# Patient Record
Sex: MALE | Race: WHITE | Employment: OTHER | URBAN - METROPOLITAN AREA
[De-identification: names, ages, dates, MRNs, and addresses within clinical notes are randomized per-mention and may not be internally consistent; named-entity substitution may affect disease eponyms.]

---

## 2017-01-17 ENCOUNTER — APPOINTMENT (OUTPATIENT)
Dept: GENERAL RADIOLOGY | Age: 82
End: 2017-01-17
Attending: PHYSICIAN ASSISTANT
Payer: MEDICARE

## 2017-01-17 ENCOUNTER — HOSPITAL ENCOUNTER (EMERGENCY)
Age: 82
Discharge: HOME | End: 2017-01-17
Attending: EMERGENCY MEDICINE
Payer: MEDICARE

## 2017-01-17 ENCOUNTER — APPOINTMENT (OUTPATIENT)
Dept: CT IMAGING | Age: 82
End: 2017-01-17
Attending: PHYSICIAN ASSISTANT
Payer: MEDICARE

## 2017-01-17 VITALS
WEIGHT: 195 LBS | HEIGHT: 68 IN | RESPIRATION RATE: 16 BRPM | HEART RATE: 71 BPM | OXYGEN SATURATION: 98 % | TEMPERATURE: 97.6 F | BODY MASS INDEX: 29.55 KG/M2 | SYSTOLIC BLOOD PRESSURE: 156 MMHG | DIASTOLIC BLOOD PRESSURE: 80 MMHG

## 2017-01-17 DIAGNOSIS — T50.905A MEDICATION SIDE EFFECT, INITIAL ENCOUNTER: ICD-10-CM

## 2017-01-17 DIAGNOSIS — R11.0 NAUSEA ALONE: ICD-10-CM

## 2017-01-17 DIAGNOSIS — R42 DIZZINESS: Primary | ICD-10-CM

## 2017-01-17 LAB
ALBUMIN SERPL BCP-MCNC: 3 G/DL (ref 3.4–5)
ALBUMIN/GLOB SERPL: 0.8 {RATIO} (ref 0.8–1.7)
ALP SERPL-CCNC: 109 U/L (ref 45–117)
ALT SERPL-CCNC: 26 U/L (ref 16–61)
ANION GAP BLD CALC-SCNC: 9 MMOL/L (ref 3–18)
APPEARANCE UR: CLEAR
AST SERPL W P-5'-P-CCNC: 24 U/L (ref 15–37)
ATRIAL RATE: 214 BPM
BASOPHILS # BLD AUTO: 0 K/UL (ref 0–0.06)
BASOPHILS # BLD: 0 % (ref 0–2)
BILIRUB SERPL-MCNC: 0.3 MG/DL (ref 0.2–1)
BILIRUB UR QL: NEGATIVE
BUN SERPL-MCNC: 21 MG/DL (ref 7–18)
BUN/CREAT SERPL: 17 (ref 12–20)
CALCIUM SERPL-MCNC: 8.2 MG/DL (ref 8.5–10.1)
CALCULATED P AXIS, ECG09: 30 DEGREES
CALCULATED R AXIS, ECG10: -2 DEGREES
CALCULATED T AXIS, ECG11: 6 DEGREES
CHLORIDE SERPL-SCNC: 104 MMOL/L (ref 100–108)
CK MB CFR SERPL CALC: 2.1 % (ref 0–4)
CK MB SERPL-MCNC: 1.4 NG/ML (ref 0.5–3.6)
CK SERPL-CCNC: 67 U/L (ref 39–308)
CO2 SERPL-SCNC: 30 MMOL/L (ref 21–32)
COLOR UR: YELLOW
CREAT SERPL-MCNC: 1.25 MG/DL (ref 0.6–1.3)
DIAGNOSIS, 93000: NORMAL
DIFFERENTIAL METHOD BLD: ABNORMAL
EOSINOPHIL # BLD: 0.2 K/UL (ref 0–0.4)
EOSINOPHIL NFR BLD: 3 % (ref 0–5)
ERYTHROCYTE [DISTWIDTH] IN BLOOD BY AUTOMATED COUNT: 15.3 % (ref 11.6–14.5)
GLOBULIN SER CALC-MCNC: 3.6 G/DL (ref 2–4)
GLUCOSE SERPL-MCNC: 216 MG/DL (ref 74–99)
GLUCOSE UR STRIP.AUTO-MCNC: NEGATIVE MG/DL
HCT VFR BLD AUTO: 37.4 % (ref 36–48)
HGB BLD-MCNC: 12.3 G/DL (ref 13–16)
HGB UR QL STRIP: NEGATIVE
KETONES UR QL STRIP.AUTO: NEGATIVE MG/DL
LEUKOCYTE ESTERASE UR QL STRIP.AUTO: NEGATIVE
LYMPHOCYTES # BLD AUTO: 30 % (ref 21–52)
LYMPHOCYTES # BLD: 1.7 K/UL (ref 0.9–3.6)
MCH RBC QN AUTO: 27.9 PG (ref 24–34)
MCHC RBC AUTO-ENTMCNC: 32.9 G/DL (ref 31–37)
MCV RBC AUTO: 84.8 FL (ref 74–97)
MONOCYTES # BLD: 0.3 K/UL (ref 0.05–1.2)
MONOCYTES NFR BLD AUTO: 6 % (ref 3–10)
NEUTS SEG # BLD: 3.5 K/UL (ref 1.8–8)
NEUTS SEG NFR BLD AUTO: 61 % (ref 40–73)
NITRITE UR QL STRIP.AUTO: NEGATIVE
PH UR STRIP: 5 [PH] (ref 5–8)
PLATELET # BLD AUTO: 207 K/UL (ref 135–420)
PMV BLD AUTO: 9.4 FL (ref 9.2–11.8)
POTASSIUM SERPL-SCNC: 3.8 MMOL/L (ref 3.5–5.5)
PROT SERPL-MCNC: 6.6 G/DL (ref 6.4–8.2)
PROT UR STRIP-MCNC: NEGATIVE MG/DL
Q-T INTERVAL, ECG07: 426 MS
QRS DURATION, ECG06: 84 MS
QTC CALCULATION (BEZET), ECG08: 435 MS
RBC # BLD AUTO: 4.41 M/UL (ref 4.7–5.5)
SODIUM SERPL-SCNC: 143 MMOL/L (ref 136–145)
SP GR UR REFRACTOMETRY: 1.03 (ref 1–1.03)
TROPONIN I SERPL-MCNC: <0.02 NG/ML (ref 0–0.06)
UROBILINOGEN UR QL STRIP.AUTO: 1 EU/DL (ref 0.2–1)
VENTRICULAR RATE, ECG03: 63 BPM
WBC # BLD AUTO: 5.7 K/UL (ref 4.6–13.2)

## 2017-01-17 PROCEDURE — 71020 XR CHEST PA LAT: CPT

## 2017-01-17 PROCEDURE — 85025 COMPLETE CBC W/AUTO DIFF WBC: CPT | Performed by: PHYSICIAN ASSISTANT

## 2017-01-17 PROCEDURE — 93005 ELECTROCARDIOGRAM TRACING: CPT

## 2017-01-17 PROCEDURE — 82550 ASSAY OF CK (CPK): CPT | Performed by: PHYSICIAN ASSISTANT

## 2017-01-17 PROCEDURE — 96360 HYDRATION IV INFUSION INIT: CPT

## 2017-01-17 PROCEDURE — 80053 COMPREHEN METABOLIC PANEL: CPT | Performed by: PHYSICIAN ASSISTANT

## 2017-01-17 PROCEDURE — 74011250636 HC RX REV CODE- 250/636: Performed by: PHYSICIAN ASSISTANT

## 2017-01-17 PROCEDURE — 70450 CT HEAD/BRAIN W/O DYE: CPT

## 2017-01-17 PROCEDURE — 81003 URINALYSIS AUTO W/O SCOPE: CPT | Performed by: PHYSICIAN ASSISTANT

## 2017-01-17 PROCEDURE — 99284 EMERGENCY DEPT VISIT MOD MDM: CPT

## 2017-01-17 RX ORDER — OXYBUTYNIN CHLORIDE 5 MG/1
5 TABLET ORAL
COMMUNITY

## 2017-01-17 RX ORDER — ONDANSETRON 4 MG/1
4 TABLET, ORALLY DISINTEGRATING ORAL
Qty: 20 TAB | Refills: 0 | Status: ON HOLD | OUTPATIENT
Start: 2017-01-17 | End: 2017-09-26

## 2017-01-17 RX ORDER — ONDANSETRON 4 MG/1
4 TABLET, ORALLY DISINTEGRATING ORAL
Qty: 20 TAB | Refills: 0 | Status: SHIPPED | OUTPATIENT
Start: 2017-01-17 | End: 2018-03-13

## 2017-01-17 RX ORDER — DONEPEZIL HYDROCHLORIDE 5 MG/1
5 TABLET, FILM COATED ORAL
COMMUNITY
End: 2018-06-11

## 2017-01-17 RX ADMIN — SODIUM CHLORIDE 250 ML: 9 INJECTION, SOLUTION INTRAVENOUS at 15:16

## 2017-01-17 NOTE — ED NOTES
Bedside report complete. Patient was introduced to oncsanjay Lara RN. Patient updated on plan of care. Safety check performed: Bed locked in low posiiton. Side rails up. Call bell and personal items within reach. Daughter at bedside.

## 2017-01-17 NOTE — ED PROVIDER NOTES
HPI Comments:   3:01 PM     Gabriel Rios. is a 80 y.o. male who presents to ED c/o dizziness onset 10 days ago  Associated symptoms include nausea/diarrhea and generalized weakness. He states he feels like he is having \"motion sickness. \" Pt reports his dizziness is fine when he is lying down but is exacerbated by being up/moving around. Pt reports he recently started on Cardizem 10 days ago by his cardiologist. 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 Pt denies chest pain, headache, generalized body aches, vomiting, any pain, syncope, weakness, sick contacts, hx of vertigo (does say he has motion sickness), and any other symptoms or complaints. Patient is a 80 y.o. male presenting with dizziness. The history is provided by the patient. No  was used. Dizziness   This is a new problem. The current episode started more than 1 week ago. The problem has not changed since onset. Associated symptoms include nausea. Pertinent negatives include no chest pain, no vomiting and no headaches.         Past Medical History:   Diagnosis Date    Arthritis     Atrial fibrillation ([address]     Depression     Diabetes ([address] 1980s    GERD (gastroesophageal reflux disease)     Hypercholesterolemia     Hypertension      patient denies     Ulcer ([address]      at the anastomotic bite of gastric bypass    Varicose veins     Wide-complex tachycardia ([address] 5/2/2015       Past Surgical History:   Procedure Laterality Date    Endoscopy, colon, diagnostic      Egd  12/29/09     with biopsy    Biopsy liver  10/05/04    Gastrostomy tube  10/05/04    Pr abdomen surgery proc unlisted  1998     umb hernia Rupture    Hx cholecystectomy  10/05/04    Hx gi  10/05/04     vertical banded gastroplasty/gastric bypass    Hx mohs procedure  1991/1995     bilateral         Family History:   Problem Relation Age of Onset    Heart Attack Father     Diabetes Father     Hypertension Father     Cancer Mother      lung  Diabetes Mother     Other Brother      obese    Diabetes Brother     Diabetes Brother     Other Maternal Grandmother      obese    Diabetes Sister        Social History     Social History    Marital status:      Spouse name: N/A    Number of children: N/A    Years of education: N/A     Occupational History    Not on file. Social History Main Topics    Smoking status: Former Smoker    Smokeless tobacco: Not on file    Alcohol use 1.8 oz/week     1 Glasses of wine, 1 Cans of beer, 1 Shots of liquor per week      Comment: yearly    Drug use: No    Sexual activity: Not on file     Other Topics Concern    Not on file     Social History Narrative         ALLERGIES: Review of patient's allergies indicates no known allergies. Review of Systems   Constitutional:        Negative generalized body aches. Cardiovascular: Negative for chest pain. Gastrointestinal: Positive for diarrhea and nausea. Negative for vomiting. Neurological: Positive for dizziness. Negative for weakness and headaches. All other systems reviewed and are negative. Vitals:    01/17/17 1650 01/17/17 1700 01/17/17 1715 01/17/17 1733   BP:  146/82 135/87 156/80   Pulse:       Resp:       Temp:       SpO2: 98% 97% 96% 98%   Weight:       Height:                Physical Exam   Constitutional: He is oriented to person, place, and time. He appears well-developed and well-nourished. No distress. HENT:   Head: Normocephalic and atraumatic. Right Ear: External ear normal.   Left Ear: External ear normal.   Nose: Nose normal.   Mouth/Throat: Oropharynx is clear and moist.   Eyes: Conjunctivae and EOM are normal. Pupils are equal, round, and reactive to light. Neck: Normal range of motion. Neck supple. Cardiovascular: Normal rate and regular rhythm. Pulmonary/Chest: Effort normal and breath sounds normal. He has no wheezes. Abdominal: Soft. Bowel sounds are normal. He exhibits no distension.  There is no tenderness. There is no rebound and no guarding. Musculoskeletal: Normal range of motion. He exhibits no edema or tenderness. Neurological: He is alert and oriented to person, place, and time. He has normal strength. No cranial nerve deficit or sensory deficit. Coordination and gait normal.   Skin: Skin is warm and dry. Psychiatric: He has a normal mood and affect. His behavior is normal.   Nursing note and vitals reviewed. RESULTS:    EKG interpretation: (Preliminary)  14:48   Atrial flutter with variable AV block, 63 bpm, no change from previous 12/27/16  EKG read by Shawn Shaffer PA-C    CT HEAD WO CONT   Final Result  IMPRESSION:        1. No acute intracranial abnormality. Of note, noncontrast head CT can be normal  in the context of early acute stroke. 2. Mild periventricular white matter hypoattenuation, an unchanged and  nonspecific finding likely pertaining to chronic ischemic microvascular change. As read by the radiologist.       XR CHEST PA LAT   Final Result  IMPRESSION:     1. Right mid and lower lung field strandy opacity with associated right pleural  effusion, recommend clinical correlation for pneumonia. Small left pleural  effusion. Mild cardiomegaly. As read by the radiologist.            Labs Reviewed   CBC WITH AUTOMATED DIFF - Abnormal; Notable for the following:        Result Value    RBC 4.41 (*)     HGB 12.3 (*)     RDW 15.3 (*)     All other components within normal limits   METABOLIC PANEL, COMPREHENSIVE - Abnormal; Notable for the following:     Glucose 216 (*)     BUN 21 (*)     GFR est non-AA 56 (*)     Calcium 8.2 (*)     Albumin 3.0 (*)     All other components within normal limits   CARDIAC PANEL,(CK, CKMB & TROPONIN)   URINALYSIS W/ RFLX MICROSCOPIC       No results found for this or any previous visit (from the past 12 hour(s)).      MDM  Number of Diagnoses or Management Options     Amount and/or Complexity of Data Reviewed  Clinical lab tests: ordered and reviewed  Tests in the radiology section of CPT®: ordered and reviewed (CT head, CXR)  Tests in the medicine section of CPT®: ordered and reviewed (EKG)  Review and summarize past medical records: yes (Upon review of pt's chart, pt had admission on 12/27/16 for SOB and pneumonia. He was evalauted by cardiology. The only new medications that were started were Xarelto. The plan was to discharge pt and to have NAOMI guided cardioversion after 3-4 weeks of oral anticoagulation. )  Independent visualization of images, tracings, or specimens: yes (EKG, CXR )      ED Course     MEDICATIONS GIVEN:  Medications   sodium chloride 0.9 % bolus infusion 250 mL (0 mL IntraVENous IV Completed 1/17/17 0136)          Procedures      PROGRESS NOTE:  2:51 PM  Initial assessment performed. PROGRESS NOTE:   5:36 PM  Pt feeling better. He is up and ambulatory to the restroom without any assistance. Family at bedside. Long discussion with pt and family regarding possibility of dehydration versus medication side effect versus vertigo. He has an appointment with cardiology in 3 days to go over medications and undergo cardioversion for atrial flutter. His diagnostics all within normal limits, vital signs stable and suitable for discharge home. Pt and family agree with plan. Written by SAI Robles, as dictated by Kaushal Reddy PA-C .        DISCHARGE NOTE:  5:37 PM   Johanna Robertson Jr.'s  results have been reviewed with him. He has been counseled regarding his diagnosis, treatment, and plan. He verbally conveys understanding and agreement of the signs, symptoms, diagnosis, treatment and prognosis and additionally agrees to follow up as discussed. He also agrees with the care-plan and conveys that all of his questions have been answered.   I have also provided discharge instructions for him that include: educational information regarding their diagnosis and treatment, and list of reasons why they would want to return to the ED prior to their follow-up appointment, should his condition change. The patient and/or family has been provided with education for proper Emergency Department utilization. CLINICAL IMPRESSION:    1. Dizziness    2. Nausea alone    3. Medication side effect, initial encounter, possible        PLAN: DISCHARGE HOME    Follow-up Information     Follow up With Details Comments [address], [place] Schedule an appointment as soon as possible for a visit in 1 day Follow up with cardiology [address]  [address] [place] [address] [facility] EMERGENCY DEPT Go to As needed, If symptoms worsen [address] [address]  [phone]          Discharge Medication List as of 1/17/2017  5:38 PM      START taking these medications    Details   !! ondansetron (ZOFRAN ODT) 4 mg disintegrating tablet Take 1 Tab by mouth every eight (8) hours as needed for Nausea., Normal, Disp-20 Tab, R-0      !! ondansetron (ZOFRAN ODT) 4 mg disintegrating tablet Take 1 Tab by mouth every eight (8) hours as needed for Nausea., Normal, Disp-20 Tab, R-0       !! - Potential duplicate medications found. Please discuss with provider. CONTINUE these medications which have NOT CHANGED    Details   donepezil (ARICEPT) 5 mg tablet Take 5 mg by mouth nightly., Historical Med      oxybutynin (DITROPAN) 5 mg tablet Take 5 mg by mouth daily. , Historical Med      metoprolol tartrate 75 mg tab Take 75 mg by mouth every twelve (12) hours. , Print, Disp-60 Tab, R-0      rivaroxaban (XARELTO) 20 mg tab tablet Take 1 Tab by mouth daily. , Print, Disp-30 Tab, R-2      traMADol (ULTRAM) 50 mg tablet Take 50 mg by mouth every six (6) hours as needed for Pain., Historical Med      SITagliptin (JANUVIA) 100 mg tablet Take 100 mg by mouth daily. , Historical Med      VITAMIN D2 50,000 unit capsule TAKE 1 CAPSULE TWICE WEEKLY, Normal, Disp-52 Cap, R-0      dilTIAZem CD (CARDIZEM CD) 240 mg ER capsule Take 1 Cap by mouth daily. , Print, Disp-30 Cap, R-0      Omeprazole delayed release (PRILOSEC D/R) 20 mg tablet Take 20 mg by mouth daily. , Historical Med      multivitamin, tx-iron-ca-min (THERA-M W/ IRON) 9 mg iron-400 mcg tab tablet Take 1 Tab by mouth daily. , Historical Med      cholecalciferol (VITAMIN D3) 1,000 unit tablet Take  by mouth daily. , Historical Med      furosemide (LASIX) 20 mg tablet Take 40 mg by mouth daily. , Historical Med      melatonin tab tablet Take  by mouth nightly. Indications: sleep, Historical Med      ACETAMINOPHEN/DIPHENHYDRAMINE (TYLENOL PM PO) Take  by mouth nightly., Historical Med      insulin lispro (HUMALOG) 100 unit/mL injection by SubCUTAneous route. Indications: TYPE 2 DIABETES MELLITUS, sliding scale, Historical Med      insulin glargine (LANTUS) 100 unit/mL injection 12 Units by SubCUTAneous route nightly. Indications: TYPE 2 DIABETES MELLITUS, Historical Med      metFORMIN (GLUCOPHAGE) 500 mg tablet Take 500 mg by mouth nightly. Indications: type 2 diabetes mellitus, Historical Med      buPROPion XL (WELLBUTRIN XL) 150 mg tablet Take 150 mg by mouth every morning. Indications: MAJOR DEPRESSIVE DISORDER, Historical Med      vilazodone (VIIBRYD) 40 mg Tab Take  by mouth daily. Indications: MAJOR DEPRESSIVE DISORDER, Historical Med             ATTESTATIONS:  This note is prepared by Sophia Walters, acting as Scribe for Dmitriy Pop PA-C . Dmitriy Pop PA-C: The scribe's documentation has been prepared under my direction and personally reviewed by me in its entirety. I confirm that the note above accurately reflects all work, treatment, procedures, and medical decision making performed by me.

## 2017-01-17 NOTE — DISCHARGE INSTRUCTIONS
Dizziness: Care Instructions  Your Care Instructions  Dizziness is the feeling of unsteadiness or fuzziness in your head. It is different than having vertigo, which is a feeling that the room is spinning or that you are moving or falling. It is also different from lightheadedness, which is the feeling that you are about to faint. It can be hard to know what causes dizziness. Some people feel dizzy when they have migraine headaches. Sometimes bouts of flu can make you feel dizzy. Some medical conditions, such as heart problems or high blood pressure, can make you feel dizzy. Many medicines can cause dizziness, including medicines for high blood pressure, pain, or anxiety. If a medicine causes your symptoms, your doctor may recommend that you stop or change the medicine. If it is a problem with your heart, you may need medicine to help your heart work better. If there is no clear reason for your symptoms, your doctor may suggest watching and waiting for a while to see if the dizziness goes away on its own. Follow-up care is a key part of your treatment and safety. Be sure to make and go to all appointments, and call your doctor if you are having problems. It's also a good idea to know your test results and keep a list of the medicines you take. How can you care for yourself at home? · If your doctor recommends or prescribes medicine, take it exactly as directed. Call your doctor if you think you are having a problem with your medicine. · Do not drive while you feel dizzy. · Try to prevent falls. Steps you can take include:  ¨ Using nonskid mats, adding grab bars near the tub, and using night-lights. ¨ Clearing your home so that walkways are free of anything you might trip on. ¨ Letting family and friends know that you have been feeling dizzy. This will help them know how to help you. When should you call for help? Call 911 anytime you think you may need emergency care.  For example, call if:  · You passed out (lost consciousness). · You have dizziness along with symptoms of a heart attack. These may include:  ¨ Chest pain or pressure, or a strange feeling in the chest.  ¨ Sweating. ¨ Shortness of breath. ¨ Nausea or vomiting. ¨ Pain, pressure, or a strange feeling in the back, neck, jaw, or upper belly or in one or both shoulders or arms. ¨ Lightheadedness or sudden weakness. ¨ A fast or irregular heartbeat. · You have symptoms of a stroke. These may include:  ¨ Sudden numbness, tingling, weakness, or loss of movement in your face, arm, or leg, especially on only one side of your body. ¨ Sudden vision changes. ¨ Sudden trouble speaking. ¨ Sudden confusion or trouble understanding simple statements. ¨ Sudden problems with walking or balance. ¨ A sudden, severe headache that is different from past headaches. Call your doctor now or seek immediate medical care if:  · You feel dizzy and have a fever, headache, or ringing in your ears. · You have new or increased nausea and vomiting. · Your dizziness does not go away or comes back. Watch closely for changes in your health, and be sure to contact your doctor if:  · You do not get better as expected. Where can you learn more? Go to http://jared-bill.info/. Enter R733 in the search box to learn more about \"Dizziness: Care Instructions. \"  Current as of: May 27, 2016  Content Version: 11.1  © 3770-7447 [employer]. Care instructions adapted under license by [employer] (which disclaims liability or warranty for this information). If you have questions about a medical condition or this instruction, always ask your healthcare professional. [place] [address] any warranty or liability for your use of this information.

## 2017-01-17 NOTE — ED TRIAGE NOTES
Pt states nausea and dizziness, onset 10 days ago, seen by Dr. Bay Schmitt on Friday, discussed current meds, didn't seem to be concerned, pt states received phone call from his nurse to report to the ED for testing, pt states hx of gastric bypass and unable to vomit. C/o diarrhea. Pt states feels like motion sickness, pt also reports this has happened since he has been on Cardizem  Sepsis Screening completed    (  )Patient meets SIRS criteria. (x  )Patient does not meet SIRS criteria.       SIRS Criteria is achieved when two or more of the following are present   Temperature < 96.8°F (36°C) or > 100.9°F (38.3°C)   Heart Rate > 90 beats per minute   Respiratory Rate > 20 beats per minute   WBC count > 12,000 or <4,000 or > 10% bands

## 2017-01-17 NOTE — ED NOTES
RN in room for purpose of hourly rounding. Room checked for trash and safety hazards. Patient is. ..    [  ] seated at bedside. [ x ] lying in bed with side rails up and call bell in reach. [  ] lying in with call bell in reach and continuous monitoring in place. Pain reduction interventions. .. [x  ] not indicated at this time      include the following   [  ] administration of analgesic medications   [  ] lights turned down   [  ] patient offered a cool washcloth   [  ] heat applied   [  ] ice applied   [  ] patient repositioned    Restroom needs addressed. Patient is. ... [x  ] Not in need restroom assistance at this time  [  ] Ambulatory as needed to the restroom  [  ] Assisted to bedside commode  [  ] Assisted with use of bed pan  [  ] Provided with a urinal    Position. .. [x  ] Patient does not require assistance with repositioning  [  ] Patient repositions with assistance of RN  [  ] Patient repositioned with assistance of RN and other ED staff.

## 2017-01-17 NOTE — ED NOTES
Care assumed for discharge only. Pt given discharge instructions and verbalizes understanding. Patient armband removed and shredded. Pt discharged home ambulatory with family, denies complaints on discharge. No distress noted.

## 2017-02-02 ENCOUNTER — APPOINTMENT (OUTPATIENT)
Dept: GENERAL RADIOLOGY | Age: 82
End: 2017-02-02
Attending: EMERGENCY MEDICINE
Payer: MEDICARE

## 2017-02-02 ENCOUNTER — HOSPITAL ENCOUNTER (EMERGENCY)
Age: 82
Discharge: HOME | End: 2017-02-02
Attending: EMERGENCY MEDICINE | Admitting: EMERGENCY MEDICINE
Payer: MEDICARE

## 2017-02-02 VITALS
RESPIRATION RATE: 13 BRPM | TEMPERATURE: 98.2 F | BODY MASS INDEX: 29.1 KG/M2 | SYSTOLIC BLOOD PRESSURE: 152 MMHG | WEIGHT: 192 LBS | HEART RATE: 64 BPM | HEIGHT: 68 IN | OXYGEN SATURATION: 97 % | DIASTOLIC BLOOD PRESSURE: 73 MMHG

## 2017-02-02 DIAGNOSIS — I50.9 CHF (CONGESTIVE HEART FAILURE), NYHA CLASS II, UNSPECIFIED FAILURE CHRONICITY, UNSPECIFIED TYPE (HCC): Primary | ICD-10-CM

## 2017-02-02 LAB
ALBUMIN SERPL BCP-MCNC: 3.1 G/DL (ref 3.4–5)
ALBUMIN/GLOB SERPL: 0.8 {RATIO} (ref 0.8–1.7)
ALP SERPL-CCNC: 113 U/L (ref 45–117)
ALT SERPL-CCNC: 34 U/L (ref 16–61)
ANION GAP BLD CALC-SCNC: 4 MMOL/L (ref 3–18)
AST SERPL W P-5'-P-CCNC: 24 U/L (ref 15–37)
BASOPHILS # BLD AUTO: 0 K/UL (ref 0–0.06)
BASOPHILS # BLD: 0 % (ref 0–2)
BILIRUB SERPL-MCNC: 0.5 MG/DL (ref 0.2–1)
BNP SERPL-MCNC: 2298 PG/ML (ref 0–1800)
BUN SERPL-MCNC: 26 MG/DL (ref 7–18)
BUN/CREAT SERPL: 20 (ref 12–20)
CALCIUM SERPL-MCNC: 8.7 MG/DL (ref 8.5–10.1)
CHLORIDE SERPL-SCNC: 108 MMOL/L (ref 100–108)
CK MB CFR SERPL CALC: 1.8 % (ref 0–4)
CK MB SERPL-MCNC: 1.3 NG/ML (ref 0.5–3.6)
CK SERPL-CCNC: 71 U/L (ref 39–308)
CO2 SERPL-SCNC: 33 MMOL/L (ref 21–32)
CREAT SERPL-MCNC: 1.27 MG/DL (ref 0.6–1.3)
DIFFERENTIAL METHOD BLD: ABNORMAL
EOSINOPHIL # BLD: 0.2 K/UL (ref 0–0.4)
EOSINOPHIL NFR BLD: 3 % (ref 0–5)
ERYTHROCYTE [DISTWIDTH] IN BLOOD BY AUTOMATED COUNT: 16.1 % (ref 11.6–14.5)
GLOBULIN SER CALC-MCNC: 3.7 G/DL (ref 2–4)
GLUCOSE SERPL-MCNC: 139 MG/DL (ref 74–99)
HCT VFR BLD AUTO: 38.7 % (ref 36–48)
HGB BLD-MCNC: 12.2 G/DL (ref 13–16)
LYMPHOCYTES # BLD AUTO: 26 % (ref 21–52)
LYMPHOCYTES # BLD: 1.6 K/UL (ref 0.9–3.6)
MCH RBC QN AUTO: 27.2 PG (ref 24–34)
MCHC RBC AUTO-ENTMCNC: 31.5 G/DL (ref 31–37)
MCV RBC AUTO: 86.2 FL (ref 74–97)
MONOCYTES # BLD: 0.6 K/UL (ref 0.05–1.2)
MONOCYTES NFR BLD AUTO: 10 % (ref 3–10)
NEUTS SEG # BLD: 4 K/UL (ref 1.8–8)
NEUTS SEG NFR BLD AUTO: 61 % (ref 40–73)
PLATELET # BLD AUTO: 244 K/UL (ref 135–420)
PMV BLD AUTO: 9.9 FL (ref 9.2–11.8)
POTASSIUM SERPL-SCNC: 3.7 MMOL/L (ref 3.5–5.5)
PROT SERPL-MCNC: 6.8 G/DL (ref 6.4–8.2)
RBC # BLD AUTO: 4.49 M/UL (ref 4.7–5.5)
SODIUM SERPL-SCNC: 145 MMOL/L (ref 136–145)
TROPONIN I SERPL-MCNC: 0.02 NG/ML (ref 0–0.06)
WBC # BLD AUTO: 6.4 K/UL (ref 4.6–13.2)

## 2017-02-02 PROCEDURE — 96374 THER/PROPH/DIAG INJ IV PUSH: CPT

## 2017-02-02 PROCEDURE — 85025 COMPLETE CBC W/AUTO DIFF WBC: CPT | Performed by: EMERGENCY MEDICINE

## 2017-02-02 PROCEDURE — 93005 ELECTROCARDIOGRAM TRACING: CPT

## 2017-02-02 PROCEDURE — 80053 COMPREHEN METABOLIC PANEL: CPT | Performed by: EMERGENCY MEDICINE

## 2017-02-02 PROCEDURE — 71010 XR CHEST PORT: CPT

## 2017-02-02 PROCEDURE — 99284 EMERGENCY DEPT VISIT MOD MDM: CPT

## 2017-02-02 PROCEDURE — 74011250636 HC RX REV CODE- 250/636: Performed by: EMERGENCY MEDICINE

## 2017-02-02 PROCEDURE — 82550 ASSAY OF CK (CPK): CPT | Performed by: EMERGENCY MEDICINE

## 2017-02-02 PROCEDURE — 83880 ASSAY OF NATRIURETIC PEPTIDE: CPT | Performed by: EMERGENCY MEDICINE

## 2017-02-02 RX ORDER — SODIUM CHLORIDE 0.9 % (FLUSH) 0.9 %
5-10 SYRINGE (ML) INJECTION AS NEEDED
Status: DISCONTINUED | OUTPATIENT
Start: 2017-02-02 | End: 2017-02-02 | Stop reason: HOSPADM

## 2017-02-02 RX ORDER — SODIUM CHLORIDE 0.9 % (FLUSH) 0.9 %
5-10 SYRINGE (ML) INJECTION EVERY 8 HOURS
Status: DISCONTINUED | OUTPATIENT
Start: 2017-02-02 | End: 2017-02-02 | Stop reason: HOSPADM

## 2017-02-02 RX ORDER — FUROSEMIDE 10 MG/ML
20 INJECTION INTRAMUSCULAR; INTRAVENOUS
Status: COMPLETED | OUTPATIENT
Start: 2017-02-02 | End: 2017-02-02

## 2017-02-02 RX ADMIN — FUROSEMIDE 20 MG: 10 INJECTION, SOLUTION INTRAMUSCULAR; INTRAVENOUS at 12:56

## 2017-02-02 RX ADMIN — Medication 10 ML: at 12:57

## 2017-02-02 NOTE — ED TRIAGE NOTES
C/o SOB, difficulty taking deep breath onset about 45 minutes ago. Denies CP, dizziness, fever, cough. Sepsis Screening completed    (  )Patient meets SIRS criteria. ( x )Patient does not meet SIRS criteria.       SIRS Criteria is achieved when two or more of the following are present   Temperature < 96.8°F (36°C) or > 100.9°F (38.3°C)   Heart Rate > 90 beats per minute   Respiratory Rate > 20 beats per minute   WBC count > 12,000 or <4,000 or > 10% bands  x

## 2017-02-02 NOTE — DISCHARGE INSTRUCTIONS
Heart Failure: Care Instructions  Your Care Instructions    Heart failure occurs when your heart does not pump as much blood as the body needs. Failure does not mean that the heart has stopped pumping but rather that it is not pumping as well as it should. Over time, this causes fluid buildup in your lungs and other parts of your body. Fluid buildup can cause shortness of breath, fatigue, swollen ankles, and other problems. By taking medicines regularly, reducing sodium (salt) in your diet, checking your weight every day, and making lifestyle changes, you can feel better and live longer. Follow-up care is a key part of your treatment and safety. Be sure to make and go to all appointments, and call your doctor if you are having problems. It's also a good idea to know your test results and keep a list of the medicines you take. How can you care for yourself at home? Medicines  · Be safe with medicines. Take your medicines exactly as prescribed. Call your doctor if you think you are having a problem with your medicine. · Do not take any vitamins, over-the-counter medicine, or herbal products without talking to your doctor first. Elkin Henriquezi not take ibuprofen (Advil or Motrin) and naproxen (Aleve) without talking to your doctor first. They could make your heart failure worse. · You may be taking some of the following medicine. ¨ Beta-blockers can slow heart rate, decrease blood pressure, and improve your condition. Taking a beta-blocker may lower your chance of needing to be hospitalized. ¨ Angiotensin-converting enzyme inhibitors (ACEIs) reduce the heart's workload, lower blood pressure, and reduce swelling. Taking an ACEI may lower your chance of needing to be hospitalized again. ¨ Angiotensin II receptor blockers (ARBs) work like ACEIs. Your doctor may prescribe them instead of ACEIs. ¨ Diuretics, also called water pills, reduce swelling.   ¨ Potassium supplements replace this important mineral, which is sometimes lost with diuretics. ¨ Aspirin and other blood thinners prevent blood clots, which can cause a stroke or heart attack. You will get more details on the specific medicines your doctor prescribes. Diet  · Your doctor may suggest that you limit sodium to 2,000 milligrams (mg) a day or less. That is less than 1 teaspoon of salt a day, including all the salt you eat in cooking or in packaged foods. People get most of their sodium from processed foods. Fast food and restaurant meals also tend to be very high in sodium. · Ask your doctor how much liquid you can drink each day. You may have to limit liquids. Weight  · Weigh yourself without clothing at the same time each day. Record your weight. Call your doctor if you gain more than 3 pounds in 2 to 3 days. A sudden weight gain may mean that your heart failure is getting worse. Activity level  · Start light exercise (if your doctor says it is okay). Even if you can only do a small amount, exercise will help you get stronger, have more energy, and manage your weight and your stress. Walking is an easy way to get exercise. Start out by walking a little more than you did before. Bit by bit, increase the amount you walk. · When you exercise, watch for signs that your heart is working too hard. You are pushing yourself too hard if you cannot talk while you are exercising. If you become short of breath or dizzy or have chest pain, stop, sit down, and rest.  · If you feel \"wiped out\" the day after you exercise, walk slower or for a shorter distance until you can work up to a better pace. · Get enough rest at night. Sleeping with 1 or 2 pillows under your upper body and head may help you breathe easier. Lifestyle changes  · Do not smoke. Smoking can make a heart condition worse. If you need help quitting, talk to your doctor about stop-smoking programs and medicines. These can increase your chances of quitting for good.  Quitting smoking may be the most important step you can take to protect your heart. · Limit alcohol to 2 drinks a day for men and 1 drink a day for women. Too much alcohol can cause health problems. · Avoid getting sick from colds and the flu. Get a pneumococcal vaccine shot. If you have had one before, ask your doctor whether you need another dose. Get a flu shot each year. If you must be around people with colds or the flu, wash your hands often. When should you call for help? Call 911 if you have symptoms of sudden heart failure such as:  · You have severe trouble breathing. · You cough up pink, foamy mucus. · You have a new irregular or rapid heartbeat. Call your doctor now or seek immediate medical care if:  · You have new or increased shortness of breath. · You are dizzy or lightheaded, or you feel like you may faint. · You have sudden weight gain, such as 3 pounds or more in 2 to 3 days. · You have increased swelling in your legs, ankles, or feet. · You are suddenly so tired or weak that you cannot do your usual activities. Watch closely for changes in your health, and be sure to contact your doctor if:  · You develop new symptoms. Where can you learn more? Go to http://jared-bill.info/. Enter J725 in the search box to learn more about \"Heart Failure: Care Instructions. \"  Current as of: January 27, 2016  Content Version: 11.1  © 9916-7734 [employer]. Care instructions adapted under license by [employer] (which disclaims liability or warranty for this information). If you have questions about a medical condition or this instruction, always ask your healthcare professional. [address] any warranty or liability for your use of this information.

## 2017-02-02 NOTE — ED PROVIDER NOTES
HPI Comments:   10:06 AM     Sabiha Humphreys is a 80 y.o. Male with a hx of HTN and depression who presents to ED c/o SOB onset 1 hour ago while watching TV (lasted 30 minutes). He states it worsened while lying in bed. Associated sx include mild leg swelling. Prior episodes of this in the past while mowing the lawn 2 months ago and was admitted to this facility for pneumonia and A-fib. PMHx of arthritis, atrial fibrillation, tachycardia, GERD, and DM. Positive occasional EtOH use (last week) and blood thinner use (Xarelto). PCP is Dr. Joey Anderson. Pt denies numbness/tingling/weakness, tremors, tobacco use, any pain, and any other symptoms or complaints. Patient is a 80 y.o. male presenting with shortness of breath. The history is provided by the patient. No  was used. Shortness of Breath   This is a new problem. The current episode started less than 1 hour ago. The problem has not changed since onset. Associated symptoms include leg swelling (mild).         Past Medical History:   Diagnosis Date    Arthritis     Atrial fibrillation ([address]     Depression     Diabetes ([address] 1980s    GERD (gastroesophageal reflux disease)     Hypercholesterolemia     Hypertension      patient denies     Ulcer ([address]      at the anastomotic bite of gastric bypass    Varicose veins     Wide-complex tachycardia ([address] 5/2/2015       Past Surgical History:   Procedure Laterality Date    Endoscopy, colon, diagnostic      Egd  12/29/09     with biopsy    Biopsy liver  10/05/04    Gastrostomy tube  10/05/04    Pr abdomen surgery proc unlisted  1998     umb hernia Rupture    Hx cholecystectomy  10/05/04    Hx gi  10/05/04     vertical banded gastroplasty/gastric bypass    Hx mohs procedure  1991/1995     bilateral         Family History:   Problem Relation Age of Onset    Heart Attack Father     Diabetes Father     Hypertension Father     Cancer Mother      lung    Diabetes Mother    Ander Barboza Other Brother      obese    Diabetes Brother     Diabetes Brother     Other Maternal Grandmother      obese    Diabetes Sister        Social History     Social History    Marital status:      Spouse name: N/A    Number of children: N/A    Years of education: N/A     Occupational History    Not on file. Social History Main Topics    Smoking status: Former Smoker    Smokeless tobacco: Not on file    Alcohol use 1.8 oz/week     1 Glasses of wine, 1 Cans of beer, 1 Shots of liquor per week      Comment: yearly    Drug use: No    Sexual activity: Not on file     Other Topics Concern    Not on file     Social History Narrative         ALLERGIES: Review of patient's allergies indicates no known allergies. Review of Systems   Respiratory: Positive for shortness of breath. Cardiovascular: Positive for leg swelling (mild). Neurological: Negative for tremors, weakness and numbness. All other systems reviewed and are negative. Vitals:    02/02/17 0948 02/02/17 1000 02/02/17 1030   BP: 139/79 151/80 152/73   Pulse: (!) 55 63 64   Resp: 17 24 13   Temp: 98.2 °F (36.8 °C)     SpO2: 99% 100% 97%   Weight: 87.1 kg (192 lb)     Height: 5' 8\" (1.727 m)              Physical Exam   Constitutional: He is oriented to person, place, and time. He appears well-developed and well-nourished. No distress. Elderly male. HENT:   Head: Normocephalic and atraumatic. Eyes: Pupils are equal, round, and reactive to light. Neck: Neck supple. Cardiovascular: Normal rate, regular rhythm, S1 normal, S2 normal and normal heart sounds. Pulmonary/Chest: Breath sounds normal. No respiratory distress. He has no wheezes. He has no rales. He exhibits no tenderness. Abdominal: Soft. He exhibits no distension and no mass. There is no tenderness. There is no guarding. Musculoskeletal: Normal range of motion. He exhibits no edema or tenderness. Neurological: He is alert and oriented to person, place, and time. No cranial nerve deficit. Skin: No rash noted. Psychiatric: He has a normal mood and affect. His behavior is normal. Thought content normal.   Nursing note and vitals reviewed. RESULTS:    EKG interpretation: (Preliminary)  9:47  Atrial flutter with 4:1 AV conduction, 54 bpm, abnormal ECG  EKG read by May White MD     XR CHEST PORT   Final Result  1. Increasing consolidation right lower lobe.   2. Small right pleural effusion which may be larger than the portable exam from  2017 but is smaller than the CT of the chest.  As read by the radiologist.            Labs Reviewed   CBC WITH AUTOMATED DIFF - Abnormal; Notable for the following:        Result Value    RBC 4.49 (*)     HGB 12.2 (*)     RDW 16.1 (*)     All other components within normal limits   METABOLIC PANEL, COMPREHENSIVE - Abnormal; Notable for the following:     CO2 33 (*)     Glucose 139 (*)     BUN 26 (*)     GFR est non-AA 55 (*)     Albumin 3.1 (*)     All other components within normal limits   PRO-BNP - Abnormal; Notable for the following:     NT pro-BNP 2298 (*)     All other components within normal limits   CARDIAC PANEL,(CK, CKMB & TROPONIN)       Recent Results (from the past 12 hour(s))   EKG, 12 LEAD, INITIAL    Collection Time: 02/02/17  9:47 AM   Result Value Ref Range    Ventricular Rate 54 BPM    Atrial Rate 220 BPM    QRS Duration 86 ms    Q-T Interval 422 ms    QTC Calculation (Bezet) 400 ms    Calculated P Axis 30 degrees    Calculated R Axis 21 degrees    Calculated T Axis 33 degrees    Diagnosis       Atrial flutter with 4:1 AV conduction  Abnormal ECG  When compared with ECG of 17-JAN-2017 14:48,  No significant change was found     CARDIAC PANEL,(CK, CKMB & TROPONIN)    Collection Time: 02/02/17  9:57 AM   Result Value Ref Range    CK 71 39 - 308 U/L    CK - MB 1.3 0.5 - 3.6 ng/ml    CK-MB Index 1.8 0.0 - 4.0 %    Troponin-I, Qt. 0.02 0.00 - 0.06 NG/ML   CBC WITH AUTOMATED DIFF    Collection Time: 02/02/17  9:57 AM   Result Value Ref Range    WBC 6.4 4.6 - 13.2 K/uL    RBC 4.49 (L) 4.70 - 5.50 M/uL    HGB 12.2 (L) 13.0 - 16.0 g/dL    HCT 38.7 36.0 - 48.0 %    MCV 86.2 74.0 - 97.0 FL    MCH 27.2 24.0 - 34.0 PG    MCHC 31.5 31.0 - 37.0 g/dL    RDW 16.1 (H) 11.6 - 14.5 %    PLATELET 162 495 - 870 K/uL    MPV 9.9 9.2 - 11.8 FL    NEUTROPHILS 61 40 - 73 %    LYMPHOCYTES 26 21 - 52 %    MONOCYTES 10 3 - 10 %    EOSINOPHILS 3 0 - 5 %    BASOPHILS 0 0 - 2 %    ABS. NEUTROPHILS 4.0 1.8 - 8.0 K/UL    ABS. LYMPHOCYTES 1.6 0.9 - 3.6 K/UL    ABS. MONOCYTES 0.6 0.05 - 1.2 K/UL    ABS. EOSINOPHILS 0.2 0.0 - 0.4 K/UL    ABS. BASOPHILS 0.0 0.0 - 0.06 K/UL    DF AUTOMATED     METABOLIC PANEL, COMPREHENSIVE    Collection Time: 02/02/17  9:57 AM   Result Value Ref Range    Sodium 145 136 - 145 mmol/L    Potassium 3.7 3.5 - 5.5 mmol/L    Chloride 108 100 - 108 mmol/L    CO2 33 (H) 21 - 32 mmol/L    Anion gap 4 3.0 - 18 mmol/L    Glucose 139 (H) 74 - 99 mg/dL    BUN 26 (H) 7.0 - 18 MG/DL    Creatinine 1.27 0.6 - 1.3 MG/DL    BUN/Creatinine ratio 20 12 - 20      GFR est AA >60 >60 ml/min/1.73m2    GFR est non-AA 55 (L) >60 ml/min/1.73m2    Calcium 8.7 8.5 - 10.1 MG/DL    Bilirubin, total 0.5 0.2 - 1.0 MG/DL    ALT (SGPT) 34 16 - 61 U/L    AST (SGOT) 24 15 - 37 U/L    Alk. phosphatase 113 45 - 117 U/L    Protein, total 6.8 6.4 - 8.2 g/dL    Albumin 3.1 (L) 3.4 - 5.0 g/dL    Globulin 3.7 2.0 - 4.0 g/dL    A-G Ratio 0.8 0.8 - 1.7     PRO-BNP    Collection Time: 02/02/17  9:57 AM   Result Value Ref Range    NT pro-BNP 2298 (H) 0 - 1800 PG/ML        MDM  Number of Diagnoses or Management Options  CHF (congestive heart failure), NYHA class II, unspecified failure chronicity, unspecified type [facility]):   Diagnosis management comments: INITIAL CLINICAL IMPRESSION and PLANS:  The patient presents with the primary complaint(s) of: SOB. The presentation, to include historical aspects and clinical findings are consistent with the DX of SOB.  However, other possible DX's to consider as primary, associated with, or exacerbated by include:    1. CHF  2. Anxiety   3. Panic attack  4. Medication reaction  5. MI  6. ACS  7. Bronchitis  8. GERD  8. PE    Considering the above, my initial management plan to evaluate and therapeutic interventions include the following and as noted in the orders:    1. Labs: cardiac panel, CBC, CMP, pro -BNP  2. Imaging: EKG, CXR    Recorded by Donell Couch ED Scribe, as dictated by Marialuisa Osborne MD.        Amount and/or Complexity of Data Reviewed  Clinical lab tests: ordered and reviewed  Tests in the radiology section of CPT®: ordered and reviewed (CXR)  Tests in the medicine section of CPT®: ordered and reviewed (EKG)  Independent visualization of images, tracings, or specimens: yes (EKG, CXR)      ED Course     MEDICATIONS GIVEN:  Medications   sodium chloride (NS) flush 5-10 mL (not administered)   sodium chloride (NS) flush 5-10 mL (10 mL IntraVENous Given 2/2/17 1257)   furosemide (LASIX) injection 20 mg (20 mg IntraVENous Given 2/2/17 1256)        Procedures           PROGRESS NOTE:  10:06 AM  Initial assessment performed. CONSULT NOTE:   12:43 PM  Marialuisa Osborne MD  spoke with Vazquez Loza MD via telephone consult  Specialty: Cardiology  Discussed pt's hx, disposition, and available diagnostic and imaging results. Reviewed care plans. Consulting physician agrees with plans as outlined. He recommends 1 dose of Lasix and pt is to follow up with Micah Peterson MD tomorrow as scheduled. DISCHARGE NOTE:  12:44 PM   Ros Doyle Jr.'s  results have been reviewed with him. He has been counseled regarding his diagnosis, treatment, and plan. He verbally conveys understanding and agreement of the signs, symptoms, diagnosis, treatment and prognosis and additionally agrees to follow up as discussed. He also agrees with the care-plan and conveys that all of his questions have been answered.   I have also provided discharge instructions for him that include: educational information regarding their diagnosis and treatment, and list of reasons why they would want to return to the ED prior to their follow-up appointment, should his condition change. The patient and/or family has been provided with education for proper Emergency Department utilization. CLINICAL IMPRESSION:    1. CHF (congestive heart failure), NYHA class II, unspecified failure chronicity, unspecified type ([address]        PLAN: DISCHARGE HOME    Follow-up Information     Follow up With Details Comments Virgilio Goldsmith MD Go to Follow up with cardiology as scheduled tomorrow [address]  [address] [place] [address] [facility] EMERGENCY DEPT  As needed, If symptoms worsen [address] [address]  [phone]          Discharge Medication List as of 2/2/2017 12:55 PM      CONTINUE these medications which have NOT CHANGED    Details   donepezil (ARICEPT) 5 mg tablet Take 5 mg by mouth nightly., Historical Med      oxybutynin (DITROPAN) 5 mg tablet Take 5 mg by mouth daily. , Historical Med      !! ondansetron (ZOFRAN ODT) 4 mg disintegrating tablet Take 1 Tab by mouth every eight (8) hours as needed for Nausea., Normal, Disp-20 Tab, R-0      metoprolol tartrate 75 mg tab Take 75 mg by mouth every twelve (12) hours. , Print, Disp-60 Tab, R-0      rivaroxaban (XARELTO) 20 mg tab tablet Take 1 Tab by mouth daily. , Print, Disp-30 Tab, R-2      traMADol (ULTRAM) 50 mg tablet Take 50 mg by mouth every six (6) hours as needed for Pain., Historical Med      SITagliptin (JANUVIA) 100 mg tablet Take 100 mg by mouth daily. , Historical Med      VITAMIN D2 50,000 unit capsule TAKE 1 CAPSULE TWICE WEEKLY, Normal, Disp-52 Cap, R-0      dilTIAZem CD (CARDIZEM CD) 240 mg ER capsule Take 1 Cap by mouth daily. , Print, Disp-30 Cap, R-0      Omeprazole delayed release (PRILOSEC D/R) 20 mg tablet Take 20 mg by mouth daily. , Historical Med      multivitamin, tx-iron-ca-min (THERA-M W/ IRON) 9 mg iron-400 mcg tab tablet Take 1 Tab by mouth daily. , Historical Med      cholecalciferol (VITAMIN D3) 1,000 unit tablet Take  by mouth daily. , Historical Med      furosemide (LASIX) 20 mg tablet Take 40 mg by mouth daily. , Historical Med      melatonin tab tablet Take  by mouth nightly. Indications: sleep, Historical Med      insulin lispro (HUMALOG) 100 unit/mL injection by SubCUTAneous route. Indications: TYPE 2 DIABETES MELLITUS, sliding scale, Historical Med      insulin glargine (LANTUS) 100 unit/mL injection 12 Units by SubCUTAneous route nightly. Indications: TYPE 2 DIABETES MELLITUS, Historical Med      metFORMIN (GLUCOPHAGE) 500 mg tablet Take 500 mg by mouth nightly. Indications: type 2 diabetes mellitus, Historical Med      buPROPion XL (WELLBUTRIN XL) 150 mg tablet Take 150 mg by mouth every morning. Indications: MAJOR DEPRESSIVE DISORDER, Historical Med      vilazodone (VIIBRYD) 40 mg Tab Take  by mouth daily. Indications: MAJOR DEPRESSIVE DISORDER, Historical Med      !! ondansetron (ZOFRAN ODT) 4 mg disintegrating tablet Take 1 Tab by mouth every eight (8) hours as needed for Nausea., Normal, Disp-20 Tab, R-0      ACETAMINOPHEN/DIPHENHYDRAMINE (TYLENOL PM PO) Take  by mouth nightly., Historical Med       !! - Potential duplicate medications found. Please discuss with provider. ATTESTATIONS:  This note is prepared by Merline Shaver, acting as Scribe for Nini Quiroga MD .    Nini Quiroga MD: The scribe's documentation has been prepared under my direction and personally reviewed by me in its entirety. I confirm that the note above accurately reflects all work, treatment, procedures, and medical decision making performed by me.

## 2017-02-06 LAB
ATRIAL RATE: 220 BPM
CALCULATED P AXIS, ECG09: 30 DEGREES
CALCULATED R AXIS, ECG10: 21 DEGREES
CALCULATED T AXIS, ECG11: 33 DEGREES
DIAGNOSIS, 93000: NORMAL
Q-T INTERVAL, ECG07: 422 MS
QRS DURATION, ECG06: 86 MS
QTC CALCULATION (BEZET), ECG08: 400 MS
VENTRICULAR RATE, ECG03: 54 BPM

## 2017-03-20 ENCOUNTER — HOSPITAL ENCOUNTER (EMERGENCY)
Age: 82
Discharge: HOME | End: 2017-03-20
Attending: INTERNAL MEDICINE | Admitting: INTERNAL MEDICINE
Payer: MEDICARE

## 2017-03-20 ENCOUNTER — APPOINTMENT (OUTPATIENT)
Dept: GENERAL RADIOLOGY | Age: 82
End: 2017-03-20
Attending: FAMILY MEDICINE
Payer: MEDICARE

## 2017-03-20 ENCOUNTER — APPOINTMENT (OUTPATIENT)
Dept: CT IMAGING | Age: 82
End: 2017-03-20
Attending: FAMILY MEDICINE
Payer: MEDICARE

## 2017-03-20 VITALS
RESPIRATION RATE: 20 BRPM | TEMPERATURE: 98 F | HEART RATE: 58 BPM | SYSTOLIC BLOOD PRESSURE: 157 MMHG | HEIGHT: 68 IN | BODY MASS INDEX: 29.4 KG/M2 | DIASTOLIC BLOOD PRESSURE: 94 MMHG | WEIGHT: 194 LBS | OXYGEN SATURATION: 100 %

## 2017-03-20 DIAGNOSIS — T14.8XXA ABRASION: ICD-10-CM

## 2017-03-20 DIAGNOSIS — S00.03XA CONTUSION OF SCALP, INITIAL ENCOUNTER: Primary | ICD-10-CM

## 2017-03-20 PROCEDURE — 77030018846 HC SOL IRR STRL H20 ICUM -A

## 2017-03-20 PROCEDURE — 70450 CT HEAD/BRAIN W/O DYE: CPT

## 2017-03-20 PROCEDURE — 99283 EMERGENCY DEPT VISIT LOW MDM: CPT

## 2017-03-20 PROCEDURE — 90715 TDAP VACCINE 7 YRS/> IM: CPT | Performed by: INTERNAL MEDICINE

## 2017-03-20 PROCEDURE — 73080 X-RAY EXAM OF ELBOW: CPT

## 2017-03-20 PROCEDURE — 74011250636 HC RX REV CODE- 250/636: Performed by: INTERNAL MEDICINE

## 2017-03-20 PROCEDURE — 90471 IMMUNIZATION ADMIN: CPT

## 2017-03-20 PROCEDURE — 77030028990 HC ADH TISS DERMFLX CHMP -B

## 2017-03-20 RX ORDER — MUPIROCIN 20 MG/G
OINTMENT TOPICAL 3 TIMES DAILY
Qty: 22 G | Refills: 0 | Status: SHIPPED | OUTPATIENT
Start: 2017-03-20 | End: 2018-03-13

## 2017-03-20 RX ORDER — SULFAMETHOXAZOLE AND TRIMETHOPRIM 800; 160 MG/1; MG/1
1 TABLET ORAL 2 TIMES DAILY
Qty: 20 TAB | Refills: 0 | Status: SHIPPED | OUTPATIENT
Start: 2017-03-20 | End: 2017-03-30

## 2017-03-20 RX ADMIN — TETANUS TOXOID, REDUCED DIPHTHERIA TOXOID AND ACELLULAR PERTUSSIS VACCINE, ADSORBED 0.5 ML: 5; 2.5; 8; 8; 2.5 SUSPENSION INTRAMUSCULAR at 18:54

## 2017-03-20 NOTE — ED PROVIDER NOTES
[address]  EMERGENCY DEPARTMENT HISTORY AND PHYSICAL EXAM       Date: 3/20/2017   Patient Name: Juve Cutler   YOB: 1935  Medical Record Number: [id number]    History of Presenting Illness     Chief Complaint   Patient presents with    Fall    Head Injury        History Provided By:  patient    Additional History:   5:25 PM   Juve Cutler is a 80 y.o. male who presents to the emergency department C/O mechanical fall onto rocks and gravel when he was working on his yard 5.5 hours ago. Pt has associated laceration to left forehead, nose, and left cheek; the bleeding is controlled. Pt states that he \"doesn't feel right\". Pt has not eaten since breakfast but reports he had a diet coke. Patient is on Xarelto. Pt does not know his tetanus status. Denies LOC, dizziness, nausea, vomiting, diarrhea, chest pain, SOB, and any other sxs or complaints.      Primary Care Provider: Gerald Galeazzi, MD   Specialist:    Past History     Past Medical History:   Past Medical History:   Diagnosis Date    Arthritis     Atrial fibrillation ([address]     Depression     Diabetes ([address] 1980s    GERD (gastroesophageal reflux disease)     Hypercholesterolemia     Hypertension     patient denies     Ulcer ([address]     at the anastomotic bite of gastric bypass    Varicose veins     Wide-complex tachycardia ([address] 5/2/2015        Past Surgical History:   Past Surgical History:   Procedure Laterality Date    ABDOMEN SURGERY PROC UNLISTED  1998    umb hernia Rupture    BIOPSY LIVER  10/05/04    EGD  12/29/09    with biopsy    ENDOSCOPY, COLON, DIAGNOSTIC      GASTROSTOMY TUBE  10/05/04    HX CHOLECYSTECTOMY  10/05/04    HX GI  10/05/04    vertical banded gastroplasty/gastric bypass    HX MOHS PROCEDURES  1991/1995    bilateral        Family History:   Family History   Problem Relation Age of Onset    Heart Attack Father     Diabetes Father     Hypertension Father     Cancer Mother lung    Diabetes Mother     Other Brother      obese    Diabetes Brother     Diabetes Brother     Other Maternal Grandmother      obese    Diabetes Sister         Social History:   Social History   Substance Use Topics    Smoking status: Former Smoker    Smokeless tobacco: None    Alcohol use 1.8 oz/week     1 Glasses of wine, 1 Cans of beer, 1 Shots of liquor per week      Comment: yearly        Allergies:   No Known Allergies     Review of Systems   Review of Systems   Respiratory: Negative for shortness of breath. Cardiovascular: Negative for chest pain. Gastrointestinal: Negative for constipation, diarrhea, nausea and vomiting. Genitourinary: Negative. Skin: Positive for wound. Neurological: Negative for dizziness. (-) LOC   All other systems reviewed and are negative. Physical Exam  Vitals:    03/20/17 1502   BP: (!) 157/94   Pulse: (!) 58   Resp: 20   Temp: 98 °F (36.7 °C)   SpO2: 100%   Weight: 88 kg (194 lb)   Height: 5' 8\" (1.727 m)       Physical Exam   Constitutional: He is oriented to person, place, and time. He appears well-developed and well-nourished. HENT:   Head: Normocephalic. Right Ear: External ear normal.   Left Ear: External ear normal.   Nose: Nose normal.   Mouth/Throat: Oropharynx is clear and moist. Mucous membranes are dry. Abrasions forehead, nose, and left cheeck, see skin exam; no depressed skull, point tenderness, otorrhea or rhinorhea. No love sign or periorbital echymosis. No deformity. Eyes: Conjunctivae and EOM are normal. Pupils are equal, round, and reactive to light. Neck: Normal range of motion. Neck supple. No JVD present. No tracheal deviation present. No thyromegaly present. No step offs   Cardiovascular: Normal rate, regular rhythm, normal heart sounds and intact distal pulses. Pulmonary/Chest: Effort normal and breath sounds normal.   Pacer noted in the left upper chest.    Abdominal: Soft.  Bowel sounds are normal. He exhibits no distension and no mass. There is no tenderness. No HSM   Musculoskeletal: Normal range of motion. He exhibits no edema or tenderness. Lymphadenopathy:     He has no cervical adenopathy. Neurological: He is alert and oriented to person, place, and time. He has normal reflexes. No cranial nerve deficit. He exhibits normal muscle tone. Coordination normal.   No focal weakness. Cranial nerves intact. Skin: Skin is warm and dry. Multiple abrasions to the left forehead and supraorbital area. No active bleeding. No foreign body. Abrasion on left check and nasal bridge. Bruising on the lower lip; no lacerations. Abrasions to the left palm. Mild bruising of left elbow. Psychiatric: He has a normal mood and affect. His behavior is normal. Thought content normal.   Nursing note and vitals reviewed. Diagnostic Study Results     Labs -    No results found for this or any previous visit (from the past 12 hour(s)). Radiologic Studies -  CT HEAD WO CONT   Final Result   1. No acute intracranial abnormality. Stable exam.  2. Mild subcortical and periventricular white matter hypoattenuation, a  nonspecific finding likely pertaining to chronic ischemic microvascular change. As read by the radiologist.    XR ELBOW LT MIN 3 V   Final Result   No fracture or dislocation. As read by the radiologist.       Medical Decision Making   I am the first provider for this patient. I reviewed the vital signs, available nursing notes, past medical history, past surgical history, family history and social history. Vital Signs-Reviewed the patient's vital signs. Patient Vitals for the past 12 hrs:   Temp Pulse Resp BP SpO2   03/20/17 1502 98 °F (36.7 °C) (!) 58 20 (!) 157/94 100 %       Pulse Oximetry Analysis - Normal 100% on room air     Old Medical Records: Nursing notes. Provider Notes:   Ddx: mechanical fall- fracture, dislocation, bleed. No acute neural findings. Laceration/abrasion.      ED Course:    5:25 PM   Initial assessment performed. Medications Given in the ED:  Medications   diph,Pertuss(AC),Tet Vac-PF (BOOSTRIX) suspension 0.5 mL (not administered)       Discharge Note:  6:26 PM   Pt has been reexamined. Patient has no new complaints, changes, or physical findings. Care plan outlined and precautions discussed. Results were reviewed with the patient. All medications were reviewed with the patient; will d/c home with Bactrim and Bactroban. All of pt's questions and concerns were addressed. Patient was instructed and agrees to follow up with his PCP, as well as to return to the ED upon further deterioration. Patient is ready to go home. Diagnosis   Clinical Impression:   1. Contusion of scalp, initial encounter    2. Abrasion    3. Laceration         Follow-up Information     Follow up With Details Comments Contact Info    Clau Miller MD Schedule an appointment as soon as possible for a visit in 2 days For primary care follow up Edgard Cotton [address] [place] [address] EMERGENCY DEPT  As needed, If symptoms worsen [address] [id number] [phone]          Current Discharge Medication List      START taking these medications    Details   trimethoprim-sulfamethoxazole (BACTRIM DS) 160-800 mg per tablet Take 1 Tab by mouth two (2) times a day for 10 days. Qty: 20 Tab, Refills: 0      mupirocin (BACTROBAN) 2 % ointment Apply  to affected area three (3) times daily. Apply to area where theres is no dermabond (tissue glue)for 10 days  Qty: 22 g, Refills: 0             _______________________________   Attestations: This note is prepared by Candance Nim, acting as a Scribe for Doris Najera MD on 5:25 PM on 3/20/2017 . Doris Najera MD: The scribe's documentation has been prepared under my direction and personally reviewed by me in its entirety.   _______________________________

## 2017-03-20 NOTE — ED TRIAGE NOTES
Pt states tripped and fell outside today hit head on concrete. +abrasion noted to forehead, nose, L cheek. +bruising noted to lower lip. Denies LOC. Pt is on xarelto. Pt states \"I don't feel right. I'm not dizzy or nauseated. \"    Sepsis Screening completed    (  )Patient meets SIRS criteria. (x  )Patient does not meet SIRS criteria.       SIRS Criteria is achieved when two or more of the following are present   Temperature < 96.8°F (36°C) or > 100.9°F (38.3°C)   Heart Rate > 90 beats per minute   Respiratory Rate > 20 breaths per minute   WBC count > 12,000 or <4,000 or > 10% bands

## 2017-05-23 RX ORDER — ERGOCALCIFEROL 1.25 MG/1
CAPSULE ORAL
Qty: 52 CAP | Refills: 0 | Status: SHIPPED | OUTPATIENT
Start: 2017-05-23 | End: 2017-11-19 | Stop reason: SDUPTHER

## 2017-09-25 ENCOUNTER — ANESTHESIA EVENT (OUTPATIENT)
Dept: SURGERY | Age: 82
End: 2017-09-25
Payer: MEDICARE

## 2017-09-25 RX ORDER — SODIUM CHLORIDE 0.9 % (FLUSH) 0.9 %
5-10 SYRINGE (ML) INJECTION AS NEEDED
Status: CANCELLED | OUTPATIENT
Start: 2017-09-25

## 2017-09-25 RX ORDER — DEXTROSE 50 % IN WATER (D50W) INTRAVENOUS SYRINGE
25-50 AS NEEDED
Status: CANCELLED | OUTPATIENT
Start: 2017-09-25

## 2017-09-25 RX ORDER — NALOXONE HYDROCHLORIDE 0.4 MG/ML
0.1 INJECTION, SOLUTION INTRAMUSCULAR; INTRAVENOUS; SUBCUTANEOUS AS NEEDED
Status: CANCELLED | OUTPATIENT
Start: 2017-09-25

## 2017-09-25 RX ORDER — INSULIN LISPRO 100 [IU]/ML
INJECTION, SOLUTION INTRAVENOUS; SUBCUTANEOUS ONCE
Status: CANCELLED | OUTPATIENT
Start: 2017-09-25 | End: 2017-09-26

## 2017-09-25 RX ORDER — FLUMAZENIL 0.1 MG/ML
0.2 INJECTION INTRAVENOUS
Status: CANCELLED | OUTPATIENT
Start: 2017-09-25

## 2017-09-25 RX ORDER — MAGNESIUM SULFATE 100 %
4 CRYSTALS MISCELLANEOUS AS NEEDED
Status: CANCELLED | OUTPATIENT
Start: 2017-09-25

## 2017-09-25 RX ORDER — SODIUM CHLORIDE, SODIUM LACTATE, POTASSIUM CHLORIDE, CALCIUM CHLORIDE 600; 310; 30; 20 MG/100ML; MG/100ML; MG/100ML; MG/100ML
1000 INJECTION, SOLUTION INTRAVENOUS CONTINUOUS
Status: CANCELLED | OUTPATIENT
Start: 2017-09-25

## 2017-09-26 ENCOUNTER — HOSPITAL ENCOUNTER (OUTPATIENT)
Age: 82
Setting detail: OUTPATIENT SURGERY
Discharge: HOME | End: 2017-09-26
Attending: OPHTHALMOLOGY | Admitting: OPHTHALMOLOGY
Payer: MEDICARE

## 2017-09-26 ENCOUNTER — ANESTHESIA (OUTPATIENT)
Dept: SURGERY | Age: 82
End: 2017-09-26
Payer: MEDICARE

## 2017-09-26 VITALS
TEMPERATURE: 97.1 F | SYSTOLIC BLOOD PRESSURE: 119 MMHG | WEIGHT: 193 LBS | RESPIRATION RATE: 12 BRPM | DIASTOLIC BLOOD PRESSURE: 57 MMHG | HEIGHT: 67 IN | HEART RATE: 47 BPM | BODY MASS INDEX: 30.29 KG/M2 | OXYGEN SATURATION: 100 %

## 2017-09-26 PROBLEM — H53.8 BLURRED VISION: Status: ACTIVE | Noted: 2017-09-26

## 2017-09-26 PROBLEM — H53.8 BLURRED VISION: Status: RESOLVED | Noted: 2017-09-26 | Resolved: 2017-09-26

## 2017-09-26 LAB
GLUCOSE BLD STRIP.AUTO-MCNC: 121 MG/DL (ref 70–110)
GLUCOSE BLD STRIP.AUTO-MCNC: 155 MG/DL (ref 70–110)

## 2017-09-26 PROCEDURE — 77030013389: Performed by: OPHTHALMOLOGY

## 2017-09-26 PROCEDURE — 74011250636 HC RX REV CODE- 250/636

## 2017-09-26 PROCEDURE — 82962 GLUCOSE BLOOD TEST: CPT

## 2017-09-26 PROCEDURE — 74011000250 HC RX REV CODE- 250: Performed by: OPHTHALMOLOGY

## 2017-09-26 PROCEDURE — 76210000021 HC REC RM PH II 0.5 TO 1 HR: Performed by: OPHTHALMOLOGY

## 2017-09-26 PROCEDURE — V2632 POST CHMBR INTRAOCULAR LENS: HCPCS | Performed by: OPHTHALMOLOGY

## 2017-09-26 PROCEDURE — 77030013340: Performed by: OPHTHALMOLOGY

## 2017-09-26 PROCEDURE — 76010000138 HC OR TIME 0.5 TO 1 HR: Performed by: OPHTHALMOLOGY

## 2017-09-26 PROCEDURE — 74011250636 HC RX REV CODE- 250/636: Performed by: OPHTHALMOLOGY

## 2017-09-26 PROCEDURE — 74011636637 HC RX REV CODE- 636/637: Performed by: ANESTHESIOLOGY

## 2017-09-26 PROCEDURE — 76060000032 HC ANESTHESIA 0.5 TO 1 HR: Performed by: OPHTHALMOLOGY

## 2017-09-26 DEVICE — LENS IOL POST 1-PC 6X13 18.0 -- ACRYSOF: Type: IMPLANTABLE DEVICE | Site: EYE | Status: FUNCTIONAL

## 2017-09-26 RX ORDER — ONDANSETRON 2 MG/ML
INJECTION INTRAMUSCULAR; INTRAVENOUS AS NEEDED
Status: DISCONTINUED | OUTPATIENT
Start: 2017-09-26 | End: 2017-09-26 | Stop reason: HOSPADM

## 2017-09-26 RX ORDER — TROPICAMIDE 10 MG/ML
1 SOLUTION/ DROPS OPHTHALMIC
Status: COMPLETED | OUTPATIENT
Start: 2017-09-26 | End: 2017-09-26

## 2017-09-26 RX ORDER — FENTANYL CITRATE 50 UG/ML
INJECTION, SOLUTION INTRAMUSCULAR; INTRAVENOUS AS NEEDED
Status: DISCONTINUED | OUTPATIENT
Start: 2017-09-26 | End: 2017-09-26 | Stop reason: HOSPADM

## 2017-09-26 RX ORDER — INSULIN LISPRO 100 [IU]/ML
INJECTION, SOLUTION INTRAVENOUS; SUBCUTANEOUS ONCE
Status: COMPLETED | OUTPATIENT
Start: 2017-09-26 | End: 2017-09-26

## 2017-09-26 RX ORDER — MIDAZOLAM HYDROCHLORIDE 1 MG/ML
INJECTION, SOLUTION INTRAMUSCULAR; INTRAVENOUS AS NEEDED
Status: DISCONTINUED | OUTPATIENT
Start: 2017-09-26 | End: 2017-09-26 | Stop reason: HOSPADM

## 2017-09-26 RX ORDER — SODIUM CHLORIDE, SODIUM LACTATE, POTASSIUM CHLORIDE, CALCIUM CHLORIDE 600; 310; 30; 20 MG/100ML; MG/100ML; MG/100ML; MG/100ML
75 INJECTION, SOLUTION INTRAVENOUS CONTINUOUS
Status: DISCONTINUED | OUTPATIENT
Start: 2017-09-26 | End: 2017-09-26 | Stop reason: HOSPADM

## 2017-09-26 RX ORDER — EPINEPHRINE 1 MG/ML
INJECTION, SOLUTION, CONCENTRATE INTRAVENOUS AS NEEDED
Status: DISCONTINUED | OUTPATIENT
Start: 2017-09-26 | End: 2017-09-26 | Stop reason: HOSPADM

## 2017-09-26 RX ORDER — PHENYLEPHRINE HYDROCHLORIDE 25 MG/ML
1 SOLUTION/ DROPS OPHTHALMIC
Status: COMPLETED | OUTPATIENT
Start: 2017-09-26 | End: 2017-09-26

## 2017-09-26 RX ADMIN — FENTANYL CITRATE 25 MCG: 50 INJECTION, SOLUTION INTRAMUSCULAR; INTRAVENOUS at 08:02

## 2017-09-26 RX ADMIN — MIDAZOLAM HYDROCHLORIDE 0.5 MG: 1 INJECTION, SOLUTION INTRAMUSCULAR; INTRAVENOUS at 08:02

## 2017-09-26 RX ADMIN — PHENYLEPHRINE HYDROCHLORIDE 1 DROP: 2.5 SOLUTION/ DROPS OPHTHALMIC at 06:17

## 2017-09-26 RX ADMIN — FENTANYL CITRATE 50 MCG: 50 INJECTION, SOLUTION INTRAMUSCULAR; INTRAVENOUS at 07:39

## 2017-09-26 RX ADMIN — PHENYLEPHRINE HYDROCHLORIDE 1 DROP: 2.5 SOLUTION/ DROPS OPHTHALMIC at 06:28

## 2017-09-26 RX ADMIN — TROPICAMIDE 1 DROP: 10 SOLUTION/ DROPS OPHTHALMIC at 06:38

## 2017-09-26 RX ADMIN — PHENYLEPHRINE HYDROCHLORIDE 1 DROP: 2.5 SOLUTION/ DROPS OPHTHALMIC at 06:33

## 2017-09-26 RX ADMIN — TROPICAMIDE 1 DROP: 10 SOLUTION/ DROPS OPHTHALMIC at 06:23

## 2017-09-26 RX ADMIN — TROPICAMIDE 1 DROP: 10 SOLUTION/ DROPS OPHTHALMIC at 06:17

## 2017-09-26 RX ADMIN — INSULIN LISPRO 2 UNITS: 100 INJECTION, SOLUTION INTRAVENOUS; SUBCUTANEOUS at 07:03

## 2017-09-26 RX ADMIN — PHENYLEPHRINE HYDROCHLORIDE 1 DROP: 2.5 SOLUTION/ DROPS OPHTHALMIC at 06:38

## 2017-09-26 RX ADMIN — MIDAZOLAM HYDROCHLORIDE 1 MG: 1 INJECTION, SOLUTION INTRAMUSCULAR; INTRAVENOUS at 07:39

## 2017-09-26 RX ADMIN — LIDOCAINE HYDROCHLORIDE 2 DROP: 35 GEL OPHTHALMIC at 06:48

## 2017-09-26 RX ADMIN — SODIUM CHLORIDE, SODIUM LACTATE, POTASSIUM CHLORIDE, AND CALCIUM CHLORIDE 75 ML/HR: 600; 310; 30; 20 INJECTION, SOLUTION INTRAVENOUS at 06:47

## 2017-09-26 RX ADMIN — FENTANYL CITRATE 25 MCG: 50 INJECTION, SOLUTION INTRAMUSCULAR; INTRAVENOUS at 07:51

## 2017-09-26 RX ADMIN — TROPICAMIDE 1 DROP: 10 SOLUTION/ DROPS OPHTHALMIC at 06:33

## 2017-09-26 RX ADMIN — PHENYLEPHRINE HYDROCHLORIDE 1 DROP: 2.5 SOLUTION/ DROPS OPHTHALMIC at 06:23

## 2017-09-26 RX ADMIN — TROPICAMIDE 1 DROP: 10 SOLUTION/ DROPS OPHTHALMIC at 06:28

## 2017-09-26 RX ADMIN — MIDAZOLAM HYDROCHLORIDE 0.5 MG: 1 INJECTION, SOLUTION INTRAMUSCULAR; INTRAVENOUS at 07:51

## 2017-09-26 RX ADMIN — ONDANSETRON 4 MG: 2 INJECTION INTRAMUSCULAR; INTRAVENOUS at 07:39

## 2017-09-26 NOTE — IP AVS SNAPSHOT
[address] [address] 
[phone] Patient: Tricia Yoo MRN: [id number] CCY:1[birth year] You are allergic to the following No active allergies Recent Documentation Height Weight BMI Smoking Status 1.702 m 87.5 kg 30.23 kg/m2 Former Smoker Emergency Contacts Name Discharge Info Relation Home Work Mobile Dori Mcgee DISCHARGE CAREGIVER [3] Spouse [3] [phone] About your hospitalization You were admitted on:  September 26, 2017 You last received care in the:  [address] [place] [address] You were discharged on:  September 26, 2017 Unit phone number:  [phone] Why you were hospitalized Your primary diagnosis was:  Blurred Vision Providers Seen During Your Hospitalizations Provider Role Specialty Primary office phone Eneida Patterson MD Attending Provider Ophthalmology [phone] Your Primary Care Physician (PCP) Primary Care Physician Office Phone Office Fax Selvin Chelsey [phone] Follow-up Information Follow up With Details Comments Contact Info Sondra Eddy [address] [place] [address] 
[phone] Current Discharge Medication List  
  
CONTINUE these medications which have NOT CHANGED Dose & Instructions Dispensing Information Comments Morning Noon Evening Bedtime  
 cholecalciferol 1,000 unit tablet Commonly known as:  VITAMIN D3 Your last dose was: Your next dose is: Take  by mouth daily. Refills:  0  
     
   
   
   
  
 dilTIAZem  mg ER capsule Commonly known as:  CARDIZEM CD Your last dose was: Your next dose is:    
   
   
 Dose:  240 mg Take 1 Cap by mouth daily. Quantity:  30 Cap Refills:  0  
     
   
   
   
  
 donepezil 5 mg tablet Commonly known as:  ARICEPT  
   
 Your last dose was: Your next dose is:    
   
   
 Dose:  5 mg Take 5 mg by mouth nightly. Refills:  0  
     
   
   
   
  
 furosemide 20 mg tablet Commonly known as:  LASIX Your last dose was: Your next dose is:    
   
   
 Dose:  40 mg Take 40 mg by mouth daily. Refills:  0  
     
   
   
   
  
 insulin glargine 100 unit/mL injection Commonly known as:  LANTUS Your last dose was: Your next dose is:    
   
   
 Dose:  12 Units 12 Units by SubCUTAneous route nightly. Indications: TYPE 2 DIABETES MELLITUS Refills:  0  
     
   
   
   
  
 insulin lispro 100 unit/mL injection Commonly known as:  HUMALOG Your last dose was: Your next dose is:    
   
   
 by SubCUTAneous route. Indications: TYPE 2 DIABETES MELLITUS, sliding scale Refills:  0 JANUVIA 100 mg tablet Generic drug:  SITagliptin Your last dose was: Your next dose is:    
   
   
 Dose:  100 mg Take 100 mg by mouth daily. Refills:  0  
     
   
   
   
  
 melatonin Tab tablet Your last dose was: Your next dose is: Take  by mouth nightly. Indications: sleep Refills:  0  
     
   
   
   
  
 metFORMIN 500 mg tablet Commonly known as:  GLUCOPHAGE Your last dose was: Your next dose is:    
   
   
 Dose:  500 mg Take 500 mg by mouth nightly. Indications: type 2 diabetes mellitus Refills:  0  
     
   
   
   
  
 metoprolol tartrate 75 mg Tab Your last dose was: Your next dose is:    
   
   
 Dose:  75 mg Take 75 mg by mouth every twelve (12) hours. Quantity:  60 Tab Refills:  0  
     
   
   
   
  
 multivitamin, tx-iron-ca-min 9 mg iron-400 mcg Tab tablet Commonly known as:  THERA-M w/ IRON Your last dose was: Your next dose is:    
   
   
 Dose:  1 Tab Take 1 Tab by mouth daily. Refills:  0 mupirocin 2 % ointment [facility]  [facility] Your last dose was: Your next dose is:    
   
   
 Apply  to affected area three (3) times daily. Apply to area where theres is no dermabond (tissue glue)for 10 days Quantity:  22 g Refills:  0 Omeprazole delayed release 20 mg tablet Commonly known as:  PRILOSEC D/R Your last dose was: Your next dose is:    
   
   
 Dose:  20 mg Take 20 mg by mouth daily. Refills:  0  
     
   
   
   
  
 ondansetron 4 mg disintegrating tablet Commonly known as:  ZOFRAN ODT Your last dose was: Your next dose is:    
   
   
 Dose:  4 mg Take 1 Tab by mouth every eight (8) hours as needed for Nausea. Quantity:  20 Tab Refills:  0  
     
   
   
   
  
 oxybutynin 5 mg tablet Commonly known as:  NKYHGWNG Your last dose was: Your next dose is:    
   
   
 Dose:  5 mg Take 5 mg by mouth daily. Refills:  0  
     
   
   
   
  
 rivaroxaban 20 mg Tab tablet Commonly known as:  Cliff Saucedoe Your last dose was: Your next dose is:    
   
   
 Dose:  20 mg Take 1 Tab by mouth daily. Quantity:  30 Tab Refills:  2  
     
   
   
   
  
 traMADol 50 mg tablet Commonly known as:  ULTRAM  
   
Your last dose was: Your next dose is:    
   
   
 Dose:  50 mg Take 50 mg by mouth every six (6) hours as needed for Pain. Refills:  0  
     
   
   
   
  
 TYLENOL PM PO Your last dose was: Your next dose is: Take  by mouth nightly. Refills:  0 VIIBRYD 40 mg Tab tablet Generic drug:  vilazodone Your last dose was: Your next dose is: Take  by mouth daily. Indications: MAJOR DEPRESSIVE DISORDER Refills:  0  
     
   
   
   
  
 VITAMIN D2 50,000 unit capsule Generic drug:  ergocalciferol Your last dose was: Your next dose is: TAKE 1 CAPSULE TWICE WEEKLY Quantity:  52 Cap Refills:  0 WELLBUTRIN  mg tablet Generic drug:  buPROPion XL Your last dose was: Your next dose is:    
   
   
 Dose:  150 mg Take 150 mg by mouth every morning. Indications: MAJOR DEPRESSIVE DISORDER Refills:  0 Discharge Instructions DISCHARGE SUMMARY from Nurse The following personal items are in your possession at time of discharge: 
 
Dental Appliances: None Home Medications: None Jewelry: Necklace (x 2  with [place]) Clothing: Footwear, Shirt, Socks (locker #1) Other Valuables: Eyeglasses (with [place]) PATIENT INSTRUCTIONS: 
 
After general anesthesia or intravenous sedation, for 24 hours or while taking prescription Narcotics: · Limit your activities · Do not drive and operate hazardous machinery · Do not make important personal or business decisions · Do  not drink alcoholic beverages · If you have not urinated within 8 hours after discharge, please contact your surgeon on call. Report the following to your surgeon: 
· Excessive pain, swelling, redness or odor of or around the surgical area · Temperature over 100.5 · Nausea and vomiting lasting longer than 4 hours or if unable to take medications · Any signs of decreased circulation or nerve impairment to extremity: change in color, persistent  numbness, tingling, coldness or increase pain · Any questions What to do at Home: 
Regular diet Please follow post op eye drop instructions received from the office Return to office on Wednesday as scheduled If you experience any of the following symptoms bleeding, nausea, severe pain, please follow up with dr Lilo Brewer *  Please give a list of your current medications to your Primary Care Provider.  
 
*  Please update this list whenever your medications are discontinued, doses are 
 changed, or new medications (including over-the-counter products) are added. *  Please carry medication information at all times in case of emergency situations. These are general instructions for a healthy lifestyle: No smoking/ No tobacco products/ Avoid exposure to second hand smoke Surgeon General's Warning:  Quitting smoking now greatly reduces serious risk to your health. Obesity, smoking, and sedentary lifestyle greatly increases your risk for illness A healthy diet, regular physical exercise & weight monitoring are important for maintaining a healthy lifestyle You may be retaining fluid if you have a history of heart failure or if you experience any of the following symptoms:  Weight gain of 3 pounds or more overnight or 5 pounds in a week, increased swelling in our hands or feet or shortness of breath while lying flat in bed. Please call your doctor as soon as you notice any of these symptoms; do not wait until your next office visit. Recognize signs and symptoms of STROKE: 
 
F-face looks uneven A-arms unable to move or move unevenly S-speech slurred or non-existent T-time-call 911 as soon as signs and symptoms begin-DO NOT go Back to bed or wait to see if you get better-TIME IS BRAIN. Warning Signs of HEART ATTACK Call 911 if you have these symptoms: 
? Chest discomfort. Most heart attacks involve discomfort in the center of the chest that lasts more than a few minutes, or that goes away and comes back. It can feel like uncomfortable pressure, squeezing, fullness, or pain. ? Discomfort in other areas of the upper body. Symptoms can include pain or discomfort in one or both arms, the back, neck, jaw, or stomach. ? Shortness of breath with or without chest discomfort. ? Other signs may include breaking out in a cold sweat, nausea, or lightheadedness. Don't wait more than five minutes to call [email or web address]!  Fast action can save your life. Calling 911 is almost always the fastest way to get lifesaving treatment. Emergency Medical Services staff can begin treatment when they arrive  up to an hour sooner than if someone gets to the hospital by car. The discharge information has been reviewed with the patient and caregiver. The patient and caregiver verbalized understanding. Discharge medications reviewed with the patient and caregiver and appropriate educational materials and side effects teaching were provided. Cataract Surgery: What to Expect at [facility] Your Recovery After surgery, your eye will not hurt. But it may feel scratchy, sticky, or uncomfortable. It may also water more than usual. 
Most people see better 1 to 3 days after surgery. But it could take 3 to 10 weeks to get the full benefits of surgery and to see as clearly as possible. Your doctor may send you home with a bandage, patch, or clear shield on your eye. This will keep you from rubbing your eye. Your doctor will also give you eyedrops to help your eye heal. Use them exactly as directed. You can read or watch TV right away, but things may look blurry. Most people are able to return to work or their normal routine in 1 to 3 days. After your eye heals, you may still need to wear glasses, especially for reading. This care sheet gives you a general idea about how long it will take for you to recover. But each person recovers at a different pace. Follow the steps below to get better as quickly as possible. How can you care for yourself at home? Activity · Rest when you feel tired. Getting enough sleep will help you recover. · You may have trouble judging distances for a few days. Move slowly, and be careful going up and down stairs and pouring hot liquids. Ask for help if you need it. · Ask your doctor when it is okay to drive.  
· Wear your eye bandage, patch, or shield for as long as your doctor recommends. You may only need to wear it when you sleep. · You can shower or wash your hair the day after surgery. Keep water, soap, shampoo, hair spray, and shaving lotion out of your eye, especially for the first week. · Do not rub or put pressure on your eye for at least 1 week. · Do not wear eye makeup for 1 to 2 weeks. You may also want to avoid face cream or lotion. · Do not get your hair colored or permed for 10 days after surgery. · Do not bend over or do any strenuous activities, such as biking, jogging, weight lifting, or aerobic exercise, for 2 weeks or until your doctor says it is okay. · Avoid swimming, hot tubs, gardening, and dusting for 1 to 2 weeks. · Wear sunglasses on bright days for at least 1 year after surgery. Medicines · Your doctor will tell you if and when you can restart your medicines. He or she will also give you instructions about taking any new medicines. · If you take blood thinners, such as warfarin (Coumadin), clopidogrel (Plavix), or aspirin, be sure to talk to your doctor. He or she will tell you if and when to start taking those medicines again. Make sure that you understand exactly what your doctor wants you to do. · Follow your doctor's instructions for when to use your eyedrops. Always wash your hands before you put your drops in. To put in eyedrops: ¨ Tilt your head back, and pull your lower eyelid down with one finger. ¨ Drop or squirt the medicine inside the lower lid. ¨ Close your eye for 30 to 60 seconds to let the drops or ointment move around. ¨ Do not touch the ointment or dropper tip to your eyelashes or any other surface. · Follow your doctor's instructions for taking pain medicines. Follow-up care is a key part of your treatment and safety. Be sure to make and go to all appointments, and call your doctor if you are having problems. It's also a good idea to know your test results and keep a list of the medicines you take. When should you call for help? Call 911 anytime you think you may need emergency care. For example, call if: 
· You passed out (lost consciousness). · You have a sudden loss of vision. · You have sudden chest pain, are short of breath, or cough up blood. Call your doctor now or seek immediate medical care if: 
· You have signs of an eye infection, such as: 
¨ Pus or thick discharge coming from the eye. ¨ Redness or swelling around the eye. ¨ A fever. · You have new or worse eye pain. · You have vision changes. · You have symptoms of a blood clot in your leg (called a deep vein thrombosis), such as: 
¨ Pain in the calf, back of the knee, thigh, or groin. ¨ Redness and swelling in your leg or groin. Watch closely for changes in your health, and be sure to contact your doctor if: 
· You do not get better as expected. Where can you learn more? Go to http://jared-bill.info/. Enter R255 in the search box to learn more about \"Cataract Surgery: What to Expect at Home. \" Current as of: March 3, 2017 Content Version: 11.3 © 6583-8888 [employer]. Care instructions adapted under license by [facility] (which disclaims liability or warranty for this information). If you have questions about a medical condition or this instruction, always ask your healthcare professional. [address] any warranty or liability for your use of this information. Patient armband removed and shredded Discharge Orders None Introducing [facility]! Dear Felix Anguiano: Thank you for requesting a [employer] account. Our records indicate that you already have an active [employer] account. You can access your account anytime at [email or web address]. [employer] Did you know that you can access your hospital and ER discharge instructions at any time in [employer]? You can also review all of your test results from your hospital stay or ER visit. Additional Information If you have questions, please visit the Frequently Asked Questions section of the MyChart website at [email or web address] Remember, MyChart is NOT to be used for urgent needs. For medical emergencies, dial 911. Now available from your iPhone and Android! General Information Please provide this summary of care documentation to your next provider. Patient Signature:  ____________________________________________________________ Date:  ____________________________________________________________  
  
Juan Mancia Provider Signature:  ____________________________________________________________ Date:  ____________________________________________________________

## 2017-09-26 NOTE — PROGRESS NOTES
conducted a pre-surgery visit with Heather Hernandez, who is a [de-identified] y.o.,male. The  provided the following Interventions:  Initiated a relationship of care and support. Offered prayer and assurance of continued prayers on patient's behalf. Plan:  Chaplains will continue to follow and will provide pastoral care on an as needed/requested basis.  recommends bedside caregivers page  on duty if patient shows signs of acute spiritual or emotional distress.     [address] [facility], [address] [facility] office  [phone] pager

## 2017-09-26 NOTE — OP NOTES
Cataract Operative Note      Patient: Brett Meza               Sex: male          DOA: 9/26/2017         YOB: 1935      Age:  [de-identified] y.o.        LOS:  LOS: 0 days     Preoperative Diagnosis: Cataract left eye    Postoperative Diagnosis:  Cataract  left eye  Surgeon: Roberto Virk M.D. Anesthesia:  Topical anesthesia  Procedure:  Phacoemulsification of posterior chamber for intraocular lens implantation left    Fluids:  0    Procedure in Detail: The operative eye was prepped and draped in the usual fashion. A lid speculum was placed in the operative eye. A clear cornea approach was utilized. A paracentesis incision(s) was constructed with a 1 mm slit knife. One percent preservative-free lidocaine followed by viscoelastic was instilled into the anterior chamber. A clear corneal incision was made with a slit knife. A continuous curvilinear capsulorrhexis was constructed followed by hydrodissection. A phacoemulsification tip was placed into the eye, and the lens nucleus was emulsified. The irrigation/aspiration device was then used to remove any remaining cortical material.  Polishing of the capsule was performed as needed. The intraocular lens was then placed into the capsular bag after it was re-inflated with viscoelastic. The remaining viscoelastic was then removed using the irrigation/aspiration device. BSS on a cannula was then used to hydrate the wound edges. At the end of the procedure the wound was found to be watertight, the anterior chamber was deep and the pupil round. No blood loss during surgery. An antibiotic and anti-inflammatroy was placed into the operative eye. The lid speculum was removed. Protective sunglasses were then placed onto the patient. The patient was taken to the [address] [place] [address] Unit (PACU) in good condition having tolerated the procedure well. Estimated Blood Loss: 0                 Implants:   Implant Name Type Inv.  Item Serial No.  Lot No. LRB No. Used Action   LENS IOL POST 1-PC 6X13 18.0 -- [employer] - [id number]   LENS IOL POST 1-PC 6X13 18.0 -- [employer] [id number] [employer]   Left 1 Implanted       Specimens: * No specimens in log *            Complications:  None           Eduardo Wade.  Elizabeth Donovan M.D.  [unfilled]  8:12 AM

## 2017-09-26 NOTE — DISCHARGE INSTRUCTIONS
DISCHARGE SUMMARY from Nurse    The following personal items are in your possession at time of discharge:    Dental Appliances: None        Home Medications: None  Jewelry: Necklace (x 2  with [place])  Clothing: Footwear, Shirt, Socks (locker #1)  Other Valuables: Eyeglasses (with [place])             PATIENT INSTRUCTIONS:    After general anesthesia or intravenous sedation, for 24 hours or while taking prescription Narcotics:  · Limit your activities  · Do not drive and operate hazardous machinery  · Do not make important personal or business decisions  · Do  not drink alcoholic beverages  · If you have not urinated within 8 hours after discharge, please contact your surgeon on call. Report the following to your surgeon:  · Excessive pain, swelling, redness or odor of or around the surgical area  · Temperature over 100.5  · Nausea and vomiting lasting longer than 4 hours or if unable to take medications  · Any signs of decreased circulation or nerve impairment to extremity: change in color, persistent  numbness, tingling, coldness or increase pain  · Any questions        What to do at Home:  Regular diet  Please follow post op eye drop instructions received from the office  Return to office on Wednesday as scheduled    If you experience any of the following symptoms bleeding, nausea, severe pain, please follow up with dr Aman Burns      *  Please give a list of your current medications to your Primary Care Provider. *  Please update this list whenever your medications are discontinued, doses are      changed, or new medications (including over-the-counter products) are added. *  Please carry medication information at all times in case of emergency situations. These are general instructions for a healthy lifestyle:    No smoking/ No tobacco products/ Avoid exposure to second hand smoke    Surgeon General's Warning:  Quitting smoking now greatly reduces serious risk to your health.     Obesity, smoking, and sedentary lifestyle greatly increases your risk for illness    A healthy diet, regular physical exercise & weight monitoring are important for maintaining a healthy lifestyle    You may be retaining fluid if you have a history of heart failure or if you experience any of the following symptoms:  Weight gain of 3 pounds or more overnight or 5 pounds in a week, increased swelling in our hands or feet or shortness of breath while lying flat in bed. Please call your doctor as soon as you notice any of these symptoms; do not wait until your next office visit. Recognize signs and symptoms of STROKE:    F-face looks uneven    A-arms unable to move or move unevenly    S-speech slurred or non-existent    T-time-call 911 as soon as signs and symptoms begin-DO NOT go       Back to bed or wait to see if you get better-TIME IS BRAIN. Warning Signs of HEART ATTACK     Call 911 if you have these symptoms:   Chest discomfort. Most heart attacks involve discomfort in the center of the chest that lasts more than a few minutes, or that goes away and comes back. It can feel like uncomfortable pressure, squeezing, fullness, or pain.  Discomfort in other areas of the upper body. Symptoms can include pain or discomfort in one or both arms, the back, neck, jaw, or stomach.  Shortness of breath with or without chest discomfort.  Other signs may include breaking out in a cold sweat, nausea, or lightheadedness. Don't wait more than five minutes to call 911 - MINUTES MATTER! Fast action can save your life. Calling 911 is almost always the fastest way to get lifesaving treatment. Emergency Medical Services staff can begin treatment when they arrive -- up to an hour sooner than if someone gets to the hospital by car. The discharge information has been reviewed with the patient and caregiver. The patient and caregiver verbalized understanding.     Discharge medications reviewed with the patient and caregiver and appropriate educational materials and side effects teaching were provided. Cataract Surgery: What to Expect at Home  Your Recovery    After surgery, your eye will not hurt. But it may feel scratchy, sticky, or uncomfortable. It may also water more than usual.  Most people see better 1 to 3 days after surgery. But it could take 3 to 10 weeks to get the full benefits of surgery and to see as clearly as possible. Your doctor may send you home with a bandage, patch, or clear shield on your eye. This will keep you from rubbing your eye. Your doctor will also give you eyedrops to help your eye heal. Use them exactly as directed. You can read or watch TV right away, but things may look blurry. Most people are able to return to work or their normal routine in 1 to 3 days. After your eye heals, you may still need to wear glasses, especially for reading. This care sheet gives you a general idea about how long it will take for you to recover. But each person recovers at a different pace. Follow the steps below to get better as quickly as possible. How can you care for yourself at home? Activity  · Rest when you feel tired. Getting enough sleep will help you recover. · You may have trouble judging distances for a few days. Move slowly, and be careful going up and down stairs and pouring hot liquids. Ask for help if you need it. · Ask your doctor when it is okay to drive. · Wear your eye bandage, patch, or shield for as long as your doctor recommends. You may only need to wear it when you sleep. · You can shower or wash your hair the day after surgery. Keep water, soap, shampoo, hair spray, and shaving lotion out of your eye, especially for the first week. · Do not rub or put pressure on your eye for at least 1 week. · Do not wear eye makeup for 1 to 2 weeks. You may also want to avoid face cream or lotion. · Do not get your hair colored or permed for 10 days after surgery.   · Do not bend over or do any strenuous activities, such as biking, jogging, weight lifting, or aerobic exercise, for 2 weeks or until your doctor says it is okay. · Avoid swimming, hot tubs, gardening, and dusting for 1 to 2 weeks. · Wear sunglasses on bright days for at least 1 year after surgery. Medicines  · Your doctor will tell you if and when you can restart your medicines. He or she will also give you instructions about taking any new medicines. · If you take blood thinners, such as warfarin (Coumadin), clopidogrel (Plavix), or aspirin, be sure to talk to your doctor. He or she will tell you if and when to start taking those medicines again. Make sure that you understand exactly what your doctor wants you to do. · Follow your doctor's instructions for when to use your eyedrops. Always wash your hands before you put your drops in. To put in eyedrops:  ¨ Tilt your head back, and pull your lower eyelid down with one finger. ¨ Drop or squirt the medicine inside the lower lid. ¨ Close your eye for 30 to 60 seconds to let the drops or ointment move around. ¨ Do not touch the ointment or dropper tip to your eyelashes or any other surface. · Follow your doctor's instructions for taking pain medicines. Follow-up care is a key part of your treatment and safety. Be sure to make and go to all appointments, and call your doctor if you are having problems. It's also a good idea to know your test results and keep a list of the medicines you take. When should you call for help? Call 911 anytime you think you may need emergency care. For example, call if:  · You passed out (lost consciousness). · You have a sudden loss of vision. · You have sudden chest pain, are short of breath, or cough up blood. Call your doctor now or seek immediate medical care if:  · You have signs of an eye infection, such as:  ¨ Pus or thick discharge coming from the eye. ¨ Redness or swelling around the eye. ¨ A fever. · You have new or worse eye pain.   · You have vision changes. · You have symptoms of a blood clot in your leg (called a deep vein thrombosis), such as:  ¨ Pain in the calf, back of the knee, thigh, or groin. ¨ Redness and swelling in your leg or groin. Watch closely for changes in your health, and be sure to contact your doctor if:  · You do not get better as expected. Where can you learn more? Go to http://jared-bill.info/. Enter R255 in the search box to learn more about \"Cataract Surgery: What to Expect at Home. \"  Current as of: March 3, 2017  Content Version: 11.3  © 3590-9664 [employer]. Care instructions adapted under license by [employer] (which disclaims liability or warranty for this information). If you have questions about a medical condition or this instruction, always ask your healthcare professional. [place] [address] any warranty or liability for your use of this information.     Patient armband removed and shredded

## 2017-09-26 NOTE — ANESTHESIA POSTPROCEDURE EVALUATION
Post-Anesthesia Evaluation and Assessment    Patient: Terri Michel MRN: [id number]  SSN: [id number]    YOB: 1935  Age: [de-identified] y.o. Sex: male       Cardiovascular Function/Vital Signs  Visit Vitals    /55 (BP 1 Location: Left arm, BP Patient Position: At rest)    Pulse (!) 50    Temp 36.2 °C (97.1 °F)    Resp 12    Ht 5' 7\" (1.702 m)    Wt 87.5 kg (193 lb)    SpO2 97%    BMI 30.23 kg/m2       Patient is status post MAC anesthesia for Procedure(s):  CATARACT EXTRACTION WITH INTRA OCULAR LENS IMPLANT LEFT EYE. Nausea/Vomiting: None    Postoperative hydration reviewed and adequate. Pain:  Pain Scale 1: Numeric (0 - 10) (09/26/17 0823)  Pain Intensity 1: 0 (09/26/17 0823)   Managed    Neurological Status:   Neuro (WDL): Within Defined Limits (09/26/17 5412)   At baseline    Mental Status and Level of Consciousness: Alert and oriented     Pulmonary Status:   O2 Device: Room air (09/26/17 4071)   Adequate oxygenation and airway patent    Complications related to anesthesia: None    Post-anesthesia assessment completed.  No concerns    Signed By: Radha Rodrigez CRNA     September 26, 2017

## 2017-11-20 RX ORDER — ERGOCALCIFEROL 1.25 MG/1
CAPSULE ORAL
Qty: 52 CAP | Refills: 0 | Status: SHIPPED | OUTPATIENT
Start: 2017-11-20

## 2017-11-20 RX ORDER — SITAGLIPTIN 100 MG/1
TABLET, FILM COATED ORAL
Qty: 90 TAB | Refills: 3 | Status: SHIPPED | OUTPATIENT
Start: 2017-11-20 | End: 2018-04-03

## 2018-03-13 ENCOUNTER — HOSPITAL ENCOUNTER (EMERGENCY)
Age: 83
Discharge: HOME | End: 2018-03-13
Attending: EMERGENCY MEDICINE
Payer: MEDICARE

## 2018-03-13 VITALS
HEART RATE: 78 BPM | WEIGHT: 191 LBS | HEIGHT: 68 IN | SYSTOLIC BLOOD PRESSURE: 140 MMHG | DIASTOLIC BLOOD PRESSURE: 72 MMHG | OXYGEN SATURATION: 99 % | RESPIRATION RATE: 16 BRPM | BODY MASS INDEX: 28.95 KG/M2 | TEMPERATURE: 100.1 F

## 2018-03-13 DIAGNOSIS — G89.29 CHRONIC PAIN OF RIGHT KNEE: Primary | ICD-10-CM

## 2018-03-13 DIAGNOSIS — M25.561 CHRONIC PAIN OF RIGHT KNEE: Primary | ICD-10-CM

## 2018-03-13 PROCEDURE — 74011250637 HC RX REV CODE- 250/637: Performed by: PHYSICIAN ASSISTANT

## 2018-03-13 PROCEDURE — 99283 EMERGENCY DEPT VISIT LOW MDM: CPT

## 2018-03-13 RX ORDER — HYDROCODONE BITARTRATE AND ACETAMINOPHEN 5; 325 MG/1; MG/1
1 TABLET ORAL
Status: COMPLETED | OUTPATIENT
Start: 2018-03-13 | End: 2018-03-13

## 2018-03-13 RX ORDER — VALSARTAN AND HYDROCHLOROTHIAZIDE 320; 25 MG/1; MG/1
1 TABLET, FILM COATED ORAL DAILY
COMMUNITY
End: 2019-07-09

## 2018-03-13 RX ADMIN — HYDROCODONE BITARTRATE AND ACETAMINOPHEN 1 TABLET: 5; 325 TABLET ORAL at 13:37

## 2018-03-13 NOTE — ED PROVIDER NOTES
EMERGENCY DEPARTMENT HISTORY AND PHYSICAL EXAM    Date: 3/13/2018  Patient Name: Sharri Severance. History of Presenting Illness     Chief Complaint   Patient presents with    Knee Pain         History Provided By: Patient    Chief Complaint: right knee pain  Duration: 2 Years  Timing: acute on chronic  Location: right knee  Quality: sharp  Modifying Factors: Worse in the mornings  Associated Symptoms: antalgic gait    Additional History (Context):   1:22 PM   Sharri Severance. is a [de-identified] y.o. male who presents to the emergency department via EMS C/O acute on chronic right knee pain for 2 years, worsening in the past few days. Associated sxs include inability to walk and sleep disturbance secondary to pain. Sxs are worse in the mornings right after waking up. He has been taking Vicodin with temporary relief. Pt is followed by Lorena Aguilera MD, orthopedics and has a surgical consulatation appointment with him in approximately 1 hour. Pt has been receiving cortisone injections to the knee which has provided temporary relief. Denies new fall or injury. Pt denies any other sxs or complaints. PCP: Dwain Lieberman MD    Current Facility-Administered Medications   Medication Dose Route Frequency Provider Last Rate Last Dose    HYDROcodone-acetaminophen (NORCO) 5-325 mg per tablet 1 Tab  1 Tab Oral NOW SASHA Corcoran         Current Outpatient Prescriptions   Medication Sig Dispense Refill    FAMOTIDINE (PEPCID PO) Take  by mouth.  valsartan-hydroCHLOROthiazide (DIOVAN-HCT) 320-25 mg per tablet Take 1 Tab by mouth daily.  FOLIC ACID/MULTIVIT-MIN/LUTEIN (CENTRUM SILVER PO) Take  by mouth.  oxybutynin (DITROPAN) 5 mg tablet Take 5 mg by mouth daily.  JANUVIA 100 mg tablet TAKE 1 TABLET DAILY 90 Tab 3    VITAMIN D2 50,000 unit capsule TAKE 1 CAPSULE TWICE WEEKLY 52 Cap 0    donepezil (ARICEPT) 5 mg tablet Take 5 mg by mouth nightly.       metoprolol tartrate 75 mg tab Take 75 mg by mouth every twelve (12) hours. 60 Tab 0    rivaroxaban (XARELTO) 20 mg tab tablet Take 1 Tab by mouth daily. 30 Tab 2    dilTIAZem CD (CARDIZEM CD) 240 mg ER capsule Take 1 Cap by mouth daily. 30 Cap 0    Omeprazole delayed release (PRILOSEC D/R) 20 mg tablet Take 20 mg by mouth daily.  multivitamin, tx-iron-ca-min (THERA-M W/ IRON) 9 mg iron-400 mcg tab tablet Take 1 Tab by mouth daily.  cholecalciferol (VITAMIN D3) 1,000 unit tablet Take  by mouth daily.  furosemide (LASIX) 20 mg tablet Take 40 mg by mouth daily.  melatonin tab tablet Take  by mouth nightly. Indications: sleep      ACETAMINOPHEN/DIPHENHYDRAMINE (TYLENOL PM PO) Take  by mouth nightly.  insulin lispro (HUMALOG) 100 unit/mL injection by SubCUTAneous route. Indications: TYPE 2 DIABETES MELLITUS, sliding scale      insulin glargine (LANTUS) 100 unit/mL injection 12 Units by SubCUTAneous route nightly. Indications: TYPE 2 DIABETES MELLITUS      metFORMIN (GLUCOPHAGE) 500 mg tablet Take 500 mg by mouth nightly. Indications: type 2 diabetes mellitus      buPROPion XL (WELLBUTRIN XL) 150 mg tablet Take 150 mg by mouth every morning.  Indications: MAJOR DEPRESSIVE DISORDER         Past History     Past Medical History:  Past Medical History:   Diagnosis Date    Arthritis     Atrial fibrillation ([address]     Depression     Diabetes ([address] 1980s    GERD (gastroesophageal reflux disease)     Hypercholesterolemia     Hypertension     patient denies     Ulcer ([address]     at the anastomotic bite of gastric bypass    Varicose veins     Wide-complex tachycardia ([address] 5/2/2015       Past Surgical History:  Past Surgical History:   Procedure Laterality Date    ABDOMEN SURGERY PROC UNLISTED  1998    umb hernia Rupture    BIOPSY LIVER  10/05/04    EGD  12/29/09    with biopsy    ENDOSCOPY, COLON, DIAGNOSTIC      GASTROSTOMY TUBE  10/05/04    HX CHOLECYSTECTOMY  10/05/04    HX GI  10/05/04    vertical banded gastroplasty/gastric bypass    HX MOHS PROCEDURES  1991/1995    bilateral       Family History:  Family History   Problem Relation Age of Onset    Heart Attack Father     Diabetes Father     Hypertension Father     Cancer Mother      lung    Diabetes Mother     Other Brother      obese    Diabetes Brother     Diabetes Brother     Other Maternal Grandmother      obese    Diabetes Sister        Social History:  Social History   Substance Use Topics    Smoking status: Former Smoker    Smokeless tobacco: Not on file    Alcohol use 1.8 oz/week     1 Glasses of wine, 1 Cans of beer, 1 Shots of liquor per week      Comment: yearly       Allergies:  No Known Allergies      Review of Systems   Review of Systems   Musculoskeletal: Positive for arthralgias (right) and gait problem (unable). Psychiatric/Behavioral: Positive for sleep disturbance (secondary to pain). All other systems reviewed and are negative. Physical Exam     Vitals:    03/13/18 1315   BP: 140/72   Pulse: 78   Resp: 16   Temp: 100.1 °F (37.8 °C)   SpO2: 99%   Weight: 86.6 kg (191 lb)   Height: 5' 8\" (1.727 m)     Physical Exam   Constitutional: He is oriented to person, place, and time. He appears well-developed and well-nourished. No distress. HENT:   Head: Normocephalic and atraumatic. Eyes: Conjunctivae and EOM are normal. Pupils are equal, round, and reactive to light. Neck: Normal range of motion. Neck supple. Cardiovascular: Normal rate and regular rhythm. Musculoskeletal: Normal range of motion. He exhibits tenderness. He exhibits no deformity. Right knee with slighlt swelling & effusion and diffuse TTP, no erythema warmth or lesion, atraumatic appearing, FROM with increased pain with all motions, no calf TTP or pedal edema, distal pulses normal    Neurological: He is alert and oriented to person, place, and time. Skin: Skin is warm and dry. No rash noted. No erythema. Psychiatric: He has a normal mood and affect.  His behavior is normal.   Nursing note and vitals reviewed. Diagnostic Study Results     Labs -   No results found for this or any previous visit (from the past 12 hour(s)). Radiologic Studies -   No orders to display     CT Results  (Last 48 hours)    None        CXR Results  (Last 48 hours)    None          Medications given in the ED-  Medications   HYDROcodone-acetaminophen (NORCO) 5-325 mg per tablet 1 Tab (not administered)         Medical Decision Making   I am the first provider for this patient. I reviewed the vital signs, available nursing notes, past medical history, past surgical history, family history and social history. Vital Signs-Reviewed the patient's vital signs. Pulse Oximetry Analysis - 99% on room air     Records Reviewed: Nursing Notes    Procedures:  Procedures    ED Course:   1:22 PM Initial assessment performed. The patients presenting problems have been discussed, and they are in agreement with the care plan formulated and outlined with them. I have encouraged them to ask questions as they arise throughout their visit. 1:32 PM Called the patient's wife, Georgia, at [phone]. She will be on her way. Understands the plan is to go straight to Rashel Plasencia MD's office from here. They will be waiting there with a wheelchair to assist her.     1:41 PM Spoke with reception at Rashel Plasencia MD's office. Pt may be seen by Rashel Plasencia MD at 2:40 PM which is 1 hour from now. Will giva a pain pill now, discharge straight to [facility]. Diagnosis and Disposition       DISCHARGE NOTE:  1:45 PM   Tomasz Burnham Jr.'s  results have been reviewed with him. He has been counseled regarding his diagnosis, treatment, and plan. He verbally conveys understanding and agreement of the signs, symptoms, diagnosis, treatment and prognosis and additionally agrees to follow up as discussed.   He also agrees with the care-plan and conveys that all of his questions have been answered. I have also provided discharge instructions for him that include: educational information regarding their diagnosis and treatment, and list of reasons why they would want to return to the ED prior to their follow-up appointment, should his condition change. He has been provided with education for proper emergency department utilization. CLINICAL IMPRESSION:    1. Chronic pain of right knee        PLAN:  1. D/C Home  2. Current Discharge Medication List        3. Follow-up Information     Follow up With Details Comments Contact Info    Carolin Duff MD Go today For your appointment at 2:40 PM today [address] EMERGENCY DEPT  As needed, If symptoms worsen [address] [address]  [phone]        _______________________________    Attestations: This note is prepared by Earl Loera, acting as Scribe for Enedelia Denson PA-C. Enedelia Denson PA-C:  The scribe's documentation has been prepared under my direction and personally reviewed by me in its entirety.   I confirm that the note above accurately reflects all work, treatment, procedures, and medical decision making performed by me.  _______________________________

## 2018-03-13 NOTE — DISCHARGE INSTRUCTIONS
Chronic Pain: Care Instructions  Your Care Instructions    Chronic pain is pain that lasts a long time (months or even years) and may or may not have a clear cause. It is different from acute pain, which usually does have a clear cause-like an injury or illness-and gets better over time. Chronic pain:  · Lasts over time but may vary from day to day. · Does not go away despite efforts to end it. · May disrupt your sleep and lead to fatigue. · May cause depression or anxiety. · May make your muscles tense, causing more pain. · Can disrupt your work, hobbies, home life, and relationships with friends and family. Chronic pain is a very real condition. It is not just in your head. Treatment can help and usually includes several methods used together, such as medicines, physical therapy, exercise, and other treatments. Learning how to relax and changing negative thought patterns can also help you cope. Chronic pain is complex. Taking an active role in your treatment will help you better manage your pain. Tell your doctor if you have trouble dealing with your pain. You may have to try several things before you find what works best for you. Follow-up care is a key part of your treatment and safety. Be sure to make and go to all appointments, and call your doctor if you are having problems. It's also a good idea to know your test results and keep a list of the medicines you take. How can you care for yourself at home? · Pace yourself. Break up large jobs into smaller tasks. Save harder tasks for days when you have less pain, or go back and forth between hard tasks and easier ones. Take rest breaks. · Relax, and reduce stress. Relaxation techniques such as deep breathing or meditation can help. · Keep moving. Gentle, daily exercise can help reduce pain over the long run. Try low- or no-impact exercises such as walking, swimming, and stationary biking. Do stretches to stay flexible.   · Try heat, cold packs, and massage. · Get enough sleep. Chronic pain can make you tired and drain your energy. Talk with your doctor if you have trouble sleeping because of pain. · Think positive. Your thoughts can affect your pain level. Do things that you enjoy to distract yourself when you have pain instead of focusing on the pain. See a movie, read a book, listen to music, or spend time with a friend. · If you think you are depressed, talk to your doctor about treatment. · Keep a daily pain diary. Record how your moods, thoughts, sleep patterns, activities, and medicine affect your pain. You may find that your pain is worse during or after certain activities or when you are feeling a certain emotion. Having a record of your pain can help you and your doctor find the best ways to treat your pain. · Take pain medicines exactly as directed. ¨ If the doctor gave you a prescription medicine for pain, take it as prescribed. ¨ If you are not taking a prescription pain medicine, ask your doctor if you can take an over-the-counter medicine. Reducing constipation caused by pain medicine  · Include fruits, vegetables, beans, and whole grains in your diet each day. These foods are high in fiber. · Drink plenty of fluids, enough so that your urine is light yellow or clear like water. If you have kidney, heart, or liver disease and have to limit fluids, talk with your doctor before you increase the amount of fluids you drink. · If your doctor recommends it, get more exercise. Walking is a good choice. Bit by bit, increase the amount you walk every day. Try for at least 30 minutes on most days of the week. · Schedule time each day for a bowel movement. A daily routine may help. Take your time and do not strain when having a bowel movement. When should you call for help? Call your doctor now or seek immediate medical care if:  ? · Your pain gets worse or is out of control.    ? · You feel down or blue, or you do not enjoy things like you once did. You may be depressed, which is common in people with chronic pain. Depression can be treated. ? · You have vomiting or cramps for more than 2 hours. ? Watch closely for changes in your health, and be sure to contact your doctor if:  ? · You cannot sleep because of pain. ? · You are very worried or anxious about your pain. ? · You have trouble taking your pain medicine. ? · You have any concerns about your pain medicine. ? · You have trouble with bowel movements, such as:  ¨ No bowel movement in 3 days. ¨ Blood in the anal area, in your stool, or on the toilet paper. ¨ Diarrhea for more than 24 hours. Where can you learn more? Go to http://jared-bill.info/. Enter N004 in the search box to learn more about \"Chronic Pain: Care Instructions. \"  Current as of: October 14, 2016  Content Version: 11.4  © 1283-2296 [employer]. Care instructions adapted under license by [employer] (which disclaims liability or warranty for this information). If you have questions about a medical condition or this instruction, always ask your healthcare professional. [place] [address] any warranty or liability for your use of this information.

## 2018-03-13 NOTE — ED NOTES
See scanned documents for pt signing that he rec'd discharge instructions. Patient armband removed and shredded. Pt given discharge instructions and verbalizes understanding. Pt instructed to go directly to Dr. Caren Will office for his [phone] appointment. Pt discharged via w/c.

## 2018-03-13 NOTE — ED TRIAGE NOTES
C/o right knee pain since last week. Pt states he had a appointment with Dr. Elisabeth Silva today at [phone] for his knee pain. Pt declined pain medication by EMS en route. Sepsis Screening completed    (  )Patient meets SIRS criteria. (x  )Patient does not meet SIRS criteria.       SIRS Criteria is achieved when two or more of the following are present   Temperature < 96.8°F (36°C) or > 100.9°F (38.3°C)   Heart Rate > 90 beats per minute   Respiratory Rate > 20 breaths per minute   WBC count > 12,000 or <4,000 or > 10% bands

## 2018-04-03 ENCOUNTER — HOSPITAL ENCOUNTER (OUTPATIENT)
Dept: PREADMISSION TESTING | Age: 83
Discharge: HOME | End: 2018-04-03
Payer: MEDICARE

## 2018-04-03 ENCOUNTER — HOSPITAL ENCOUNTER (OUTPATIENT)
Dept: GENERAL RADIOLOGY | Age: 83
Discharge: HOME | End: 2018-04-03
Attending: ORTHOPAEDIC SURGERY
Payer: MEDICARE

## 2018-04-03 VITALS — HEIGHT: 67 IN | WEIGHT: 195 LBS | BODY MASS INDEX: 30.61 KG/M2

## 2018-04-03 LAB
ALBUMIN SERPL-MCNC: 2.7 G/DL (ref 3.4–5)
ALBUMIN/GLOB SERPL: 0.9 {RATIO} (ref 0.8–1.7)
ALP SERPL-CCNC: 184 U/L (ref 45–117)
ALT SERPL-CCNC: 21 U/L (ref 16–61)
ANION GAP SERPL CALC-SCNC: 10 MMOL/L (ref 3–18)
APPEARANCE UR: CLEAR
AST SERPL-CCNC: 19 U/L (ref 15–37)
ATRIAL RATE: 60 BPM
BASOPHILS # BLD: 0 K/UL (ref 0–0.06)
BASOPHILS NFR BLD: 1 % (ref 0–2)
BILIRUB SERPL-MCNC: 0.2 MG/DL (ref 0.2–1)
BILIRUB UR QL: NEGATIVE
BUN SERPL-MCNC: 32 MG/DL (ref 7–18)
BUN/CREAT SERPL: 18 (ref 12–20)
CALCIUM SERPL-MCNC: 8 MG/DL (ref 8.5–10.1)
CALCULATED P AXIS, ECG09: 67 DEGREES
CALCULATED R AXIS, ECG10: -33 DEGREES
CALCULATED T AXIS, ECG11: 9 DEGREES
CHLORIDE SERPL-SCNC: 108 MMOL/L (ref 100–108)
CO2 SERPL-SCNC: 28 MMOL/L (ref 21–32)
COLOR UR: YELLOW
CREAT SERPL-MCNC: 1.78 MG/DL (ref 0.6–1.3)
DIAGNOSIS, 93000: NORMAL
DIFFERENTIAL METHOD BLD: ABNORMAL
EOSINOPHIL # BLD: 0.2 K/UL (ref 0–0.4)
EOSINOPHIL NFR BLD: 3 % (ref 0–5)
ERYTHROCYTE [DISTWIDTH] IN BLOOD BY AUTOMATED COUNT: 13.3 % (ref 11.6–14.5)
EST. AVERAGE GLUCOSE BLD GHB EST-MCNC: 171 MG/DL
GLOBULIN SER CALC-MCNC: 2.9 G/DL (ref 2–4)
GLUCOSE SERPL-MCNC: 266 MG/DL (ref 74–99)
GLUCOSE UR STRIP.AUTO-MCNC: NEGATIVE MG/DL
HBA1C MFR BLD: 7.6 % (ref 4.5–5.6)
HCT VFR BLD AUTO: 34.6 % (ref 36–48)
HGB BLD-MCNC: 11.5 G/DL (ref 13–16)
HGB UR QL STRIP: NEGATIVE
KETONES UR QL STRIP.AUTO: NEGATIVE MG/DL
LEUKOCYTE ESTERASE UR QL STRIP.AUTO: NEGATIVE
LYMPHOCYTES # BLD: 2.2 K/UL (ref 0.9–3.6)
LYMPHOCYTES NFR BLD: 37 % (ref 21–52)
MCH RBC QN AUTO: 30.4 PG (ref 24–34)
MCHC RBC AUTO-ENTMCNC: 33.2 G/DL (ref 31–37)
MCV RBC AUTO: 91.5 FL (ref 74–97)
MONOCYTES # BLD: 0.4 K/UL (ref 0.05–1.2)
MONOCYTES NFR BLD: 7 % (ref 3–10)
NEUTS SEG # BLD: 3 K/UL (ref 1.8–8)
NEUTS SEG NFR BLD: 52 % (ref 40–73)
NITRITE UR QL STRIP.AUTO: NEGATIVE
P-R INTERVAL, ECG05: 174 MS
PH UR STRIP: 5 [PH] (ref 5–8)
PLATELET # BLD AUTO: 193 K/UL (ref 135–420)
PMV BLD AUTO: 9.8 FL (ref 9.2–11.8)
POTASSIUM SERPL-SCNC: 4.1 MMOL/L (ref 3.5–5.5)
PROT SERPL-MCNC: 5.6 G/DL (ref 6.4–8.2)
PROT UR STRIP-MCNC: NEGATIVE MG/DL
Q-T INTERVAL, ECG07: 434 MS
QRS DURATION, ECG06: 80 MS
QTC CALCULATION (BEZET), ECG08: 434 MS
RBC # BLD AUTO: 3.78 M/UL (ref 4.7–5.5)
SODIUM SERPL-SCNC: 146 MMOL/L (ref 136–145)
SP GR UR REFRACTOMETRY: 1.01 (ref 1–1.03)
UROBILINOGEN UR QL STRIP.AUTO: 0.2 EU/DL (ref 0.2–1)
VENTRICULAR RATE, ECG03: 60 BPM
WBC # BLD AUTO: 5.8 K/UL (ref 4.6–13.2)

## 2018-04-03 PROCEDURE — 36415 COLL VENOUS BLD VENIPUNCTURE: CPT | Performed by: ORTHOPAEDIC SURGERY

## 2018-04-03 PROCEDURE — 81003 URINALYSIS AUTO W/O SCOPE: CPT | Performed by: ORTHOPAEDIC SURGERY

## 2018-04-03 PROCEDURE — 71045 X-RAY EXAM CHEST 1 VIEW: CPT

## 2018-04-03 PROCEDURE — 93005 ELECTROCARDIOGRAM TRACING: CPT

## 2018-04-03 PROCEDURE — 85025 COMPLETE CBC W/AUTO DIFF WBC: CPT | Performed by: ORTHOPAEDIC SURGERY

## 2018-04-03 PROCEDURE — 80053 COMPREHEN METABOLIC PANEL: CPT | Performed by: ORTHOPAEDIC SURGERY

## 2018-04-03 PROCEDURE — 83036 HEMOGLOBIN GLYCOSYLATED A1C: CPT | Performed by: ORTHOPAEDIC SURGERY

## 2018-04-03 RX ORDER — CEFAZOLIN SODIUM 2 G/50ML
2 SOLUTION INTRAVENOUS ONCE
Status: CANCELLED | OUTPATIENT
Start: 2018-04-03 | End: 2018-04-03

## 2018-04-03 RX ORDER — INSULIN LISPRO 100 [IU]/ML
INJECTION, SOLUTION INTRAVENOUS; SUBCUTANEOUS
Status: CANCELLED | OUTPATIENT
Start: 2018-04-03

## 2018-04-03 RX ORDER — SODIUM CHLORIDE, SODIUM LACTATE, POTASSIUM CHLORIDE, CALCIUM CHLORIDE 600; 310; 30; 20 MG/100ML; MG/100ML; MG/100ML; MG/100ML
125 INJECTION, SOLUTION INTRAVENOUS CONTINUOUS
Status: CANCELLED | OUTPATIENT
Start: 2018-04-03

## 2018-04-03 RX ORDER — AMLODIPINE, VALSARTAN AND HYDROCHLOROTHIAZIDE 10; 320; 25 MG/1; MG/1; MG/1
1 TABLET ORAL DAILY
COMMUNITY
End: 2018-06-11

## 2018-04-03 RX ORDER — METFORMIN HYDROCHLORIDE 1000 MG/1
1000 TABLET ORAL
COMMUNITY
End: 2018-06-11

## 2018-04-03 NOTE — PERIOP NOTES
No sleep apnea or removable prosthetic devices. Reviewed Deandre wipes. No family history of MH. Pt has a [employer] Reveal Cardiac Monitor-OR notified. Pt to check with PCP re insulin dosage. Pt to check with cardiology on when to stop xarelto. Pt has dementia per wife. Pt meets criteria for special populations. Both PCP and cardiology contacted for BLANQUITA. Cardiology clearance in media.

## 2018-04-05 NOTE — PERIOP NOTES
Called patient regarding outstanding MRSA test, he states he will be here tomorrow. Patient was supposed to come today after attending the Joint Replacement Class but he forgot. Patient spoke with a nurse while attending the class about a laceration to the operative leg. He had it bandaged and mentioned antibiotics. I called Dr. Dinorah Guidry office and informed them. They were going to look into bringing the patient into the office earlier than 4/10 to assess.

## 2018-04-06 ENCOUNTER — HOSPITAL ENCOUNTER (OUTPATIENT)
Dept: PREADMISSION TESTING | Age: 83
Discharge: HOME | End: 2018-04-06
Payer: MEDICARE

## 2018-04-06 LAB
BACTERIA SPEC CULT: ABNORMAL
SERVICE CMNT-IMP: ABNORMAL

## 2018-04-06 PROCEDURE — 87641 MR-STAPH DNA AMP PROBE: CPT | Performed by: ORTHOPAEDIC SURGERY

## 2018-04-09 NOTE — PERIOP NOTES
Notified surgeons office that patient is MRSA positive. Spoke with patient concerning positive MRSA, aware of RX from [facility], explained directions for use, explained colonization of MRSA and made aware of contact isolation when hospitalized.

## 2018-05-19 RX ORDER — ERGOCALCIFEROL 1.25 MG/1
CAPSULE ORAL
Qty: 52 CAP | Refills: 0
Start: 2018-05-19

## 2018-06-11 ENCOUNTER — HOSPITAL ENCOUNTER (OUTPATIENT)
Dept: PREADMISSION TESTING | Age: 83
Discharge: HOME | End: 2018-06-11
Payer: MEDICARE

## 2018-06-11 VITALS — WEIGHT: 200 LBS | HEIGHT: 66 IN | BODY MASS INDEX: 32.14 KG/M2

## 2018-06-11 LAB
ALBUMIN SERPL-MCNC: 2.9 G/DL (ref 3.4–5)
ALBUMIN/GLOB SERPL: 1 {RATIO} (ref 0.8–1.7)
ALP SERPL-CCNC: 152 U/L (ref 45–117)
ALT SERPL-CCNC: 19 U/L (ref 16–61)
ANION GAP SERPL CALC-SCNC: 11 MMOL/L (ref 3–18)
APPEARANCE UR: CLEAR
AST SERPL-CCNC: 17 U/L (ref 15–37)
BACTERIA SPEC CULT: NORMAL
BASOPHILS # BLD: 0 K/UL (ref 0–0.06)
BASOPHILS NFR BLD: 0 % (ref 0–2)
BILIRUB SERPL-MCNC: 0.4 MG/DL (ref 0.2–1)
BILIRUB UR QL: NEGATIVE
BUN SERPL-MCNC: 24 MG/DL (ref 7–18)
BUN/CREAT SERPL: 17 (ref 12–20)
CALCIUM SERPL-MCNC: 8.1 MG/DL (ref 8.5–10.1)
CHLORIDE SERPL-SCNC: 106 MMOL/L (ref 100–108)
CO2 SERPL-SCNC: 28 MMOL/L (ref 21–32)
COLOR UR: YELLOW
CREAT SERPL-MCNC: 1.39 MG/DL (ref 0.6–1.3)
DIFFERENTIAL METHOD BLD: ABNORMAL
EOSINOPHIL # BLD: 0.2 K/UL (ref 0–0.4)
EOSINOPHIL NFR BLD: 3 % (ref 0–5)
ERYTHROCYTE [DISTWIDTH] IN BLOOD BY AUTOMATED COUNT: 13.3 % (ref 11.6–14.5)
EST. AVERAGE GLUCOSE BLD GHB EST-MCNC: 180 MG/DL
GLOBULIN SER CALC-MCNC: 2.9 G/DL (ref 2–4)
GLUCOSE SERPL-MCNC: 194 MG/DL (ref 74–99)
GLUCOSE UR STRIP.AUTO-MCNC: NEGATIVE MG/DL
HBA1C MFR BLD: 7.9 % (ref 4.5–5.6)
HCT VFR BLD AUTO: 33.9 % (ref 36–48)
HGB BLD-MCNC: 11.1 G/DL (ref 13–16)
HGB UR QL STRIP: NEGATIVE
KETONES UR QL STRIP.AUTO: ABNORMAL MG/DL
LEUKOCYTE ESTERASE UR QL STRIP.AUTO: NEGATIVE
LYMPHOCYTES # BLD: 1.7 K/UL (ref 0.9–3.6)
LYMPHOCYTES NFR BLD: 33 % (ref 21–52)
MCH RBC QN AUTO: 29.8 PG (ref 24–34)
MCHC RBC AUTO-ENTMCNC: 32.7 G/DL (ref 31–37)
MCV RBC AUTO: 90.9 FL (ref 74–97)
MONOCYTES # BLD: 0.4 K/UL (ref 0.05–1.2)
MONOCYTES NFR BLD: 7 % (ref 3–10)
NEUTS SEG # BLD: 2.9 K/UL (ref 1.8–8)
NEUTS SEG NFR BLD: 57 % (ref 40–73)
NITRITE UR QL STRIP.AUTO: NEGATIVE
PH UR STRIP: 5 [PH] (ref 5–8)
PLATELET # BLD AUTO: 173 K/UL (ref 135–420)
PMV BLD AUTO: 10.3 FL (ref 9.2–11.8)
POTASSIUM SERPL-SCNC: 3.8 MMOL/L (ref 3.5–5.5)
PROT SERPL-MCNC: 5.8 G/DL (ref 6.4–8.2)
PROT UR STRIP-MCNC: NEGATIVE MG/DL
RBC # BLD AUTO: 3.73 M/UL (ref 4.7–5.5)
SERVICE CMNT-IMP: NORMAL
SODIUM SERPL-SCNC: 145 MMOL/L (ref 136–145)
SP GR UR REFRACTOMETRY: 1.02 (ref 1–1.03)
UROBILINOGEN UR QL STRIP.AUTO: 1 EU/DL (ref 0.2–1)
WBC # BLD AUTO: 5.1 K/UL (ref 4.6–13.2)

## 2018-06-11 PROCEDURE — 85025 COMPLETE CBC W/AUTO DIFF WBC: CPT | Performed by: ORTHOPAEDIC SURGERY

## 2018-06-11 PROCEDURE — 83036 HEMOGLOBIN GLYCOSYLATED A1C: CPT | Performed by: ORTHOPAEDIC SURGERY

## 2018-06-11 PROCEDURE — 80053 COMPREHEN METABOLIC PANEL: CPT | Performed by: ORTHOPAEDIC SURGERY

## 2018-06-11 PROCEDURE — 81003 URINALYSIS AUTO W/O SCOPE: CPT | Performed by: ORTHOPAEDIC SURGERY

## 2018-06-11 PROCEDURE — 87641 MR-STAPH DNA AMP PROBE: CPT | Performed by: ORTHOPAEDIC SURGERY

## 2018-06-11 RX ORDER — MAGNESIUM SULFATE 100 %
16 CRYSTALS MISCELLANEOUS AS NEEDED
Status: CANCELLED | OUTPATIENT
Start: 2018-06-11

## 2018-06-11 RX ORDER — INSULIN LISPRO 100 [IU]/ML
INJECTION, SOLUTION INTRAVENOUS; SUBCUTANEOUS
Status: CANCELLED | OUTPATIENT
Start: 2018-06-11

## 2018-06-11 RX ORDER — METFORMIN HYDROCHLORIDE 500 MG/1
500 TABLET ORAL
COMMUNITY
End: 2018-11-27

## 2018-06-11 RX ORDER — DONEPEZIL HYDROCHLORIDE 5 MG/1
5 TABLET, FILM COATED ORAL
COMMUNITY

## 2018-06-11 RX ORDER — DICLOFENAC SODIUM 75 MG/1
TABLET, DELAYED RELEASE ORAL 2 TIMES DAILY
COMMUNITY
End: 2018-07-01

## 2018-06-11 RX ORDER — SODIUM CHLORIDE, SODIUM LACTATE, POTASSIUM CHLORIDE, CALCIUM CHLORIDE 600; 310; 30; 20 MG/100ML; MG/100ML; MG/100ML; MG/100ML
125 INJECTION, SOLUTION INTRAVENOUS CONTINUOUS
Status: CANCELLED | OUTPATIENT
Start: 2018-06-11

## 2018-06-11 RX ORDER — DEXTROSE 50 % IN WATER (D50W) INTRAVENOUS SYRINGE
50 AS NEEDED
Status: CANCELLED | OUTPATIENT
Start: 2018-06-11

## 2018-06-11 RX ORDER — CEFAZOLIN SODIUM/WATER 2 G/20 ML
2 SYRINGE (ML) INTRAVENOUS ONCE
Status: CANCELLED | OUTPATIENT
Start: 2018-06-11 | End: 2018-06-11

## 2018-06-11 NOTE — PERIOP NOTES
Chg wipes givenDenies any prostheticsPatient states family physician is aware of upcoming procedure/surgeryDenies sleep apneaDenies family history of anesthesia complicationsDenies shortness of breath nor chest pain while climbing stairs  Live [employer] 360 reveal to OR/darrel

## 2018-06-13 ENCOUNTER — HOSPITAL ENCOUNTER (EMERGENCY)
Age: 83
Discharge: HOME | End: 2018-06-13
Attending: EMERGENCY MEDICINE
Payer: MEDICARE

## 2018-06-13 VITALS
TEMPERATURE: 97.7 F | BODY MASS INDEX: 29.98 KG/M2 | WEIGHT: 191 LBS | HEIGHT: 67 IN | OXYGEN SATURATION: 99 % | SYSTOLIC BLOOD PRESSURE: 135 MMHG | RESPIRATION RATE: 13 BRPM | HEART RATE: 75 BPM | DIASTOLIC BLOOD PRESSURE: 65 MMHG
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
## 2018-06-13 PROCEDURE — 99282 EMERGENCY DEPT VISIT SF MDM: CPT

## 2018-06-13 RX ORDER — DOXYCYCLINE HYCLATE 100 MG
100 TABLET ORAL 2 TIMES DAILY
Qty: 20 TAB | Refills: 0 | Status: SHIPPED | OUTPATIENT
Start: 2018-06-13 | End: 2018-06-23

## 2018-06-14 NOTE — ED TRIAGE NOTES
Found tick on his LT flank area. Pt has removed. Pt thinking it could have been there for at least 2-3 days. Pt denying NV/rash. Site just became painful.

## 2018-06-14 NOTE — ED NOTES
Patient reports he started itching yesterday -left lower flank. Itching continued today. Pulled off a small brown tick. Patient reports he applied bacitracin magi and a band aid to the site after the tick was removed.

## 2018-06-14 NOTE — ED NOTES
Assuming care of pt from [facility]. Tick bite, may have been there for 2-3 days. Pt asymptomatic. NAD observed.

## 2018-06-14 NOTE — ED PROVIDER NOTES
EMERGENCY DEPARTMENT HISTORY AND PHYSICAL EXAM    Date: 6/13/2018  Patient Name: Maryjane Nunez. History of Presenting Illness     Chief Complaint   Patient presents with    Insect Bite         History Provided By: Patient    Chief Complaint: Tick bite  Duration: 1 Days  Timing:  Acute  Location: Left flank  Quality: Red rash  Severity: 2 out of 10  Modifying Factors: No modifying factors   Associated Symptoms: Itching redness    Additional History (Context):   11:13 PM  Maryjane Benavidez is a [de-identified] y.o. male with PMHX of HTN who presents to the emergency department C/O tick bite to left flank. Pt felt the tick yesterday, but did not realize it was actually a tick until today. Pt was able to fully remove the tick 3 hours ago, which was slightly engorged. Associated sxs include itchiness and redness surrounding bite. Pt states he was working in his yard prior to when he noticed the tick. Pt has appointment to f/u with PCP this week. Denies fever, chills, N/V, and any other sxs or complaints. PCP: Camille Wisdom MD    Current Outpatient Prescriptions   Medication Sig Dispense Refill    doxycycline (VIBRA-TABS) 100 mg tablet Take 1 Tab by mouth two (2) times a day for 10 days. 20 Tab 0    diclofenac EC (VOLTAREN) 75 mg EC tablet Take  by mouth two (2) times a day.  donepezil (ARICEPT) 5 mg tablet Take  by mouth nightly.  metFORMIN (GLUCOPHAGE) 500 mg tablet Take  by mouth two (2) times daily (with meals).  SITagliptin (JANUVIA) 100 mg tablet Take 100 mg by mouth nightly. Indications: type 2 diabetes mellitus      FAMOTIDINE (PEPCID PO) Take 20 mg by mouth two (2) times a day.  valsartan-hydroCHLOROthiazide (DIOVAN-HCT) 320-25 mg per tablet Take 1 Tab by mouth daily.  FOLIC ACID/MULTIVIT-MIN/LUTEIN (CENTRUM SILVER PO) Take 1 Tab by mouth daily.       VITAMIN D2 50,000 unit capsule TAKE 1 CAPSULE TWICE WEEKLY (Patient taking differently: TAKE 1 CAPSULE TWICE WEEKLY tue/thur) 52 Cap 0    oxybutynin (DITROPAN) 5 mg tablet Take 5 mg by mouth nightly.  metoprolol tartrate 75 mg tab Take 75 mg by mouth every twelve (12) hours. 60 Tab 0    rivaroxaban (XARELTO) 20 mg tab tablet Take 1 Tab by mouth daily. (Patient taking differently: Take 20 mg by mouth daily (with lunch). Indications: afib) 30 Tab 2    dilTIAZem CD (CARDIZEM CD) 240 mg ER capsule Take 1 Cap by mouth daily. (Patient taking differently: Take 240 mg by mouth daily (with lunch). ) 30 Cap 0    Omeprazole delayed release (PRILOSEC D/R) 20 mg tablet Take 20 mg by mouth daily.  cholecalciferol (VITAMIN D3) 1,000 unit tablet Take  by mouth daily.  furosemide (LASIX) 20 mg tablet Take 20 mg by mouth daily. Indications: Edema      melatonin tab tablet Take 5 mg by mouth nightly. Indications: sleep      buPROPion XL (WELLBUTRIN XL) 150 mg tablet Take 150 mg by mouth every morning. Indications: MAJOR DEPRESSIVE DISORDER      OTHER Take 2 Tabs by mouth two (2) times a day. Berbinex supplement Blood sugar and weight support      ACETAMINOPHEN/DIPHENHYDRAMINE (TYLENOL PM PO) Take  by mouth nightly.  insulin lispro (HUMALOG) 100 unit/mL injection by SubCUTAneous route four (4) times daily. BS-with sliding scale  Indications: type 2 diabetes mellitus, sliding scale      insulin glargine (LANTUS) 100 unit/mL injection 12 Units by SubCUTAneous route nightly.  Indications: TYPE 2 DIABETES MELLITUS         Past History     Past Medical History:  Past Medical History:   Diagnosis Date    Arthritis     Atrial fibrillation ([address]     Dementia     Depression     Diabetes ([address] 1980s    GERD (gastroesophageal reflux disease)     Hypercholesterolemia     Hypertension 2000    Ill-defined condition 2015    has passed out-has been tested cannot determine cause    Ulcer     at the anastomotic bite of gastric bypass    Varicose veins     Wide-complex tachycardia ([address] 5/2/2015       Past Surgical History:  Past Surgical History:   Procedure Laterality Date    ABDOMEN SURGERY PROC UNLISTED  1998    umb hernia Rupture    BIOPSY LIVER  10/05/04    EGD  12/29/09    with biopsy    ENDOSCOPY, COLON, DIAGNOSTIC      GASTROSTOMY TUBE  10/05/04    HX CATARACT REMOVAL      bilateral    HX CHOLECYSTECTOMY  10/05/04    HX GI  10/05/04    vertical banded gastroplasty/gastric bypass    HX MOHS PROCEDURES  1991/1995    bilateral    HX PACEMAKER  2015    [employer]-Reveal Ling Cardiac Monitor       Family History:  Family History   Problem Relation Age of Onset    Heart Attack Father     Diabetes Father     Hypertension Father     Cancer Mother      lung    Diabetes Mother     Other Brother      obese    Diabetes Brother     Diabetes Brother     Other Maternal Grandmother      obese    Diabetes Sister        Social History:  Social History   Substance Use Topics    Smoking status: Former Smoker     Quit date: 4/3/1967    Smokeless tobacco: Never Used    Alcohol use 0.6 oz/week     1 Glasses of wine per week      Comment: weekly       Allergies: Allergies   Allergen Reactions    Morphine Other (comments)     Hallucinate; \"ran all night long\"         Review of Systems   Review of Systems   Constitutional: Negative for chills and fever. Gastrointestinal: Negative for nausea and vomiting. Skin: Positive for rash and wound (tick bite). All other systems reviewed and are negative. Physical Exam     Vitals:    06/13/18 2243   BP: 135/65   Pulse: 75   Resp: 13   Temp: 97.5 °F (36.4 °C)   SpO2: 99%   Weight: 86.6 kg (191 lb)   Height: 5' 7\" (1.702 m)     Physical Exam   Constitutional: He is oriented to person, place, and time. He appears well-developed and well-nourished. No distress. HENT:   Head: Normocephalic and atraumatic. Eyes: EOM are normal. Pupils are equal, round, and reactive to light. Neck: Neck supple. Cardiovascular: Normal rate and regular rhythm. Exam reveals no gallop and no friction rub.     No murmur heard. Pulmonary/Chest: Effort normal and breath sounds normal. No respiratory distress. He has no wheezes. He has no rales. Abdominal: Soft. Bowel sounds are normal. He exhibits no distension and no mass. There is no tenderness. There is no rebound and no guarding. Musculoskeletal: Normal range of motion. He exhibits no tenderness or deformity. Lymphadenopathy:     He has no cervical adenopathy. Neurological: He is alert and oriented to person, place, and time. Skin: Skin is warm and dry. He is not diaphoretic. There is erythema.   + tick bite to the left lateral flank with noted erythema and mild induration, no abscess. No bebeto bulls eye rash. No vasculitis. Psychiatric: He has a normal mood and affect. His behavior is normal.   Nursing note and vitals reviewed. Diagnostic Study Results     Labs -   No results found for this or any previous visit (from the past 12 hour(s)). Radiologic Studies -   No orders to display       Medications given in the ED-  Medications - No data to display      Medical Decision Making   I am the first provider for this patient. I reviewed the vital signs, available nursing notes, past medical history, past surgical history, family history and social history. Vital Signs-Reviewed the patient's vital signs. Pulse Oximetry Analysis - 99% on RA     Records Reviewed: Nursing Notes    Provider Notes (Medical Decision Making): Impression:  Tick bite with concerning local reaction. Will start on Doxy. Have patient follow up with PCP for wound check. Leah Tate      Procedures:  Procedures    ED Course:   11:13 PM Initial assessment performed. The patients presenting problems have been discussed, and they are in agreement with the care plan formulated and outlined with them. I have encouraged them to ask questions as they arise throughout their visit.     Diagnosis and Disposition       DISCHARGE NOTE:  11:24 PM  Zia Medrano results have been reviewed with him. He has been counseled regarding his diagnosis, treatment, and plan. He verbally conveys understanding and agreement of the signs, symptoms, diagnosis, treatment and prognosis and additionally agrees to follow up as discussed. He also agrees with the care-plan and conveys that all of his questions have been answered. I have also provided discharge instructions for him that include: educational information regarding their diagnosis and treatment, and list of reasons why they would want to return to the ED prior to their follow-up appointment, should his condition change. He has been provided with education for proper emergency department utilization. CLINICAL IMPRESSION:    1. Tick bite, initial encounter        PLAN:  1. D/C Home  2. Current Discharge Medication List      START taking these medications    Details   doxycycline (VIBRA-TABS) 100 mg tablet Take 1 Tab by mouth two (2) times a day for 10 days. Qty: 20 Tab, Refills: 0         CONTINUE these medications which have NOT CHANGED    Details   diclofenac EC (VOLTAREN) 75 mg EC tablet Take  by mouth two (2) times a day. donepezil (ARICEPT) 5 mg tablet Take  by mouth nightly. metFORMIN (GLUCOPHAGE) 500 mg tablet Take  by mouth two (2) times daily (with meals). SITagliptin (JANUVIA) 100 mg tablet Take 100 mg by mouth nightly. Indications: type 2 diabetes mellitus      FAMOTIDINE (PEPCID PO) Take 20 mg by mouth two (2) times a day. valsartan-hydroCHLOROthiazide (DIOVAN-HCT) 320-25 mg per tablet Take 1 Tab by mouth daily. FOLIC ACID/MULTIVIT-MIN/LUTEIN (CENTRUM SILVER PO) Take 1 Tab by mouth daily. VITAMIN D2 50,000 unit capsule TAKE 1 CAPSULE TWICE WEEKLY  Qty: 52 Cap, Refills: 0      oxybutynin (DITROPAN) 5 mg tablet Take 5 mg by mouth nightly. metoprolol tartrate 75 mg tab Take 75 mg by mouth every twelve (12) hours.   Qty: 60 Tab, Refills: 0      rivaroxaban (XARELTO) 20 mg tab tablet Take 1 Tab by mouth daily. Qty: 30 Tab, Refills: 2      dilTIAZem CD (CARDIZEM CD) 240 mg ER capsule Take 1 Cap by mouth daily. Qty: 30 Cap, Refills: 0      Omeprazole delayed release (PRILOSEC D/R) 20 mg tablet Take 20 mg by mouth daily. cholecalciferol (VITAMIN D3) 1,000 unit tablet Take  by mouth daily. furosemide (LASIX) 20 mg tablet Take 20 mg by mouth daily. Indications: Edema      melatonin tab tablet Take 5 mg by mouth nightly. Indications: sleep      buPROPion XL (WELLBUTRIN XL) 150 mg tablet Take 150 mg by mouth every morning. Indications: MAJOR DEPRESSIVE DISORDER      OTHER Take 2 Tabs by mouth two (2) times a day. Berbinex supplement Blood sugar and weight support      ACETAMINOPHEN/DIPHENHYDRAMINE (TYLENOL PM PO) Take  by mouth nightly. insulin lispro (HUMALOG) 100 unit/mL injection by SubCUTAneous route four (4) times daily. BS-with sliding scale  Indications: type 2 diabetes mellitus, sliding scale      insulin glargine (LANTUS) 100 unit/mL injection 12 Units by SubCUTAneous route nightly. Indications: TYPE 2 DIABETES MELLITUS           3. Follow-up Information     Follow up With Details Comments [address]    THE [facility] EMERGENCY DEPT Go to As needed, if symptoms worsen [address] [facility]    Pablito Mena MD Schedule an appointment as soon as possible for a visit For primary care follow up the beginning of next week. [address] [facility]  [phone]          _______________________________    Attestations: This note is prepare by [place] BROOKE URBINA and Ora Petersen, acting as Scribe for YELITZA Barnes. YELITZA Barnes:  The scribe's documentation has been prepared under my direction and personally reviewed by me in its entirety.   I confirm that the note above accurately reflects all work, treatment, procedures, and medical decision making performed by me.  _______________________________

## 2018-06-14 NOTE — ED NOTES
Pt fine upon d/c. Afebrile- T 97.9 Tym. Pt ready for d/c home with his spouse. D/C instructions/RX reviewed with pt/understanding acknowledged by pt. NAD observed upon d/c home. Pt ambulatory.

## 2018-06-14 NOTE — DISCHARGE INSTRUCTIONS
Tick Bite: Care Instructions  Your Care Instructions    Ticks are small spiderlike animals. They bite to fasten themselves onto your skin and feed on your blood. Ticks can carry diseases. But most ticks do not carry diseases, and most tick bites do not cause serious health problems. Some people may have an allergic reaction to a tick bite. This reaction may be mild, with symptoms like itching and swelling. In rare cases, a severe allergic reaction may occur. Most of the time, all you need to do for a tick bite is relieve any symptoms you may have. Follow-up care is a key part of your treatment and safety. Be sure to make and go to all appointments, and call your doctor if you are having problems. It's also a good idea to know your test results and keep a list of the medicines you take. How can you care for yourself at home? · Put ice or a cold pack on the bite for 15 to 20 minutes once an hour. Put a thin cloth between the ice and your skin. · Try an over-the-counter medicine to relieve itching, redness, swelling, and pain. Be safe with medicines. Read and follow all instructions on the label. ¨ Take an antihistamine medicine, such as a nondrowsy one like loratadine (Claritin) or one that might make you sleepy like diphenhydramine (Benadryl). These medicines may help relieve itching, redness, and swelling. ¨ Use a spray of local anesthetic that contains benzocaine, such as Solarcaine. It may help relieve pain. If your skin reacts to the spray, stop using it. ¨ Put calamine lotion on the skin. It may help relieve itching. To avoid tick bites  · Avoid ticks:  ¨ Learn where ticks are found in your community, and stay away from those areas if possible. ¨ Cover as much of your body as possible when you work or play in grassy or wooded areas. ¨ Use insect repellents, such as products containing DEET. You can spray them on your skin.   ¨ Take steps to control ticks on your property if you live in an area where Lyme disease occurs. Clear leaves, brush, tall grasses, woodpiles, and stone fences from around your house and the edges of your yard or garden. This may help get rid of ticks. · When you come in from outdoors, check your body for ticks, including your groin, head, and underarms. The ticks may be about the size of a sesame seed. If no one else can help you check for ticks on your scalp, comb your hair with a fine-tooth comb. · If you find a tick, remove it quickly. Use tweezers to grasp the tick as close to its mouth (the part in your skin) as possible. Slowly pull the tick straight out-do not twist or yank-until its mouth releases from your skin. · Ticks can come into your house on clothing, outdoor gear, and pets. These ticks can fall off and attach to you. ¨ Check your clothing and outdoor gear. Remove any ticks you find. Then put your clothing in a clothes dryer on high heat for 1 hour to kill any ticks that might remain. ¨ Check your pets for ticks after they have been outdoors. · When hiking in the woods, carry a small dry jar or ziplock bag. If you find a tick on your body, remove the tick and put it in the jar or bag. Store the container in the freezer so you can give it to your doctor if symptoms develop. The tick can be tested to learn whether it is carrying the bacteria that cause Lyme disease. When should you call for help? Call 911 anytime you think you may need emergency care. For example, call if:  ? · You have symptoms of a severe allergic reaction. These may include:  ¨ Sudden raised, red areas (hives) all over your body. ¨ Swelling of the throat, mouth, lips, or tongue. ¨ Trouble breathing. ¨ Passing out (losing consciousness). Or you may feel very lightheaded or suddenly feel weak, confused, or restless. ?Call your doctor now or seek immediate medical care if:  ? · You have signs of infection, such as:  ¨ Increased pain, swelling, warmth, or redness around the bite.   ¨ Red streaks leading from the bite. ¨ Pus draining from the bite. ¨ A fever. ? Watch closely for changes in your health, and be sure to contact your doctor if:  ? · You develop a new rash. ? · You have joint pain. ? · You are very tired. ? · You have flu-like symptoms. ? · You have symptoms for more than 1 week. Where can you learn more? Go to http://jared-bill.info/. Enter D435 in the search box to learn more about \"Tick Bite: Care Instructions. \"  Current as of: March 20, 2017  Content Version: 11.4  © 4128-5972 [employer]. Care instructions adapted under license by [employer] (which disclaims liability or warranty for this information). If you have questions about a medical condition or this instruction, always ask your healthcare professional. [address] any warranty or liability for your use of this information.

## 2018-06-25 ENCOUNTER — ANESTHESIA EVENT (OUTPATIENT)
Dept: SURGERY | Age: 83
DRG: 470 | End: 2018-06-25
Payer: MEDICARE

## 2018-06-28 ENCOUNTER — HOSPITAL ENCOUNTER (INPATIENT)
Age: 83
LOS: 3 days | Discharge: NURSING, REHAB OR LONG-TERM CARE | DRG: 470 | End: 2018-07-01
Attending: ORTHOPAEDIC SURGERY | Admitting: ORTHOPAEDIC SURGERY
Payer: MEDICARE

## 2018-06-28 ENCOUNTER — APPOINTMENT (OUTPATIENT)
Dept: GENERAL RADIOLOGY | Age: 83
DRG: 470 | End: 2018-06-28
Attending: PHYSICIAN ASSISTANT
Payer: MEDICARE

## 2018-06-28 ENCOUNTER — ANESTHESIA (OUTPATIENT)
Dept: SURGERY | Age: 83
DRG: 470 | End: 2018-06-28
Payer: MEDICARE

## 2018-06-28 DIAGNOSIS — M17.11 PRIMARY OSTEOARTHRITIS OF RIGHT KNEE: Primary | Chronic | ICD-10-CM

## 2018-06-28 LAB
ABO + RH BLD: NORMAL
BLOOD GROUP ANTIBODIES SERPL: NORMAL
GLUCOSE BLD STRIP.AUTO-MCNC: 186 MG/DL (ref 70–110)
GLUCOSE BLD STRIP.AUTO-MCNC: 193 MG/DL (ref 70–110)
GLUCOSE BLD STRIP.AUTO-MCNC: 210 MG/DL (ref 70–110)
SPECIMEN EXP DATE BLD: NORMAL

## 2018-06-28 PROCEDURE — 82962 GLUCOSE BLOOD TEST: CPT

## 2018-06-28 PROCEDURE — 77030013708 HC HNDPC SUC IRR PULS STRY –B: Performed by: ORTHOPAEDIC SURGERY

## 2018-06-28 PROCEDURE — 76210000000 HC OR PH I REC 2 TO 2.5 HR: Performed by: ORTHOPAEDIC SURGERY

## 2018-06-28 PROCEDURE — 77030020813 HC INST SCULP CEM KT DISP S&N -B: Performed by: ORTHOPAEDIC SURGERY

## 2018-06-28 PROCEDURE — 74011250636 HC RX REV CODE- 250/636: Performed by: ORTHOPAEDIC SURGERY

## 2018-06-28 PROCEDURE — 77030008462 HC STPLR SKN PROX J&J -A: Performed by: ORTHOPAEDIC SURGERY

## 2018-06-28 PROCEDURE — 77030018836 HC SOL IRR NACL ICUM -A: Performed by: ORTHOPAEDIC SURGERY

## 2018-06-28 PROCEDURE — 77030032490 HC SLV COMPR SCD KNE COVD -B: Performed by: ORTHOPAEDIC SURGERY

## 2018-06-28 PROCEDURE — 76010000131 HC OR TIME 2 TO 2.5 HR: Performed by: ORTHOPAEDIC SURGERY

## 2018-06-28 PROCEDURE — 74011636637 HC RX REV CODE- 636/637: Performed by: PHYSICIAN ASSISTANT

## 2018-06-28 PROCEDURE — 86901 BLOOD TYPING SEROLOGIC RH(D): CPT | Performed by: ORTHOPAEDIC SURGERY

## 2018-06-28 PROCEDURE — 77030018673: Performed by: ORTHOPAEDIC SURGERY

## 2018-06-28 PROCEDURE — 74011250636 HC RX REV CODE- 250/636

## 2018-06-28 PROCEDURE — 77030012893

## 2018-06-28 PROCEDURE — 77030012508 HC MSK AIRWY LMA AMBU -A: Performed by: NURSE ANESTHETIST, CERTIFIED REGISTERED

## 2018-06-28 PROCEDURE — 74011000258 HC RX REV CODE- 258

## 2018-06-28 PROCEDURE — 77030003666 HC NDL SPINAL BD -A: Performed by: ORTHOPAEDIC SURGERY

## 2018-06-28 PROCEDURE — 74011000250 HC RX REV CODE- 250

## 2018-06-28 PROCEDURE — 74011250637 HC RX REV CODE- 250/637: Performed by: PHYSICIAN ASSISTANT

## 2018-06-28 PROCEDURE — 77030003029 HC SUT VCRL J&J -B: Performed by: ORTHOPAEDIC SURGERY

## 2018-06-28 PROCEDURE — C1713 ANCHOR/SCREW BN/BN,TIS/BN: HCPCS | Performed by: ORTHOPAEDIC SURGERY

## 2018-06-28 PROCEDURE — 64450 NJX AA&/STRD OTHER PN/BRANCH: CPT | Performed by: ORTHOPAEDIC SURGERY

## 2018-06-28 PROCEDURE — C1776 JOINT DEVICE (IMPLANTABLE): HCPCS | Performed by: ORTHOPAEDIC SURGERY

## 2018-06-28 PROCEDURE — 77030037875 HC DRSG MEPILEX <16IN BORD MOLN -A: Performed by: ORTHOPAEDIC SURGERY

## 2018-06-28 PROCEDURE — 0SRC0J9 REPLACEMENT OF RIGHT KNEE JOINT WITH SYNTHETIC SUBSTITUTE, CEMENTED, OPEN APPROACH: ICD-10-PCS | Performed by: ORTHOPAEDIC SURGERY

## 2018-06-28 PROCEDURE — 77030011640 HC PAD GRND REM COVD -A: Performed by: ORTHOPAEDIC SURGERY

## 2018-06-28 PROCEDURE — 74011250637 HC RX REV CODE- 250/637: Performed by: ANESTHESIOLOGY

## 2018-06-28 PROCEDURE — 77030011628: Performed by: ORTHOPAEDIC SURGERY

## 2018-06-28 PROCEDURE — 77030039267 HC ADH SKN EXOFIN S2SG -B: Performed by: ORTHOPAEDIC SURGERY

## 2018-06-28 PROCEDURE — 74011636637 HC RX REV CODE- 636/637: Performed by: ORTHOPAEDIC SURGERY

## 2018-06-28 PROCEDURE — 77030027138 HC INCENT SPIROMETER -A

## 2018-06-28 PROCEDURE — 77030020754 HC CUF TRNQT 2BLA STRY -B: Performed by: ORTHOPAEDIC SURGERY

## 2018-06-28 PROCEDURE — 77030038010: Performed by: ORTHOPAEDIC SURGERY

## 2018-06-28 PROCEDURE — 74011250636 HC RX REV CODE- 250/636: Performed by: ANESTHESIOLOGY

## 2018-06-28 PROCEDURE — 73560 X-RAY EXAM OF KNEE 1 OR 2: CPT

## 2018-06-28 PROCEDURE — 76942 ECHO GUIDE FOR BIOPSY: CPT | Performed by: ORTHOPAEDIC SURGERY

## 2018-06-28 PROCEDURE — 74011000250 HC RX REV CODE- 250: Performed by: ORTHOPAEDIC SURGERY

## 2018-06-28 PROCEDURE — 77030002933 HC SUT MCRYL J&J -A: Performed by: ORTHOPAEDIC SURGERY

## 2018-06-28 PROCEDURE — 65270000029 HC RM PRIVATE

## 2018-06-28 PROCEDURE — 77030020782 HC GWN BAIR PAWS FLX 3M -B: Performed by: ORTHOPAEDIC SURGERY

## 2018-06-28 PROCEDURE — 77030018846 HC SOL IRR STRL H20 ICUM -A: Performed by: ORTHOPAEDIC SURGERY

## 2018-06-28 PROCEDURE — 76060000035 HC ANESTHESIA 2 TO 2.5 HR: Performed by: ORTHOPAEDIC SURGERY

## 2018-06-28 PROCEDURE — 3E0T3BZ INTRODUCTION OF ANESTHETIC AGENT INTO PERIPHERAL NERVES AND PLEXI, PERCUTANEOUS APPROACH: ICD-10-PCS | Performed by: ORTHOPAEDIC SURGERY

## 2018-06-28 DEVICE — CEMENT BNE 20GM HALF DOSE PMMA VISC RADPQ FAST: Type: IMPLANTABLE DEVICE | Site: KNEE | Status: FUNCTIONAL

## 2018-06-28 DEVICE — CEMENT BNE 40GM FULL DOSE PMMA W/O ANTIBIO HI VISC N RADPQ: Type: IMPLANTABLE DEVICE | Site: KNEE | Status: FUNCTIONAL

## 2018-06-28 RX ORDER — LIDOCAINE HYDROCHLORIDE 20 MG/ML
INJECTION, SOLUTION EPIDURAL; INFILTRATION; INTRACAUDAL; PERINEURAL AS NEEDED
Status: DISCONTINUED | OUTPATIENT
Start: 2018-06-28 | End: 2018-06-28 | Stop reason: HOSPADM

## 2018-06-28 RX ORDER — MAGNESIUM SULFATE 100 %
4 CRYSTALS MISCELLANEOUS AS NEEDED
Status: DISCONTINUED | OUTPATIENT
Start: 2018-06-28 | End: 2018-07-01 | Stop reason: HOSPADM

## 2018-06-28 RX ORDER — DEXTROSE 50 % IN WATER (D50W) INTRAVENOUS SYRINGE
25-50 AS NEEDED
Status: DISCONTINUED | OUTPATIENT
Start: 2018-06-28 | End: 2018-06-28 | Stop reason: HOSPADM

## 2018-06-28 RX ORDER — SODIUM CHLORIDE, SODIUM LACTATE, POTASSIUM CHLORIDE, CALCIUM CHLORIDE 600; 310; 30; 20 MG/100ML; MG/100ML; MG/100ML; MG/100ML
75 INJECTION, SOLUTION INTRAVENOUS CONTINUOUS
Status: DISCONTINUED | OUTPATIENT
Start: 2018-06-28 | End: 2018-06-29

## 2018-06-28 RX ORDER — BUPROPION HYDROCHLORIDE 150 MG/1
150 TABLET ORAL
Status: DISCONTINUED | OUTPATIENT
Start: 2018-06-29 | End: 2018-07-01 | Stop reason: HOSPADM

## 2018-06-28 RX ORDER — ACETAMINOPHEN 500 MG
1000 TABLET ORAL ONCE
Status: COMPLETED | OUTPATIENT
Start: 2018-06-28 | End: 2018-06-28

## 2018-06-28 RX ORDER — ONDANSETRON 2 MG/ML
4 INJECTION INTRAMUSCULAR; INTRAVENOUS ONCE
Status: DISCONTINUED | OUTPATIENT
Start: 2018-06-28 | End: 2018-06-28 | Stop reason: HOSPADM

## 2018-06-28 RX ORDER — BISACODYL 5 MG
10 TABLET, DELAYED RELEASE (ENTERIC COATED) ORAL DAILY PRN
Status: DISCONTINUED | OUTPATIENT
Start: 2018-06-28 | End: 2018-07-01 | Stop reason: HOSPADM

## 2018-06-28 RX ORDER — SODIUM CHLORIDE, SODIUM LACTATE, POTASSIUM CHLORIDE, CALCIUM CHLORIDE 600; 310; 30; 20 MG/100ML; MG/100ML; MG/100ML; MG/100ML
150 INJECTION, SOLUTION INTRAVENOUS CONTINUOUS
Status: DISCONTINUED | OUTPATIENT
Start: 2018-06-28 | End: 2018-06-28 | Stop reason: HOSPADM

## 2018-06-28 RX ORDER — OXYCODONE AND ACETAMINOPHEN 5; 325 MG/1; MG/1
1-2 TABLET ORAL
Status: DISCONTINUED | OUTPATIENT
Start: 2018-06-28 | End: 2018-07-01 | Stop reason: HOSPADM

## 2018-06-28 RX ORDER — MAGNESIUM SULFATE 100 %
16 CRYSTALS MISCELLANEOUS AS NEEDED
Status: DISCONTINUED | OUTPATIENT
Start: 2018-06-28 | End: 2018-07-01 | Stop reason: HOSPADM

## 2018-06-28 RX ORDER — ENOXAPARIN SODIUM 100 MG/ML
40 INJECTION SUBCUTANEOUS DAILY
Status: CANCELLED | OUTPATIENT
Start: 2018-06-29 | End: 2018-07-02

## 2018-06-28 RX ORDER — ROPIVACAINE HYDROCHLORIDE 5 MG/ML
INJECTION, SOLUTION EPIDURAL; INFILTRATION; PERINEURAL AS NEEDED
Status: DISCONTINUED | OUTPATIENT
Start: 2018-06-28 | End: 2018-06-28 | Stop reason: HOSPADM

## 2018-06-28 RX ORDER — OXYCODONE HYDROCHLORIDE 5 MG/1
5 TABLET ORAL ONCE
Status: DISCONTINUED | OUTPATIENT
Start: 2018-06-28 | End: 2018-06-28 | Stop reason: HOSPADM

## 2018-06-28 RX ORDER — PROPOFOL 10 MG/ML
INJECTION, EMULSION INTRAVENOUS AS NEEDED
Status: DISCONTINUED | OUTPATIENT
Start: 2018-06-28 | End: 2018-06-28 | Stop reason: HOSPADM

## 2018-06-28 RX ORDER — CEFAZOLIN SODIUM/WATER 2 G/20 ML
2 SYRINGE (ML) INTRAVENOUS EVERY 8 HOURS
Status: COMPLETED | OUTPATIENT
Start: 2018-06-29 | End: 2018-06-29

## 2018-06-28 RX ORDER — DIPHENHYDRAMINE HYDROCHLORIDE 50 MG/ML
12.5 INJECTION, SOLUTION INTRAMUSCULAR; INTRAVENOUS
Status: DISCONTINUED | OUTPATIENT
Start: 2018-06-28 | End: 2018-07-01 | Stop reason: HOSPADM

## 2018-06-28 RX ORDER — INSULIN LISPRO 100 [IU]/ML
INJECTION, SOLUTION INTRAVENOUS; SUBCUTANEOUS
Status: DISCONTINUED | OUTPATIENT
Start: 2018-06-28 | End: 2018-06-29

## 2018-06-28 RX ORDER — ONDANSETRON 2 MG/ML
INJECTION INTRAMUSCULAR; INTRAVENOUS AS NEEDED
Status: DISCONTINUED | OUTPATIENT
Start: 2018-06-28 | End: 2018-06-28 | Stop reason: HOSPADM

## 2018-06-28 RX ORDER — CELECOXIB 100 MG/1
200 CAPSULE ORAL
Status: COMPLETED | OUTPATIENT
Start: 2018-06-28 | End: 2018-06-28

## 2018-06-28 RX ORDER — FENTANYL CITRATE 50 UG/ML
INJECTION, SOLUTION INTRAMUSCULAR; INTRAVENOUS AS NEEDED
Status: DISCONTINUED | OUTPATIENT
Start: 2018-06-28 | End: 2018-06-28 | Stop reason: HOSPADM

## 2018-06-28 RX ORDER — FENTANYL CITRATE 50 UG/ML
25 INJECTION, SOLUTION INTRAMUSCULAR; INTRAVENOUS AS NEEDED
Status: DISCONTINUED | OUTPATIENT
Start: 2018-06-28 | End: 2018-06-28 | Stop reason: HOSPADM

## 2018-06-28 RX ORDER — SODIUM CHLORIDE, SODIUM LACTATE, POTASSIUM CHLORIDE, CALCIUM CHLORIDE 600; 310; 30; 20 MG/100ML; MG/100ML; MG/100ML; MG/100ML
125 INJECTION, SOLUTION INTRAVENOUS CONTINUOUS
Status: DISCONTINUED | OUTPATIENT
Start: 2018-06-28 | End: 2018-06-29

## 2018-06-28 RX ORDER — FUROSEMIDE 20 MG/1
20 TABLET ORAL DAILY
Status: DISCONTINUED | OUTPATIENT
Start: 2018-06-29 | End: 2018-07-01 | Stop reason: HOSPADM

## 2018-06-28 RX ORDER — DILTIAZEM HYDROCHLORIDE 240 MG/1
240 CAPSULE, COATED, EXTENDED RELEASE ORAL DAILY
Status: DISCONTINUED | OUTPATIENT
Start: 2018-06-29 | End: 2018-07-01 | Stop reason: HOSPADM

## 2018-06-28 RX ORDER — INSULIN LISPRO 100 [IU]/ML
INJECTION, SOLUTION INTRAVENOUS; SUBCUTANEOUS
Status: DISCONTINUED | OUTPATIENT
Start: 2018-06-28 | End: 2018-06-28 | Stop reason: HOSPADM

## 2018-06-28 RX ORDER — INSULIN LISPRO 100 [IU]/ML
INJECTION, SOLUTION INTRAVENOUS; SUBCUTANEOUS ONCE
Status: DISCONTINUED | OUTPATIENT
Start: 2018-06-28 | End: 2018-06-28 | Stop reason: HOSPADM

## 2018-06-28 RX ORDER — HYDROMORPHONE HYDROCHLORIDE 2 MG/ML
0.5 INJECTION, SOLUTION INTRAMUSCULAR; INTRAVENOUS; SUBCUTANEOUS
Status: DISPENSED | OUTPATIENT
Start: 2018-06-28 | End: 2018-06-28

## 2018-06-28 RX ORDER — CEFAZOLIN SODIUM/WATER 2 G/20 ML
2 SYRINGE (ML) INTRAVENOUS ONCE
Status: COMPLETED | OUTPATIENT
Start: 2018-06-28 | End: 2018-06-28

## 2018-06-28 RX ORDER — DONEPEZIL HYDROCHLORIDE 5 MG/1
5 TABLET, FILM COATED ORAL
Status: DISCONTINUED | OUTPATIENT
Start: 2018-06-28 | End: 2018-07-01 | Stop reason: HOSPADM

## 2018-06-28 RX ORDER — SODIUM CHLORIDE 0.9 % (FLUSH) 0.9 %
5-10 SYRINGE (ML) INJECTION EVERY 8 HOURS
Status: DISCONTINUED | OUTPATIENT
Start: 2018-06-28 | End: 2018-07-01 | Stop reason: HOSPADM

## 2018-06-28 RX ORDER — DIPHENHYDRAMINE HYDROCHLORIDE 50 MG/ML
12.5 INJECTION, SOLUTION INTRAMUSCULAR; INTRAVENOUS
Status: DISCONTINUED | OUTPATIENT
Start: 2018-06-28 | End: 2018-06-28 | Stop reason: HOSPADM

## 2018-06-28 RX ORDER — SODIUM CHLORIDE 0.9 % (FLUSH) 0.9 %
5-10 SYRINGE (ML) INJECTION AS NEEDED
Status: DISCONTINUED | OUTPATIENT
Start: 2018-06-28 | End: 2018-06-28 | Stop reason: HOSPADM

## 2018-06-28 RX ORDER — INSULIN LISPRO 100 [IU]/ML
INJECTION, SOLUTION INTRAVENOUS; SUBCUTANEOUS 4 TIMES DAILY
Status: DISCONTINUED | OUTPATIENT
Start: 2018-06-28 | End: 2018-06-29 | Stop reason: SDUPTHER

## 2018-06-28 RX ORDER — PHENOL/SODIUM PHENOLATE
20 AEROSOL, SPRAY (ML) MUCOUS MEMBRANE DAILY
Status: DISCONTINUED | OUTPATIENT
Start: 2018-06-29 | End: 2018-06-29 | Stop reason: CLARIF

## 2018-06-28 RX ORDER — ACETAMINOPHEN 325 MG/1
650 TABLET ORAL
Status: DISCONTINUED | OUTPATIENT
Start: 2018-06-28 | End: 2018-07-01 | Stop reason: HOSPADM

## 2018-06-28 RX ORDER — EPHEDRINE SULFATE/0.9% NACL/PF 25 MG/5 ML
SYRINGE (ML) INTRAVENOUS AS NEEDED
Status: DISCONTINUED | OUTPATIENT
Start: 2018-06-28 | End: 2018-06-28 | Stop reason: HOSPADM

## 2018-06-28 RX ORDER — HYDROCHLOROTHIAZIDE 25 MG/1
25 TABLET ORAL DAILY
Status: DISCONTINUED | OUTPATIENT
Start: 2018-06-29 | End: 2018-07-01 | Stop reason: HOSPADM

## 2018-06-28 RX ORDER — INSULIN GLARGINE 100 [IU]/ML
12 INJECTION, SOLUTION SUBCUTANEOUS
Status: DISCONTINUED | OUTPATIENT
Start: 2018-06-28 | End: 2018-06-29

## 2018-06-28 RX ORDER — DEXTROSE 50 % IN WATER (D50W) INTRAVENOUS SYRINGE
25-50 AS NEEDED
Status: DISCONTINUED | OUTPATIENT
Start: 2018-06-28 | End: 2018-07-01 | Stop reason: HOSPADM

## 2018-06-28 RX ORDER — ONDANSETRON 4 MG/1
4 TABLET, ORALLY DISINTEGRATING ORAL
Status: DISCONTINUED | OUTPATIENT
Start: 2018-06-28 | End: 2018-07-01 | Stop reason: HOSPADM

## 2018-06-28 RX ORDER — SODIUM CHLORIDE 0.9 % (FLUSH) 0.9 %
5-10 SYRINGE (ML) INJECTION AS NEEDED
Status: DISCONTINUED | OUTPATIENT
Start: 2018-06-28 | End: 2018-07-01 | Stop reason: HOSPADM

## 2018-06-28 RX ORDER — NALOXONE HYDROCHLORIDE 0.4 MG/ML
0.4 INJECTION, SOLUTION INTRAMUSCULAR; INTRAVENOUS; SUBCUTANEOUS AS NEEDED
Status: DISCONTINUED | OUTPATIENT
Start: 2018-06-28 | End: 2018-07-01 | Stop reason: HOSPADM

## 2018-06-28 RX ORDER — MIDAZOLAM HYDROCHLORIDE 1 MG/ML
INJECTION, SOLUTION INTRAMUSCULAR; INTRAVENOUS AS NEEDED
Status: DISCONTINUED | OUTPATIENT
Start: 2018-06-28 | End: 2018-06-28 | Stop reason: HOSPADM

## 2018-06-28 RX ORDER — MAGNESIUM SULFATE 100 %
4 CRYSTALS MISCELLANEOUS AS NEEDED
Status: DISCONTINUED | OUTPATIENT
Start: 2018-06-28 | End: 2018-06-28 | Stop reason: HOSPADM

## 2018-06-28 RX ORDER — NALOXONE HYDROCHLORIDE 0.4 MG/ML
0.1 INJECTION, SOLUTION INTRAMUSCULAR; INTRAVENOUS; SUBCUTANEOUS AS NEEDED
Status: DISCONTINUED | OUTPATIENT
Start: 2018-06-28 | End: 2018-06-28 | Stop reason: HOSPADM

## 2018-06-28 RX ORDER — OXYBUTYNIN CHLORIDE 5 MG/1
5 TABLET ORAL
Status: DISCONTINUED | OUTPATIENT
Start: 2018-06-28 | End: 2018-06-29

## 2018-06-28 RX ORDER — VALSARTAN 160 MG/1
320 TABLET ORAL DAILY
Status: DISCONTINUED | OUTPATIENT
Start: 2018-06-29 | End: 2018-07-01 | Stop reason: HOSPADM

## 2018-06-28 RX ORDER — DEXTROSE 50 % IN WATER (D50W) INTRAVENOUS SYRINGE
50 AS NEEDED
Status: DISCONTINUED | OUTPATIENT
Start: 2018-06-28 | End: 2018-07-01 | Stop reason: HOSPADM

## 2018-06-28 RX ORDER — ALBUTEROL SULFATE 0.83 MG/ML
2.5 SOLUTION RESPIRATORY (INHALATION) AS NEEDED
Status: DISCONTINUED | OUTPATIENT
Start: 2018-06-28 | End: 2018-06-28 | Stop reason: HOSPADM

## 2018-06-28 RX ORDER — HYDROMORPHONE HYDROCHLORIDE 2 MG/ML
1 INJECTION, SOLUTION INTRAMUSCULAR; INTRAVENOUS; SUBCUTANEOUS
Status: DISCONTINUED | OUTPATIENT
Start: 2018-06-28 | End: 2018-07-01 | Stop reason: HOSPADM

## 2018-06-28 RX ADMIN — OXYBUTYNIN CHLORIDE 5 MG: 5 TABLET ORAL at 22:05

## 2018-06-28 RX ADMIN — FENTANYL CITRATE 50 MCG: 50 INJECTION, SOLUTION INTRAMUSCULAR; INTRAVENOUS at 17:19

## 2018-06-28 RX ADMIN — LIDOCAINE HYDROCHLORIDE 40 MG: 20 INJECTION, SOLUTION EPIDURAL; INFILTRATION; INTRACAUDAL; PERINEURAL at 16:57

## 2018-06-28 RX ADMIN — INSULIN GLARGINE 12 UNITS: 100 INJECTION, SOLUTION SUBCUTANEOUS at 22:05

## 2018-06-28 RX ADMIN — FENTANYL CITRATE 25 MCG: 50 INJECTION, SOLUTION INTRAMUSCULAR; INTRAVENOUS at 19:09

## 2018-06-28 RX ADMIN — SODIUM CHLORIDE, SODIUM LACTATE, POTASSIUM CHLORIDE, AND CALCIUM CHLORIDE 125 ML/HR: 600; 310; 30; 20 INJECTION, SOLUTION INTRAVENOUS at 16:17

## 2018-06-28 RX ADMIN — FENTANYL CITRATE 25 MCG: 50 INJECTION, SOLUTION INTRAMUSCULAR; INTRAVENOUS at 19:39

## 2018-06-28 RX ADMIN — INSULIN LISPRO 4 UNITS: 100 INJECTION, SOLUTION INTRAVENOUS; SUBCUTANEOUS at 22:04

## 2018-06-28 RX ADMIN — ROPIVACAINE HYDROCHLORIDE 23 ML: 5 INJECTION, SOLUTION EPIDURAL; INFILTRATION; PERINEURAL at 15:42

## 2018-06-28 RX ADMIN — Medication 2 G: at 16:49

## 2018-06-28 RX ADMIN — FENTANYL CITRATE 25 MCG: 50 INJECTION, SOLUTION INTRAMUSCULAR; INTRAVENOUS at 19:29

## 2018-06-28 RX ADMIN — FENTANYL CITRATE 25 MCG: 50 INJECTION, SOLUTION INTRAMUSCULAR; INTRAVENOUS at 18:48

## 2018-06-28 RX ADMIN — SODIUM CHLORIDE, SODIUM LACTATE, POTASSIUM CHLORIDE, AND CALCIUM CHLORIDE: 600; 310; 30; 20 INJECTION, SOLUTION INTRAVENOUS at 17:45

## 2018-06-28 RX ADMIN — DONEPEZIL HYDROCHLORIDE 5 MG: 5 TABLET, FILM COATED ORAL at 22:05

## 2018-06-28 RX ADMIN — MIDAZOLAM HYDROCHLORIDE 2 MG: 1 INJECTION, SOLUTION INTRAMUSCULAR; INTRAVENOUS at 15:35

## 2018-06-28 RX ADMIN — INSULIN LISPRO 4 UNITS: 100 INJECTION, SOLUTION INTRAVENOUS; SUBCUTANEOUS at 16:04

## 2018-06-28 RX ADMIN — ACETAMINOPHEN 1000 MG: 500 TABLET, FILM COATED ORAL at 15:10

## 2018-06-28 RX ADMIN — SODIUM CHLORIDE, SODIUM LACTATE, POTASSIUM CHLORIDE, AND CALCIUM CHLORIDE 150 ML/HR: 600; 310; 30; 20 INJECTION, SOLUTION INTRAVENOUS at 20:07

## 2018-06-28 RX ADMIN — Medication 5 MG: at 18:21

## 2018-06-28 RX ADMIN — PROPOFOL 50 MG: 10 INJECTION, EMULSION INTRAVENOUS at 16:58

## 2018-06-28 RX ADMIN — ONDANSETRON 4 MG: 2 INJECTION INTRAMUSCULAR; INTRAVENOUS at 17:06

## 2018-06-28 RX ADMIN — FENTANYL CITRATE 25 MCG: 50 INJECTION, SOLUTION INTRAMUSCULAR; INTRAVENOUS at 18:28

## 2018-06-28 RX ADMIN — SODIUM CHLORIDE, SODIUM LACTATE, POTASSIUM CHLORIDE, AND CALCIUM CHLORIDE 125 ML/HR: 600; 310; 30; 20 INJECTION, SOLUTION INTRAVENOUS at 15:07

## 2018-06-28 RX ADMIN — PROPOFOL 50 MG: 10 INJECTION, EMULSION INTRAVENOUS at 16:57

## 2018-06-28 RX ADMIN — CELECOXIB 200 MG: 100 CAPSULE ORAL at 15:10

## 2018-06-28 RX ADMIN — FENTANYL CITRATE 25 MCG: 50 INJECTION, SOLUTION INTRAMUSCULAR; INTRAVENOUS at 19:20

## 2018-06-28 NOTE — BRIEF OP NOTE
BRIEF OPERATIVE NOTE    Date of Procedure: 6/28/2018   Preoperative Diagnosis: UNILATERAL PRIMARY OSTEOARTHRITIS, RIGHT KNEE  Postoperative Diagnosis: UNILATERAL PRIMARY OSTEOARTHRITIS, RIGHT KNEE    Procedure(s):  RIGHT TOTAL KNEE REPLACEMENT   Surgeon(s) and Role:     * Van Brady MD - Primary         Surgical Assistant: rose marie    Surgical Staff:  Circ-1: Noreen Posada RN  Physician Assistant: Oskar Carrillo PA-C  Scrub Tech-1: Ivelisse Briceno  Surg Asst-1: Vincenzo Nevarez  Event Time In   Incision Start [id number]   Incision Close      Anesthesia: General   Estimated Blood Loss: 100ml  Specimens: * No specimens in log *   Findings: arthritis   Complications: none  Implants:   Implant Name Type Inv.  Item Serial No.  Lot No. LRB No. Used Action   CEMENT BNE FAST SET 20GM -- ORDER IN SETS OF 20 - [id number]  CEMENT BNE FAST SET 20GM -- ORDER IN SETS OF 20  [facility] [employer] [id number] Right 2 Implanted   HXL PE WITH VITAMIN E PATELLA     REN[place] [id number] Right 1 Implanted   EMAX - VITAMIN E HXL TIBIAL INSERT    R[employer]IS [id number] Right 1 Implanted   FEMORAL COMPONENT - A 200 KNEE SYSTEM    [employer] [id number] Right 1 Implanted   TIBIAL BASEPLATE     [employer] [id number] Right 1 Implanted   CEMENT BNE SMARTSET HV 40GM -- ORDER IN SETS OF 20 - [id number]   CEMENT BNE SMARTSET HV 40GM -- ORDER IN SETS OF 20   [facility] [employer] [id number] Right 1 Implanted

## 2018-06-28 NOTE — PERIOP NOTES
I spoke, today, with Alex Carrera regarding pt's prior hx of positive MRSA PCR back on 4/6/18, and  Negative MRSA PCR on 6/11/18. She states the pt does not need to be in isolation or needed to complete the bundle.

## 2018-06-28 NOTE — ANESTHESIA PREPROCEDURE EVALUATION
Anesthetic History   No history of anesthetic complications            Review of Systems / Medical History  Patient summary reviewed, nursing notes reviewed and pertinent labs reviewed    Pulmonary  Within defined limits                 Neuro/Psych   Within defined limits           Cardiovascular    Hypertension        Dysrhythmias       Exercise tolerance: >4 METS     GI/Hepatic/Renal     GERD           Endo/Other    Diabetes: type 2    Arthritis     Other Findings              Physical Exam    Airway  Mallampati: II  TM Distance: 4 - 6 cm  Neck ROM: normal range of motion   Mouth opening: Normal     Cardiovascular  Regular rate and rhythm,  S1 and S2 normal,  no murmur, click, rub, or gallop             Dental  No notable dental hx       Pulmonary  Breath sounds clear to auscultation               Abdominal  GI exam deferred       Other Findings            Anesthetic Plan    ASA: 3  Anesthesia type: general and regional - femoral single shot      Post-op pain plan if not by surgeon: peripheral nerve block single      Anesthetic plan and risks discussed with: Patient

## 2018-06-28 NOTE — H&P
[facility] [address] [address] [facility]  History and Physical Exam    Patient: Jerrod Bowers. MRN: [id number]  SSN: [id number]    YOB: 1935  Age: [de-identified] y.o. Sex: male      Subjective:      Chief Complaint: Right knee pain    History of Present Illness:  Patient complains of ongoing and worsening right pain and difficulty ambulating. X-rays showed severe degenerative osteoarthritis of the joint. The patient's pain has persisted and progressed despite conservative treatments and therapies, including 6 months of NSAID's, cortisone injection therapy, viscosupplement injection therapy, bracing and activity modification. Patient also reports problems performing his ADL's. The patient has at this time failed all conservative treatment measures and has opted for surgical intervention. Past Medical History:   Diagnosis Date    Arthritis     Atrial fibrillation ([address]     Dementia     Depression     Diabetes ([address] 1980s    GERD (gastroesophageal reflux disease)     Hypercholesterolemia     Hypertension 2000    Ill-defined condition 2015    has passed out-has been tested cannot determine cause    Primary osteoarthritis of right knee 6/28/2018    Ulcer     at the anastomotic bite of gastric bypass    Varicose veins     Wide-complex tachycardia ([address] 5/2/2015     Past Surgical History:   Procedure Laterality Date    ABDOMEN SURGERY PROC UNLISTED  1998    umb hernia Rupture    BIOPSY LIVER  10/05/04    EGD  12/29/09    with biopsy    ENDOSCOPY, COLON, DIAGNOSTIC      GASTROSTOMY TUBE  10/05/04    HX CATARACT REMOVAL      bilateral    HX CHOLECYSTECTOMY  10/05/04    HX GI  10/05/04    vertical banded gastroplasty/gastric bypass    HX MOHS PROCEDURES  1991/1995    bilateral    HX PACEMAKER  2015    [employer]-Re[employer] Cardiac 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      Social History Main Topics    Smoking status: Former Smoker     Quit date: 4/3/1967    Smokeless tobacco: Never Used    Alcohol use 0.6 oz/week     1 Glasses of wine per week      Comment: weekly    Drug use: No    Sexual activity: No     Prior to Admission medications    Medication Sig Start Date End Date Taking? Authorizing Provider   diclofenac EC (VOLTAREN) 75 mg EC tablet Take  by mouth two (2) times a day. Historical Provider   donepezil (ARICEPT) 5 mg tablet Take  by mouth nightly. Historical Provider   metFORMIN (GLUCOPHAGE) 500 mg tablet Take  by mouth two (2) times daily (with meals). Historical Provider   SITagliptin (JANUVIA) 100 mg tablet Take 100 mg by mouth nightly. Indications: type 2 diabetes mellitus    Historical Provider   OTHER Take 2 Tabs by mouth two (2) times a day. Berbinex supplement Blood sugar and weight support    Historical Provider   FAMOTIDINE (PEPCID PO) Take 20 mg by mouth two (2) times a day. Lobo Garcia MD   valsartan-hydroCHLOROthiazide (DIOVAN-HCT) 320-25 mg per tablet Take 1 Tab by mouth daily. Lobo Garcia MD   FOLIC ACID/MULTIVIT-MIN/LUTEIN (CENTRUM SILVER PO) Take 1 Tab by mouth daily. Lobo Garcia MD   VITAMIN D2 50,000 unit capsule TAKE 1 CAPSULE TWICE WEEKLY  Patient taking differently: TAKE 1 CAPSULE TWICE WEEKLY tue/thur 11/20/17   Elfida Buerger, MD   oxybutynin (DITROPAN) 5 mg tablet Take 5 mg by mouth nightly. Lobo Garcia MD   metoprolol tartrate 75 mg tab Take 75 mg by mouth every twelve (12) hours. 12/29/16   Jose Miguel Lara MD   rivaroxaban (XARELTO) 20 mg tab tablet Take 1 Tab by mouth daily. Patient taking differently: Take 20 mg by mouth daily (with lunch). Indications: afib 12/29/16   Jose Miguel Lara MD   dilTIAZem CD (CARDIZEM CD) 240 mg ER capsule Take 1 Cap by mouth daily. Patient taking differently: Take 240 mg by mouth daily (with lunch). 11/22/16   Dewayne Gallegos MD   Omeprazole delayed release (PRILOSEC D/R) 20 mg tablet Take 20 mg by mouth daily.     Historical Provider   cholecalciferol (VITAMIN D3) 1,000 unit tablet Take  by mouth daily. Historical Provider   furosemide (LASIX) 20 mg tablet Take 20 mg by mouth daily. Indications: Edema    Historical Provider   melatonin tab tablet Take 5 mg by mouth nightly. Indications: sleep    Historical Provider   ACETAMINOPHEN/DIPHENHYDRAMINE (TYLENOL PM PO) Take  by mouth nightly. Historical Provider   insulin lispro (HUMALOG) 100 unit/mL injection by SubCUTAneous route four (4) times daily. BS-with sliding scale  Indications: type 2 diabetes mellitus, sliding scale    Historical Provider   insulin glargine (LANTUS) 100 unit/mL injection 12 Units by SubCUTAneous route nightly. Indications: TYPE 2 DIABETES MELLITUS    Historical Provider   buPROPion XL (WELLBUTRIN XL) 150 mg tablet Take 150 mg by mouth every morning. Indications: MAJOR DEPRESSIVE DISORDER    Phys Other, MD       Allergies: Allergies   Allergen Reactions    Morphine Other (comments)     Hallucinate; \"ran all night long\"        Review of Systems:  A comprehensive review of systems was negative except for that written in the History of Present Illness. Objective:       Physical Exam:  HEENT: Normocephalic, atraumatic  Lungs:  Clear to auscultation  Heart:   Regular rate and rhythm  Abdomen: Soft  Extremities:  Pain with range of motion of the right knee, including flexion and extension. Moderate pain in the medial joint line w/ mild swelling. Pain with varus stress but no varus/ valgus deformity noted. No instability noted. Assessment:      End-stage Arthritis of the right knee. Plan:       Proceed with scheduled RIGHT TOTAL KNEE REPLACEMENT. The various methods of treatment have been discussed with the patient and family. After consideration of risks, benefits, and other options for treatment, the patient has consented to surgical interventions. Questions were answered and preoperative teaching was done by Dr Silvino Philippe.  Based on this patient's extensive medical and cardiac history to include, CHF, atrial fibrillation, hypertension and diabetes mellitus, we feel he would benefit from an inpatient admission and potential medicine management.     Signed By: Bo Obrien PA-C     June 28, 2018

## 2018-06-28 NOTE — ANESTHESIA PROCEDURE NOTES
Peripheral Block    Start time: 6/28/2018 3:35 PM  End time: 6/28/2018 3:42 PM  Performed by: David Rizzo  Authorized by: David Rizzo       Pre-procedure: Indications: at surgeon's request, post-op pain management, procedure for pain and primary anesthetic    Preanesthetic Checklist: patient identified, risks and benefits discussed, site marked, timeout performed, anesthesia consent given and patient being monitored      Block Type:   Block Type:   Adductor canal  Laterality:  Right  Monitoring:  Standard ASA monitoring, continuous pulse ox, frequent vital sign checks, heart rate, oxygen and responsive to questions  Injection Technique:  Single shot  Procedures: ultrasound guided    Location:  Mid thigh  Needle Type:  Stimuplex  Needle Gauge:  20 G  Needle Localization:  Ultrasound guidance and anatomical landmarks  Medication Injected:  0.5%  ropivacaine  Volume (mL):  23    Assessment:  Number of attempts:  1  Injection Assessment:  Incremental injection every 5 mL, negative aspiration for CSF, no paresthesia, ultrasound image on chart, no intravascular symptoms, negative aspiration for blood and local visualized surrounding nerve on ultrasound  Patient tolerance:  Patient tolerated the procedure well with no immediate complications

## 2018-06-28 NOTE — PERIOP NOTES
Pt denies ever having a MRSA infection. He states he only had a positive test back in April. His surgery that was scheduled to be in April 2018, was canceled because he hurt is shin, as stated by pt. Per Wilda Garcia, pt does  Not need to have Vanco as choice of antibiotic.

## 2018-06-29 LAB
ANION GAP SERPL CALC-SCNC: 9 MMOL/L (ref 3–18)
BASOPHILS # BLD: 0 K/UL (ref 0–0.06)
BASOPHILS NFR BLD: 0 % (ref 0–2)
BUN SERPL-MCNC: 28 MG/DL (ref 7–18)
BUN/CREAT SERPL: 18 (ref 12–20)
CALCIUM SERPL-MCNC: 7.7 MG/DL (ref 8.5–10.1)
CHLORIDE SERPL-SCNC: 105 MMOL/L (ref 100–108)
CO2 SERPL-SCNC: 30 MMOL/L (ref 21–32)
CREAT SERPL-MCNC: 1.55 MG/DL (ref 0.6–1.3)
DIFFERENTIAL METHOD BLD: ABNORMAL
EOSINOPHIL # BLD: 0.1 K/UL (ref 0–0.4)
EOSINOPHIL NFR BLD: 1 % (ref 0–5)
ERYTHROCYTE [DISTWIDTH] IN BLOOD BY AUTOMATED COUNT: 13.2 % (ref 11.6–14.5)
GLUCOSE BLD STRIP.AUTO-MCNC: 165 MG/DL (ref 70–110)
GLUCOSE BLD STRIP.AUTO-MCNC: 267 MG/DL (ref 70–110)
GLUCOSE BLD STRIP.AUTO-MCNC: 270 MG/DL (ref 70–110)
GLUCOSE BLD STRIP.AUTO-MCNC: 396 MG/DL (ref 70–110)
GLUCOSE SERPL-MCNC: 322 MG/DL (ref 74–99)
HCT VFR BLD AUTO: 29.8 % (ref 36–48)
HGB BLD-MCNC: 9.9 G/DL (ref 13–16)
INR PPP: 1.1 (ref 0.8–1.2)
LYMPHOCYTES # BLD: 1.3 K/UL (ref 0.9–3.6)
LYMPHOCYTES NFR BLD: 20 % (ref 21–52)
MCH RBC QN AUTO: 29.8 PG (ref 24–34)
MCHC RBC AUTO-ENTMCNC: 33.2 G/DL (ref 31–37)
MCV RBC AUTO: 89.8 FL (ref 74–97)
MONOCYTES # BLD: 0.7 K/UL (ref 0.05–1.2)
MONOCYTES NFR BLD: 10 % (ref 3–10)
NEUTS SEG # BLD: 4.5 K/UL (ref 1.8–8)
NEUTS SEG NFR BLD: 69 % (ref 40–73)
PLATELET # BLD AUTO: 133 K/UL (ref 135–420)
PMV BLD AUTO: 9.5 FL (ref 9.2–11.8)
POTASSIUM SERPL-SCNC: 3.5 MMOL/L (ref 3.5–5.5)
PROTHROMBIN TIME: 13.6 SEC (ref 11.5–15.2)
RBC # BLD AUTO: 3.32 M/UL (ref 4.7–5.5)
SODIUM SERPL-SCNC: 144 MMOL/L (ref 136–145)
WBC # BLD AUTO: 6.6 K/UL (ref 4.6–13.2)

## 2018-06-29 PROCEDURE — 97161 PT EVAL LOW COMPLEX 20 MIN: CPT

## 2018-06-29 PROCEDURE — 80048 BASIC METABOLIC PNL TOTAL CA: CPT | Performed by: ORTHOPAEDIC SURGERY

## 2018-06-29 PROCEDURE — 97165 OT EVAL LOW COMPLEX 30 MIN: CPT

## 2018-06-29 PROCEDURE — 74011250636 HC RX REV CODE- 250/636: Performed by: PHYSICIAN ASSISTANT

## 2018-06-29 PROCEDURE — 97116 GAIT TRAINING THERAPY: CPT

## 2018-06-29 PROCEDURE — 74011250637 HC RX REV CODE- 250/637: Performed by: ORTHOPAEDIC SURGERY

## 2018-06-29 PROCEDURE — 36415 COLL VENOUS BLD VENIPUNCTURE: CPT | Performed by: ORTHOPAEDIC SURGERY

## 2018-06-29 PROCEDURE — 85025 COMPLETE CBC W/AUTO DIFF WBC: CPT | Performed by: ORTHOPAEDIC SURGERY

## 2018-06-29 PROCEDURE — 85610 PROTHROMBIN TIME: CPT | Performed by: ORTHOPAEDIC SURGERY

## 2018-06-29 PROCEDURE — 74011636637 HC RX REV CODE- 636/637: Performed by: ORTHOPAEDIC SURGERY

## 2018-06-29 PROCEDURE — 74011250637 HC RX REV CODE- 250/637: Performed by: PHYSICIAN ASSISTANT

## 2018-06-29 PROCEDURE — 77010033678 HC OXYGEN DAILY

## 2018-06-29 PROCEDURE — 65270000029 HC RM PRIVATE

## 2018-06-29 PROCEDURE — 97535 SELF CARE MNGMENT TRAINING: CPT

## 2018-06-29 PROCEDURE — 74011636637 HC RX REV CODE- 636/637: Performed by: INTERNAL MEDICINE

## 2018-06-29 PROCEDURE — 74011636637 HC RX REV CODE- 636/637: Performed by: PHYSICIAN ASSISTANT

## 2018-06-29 PROCEDURE — 82962 GLUCOSE BLOOD TEST: CPT

## 2018-06-29 PROCEDURE — 97110 THERAPEUTIC EXERCISES: CPT

## 2018-06-29 RX ORDER — INSULIN LISPRO 100 [IU]/ML
5 INJECTION, SOLUTION INTRAVENOUS; SUBCUTANEOUS ONCE
Status: COMPLETED | OUTPATIENT
Start: 2018-06-29 | End: 2018-06-29

## 2018-06-29 RX ORDER — INSULIN LISPRO 100 [IU]/ML
INJECTION, SOLUTION INTRAVENOUS; SUBCUTANEOUS
Status: DISCONTINUED | OUTPATIENT
Start: 2018-06-29 | End: 2018-07-01 | Stop reason: HOSPADM

## 2018-06-29 RX ORDER — INSULIN LISPRO 100 [IU]/ML
6 INJECTION, SOLUTION INTRAVENOUS; SUBCUTANEOUS
Status: DISCONTINUED | OUTPATIENT
Start: 2018-06-30 | End: 2018-07-01 | Stop reason: HOSPADM

## 2018-06-29 RX ORDER — METFORMIN HYDROCHLORIDE 500 MG/1
500 TABLET ORAL 2 TIMES DAILY WITH MEALS
Status: DISCONTINUED | OUTPATIENT
Start: 2018-06-29 | End: 2018-06-29

## 2018-06-29 RX ORDER — INSULIN GLARGINE 100 [IU]/ML
18 INJECTION, SOLUTION SUBCUTANEOUS
Status: DISCONTINUED | OUTPATIENT
Start: 2018-06-29 | End: 2018-07-01 | Stop reason: HOSPADM

## 2018-06-29 RX ORDER — OMEPRAZOLE 20 MG/1
20 CAPSULE, DELAYED RELEASE ORAL DAILY
Status: DISCONTINUED | OUTPATIENT
Start: 2018-06-29 | End: 2018-07-01 | Stop reason: HOSPADM

## 2018-06-29 RX ADMIN — SODIUM CHLORIDE, SODIUM LACTATE, POTASSIUM CHLORIDE, AND CALCIUM CHLORIDE 75 ML/HR: 600; 310; 30; 20 INJECTION, SOLUTION INTRAVENOUS at 05:49

## 2018-06-29 RX ADMIN — Medication 10 ML: at 22:26

## 2018-06-29 RX ADMIN — OMEPRAZOLE 20 MG: 20 CAPSULE, DELAYED RELEASE ORAL at 11:58

## 2018-06-29 RX ADMIN — Medication 2 G: at 08:21

## 2018-06-29 RX ADMIN — METOPROLOL TARTRATE 75 MG: 50 TABLET ORAL at 08:26

## 2018-06-29 RX ADMIN — METOPROLOL TARTRATE 75 MG: 50 TABLET ORAL at 22:21

## 2018-06-29 RX ADMIN — BUPROPION HYDROCHLORIDE 150 MG: 150 TABLET, FILM COATED, EXTENDED RELEASE ORAL at 06:33

## 2018-06-29 RX ADMIN — OXYCODONE HYDROCHLORIDE AND ACETAMINOPHEN 1 TABLET: 5; 325 TABLET ORAL at 11:58

## 2018-06-29 RX ADMIN — HYDROCHLOROTHIAZIDE 25 MG: 25 TABLET ORAL at 08:27

## 2018-06-29 RX ADMIN — METFORMIN HYDROCHLORIDE 500 MG: 500 TABLET, FILM COATED ORAL at 17:59

## 2018-06-29 RX ADMIN — OXYCODONE HYDROCHLORIDE AND ACETAMINOPHEN 1 TABLET: 5; 325 TABLET ORAL at 22:20

## 2018-06-29 RX ADMIN — RIVAROXABAN 20 MG: 10 TABLET, FILM COATED ORAL at 11:58

## 2018-06-29 RX ADMIN — FUROSEMIDE 20 MG: 20 TABLET ORAL at 08:27

## 2018-06-29 RX ADMIN — DILTIAZEM HYDROCHLORIDE 240 MG: 240 CAPSULE, COATED, EXTENDED RELEASE ORAL at 08:27

## 2018-06-29 RX ADMIN — INSULIN GLARGINE 18 UNITS: 100 INJECTION, SOLUTION SUBCUTANEOUS at 22:20

## 2018-06-29 RX ADMIN — OXYCODONE HYDROCHLORIDE AND ACETAMINOPHEN 1 TABLET: 5; 325 TABLET ORAL at 06:37

## 2018-06-29 RX ADMIN — VALSARTAN 320 MG: 160 TABLET ORAL at 08:26

## 2018-06-29 RX ADMIN — INSULIN LISPRO 5 UNITS: 100 INJECTION, SOLUTION INTRAVENOUS; SUBCUTANEOUS at 18:18

## 2018-06-29 RX ADMIN — DONEPEZIL HYDROCHLORIDE 5 MG: 5 TABLET, FILM COATED ORAL at 22:21

## 2018-06-29 RX ADMIN — INSULIN LISPRO 3 UNITS: 100 INJECTION, SOLUTION INTRAVENOUS; SUBCUTANEOUS at 22:20

## 2018-06-29 RX ADMIN — INSULIN LISPRO 6 UNITS: 100 INJECTION, SOLUTION INTRAVENOUS; SUBCUTANEOUS at 08:27

## 2018-06-29 RX ADMIN — INSULIN LISPRO 9 UNITS: 100 INJECTION, SOLUTION INTRAVENOUS; SUBCUTANEOUS at 11:58

## 2018-06-29 RX ADMIN — Medication 2 G: at 00:41

## 2018-06-29 RX ADMIN — INSULIN LISPRO 15 UNITS: 100 INJECTION, SOLUTION INTRAVENOUS; SUBCUTANEOUS at 17:59

## 2018-06-29 NOTE — PERIOP NOTES
TRANSFER - OUT REPORT:    Verbal report given to Kyle Allen RN (name) on John Whalen.  being transferred to Room 201(unit) for routine post - op       Report consisted of patients Situation, Background, Assessment and   Recommendations(SBAR). Information from the following report(s) SBAR, Kardex, ED Summary, Intake/Output, Recent Results and Cardiac Rhythm NSR was reviewed with the receiving nurse. Lines:   Peripheral IV 06/28/18 Right Hand (Active)   Site Assessment Clean, dry, & intact 6/28/2018  8:48 PM   Phlebitis Assessment 0 6/28/2018  8:48 PM   Infiltration Assessment 0 6/28/2018  8:48 PM   Dressing Status Clean, dry, & intact 6/28/2018  8:48 PM   Dressing Type Transparent 6/28/2018  8:48 PM   Hub Color/Line Status Infusing 6/28/2018  8:48 PM        Opportunity for questions and clarification was provided.       Patient transported with:   O2 @ 2 liters

## 2018-06-29 NOTE — PROGRESS NOTES
[address] [facility] care of patient from Renetta Machuca RN. Shift assessment initiated. Pain voiced at 0/10 at this time, voiced pain only occurs when he walks on surgical extremity. Denies the need for interventions at this time. IS encouraged. All questions and concerns answered. All needs bed. Bed in the lowest position. Call bell/phone within reach. No further at this time. 1949 Assessment complete. Call bell and personal items within reach. No needs at this time. 2221 Patient assisted back to bed from bathroom. Repositioned in bed for comfort. Scheduled medications given. Pain voiced at 5/10, medicated per MAR. Ice applied to site. Will continue to monitor for changes. 0245 Reassessment complete. No significant changes. VS stable. Pain voiced at 6/10, medicated per MAR. Ice at site. No needs at this time. 0600 Dr. Anushka Griggs paged to report critical potassium of 2.9.   0620 No return call, Dr. Anushka Griggs repaged. 8073 Orders given. 40mEq x3 doses ordered [id number]. Orders initiated. 0630 Complaints of dizziness, not placed in chair. 3824 Nausea and vomiting at the moment. Potassium/Wellbutrin held. Zofran given. 0800 Bedside and Verbal shift change report given to LUI Friedman RN (oncoming nurse) by RONNIE Gonzalez RN (offgoing nurse). Report included the following information SBAR, Kardex, MAR, Recent Results and Med Rec Status.

## 2018-06-29 NOTE — CONSULTS
Medicine Consult    Patient:  Freddy Oro [de-identified] y.o. male 1935  Asked to evaluate patient by Dr Sharif Stephens  Primary Care Provider:  Eduar Britt MD  Date of Admission:  6/28/2018  Reason for Consult: diabetes      Assessment/Plan   1. OA sp TKA  2. Dm2 w hyperglycemia  3 chronic diastolic CHF- compensated  4. CKD  5. Dementia      PLAN:  - icnrease lantus to 18 untis qhs, lispro 6 units tid w meals  - stop metformin  - start januvia 100mg daily  - continue home medication regimen- cardizem, xarelto, hctz, lasix, metoprolol  - pt/ot  - will follow along with you      Patient Active Problem List   Diagnosis Code    Hypertension I10    Diabetes mellitus ([facility] E11.9    Hypercholesterolemia E78.00    Depression F32.9    Varicose veins I83.90    Arthritis M19.90    Encephalopathy G93.40    Encephalopathy acute G93.40    Syncope R55    Vertigo R42    Nausea R11.0    Tinnitus H93.19    Atrial fibrillation (HCC) I48.91    Wide-complex tachycardia (HCC) I47.2    Syncope and collapse R55    Cardiac device in situ Z95.9    Atrial flutter (HCC) I48.92    Acute on chronic diastolic congestive heart failure (HCC) I50.33    CHF (congestive heart failure), NYHA class II (HCC) I50.9    Combined systolic and diastolic congestive heart failure, NYHA class 3 (HCC) I50.40    Elevated BP [id number]    Primary osteoarthritis of right knee M17.11       Thank you for allowing us to participate in    HPI:   CC: \" I feel fine\"  Freddy Oro is a [de-identified] y.o. male with past medical history significant for Afib, CHF, dementia, depression, DM2 and OA is sp TKA by Dr Jg Murdock. Medicine is asked to consult to manage the aforementioned medical problems, most noteably his DM2. He currently denies any pain or discomfort, no dyspnea, headahce, shortness of breath,nausea or vomiting. He is tolerating his diet at this time.  Preoperative a1c was noted to be 7.8    Past Medical History:   Diagnosis Date    Arthritis  Atrial fibrillation ([facility]     Dementia     Depression     Diabetes ([facility] [address] [facility] 1980s    GERD (gastroesophageal reflux disease)     Hypercholesterolemia     Hypertension 2000    Ill-defined condition 2015    has passed out-has been tested cannot determine cause    Primary osteoarthritis of right knee 6/28/2018    Ulcer     at the anastomotic bite of gastric bypass    Varicose veins     Wide-complex tachycardia ([facility] 5/2/2015     Past Surgical History:   Procedure Laterality Date    ABDOMEN SURGERY PROC UNLISTED  1998    umb hernia Rupture    BIOPSY LIVER  10/05/04    EGD  12/29/09    with biopsy    ENDOSCOPY, COLON, DIAGNOSTIC      GASTROSTOMY TUBE  10/05/04    HX CATARACT REMOVAL      bilateral    HX CHOLECYSTECTOMY  10/05/04    HX GI  10/05/04    vertical banded gastroplasty/gastric bypass    HX MOHS PROCEDURES  1991/1995    bilateral    HX PACEMAKER  2015    [employer] 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 Current Outpatient Prescriptions on File Prior to Encounter   Medication Sig Dispense Refill    FAMOTIDINE (PEPCID PO) Take 20 mg by mouth two (2) times a day.  valsartan-hydroCHLOROthiazide (DIOVAN-HCT) 320-25 mg per tablet Take 1 Tab by mouth daily.  FOLIC ACID/MULTIVIT-MIN/LUTEIN (CENTRUM SILVER PO) Take 1 Tab by mouth daily.  oxybutynin (DITROPAN) 5 mg tablet Take 5 mg by mouth nightly.       metoprolol tartrate 75 mg tab Take 75 mg by mouth every twelve (12) hours. 60 Tab 0    dilTIAZem CD (CARDIZEM CD) 240 mg ER capsule Take 1 Cap by mouth daily. (Patient taking differently: Take 240 mg by mouth daily (with lunch). ) 30 Cap 0    Omeprazole delayed release (PRILOSEC D/R) 20 mg tablet Take 20 mg by mouth daily.  furosemide (LASIX) 20 mg tablet Take 20 mg by mouth daily. Indications: Edema      melatonin tab tablet Take 5 mg by mouth nightly. Indications: sleep      ACETAMINOPHEN/DIPHENHYDRAMINE (TYLENOL PM PO) Take  by mouth nightly.  insulin lispro (HUMALOG) 100 unit/mL injection by SubCUTAneous route four (4) times daily. BS-with sliding scale  Indications: type 2 diabetes mellitus, sliding scale      insulin glargine (LANTUS) 100 unit/mL injection 12 Units by SubCUTAneous route nightly. Indications: TYPE 2 DIABETES MELLITUS      buPROPion XL (WELLBUTRIN XL) 150 mg tablet Take 150 mg by mouth every morning. Indications: MAJOR DEPRESSIVE DISORDER      SITagliptin (JANUVIA) 100 mg tablet Take 100 mg by mouth nightly. Indications: type 2 diabetes mellitus      OTHER Take 2 Tabs by mouth two (2) times a day. Berbinex supplement Blood sugar and weight support      VITAMIN D2 50,000 unit capsule TAKE 1 CAPSULE TWICE WEEKLY (Patient taking differently: TAKE 1 CAPSULE TWICE WEEKLY tue/thur) 52 Cap 0    rivaroxaban (XARELTO) 20 mg tab tablet Take 1 Tab by mouth daily. (Patient taking differently: Take 20 mg by mouth daily (with lunch). Indications: afib) 30 Tab 2    cholecalciferol (VITAMIN D3) 1,000 unit tablet Take  by mouth daily.         Allergies   Allergen Reactions    Morphine Other (comments)     Hallucinate; \"ran all night long\"           Review of Systems  Constitutional: No fever, chills, diaphoresis, malaise, fatigue or weight gain/loss or falls  Skin: no itching or rashes  HEENT: no ear discomfort, hearing loss, tinnitus, epistaxis or sore throat  EYES: no blurry vision, double vision or photophobia  CARDIOVASCULAR: no claudication, cp, palpitations, orthopnea, pnd or LE edema  PULMONARY: no cough, wheeze, shortness of breath or sputum production  GI: no nausea, vomiting, diarrhea, abdominal pain, melena, hematemesis or brbpr  : no dysuria, hematuria  MUSCULOSKELETAL: no back pain, joint pain or myalgias  ENDOCRINE: no heat/cold intolerance, polyuria or polydipsia  HEME: no easy bruising or bleeding  NEURO: no unilateral weakness, numbness, tingling or seizures      Physical Exam:      Visit Vitals    /42    Pulse 71    Temp 98.2 °F (36.8 °C)    Resp 16    Ht 5' 7\" (1.702 m)    Wt 88.2 kg (194 lb 7.1 oz)    SpO2 100%    BMI 30.45 kg/m2     Body mass index is 30.45 kg/(m^2).     Physical Exam:  GEN: well nourished, laying in bed in no acute distress, pleasantly confused  HEENT: atraumatic, nose normal,oropharynx clear, MMM  NECK: supple, trachea midline, no supraclavicular or submandibular adenopathy noted  EYES: conjuctiva normal, lids with out lesions, PERRL  HEART: RRR with out m/r/g, pmi nondisplaced, pulses 2+ distally, cap refil normal  LUNGS: equal chest wall expansion, cta bl with out wheezes/rales or rhonchi  AB: soft, +BS, nt/nd no organomegaly  NEURO: alert, awake and oriented x3, gait not assessed, cranial nerves intact, strength 5/5 bl UE and LE, sensation intact, reflexes nonpathological  SKIN: dry, intact, warm no breakdown noted        Laboratory Studies:    CMP:   Lab Results   Component Value Date/Time     06/29/2018 03:35 AM    K 3.5 06/29/2018 03:35 AM     06/29/2018 03:35 AM    CO2 30 06/29/2018 03:35 AM    AGAP 9 06/29/2018 03:35 AM     (H) 06/29/2018 03:35 AM    BUN 28 (H) 06/29/2018 03:35 AM    CREA 1.55 (H) 06/29/2018 03:35 AM    GFRAA 53 (L) 06/29/2018 03:35 AM    GFRNA 43 (L) 06/29/2018 03:35 AM    CA 7.7 (L) 06/29/2018 03:35 AM     CBC:   Lab Results   Component Value Date/Time    WBC 6.6 06/29/2018 03:35 AM    HGB 9.9 (L) 06/29/2018 03:35 AM    HCT 29.8 (L) 06/29/2018 03:35 AM     (L) 06/29/2018 03:35 AM       Imaging studies personally reviewed:  Reviewed old records including old H&P, consultation notes and DC summaries        Shirley Polanco, DO  Internal Medicine/Geriatrics

## 2018-06-29 NOTE — PROGRESS NOTES
Problem: Mobility Impaired (Adult and Pediatric)  Goal: *Acute Goals and Plan of Care (Insert Text)  Physical Therapy Goals  Initiated 6/29/2018 and to be accomplished within 3 day(s)  1. Patient will move from supine to sit and sit to supine  in bed with supervision/set-up. 2.  Patient will transfer from bed to chair and chair to bed with supervision/set-up using the least restrictive device. 3.  Patient will perform sit to stand with supervision/set-up. 4.  Patient will ambulate with supervision/set-up for >150 feet with the least restrictive device. 5.  Patient will ascend/descend 3 stairs with handrail(s) with minimal assistance/contact guard assist.   Outcome: Progressing Towards Goal  physical Therapy TREATMENT    Patient: Artem Sommer. [de-identified] y.o. male)  Date: 6/29/2018  Diagnosis: UNILATERAL PRIMARY OSTEOARTHRITIS, RIGHT KNEE  Primary osteoarthritis of right knee Primary osteoarthritis of right knee  Procedure(s) (LRB):  RIGHT TOTAL KNEE REPLACEMENT  (Right) 1 Day Post-Op  Precautions: Fall, WBAT   Chart, physical therapy assessment, plan of care and goals were reviewed. ASSESSMENT:  Pt making progress toward PT goals at this time. During gait training pt ambulated 102 feet with RW/GB use with a slow/antalgic gait pattern with improved heel strike as compared to previous PT session. Left pt in bed with all needs met, call bell within reach, ice pack to R knee and SCDs applied. Hermes Maldonado RN in the room administering meds. Recommend SNF vs. HHPT at time of discharge; pt wants SNF placement as pt's wife unable to take care of him. Pt was able to demonstrate >90 degrees of R knee flexion with AAROM.   Progression toward goals:  []      Improving appropriately and progressing toward goals  [x]      Improving slowly and progressing toward goals  []      Not making progress toward goals and plan of care will be adjusted     PLAN:  Patient continues to benefit from skilled intervention to address the above impairments. Continue treatment per established plan of care. Discharge Recommendations:  Home Health and [place]  Further Equipment Recommendations for Discharge:  rolling walker     SUBJECTIVE:   Patient stated I am doing ok, it hurts a little more.     OBJECTIVE DATA SUMMARY:   Critical Behavior:  Neurologic State: Alert, Appropriate for age  Orientation Level: Appropriate for age, Oriented X4  Cognition: Appropriate decision making, Appropriate for age attention/concentration, Appropriate safety awareness, Follows commands  Safety/Judgement: Awareness of environment, Fall prevention, Good awareness of safety precautions, Insight into deficits  Functional Mobility Training:  Bed Mobility:  Rolling: Contact guard assistance  Supine to Sit: Contact guard assistance   Scooting: Contact guard assistance   Transfers:  Sit to Stand: Contact guard assistance; Additional time  Stand to Sit: Contact guard assistance; Additional time  Bed to Chair: Supervision  Balance:  Sitting: Intact  Standing: Intact; With support  Ambulation/Gait Training:  Distance (ft): 104 Feet (ft)  Assistive Device: Walker, rolling;Gait belt  Ambulation - Level of Assistance: Supervision   Gait Description (WDL): Exceptions to WDL  Gait Abnormalities: Antalgic;Decreased step clearance  Right Side Weight Bearing: As tolerated   Base of Support: Shift to left  Stance: Right decreased; Left increased  Speed/Shreya: Slow  Step Length: Right shortened;Left shortened  Swing Pattern: Right asymmetrical;Left asymmetrical   Interventions: Visual/Demos; Verbal cues; Tactile cues; Safety awareness training   Therapeutic Exercises:   HEP included ankle pumps, quad sets, heel slides, marching, LAQ x 10 reps  Pain:  Pain Scale 1: Numeric (0 - 10)  Pain Intensity 1: 5  Pain Location 1: Knee  Pain Orientation 1: Right  Pain Description 1: Aching  Pain Intervention(s) 1: Medication (see MAR)  Activity Tolerance:   Fair+  Please refer to the flowsheet for vital signs taken during this treatment.   After treatment:   [] Patient left in no apparent distress sitting up in chair  [x] Patient left in no apparent distress in bed  [x] Call bell left within reach  [x] Nursing notified  [] Caregiver present  [] Bed alarm activated        Brittnee Vieyra PT, DPT     Time Calculation: 24 mins

## 2018-06-29 NOTE — PROGRESS NOTES
Reason for Admission:   RIGHT TOTAL KNEE REPLACEMENT                  RRAT Score:     17             Do you (patient/family) have any concerns for transition/discharge? Plan for utilizing home health:     n/a    Likelihood of readmission?   green            Transition of Care Plan:      Chart reviewed, met with pt in room. Pt planning discharge to SNF, noted PT recommendation for SNF vs HH. Pt states that he and wife had discussed SNF as he is worried wife won't be able to take care of him once home. FOC offered, pt chose the [facility]; referral placed with CMS, pt ready for [facility] Sunday 7/1. Pt may need medical transport, is aware he may incur cost for transport. CM following to finalize plan. [address] unable to assist, states he is too high functioning for SNF, met with pt and he was made aware. FOC offered, referrals placed with [facility] and [facility]. Care Management Interventions  PCP Verified by CM:  Yes  Transition of Care Consult (CM Consult): SNF  Partner SNF: Yes  Discharge Durable Medical Equipment: No  Physical Therapy Consult: Yes  Occupational Therapy Consult: Yes  Current Support Network: Lives with Spouse, Own Home  Confirm Follow Up Transport: Family  Plan discussed with Pt/Family/Caregiver: Yes  Freedom of Choice Offered: Yes  Discharge Location  Discharge Placement: Skilled nursing facility

## 2018-06-29 NOTE — PROGRESS NOTES
21:15 Arrived from PACU via bed. Assessment completed. Lungs are clear bilat. Ace wrap on RLE remains C/D/I with + CMS & + PP. Denies pain or discomfort in calves. Cool packs in place. Skin assessment completed with SANDRITA Jean RN. Voiding per urinal w/o difficulty. Oriented to room ect. 22:30 Shift assessment completed. See nsg flow sheet for details. 02:45 Reassessed with 0 changes noted. Ace wrap remains C/D/I with + CMS & + PP. Denies pain or discomfort in calves. Resting quietly in bed with eyes closed between cares x for voiding per urinal & in the BR w/o difficulty. 07:25 Bedside and Verbal shift change report given to Nani Hubbard RN (oncoming nurse) by Kathy Collins RN (offgoing nurse). Report included the following information SBAR.

## 2018-06-29 NOTE — PROGRESS NOTES
Problem: Falls - Risk of  Goal: *Absence of Falls  Document Noemi Fall Risk and appropriate interventions in the flowsheet.    Outcome: Progressing Towards Goal  Fall Risk Interventions:  Mobility Interventions: Utilize walker, cane, or other assitive device, PT Consult for assist device competence, PT Consult for mobility concerns, Patient to call before getting OOB, OT consult for ADLs    Mentation Interventions: Adequate sleep, hydration, pain control    Medication Interventions: Assess postural VS orthostatic hypotension, Patient to call before getting OOB, Teach patient to arise slowly    Elimination Interventions: Call light in reach, Elevated toilet seat, Toileting schedule/hourly rounds    History of Falls Interventions: Bed/chair exit alarm

## 2018-06-29 NOTE — PROGRESS NOTES
Problem: Falls - Risk of  Goal: *Absence of Falls  Document Noemi Fall Risk and appropriate interventions in the flowsheet.    Outcome: Progressing Towards Goal  Fall Risk Interventions:  Mobility Interventions: Patient to call before getting OOB, Utilize walker, cane, or other assitive device    Mentation Interventions: Adequate sleep, hydration, pain control    Medication Interventions: Assess postural VS orthostatic hypotension, Patient to call before getting OOB, Teach patient to arise slowly    Elimination Interventions: Patient to call for help with toileting needs, Urinal in reach    History of Falls Interventions: Bed/chair exit alarm

## 2018-06-29 NOTE — PROGRESS NOTES
0725 Assumed care of pt at this time. Pt in bed with no signs of distress. Pt left with call light within reach and encouraged to call for assistance. 0747 Head to toe assessment completed at this time  Patient is A&OX4. Pt denies N/V chest pain and SOB or distress. Pt is calm and cooperative. Pedal pulses are present. Capillary refill less than 3 seconds. Skin in warm and dry with ACe wrap dressing on right knee and is CDI. Lungs are clear bilaterally. Patient instructed on use and reason for incentive spirometer. Bowel sounds are active. Abdomen is soft and non-tender. FRANSISCA on LLE & Plexi in place bilaterally . Positive dorsalis pedis pulse, sensation, warm. No tingling or numbness to lower extremities. 18 g needle in the Rt hand. Pain scale explained to patient. Reasons for taking PRN meds explained to patient. Patient instructed to call for prn when needed. Pain level is 4. Patient was oriented to call bell and bed function. Will continue to monitor    1158 Pt state pain as 5/10. Pt received medication as per MAR. Potential medication side effects explained to patient, patient verbalizes understanding, opportunities for questions provided. Patient stable, no apparent distress at this time, bed in locked position, call bell within reach. 1500 reassessment done , no changes to previous, pt is resting quietly on bed, call light within reach and bed at lowest position. 65 Spoke with SASHA acosta regarding  and she gave the order for start metformin 500 mg BID and hospitalist consult for diabetes    1703 paged dr Jennifer Dorsey for Diabetes    1706 Dr Jennifer Dorsey called and said he will come and see the pt    1805 Dr Vivian Dukes gave verbal order for 5 unit of insulin for now    Shift summary: Patient had uneventful shift. Patient ambulated with assistance using walker. Pain remained well-controlled with medication.  No issues/concerns at this time

## 2018-06-29 NOTE — ANESTHESIA POSTPROCEDURE EVALUATION
Post-Anesthesia Evaluation & Assessment    Visit Vitals    /74    Pulse 74    Temp 36.7 °C (98.1 °F)    Resp 20    Ht 5' 7\" (1.702 m)    Wt 88.2 kg (194 lb 6 oz)    SpO2 100%    BMI 30.44 kg/m2       Nausea/Vomiting: Controlled. Post-operative hydration adequate. Pain Scale 1: Numeric (0 - 10) (06/28/18 2010)  Pain Intensity 1: 0 (06/28/18 2010)   Managed    Pain score at or below stated goal level. Mental status & Level of consciousness: alert and oriented x 3    Neurological status: moves all extremities, sensation grossly intact    Pulmonary status: airway patent, adequate oxygenation. Complications related to anesthesia: none    Patient has met all PACU discharge requirements.       Lew Kumar DO

## 2018-06-29 NOTE — PROGRESS NOTES
Transferred Pt to  Room 201, Cary Le RN at bedside. Dressing CDI. Left pt stable. Dual skin assessment complete  Belongings is with the pt at bedside.      06/28/18 2114   Vital Signs   Temp 98 °F (36.7 °C)   Temp Source Oral   Pulse (Heart Rate) 65   Resp Rate 16   /63   Oxygen Therapy   O2 Sat (%) 100 %

## 2018-06-29 NOTE — PROGRESS NOTES
Plan: pt has been accepted for Sunday 7/1 admission to [facility] at [address], [place]. [phone] for report. Please include all hard scripts for controlled substances, med rec and dc summary in packet. Please medicate for pain prior to dc if possible and needed to help offset delay when patient first arrives to facility. Pt may need medical transport to facility, he is aware he may incur cost. Pt agrees with discharge plan. Please have pt arrive at facility by 2pm day of discharge. Care Management Interventions  PCP Verified by CM:  Yes  Transition of Care Consult (CM Consult): SNF  Partner SNF: Yes  Discharge Durable Medical Equipment: No  Physical Therapy Consult: Yes  Occupational Therapy Consult: Yes  Current Support Network: Lives with Spouse, Own Home  Confirm Follow Up Transport: Family  Plan discussed with Pt/Family/Caregiver: Yes  Freedom of Choice Offered: Yes  Discharge Location  Discharge Placement: Skilled nursing facility

## 2018-06-29 NOTE — PROGRESS NOTES
Problem: Self Care Deficits Care Plan (Adult)  Goal: *Acute Goals and Plan of Care (Insert Text)  Short Term Goals 6/29/18:  Rnony Oquendo OTR/L  In one session:  1. Pt will perform all basic self care and functional ADL transfers with modified independent to supervision using AD/DME if needed in order to return home safely with wife. Goal met after OT session 6/29/18. Pt reports he has sock aid and shoe horn at home. Pt would benefit from 3 in 1 commode. Pt reports having supportive wife who can assist as needed. Outcome: Resolved/Met Date Met: 06/29/18  Occupational Therapy EVALUATION/discharge    Patient: Reba Hodgson. [de-identified] y.o. male)  Date: 6/29/2018  Primary Diagnosis: UNILATERAL PRIMARY OSTEOARTHRITIS, RIGHT KNEE  Primary osteoarthritis of right knee  Procedure(s) (LRB):  RIGHT TOTAL KNEE REPLACEMENT  (Right) 1 Day Post-Op   Precautions:   Fall, WBAT    ASSESSMENT AND RECOMMENDATIONS:  Based on the objective data described below, the patient presents with ability to perform ADL tasks at near baseline level. After OT session today patient was able to perform all basic self care and functional ADL transfers with modified independence to supervision using AD/DME/AE as needed. Pt needed verbal cues for safety but exhibited good activity tolerance and good standing tolerance. Pt reports having supportive wife at home who can assist as needed. Pt with no further OT/ADL concerns at this time. Skilled occupational therapy is not indicated at this time. Education: fall prevention; compensatory techniques for dressing    Discharge Recommendations: Home Health  Further Equipment Recommendations for Discharge: rolling walker and 3 in 1 commode     SUBJECTIVE:   Patient stated My wife knows all our plans.     OBJECTIVE DATA SUMMARY:     Past Medical History:   Diagnosis Date    Arthritis     Atrial fibrillation ([facility]     Dementia     Depression     Diabetes ([facility] [facility] 1980s    GERD (gastroesophageal reflux disease)     Hypercholesterolemia     Hypertension 2000    Ill-defined condition 2015    has passed out-has been tested cannot determine cause    Primary osteoarthritis of right knee 6/28/2018    Ulcer     at the anastomotic bite of gastric bypass    Varicose veins     Wide-complex tachycardia ([address] 5/2/2015     Past Surgical History:   Procedure Laterality Date    ABDOMEN SURGERY PROC UNLISTED  1998    umb hernia Rupture    BIOPSY LIVER  10/05/04    EGD  12/29/09    with biopsy    ENDOSCOPY, COLON, DIAGNOSTIC      GASTROSTOMY TUBE  10/05/04    HX CATARACT REMOVAL      bilateral    HX CHOLECYSTECTOMY  10/05/04    HX GI  10/05/04    vertical banded gastroplasty/gastric bypass    HX MOHS PROCEDURES  1991/1995    bilateral    HX PACEMAKER  2015    [employer] Cardiac Monitor     Barriers to Learning/Limitations: yes;  emotional and cognitive  Compensate with: visual, verbal, tactile, kinesthetic cues/model    G-Codes (GP)  Self Care   Current  CJ= 20-39%   Goal  CJ= 20-39%   D/C  CJ= 20-39%  The severity rating is based on the professional judgement & direct observation of Level of Assistance required for Functional Mobility and ADLs. Eval Complexity: History: MEDIUM Complexity : Expanded review of history including physical, cognitive and psychosocial  history ; Examination: LOW Complexity : 1-3 performance deficits relating to physical, cognitive , or psychosocial skils that result in activity limitations and / or participation restrictions ; Decision Making:LOW Complexity : No comorbidities that affect functional and no verbal or physical assistance needed to complete eval tasks     Prior Level of Function/Home Situation: Pt reports living with wife at independent level. Pt used cane for ambulation.     Home Situation  Home Environment: Private residence  # Steps to Enter: 5  One/Two Story Residence: One story  Living Alone: No  Support Systems: Spouse/Significant Other/Partner  Patient Expects to be Discharged to[de-identified] Private residence  Current DME Used/Available at Home: Grab bars, Shower chair, Cinderella Reaper, straight, Cinderella Reaper, quad, Walker, rolling, Walker, rollator, [employer] dressing aides  Tub or Shower Type: Shower  Cognitive/Behavioral Status:  Neurologic State: Alert; Appropriate for age  Orientation Level: Appropriate for age;Oriented X4  Cognition: Appropriate decision making; Appropriate for age attention/concentration; Appropriate safety awareness  Safety/Judgement: Awareness of environment; Fall prevention;Good awareness of safety precautions; Insight into deficits  Skin: site of incision R knee  Edema: RLE  Vision/Perceptual:  glasses  Coordination:  Coordination: Generally decreased, functional  Fine Motor Skills-Upper: Left Intact; Right Intact    Gross Motor Skills-Upper: Left Intact; Right Intact  Balance:  Sitting: Intact  Standing: Intact; With support  Strength:  Strength: Generally decreased, functional  Tone & Sensation:  Tone: Normal  Sensation: Impaired  Range of Motion:  AROM: Generally decreased, functional  PROM: Generally decreased, functional  Functional Mobility and Transfers for ADLs:  Bed Mobility:  Rolling: Supervision  Supine to Sit: Supervision  Scooting: Supervision  Transfers:  Sit to Stand: Contact guard assistance; Additional time  Bed to Chair: Supervision   Toilet Transfer : Supervision     Bathroom Mobility: Supervision/set up  ADL Assessment:  Feeding: Independent  Oral Facial Hygiene/Grooming: Contact guard assistance  Upper Body Dressing: Independent  Lower Body Dressing:  Moderate assistance  Toileting: Contact guard assistance  ADL Intervention:    Grooming  Grooming Assistance: Modified independent  Washing Face: Modified independent  Washing Hands: Modified independent    Lower Body Dressing Assistance  Pants With Elastic Waist: Supervision/set-up    Toileting  Clothing Management: Supervision/set-up  Cues: Verbal cues provided    Cognitive Retraining  Safety/Judgement: Awareness of environment; Fall prevention;Good awareness of safety precautions; Insight into deficits    Pain:  Pre-treatment: 5/10; nurse aware  Post-treatment: 5/10; nurse aware  Activity Tolerance:   Fair; rest breaks needed during morning self care; moderate pain  Please refer to the flowsheet for vital signs taken during this treatment. After treatment:   [x]  Patient left in no apparent distress sitting up in chair  []  Patient left in no apparent distress in bed  []  Call bell left within reach  [x]  Nursing notified  []  Caregiver present  []  Bed alarm activated    COMMUNICATION/EDUCATION:   Communication/Collaboration:  [x]      Home safety education was provided and the patient/caregiver indicated understanding. [x]      Patient/family have participated as able and agree with findings and recommendations. []      Patient is unable to participate in plan of care at this time.     Thank you for this referral.  Jessika Stevens OT  Time Calculation: 28 mins

## 2018-06-29 NOTE — OP NOTES
[facility] [place]  OPERATIVE REPORT    Name:BISHOP Kasie Chandler  MR#: [id number]  : 1935  ACCOUNT #: [de-identified]   DATE OF SERVICE: 2018    PREOPERATIVE DIAGNOSIS:  Right knee osteoarthritis. POSTOPERATIVE DIAGNOSIS:  Right knee osteoarthritis. PROCEDURE PERFORMED:  Right total knee replacement. INDICATIONS:  The patient is an 53-year-old gentleman with end-stage osteoarthritis of the right knee who failed conservative treatment, opted for right total knee replacement after risks and benefits explained to him. ANESTHESIA:  General plus regional.    ESTIMATED BLOOD LOSS:  295HV    COMPLICATIONS:  None. SPECIMENS REMOVED:  None. FINDINGS:  Right knee osteoarthritis. IMPLANT:  Renovis size 5 femur, 6 tibia, 10 poly ultra-congruent a size 4 patella, all cemented. SURGEON:  Caleb Mcintosh MD    ASSISTANT:  Kellen Nicole PA-C essential for patient positioning, retraction, optimization implantation assistance with closure and hemostasis. TOURNIQUET TIME:  Approximately 36 minutes at 300 mmHg. PROCEDURE:  The patient already given general anesthetic after regional block and preoperative antibiotics, positioned, prepped and draped in usual sterile fashion with right lower extremity draped free. Surgeon time-out was performed confirming the right side as the proper side and after all team members agreed, tourniquet placed at 300 mmHg for approximately 36 minutes. Midline skin incision was made. Medial parapatellar arthrotomy was performed revealing osteoarthritic knee joint. ACL and meniscal remnants removed. PCL was preserved. Intramedullary guide was placed in the femur. Distal cut was made. Femur was measured a size 5, 4-in-1 block was used to finish off the femur in proper rotation along with Whitesides line and transepicondylar axis.   Once we were satisfied with the femoral preparation at this point, we cut the tibia, taking 2 mm off the more worn medial side using the extramedullary guide. We then checked flexion and extension gaps, gave symmetric flexion and extension to full extension and full flexion. Tibia was measured size 6, drilled and punched in proper rotation with the anatomic tibia orientation in line with the medial tibia tubercle. Once we were satisfied with this, we then trialled and the 10 poly gave full extension, full flexion, good stability and good patellar tracking with the 5 femur, 6 tibia. Patella was cut freehand measured size 4 and trialled after being drilled and had a good patellar tracking. Lug holes were drilled for the femur. The trials were removed. Wounds were irrigated and injected with ropivacaine per anesthesia recommendations. We then cemented in sequence tibia, femur and patella, removing any and all debris inserting the final poly in good position. Knee was then held in full extension. The tourniquet was then let down at 36 minutes, allowed the cement to harden. Once cement hardened, we then examined the knee. Bleeders were electrocauterized. Wound was irrigated and any and all debris was removed from the knee. Once satisfied with the final components implant, hemostasis and removal of any and all debris and tracking of the patella and stability. The extensor mechanism was then closed with #1 Vicryl. Skin was irrigated and closed with 2-0 Monocryl in the deep dermal, 3-0 in the subcuticular, Dermabond tape and Mepilex dressing. The patient was then extubated and taken to recovery  room. POSTOPERATIVE PLAN:  Ice, elevation. Weightbear as tolerated, pain control.       MD Arnulfo Barrera / Da Muñiz  D: 06/28/2018 18:11     T: 06/29/2018 11:42  JOB #: [id number]

## 2018-06-29 NOTE — PROGRESS NOTES
Problem: Mobility Impaired (Adult and Pediatric)  Goal: *Acute Goals and Plan of Care (Insert Text)  Physical Therapy Goals  Initiated 6/29/2018 and to be accomplished within 3 day(s)  1. Patient will move from supine to sit and sit to supine  in bed with supervision/set-up. 2.  Patient will transfer from bed to chair and chair to bed with supervision/set-up using the least restrictive device. 3.  Patient will perform sit to stand with supervision/set-up. 4.  Patient will ambulate with supervision/set-up for >150 feet with the least restrictive device. 5.  Patient will ascend/descend 3 stairs with handrail(s) with minimal assistance/contact guard assist.   Outcome: Progressing Towards Goal  physical Therapy EVALUATION    Patient: Gillian Ross. [de-identified] y.o. male)  Date: 6/29/2018  Primary Diagnosis: UNILATERAL PRIMARY OSTEOARTHRITIS, RIGHT KNEE  Primary osteoarthritis of right knee  Procedure(s) (LRB):  RIGHT TOTAL KNEE REPLACEMENT  (Right) 1 Day Post-Op   Precautions:   Fall, WBAT    ASSESSMENT :  Based on the objective data described below, Patient present to PT with decreased functional mobility with regard to bed mobility, transfers, gait, balance, weakness, and overall tolerance for activity secondary to R LE weakness and decreased AROM s/p R TKA. Pt alert and oriented x 3 with slight confusion during conversation. During gait training pt ambulated 100 feet with RW/GB use with a slow/antalgic gait pattern with decreased heel strike. Left pt in a chair with all needs met and call bell within reach. Recommend HHPT at time of discharge. Patient will benefit from skilled intervention to address the above impairments.   Patients rehabilitation potential is considered to be Good  Factors which may influence rehabilitation potential include:   []         None noted  []         Mental ability/status  [x]         Medical condition  []         Home/family situation and support systems  []         Safety awareness  []         Pain tolerance/management  []         Other:      PLAN :  Recommendations and Planned Interventions:  [x]           Bed Mobility Training             [x]    Neuromuscular Re-Education  [x]           Transfer Training                   []    Orthotic/Prosthetic Training  [x]           Gait Training                          []    Modalities  [x]           Therapeutic Exercises          []    Edema Management/Control  [x]           Therapeutic Activities            [x]    Patient and Family Training/Education  []           Other (comment):    Frequency/Duration: Patient will be followed by physical therapy twice daily to address goals. Discharge Recommendations: SNF vs. Home Health  Further Equipment Recommendations for Discharge: rolling walker     SUBJECTIVE:   Patient stated I am feeling ok.     OBJECTIVE DATA SUMMARY:     Past Medical History:   Diagnosis Date    Arthritis     Atrial fibrillation ([facility] [address]     Dementia     Depression     Diabetes ([facility] [address] 1980s    GERD (gastroesophageal reflux disease)     Hypercholesterolemia     Hypertension 2000    Ill-defined condition 2015    has passed out-has been tested cannot determine cause    Primary osteoarthritis of right knee 6/28/2018    Ulcer     at the anastomotic bite of gastric bypass    Varicose veins     Wide-complex tachycardia ([facility] [address] 5/2/2015     Past Surgical History:   Procedure Laterality Date    ABDOMEN SURGERY PROC UNLISTED  1998    umb hernia Rupture    BIOPSY LIVER  10/05/04    EGD  12/29/09    with biopsy    ENDOSCOPY, COLON, DIAGNOSTIC      GASTROSTOMY TUBE  10/05/04    HX CATARACT REMOVAL      bilateral    HX CHOLECYSTECTOMY  10/05/04    HX GI  10/05/04    vertical banded gastroplasty/gastric bypass    HX MOHS PROCEDURES  1991/1995    bilateral    HX PACEMAKER  2015    [employer] Cardiac Monitor     Barriers to Learning/Limitations: yes;  altered mental status (i.e.Sedation, Confusion)  Compensate with: visual, verbal, tactile, kinesthetic cues/model  Prior Level of Function/Home Situation: Pt stated he was independent with ADLs and mobility. Home Situation  Home Environment: Private residence  # Steps to Enter: 4  One/Two Story Residence: One story  Living Alone: No  Support Systems: Spouse/Significant Other/Partner  Patient Expects to be Discharged to[de-identified] Private residence  Current DME Used/Available at Home: Carollee Cashing, straight, Walker, rolling, Wheelchair  Critical Behavior:  Neurologic State: Alert; Appropriate for age  Orientation Level: Appropriate for age;Oriented X4  Cognition: Appropriate decision making; Appropriate for age attention/concentration; Appropriate safety awareness  Safety/Judgement: Awareness of environment; Fall prevention;Good awareness of safety precautions; Insight into deficits  Psychosocial  Patient Behaviors: Calm; Cooperative; Talkative  Purposeful Interaction: Yes  Pt Identified Daily Priority: Clinical issues (comment)  Caritas Process: Establish trust;Teaching/learning; Attend basic human needs  Caring Interventions: Reassure  Reassure: Therapeutic listening; Informing; Acceptance  Skin Condition/Temp: Dry;Warm   Skin Integrity: Incision (comment)  Skin Integumentary  Skin Color: Appropriate for ethnicity  Skin Condition/Temp: Dry;Warm  Skin Integrity: Incision (comment)  Turgor: Non-tenting  Hair Growth: Present  Varicosities: Absent   Strength:    Strength: Generally decreased, functional   Tone & Sensation:   Tone: Normal   Sensation: Impaired   Range Of Motion:  AROM: Generally decreased, functional   PROM: Generally decreased, functional   Functional Mobility:  Transfers:  Sit to Stand: Contact guard assistance; Additional time  Stand to Sit: Contact guard assistance; Additional time   Balance:   Sitting: Intact  Standing: Intact; With support  Ambulation/Gait Training:  Distance (ft): 100 Feet (ft)  Assistive Device: Walker, rolling;Gait belt  Ambulation - Level of Assistance: Contact guard assistance   Gait Description (WDL): Exceptions to WDL  Gait Abnormalities: Antalgic;Decreased step clearance  Right Side Weight Bearing: As tolerated   Base of Support: Shift to left  Stance: Right decreased; Left increased  Speed/Shreya: Slow  Step Length: Right shortened;Left shortened  Swing Pattern: Left asymmetrical;Right asymmetrical   Interventions: Visual/Demos; Verbal cues; Tactile cues; Safety awareness training   Therapeutic Exercises:   HEP included ankle pumps, LAQ, knee flexion x 10 reps  Pain:  Pain Scale 1: Numeric (0 - 10)  Pain Intensity 1: 4  Pain Location 1: Knee  Pain Orientation 1: Right  Pain Description 1: Aching  Pain Intervention(s) 1: Cold pack  Activity Tolerance:   Fair  Please refer to the flowsheet for vital signs taken during this treatment. After treatment:   [x]         Patient left in no apparent distress sitting up in chair  []         Patient left in no apparent distress in bed  [x]         Call bell left within reach  [x]         Nursing notified  []         Caregiver present  []         Bed alarm activated    COMMUNICATION/EDUCATION:   [x]         Fall prevention education was provided and the patient/caregiver indicated understanding. [x]         Patient/family have participated as able in goal setting and plan of care. [x]         Patient/family agree to work toward stated goals and plan of care. []         Patient understands intent and goals of therapy, but is neutral about his/her participation. []         Patient is unable to participate in goal setting and plan of care. Thank you for this referral.  Italia Triplett PT, DPT       Time Calculation: 25 mins    Mobility:  Current  CJ= 20-39%   Goal  CI= 1-19%. The severity rating is based on the Other Level of assistance required for mobility.

## 2018-06-29 NOTE — ROUTINE PROCESS
Bedside shift change report given to malcolm London (oncoming nurse) by Casandra JACKSON RN (offgoing nurse). Report included the following information SBAR, Kardex and MAR.

## 2018-06-29 NOTE — PROGRESS NOTES
Progress Note     Patient: Gillian Ross. MRN: [id number]  SSN: [id number]    YOB: 1935  Age: [de-identified] y.o. Sex: male      POD:    1 Day Post-Op  S/P:    Procedure(s):  RIGHT TOTAL KNEE REPLACEMENT      Subjective:   Pt. is resting in bed with minimal complaints of pain after receiving pain medication. Denies paresthesias, radicular pain, CP, or SOB. Objective  Objective:   Patient Vitals for the past 12 hrs:   BP Temp Pulse Resp SpO2   06/29/18 0725 147/76 98.4 °F (36.9 °C) 84 17 100 %   06/29/18 0301 112/60 97.8 °F (36.6 °C) 68 16 99 %   06/29/18 0100 120/64 98 °F (36.7 °C) 66 16 100 %   06/29/18 0010 115/58 98.1 °F (36.7 °C) 67 16 100 %   06/28/18 2308 114/58 98.1 °F (36.7 °C) 73 16 100 %   06/28/18 2256 111/59 98.1 °F (36.7 °C) 70 16 100 %   06/28/18 2209 117/64 98.5 °F (36.9 °C) 74 16 100 %   06/28/18 2114 132/63 98 °F (36.7 °C) 65 16 100 %     Recent Labs      06/29/18   0335   HGB  9.9*   HCT  29.8*   INR  1.1   NA  144   K  3.5   CL  105   CO2  30   BUN  28*   CREA  1.55*   GLU  322*     PHYSICAL EXAM  Pt resting in chair eating breakfast.  Lower extremity operative dressing clean/dry/intact. Neurovascularly intact. Dorsalis pedis pulse 2+ bilaterally. Calves soft, nontender. Assessment:     Assessment:   Status post Right total knee replacement, doing well post-operatively         Plan:   1. Continue pain management/ ice to operative area. 2. Continue to progress with PT/OT. WBAT.   3. Discharge planning for SNF

## 2018-06-30 PROBLEM — E11.65 TYPE II OR UNSPECIFIED TYPE DIABETES MELLITUS WITH RENAL MANIFESTATIONS, UNCONTROLLED(250.42) (HCC): Status: ACTIVE | Noted: 2018-06-30

## 2018-06-30 PROBLEM — I50.32 DIASTOLIC CHF, CHRONIC (HCC): Status: ACTIVE | Noted: 2018-06-30

## 2018-06-30 PROBLEM — E11.29 TYPE II OR UNSPECIFIED TYPE DIABETES MELLITUS WITH RENAL MANIFESTATIONS, UNCONTROLLED(250.42) (HCC): Status: ACTIVE | Noted: 2018-06-30

## 2018-06-30 LAB
ANION GAP SERPL CALC-SCNC: 5 MMOL/L (ref 3–18)
BASOPHILS # BLD: 0 K/UL (ref 0–0.06)
BASOPHILS NFR BLD: 0 % (ref 0–2)
BUN SERPL-MCNC: 19 MG/DL (ref 7–18)
BUN/CREAT SERPL: 15 (ref 12–20)
CALCIUM SERPL-MCNC: 7.8 MG/DL (ref 8.5–10.1)
CHLORIDE SERPL-SCNC: 101 MMOL/L (ref 100–108)
CO2 SERPL-SCNC: 32 MMOL/L (ref 21–32)
CREAT SERPL-MCNC: 1.29 MG/DL (ref 0.6–1.3)
DIFFERENTIAL METHOD BLD: ABNORMAL
EOSINOPHIL # BLD: 0.2 K/UL (ref 0–0.4)
EOSINOPHIL NFR BLD: 2 % (ref 0–5)
ERYTHROCYTE [DISTWIDTH] IN BLOOD BY AUTOMATED COUNT: 13.2 % (ref 11.6–14.5)
GLUCOSE BLD STRIP.AUTO-MCNC: 140 MG/DL (ref 70–110)
GLUCOSE BLD STRIP.AUTO-MCNC: 160 MG/DL (ref 70–110)
GLUCOSE BLD STRIP.AUTO-MCNC: 178 MG/DL (ref 70–110)
GLUCOSE BLD STRIP.AUTO-MCNC: 188 MG/DL (ref 70–110)
GLUCOSE SERPL-MCNC: 88 MG/DL (ref 74–99)
HCT VFR BLD AUTO: 29.8 % (ref 36–48)
HGB BLD-MCNC: 10 G/DL (ref 13–16)
INR PPP: 2 (ref 0.8–1.2)
LYMPHOCYTES # BLD: 1.7 K/UL (ref 0.9–3.6)
LYMPHOCYTES NFR BLD: 22 % (ref 21–52)
MCH RBC QN AUTO: 30.1 PG (ref 24–34)
MCHC RBC AUTO-ENTMCNC: 33.6 G/DL (ref 31–37)
MCV RBC AUTO: 89.8 FL (ref 74–97)
MONOCYTES # BLD: 0.9 K/UL (ref 0.05–1.2)
MONOCYTES NFR BLD: 12 % (ref 3–10)
NEUTS SEG # BLD: 4.7 K/UL (ref 1.8–8)
NEUTS SEG NFR BLD: 64 % (ref 40–73)
PLATELET # BLD AUTO: 138 K/UL (ref 135–420)
PMV BLD AUTO: 9.8 FL (ref 9.2–11.8)
POTASSIUM SERPL-SCNC: 2.9 MMOL/L (ref 3.5–5.5)
PROTHROMBIN TIME: 22.3 SEC (ref 11.5–15.2)
RBC # BLD AUTO: 3.32 M/UL (ref 4.7–5.5)
SODIUM SERPL-SCNC: 138 MMOL/L (ref 136–145)
WBC # BLD AUTO: 7.4 K/UL (ref 4.6–13.2)

## 2018-06-30 PROCEDURE — 74011250637 HC RX REV CODE- 250/637: Performed by: ORTHOPAEDIC SURGERY

## 2018-06-30 PROCEDURE — 82962 GLUCOSE BLOOD TEST: CPT

## 2018-06-30 PROCEDURE — 85610 PROTHROMBIN TIME: CPT | Performed by: ORTHOPAEDIC SURGERY

## 2018-06-30 PROCEDURE — 74011636637 HC RX REV CODE- 636/637: Performed by: INTERNAL MEDICINE

## 2018-06-30 PROCEDURE — 97116 GAIT TRAINING THERAPY: CPT

## 2018-06-30 PROCEDURE — 74011250637 HC RX REV CODE- 250/637: Performed by: INTERNAL MEDICINE

## 2018-06-30 PROCEDURE — 74011636637 HC RX REV CODE- 636/637: Performed by: ORTHOPAEDIC SURGERY

## 2018-06-30 PROCEDURE — 80048 BASIC METABOLIC PNL TOTAL CA: CPT | Performed by: ORTHOPAEDIC SURGERY

## 2018-06-30 PROCEDURE — 36415 COLL VENOUS BLD VENIPUNCTURE: CPT | Performed by: ORTHOPAEDIC SURGERY

## 2018-06-30 PROCEDURE — 85025 COMPLETE CBC W/AUTO DIFF WBC: CPT | Performed by: ORTHOPAEDIC SURGERY

## 2018-06-30 PROCEDURE — 74011250637 HC RX REV CODE- 250/637: Performed by: PHYSICIAN ASSISTANT

## 2018-06-30 PROCEDURE — 97530 THERAPEUTIC ACTIVITIES: CPT

## 2018-06-30 PROCEDURE — 65270000029 HC RM PRIVATE

## 2018-06-30 RX ORDER — OXYCODONE AND ACETAMINOPHEN 5; 325 MG/1; MG/1
1-2 TABLET ORAL
Qty: 50 TAB | Refills: 0 | Status: SHIPPED | OUTPATIENT
Start: 2018-06-30 | End: 2018-07-01

## 2018-06-30 RX ORDER — POTASSIUM CHLORIDE 20 MEQ/1
40 TABLET, EXTENDED RELEASE ORAL EVERY 4 HOURS
Status: COMPLETED | OUTPATIENT
Start: 2018-06-30 | End: 2018-06-30

## 2018-06-30 RX ADMIN — INSULIN LISPRO 6 UNITS: 100 INJECTION, SOLUTION INTRAVENOUS; SUBCUTANEOUS at 17:48

## 2018-06-30 RX ADMIN — OXYCODONE HYDROCHLORIDE AND ACETAMINOPHEN 2 TABLET: 5; 325 TABLET ORAL at 02:20

## 2018-06-30 RX ADMIN — OXYCODONE HYDROCHLORIDE AND ACETAMINOPHEN 1 TABLET: 5; 325 TABLET ORAL at 21:36

## 2018-06-30 RX ADMIN — RIVAROXABAN 20 MG: 10 TABLET, FILM COATED ORAL at 12:24

## 2018-06-30 RX ADMIN — SITAGLIPTIN 100 MG: 100 TABLET, FILM COATED ORAL at 08:56

## 2018-06-30 RX ADMIN — INSULIN LISPRO 6 UNITS: 100 INJECTION, SOLUTION INTRAVENOUS; SUBCUTANEOUS at 12:25

## 2018-06-30 RX ADMIN — DONEPEZIL HYDROCHLORIDE 5 MG: 5 TABLET, FILM COATED ORAL at 22:35

## 2018-06-30 RX ADMIN — POTASSIUM CHLORIDE 40 MEQ: 20 TABLET, EXTENDED RELEASE ORAL at 17:47

## 2018-06-30 RX ADMIN — HYDROCHLOROTHIAZIDE 25 MG: 25 TABLET ORAL at 08:56

## 2018-06-30 RX ADMIN — Medication 10 ML: at 06:54

## 2018-06-30 RX ADMIN — POTASSIUM CHLORIDE 40 MEQ: 20 TABLET, EXTENDED RELEASE ORAL at 12:24

## 2018-06-30 RX ADMIN — INSULIN LISPRO 3 UNITS: 100 INJECTION, SOLUTION INTRAVENOUS; SUBCUTANEOUS at 22:36

## 2018-06-30 RX ADMIN — ONDANSETRON 4 MG: 4 TABLET, ORALLY DISINTEGRATING ORAL at 06:50

## 2018-06-30 RX ADMIN — INSULIN GLARGINE 18 UNITS: 100 INJECTION, SOLUTION SUBCUTANEOUS at 22:37

## 2018-06-30 RX ADMIN — DILTIAZEM HYDROCHLORIDE 240 MG: 240 CAPSULE, COATED, EXTENDED RELEASE ORAL at 08:56

## 2018-06-30 RX ADMIN — VALSARTAN 320 MG: 160 TABLET ORAL at 08:56

## 2018-06-30 RX ADMIN — OMEPRAZOLE 20 MG: 20 CAPSULE, DELAYED RELEASE ORAL at 08:56

## 2018-06-30 RX ADMIN — ACETAMINOPHEN 650 MG: 325 TABLET ORAL at 00:55

## 2018-06-30 RX ADMIN — FUROSEMIDE 20 MG: 20 TABLET ORAL at 08:56

## 2018-06-30 RX ADMIN — METOPROLOL TARTRATE 75 MG: 50 TABLET ORAL at 08:56

## 2018-06-30 RX ADMIN — POTASSIUM CHLORIDE 40 MEQ: 20 TABLET, EXTENDED RELEASE ORAL at 07:55

## 2018-06-30 RX ADMIN — BUPROPION HYDROCHLORIDE 150 MG: 150 TABLET, FILM COATED, EXTENDED RELEASE ORAL at 07:55

## 2018-06-30 RX ADMIN — Medication 10 ML: at 22:00

## 2018-06-30 RX ADMIN — Medication 10 ML: at 17:50

## 2018-06-30 NOTE — DISCHARGE SUMMARY
Right Total Knee Arthroplasty Discharge Summary       Patient ID:  Edger Osler  [id number]  [de-identified] y.o.  1935    Admit date: 6/28/2018    Discharge date:  7/1/2018        Discharge Diagnoses: Status post Right total knee arthroplasty    Past Surgical History:   Procedure Laterality Date    ABDOMEN SURGERY PROC UNLISTED  1998    umb hernia Rupture    BIOPSY LIVER  10/05/04    EGD  12/29/09    with biopsy    ENDOSCOPY, COLON, DIAGNOSTIC      GASTROSTOMY TUBE  10/05/04    HX CATARACT REMOVAL      bilateral    HX CHOLECYSTECTOMY  10/05/04    HX GI  10/05/04    vertical banded gastroplasty/gastric bypass    HX MOHS PROCEDURES  8360/6782    bilateral    HX PACEMAKER  2015    [employer] Cardiac Monitor     Allergies   Allergen Reactions    Morphine Other (comments)     Hallucinate; \"ran all night long\"       Hospital Course: Patient was admitted for elective right total knee arthroplasty by Dr Micaela Baxter. Hospital course was uneventful. Patient is progressing well with physical therapy and doing well post-operatively. PCP: Magaly Omalley MD    Disposition: SNF    Patient Instructions:   Current Discharge Medication List      CONTINUE these medications which have NOT CHANGED    Details   donepezil (ARICEPT) 5 mg tablet Take  by mouth nightly. metFORMIN (GLUCOPHAGE) 500 mg tablet Take  by mouth two (2) times daily (with meals). FAMOTIDINE (PEPCID PO) Take 20 mg by mouth two (2) times a day. valsartan-hydroCHLOROthiazide (DIOVAN-HCT) 320-25 mg per tablet Take 1 Tab by mouth daily. FOLIC ACID/MULTIVIT-MIN/LUTEIN (CENTRUM SILVER PO) Take 1 Tab by mouth daily. oxybutynin (DITROPAN) 5 mg tablet Take 5 mg by mouth nightly. metoprolol tartrate 75 mg tab Take 75 mg by mouth every twelve (12) hours. Qty: 60 Tab, Refills: 0      dilTIAZem CD (CARDIZEM CD) 240 mg ER capsule Take 1 Cap by mouth daily.   Qty: 30 Cap, Refills: 0      Omeprazole delayed release (PRILOSEC D/R) 20 mg tablet Take 20 mg by mouth daily. furosemide (LASIX) 20 mg tablet Take 20 mg by mouth daily. Indications: Edema      melatonin tab tablet Take 5 mg by mouth nightly. Indications: sleep      ACETAMINOPHEN/DIPHENHYDRAMINE (TYLENOL PM PO) Take  by mouth nightly. insulin lispro (HUMALOG) 100 unit/mL injection by SubCUTAneous route four (4) times daily. BS-with sliding scale  Indications: type 2 diabetes mellitus, sliding scale      insulin glargine (LANTUS) 100 unit/mL injection 12 Units by SubCUTAneous route nightly. Indications: TYPE 2 DIABETES MELLITUS      buPROPion XL (WELLBUTRIN XL) 150 mg tablet Take 150 mg by mouth every morning. Indications: MAJOR DEPRESSIVE DISORDER      diclofenac EC (VOLTAREN) 75 mg EC tablet Take  by mouth two (2) times a day. SITagliptin (JANUVIA) 100 mg tablet Take 100 mg by mouth nightly. Indications: type 2 diabetes mellitus      OTHER Take 2 Tabs by mouth two (2) times a day. Berbinex supplement Blood sugar and weight support      VITAMIN D2 50,000 unit capsule TAKE 1 CAPSULE TWICE WEEKLY  Qty: 52 Cap, Refills: 0      rivaroxaban (XARELTO) 20 mg tab tablet Take 1 Tab by mouth daily. Qty: 30 Tab, Refills: 2      cholecalciferol (VITAMIN D3) 1,000 unit tablet Take  by mouth daily. Follow-up with Dr Dhiraj Sheldon in 2 weeks. Discharge Condition: Stable. Status post Right total knee replacement.

## 2018-06-30 NOTE — PROGRESS NOTES
Progress Note     Patient: Shanti Chi. MRN: [id number]  SSN: [id number]    YOB: 1935  Age: [de-identified] y.o. Sex: male      POD:    1 Day Post-Op  S/P:    Procedure(s):  RIGHT TOTAL KNEE REPLACEMENT      Subjective:   Pt. is resting in bed. He states he feels the two tablets of Percocet is causing him to feel dizzy and nauseous. He states he did better with one tablet. Denies paresthesias, radicular pain, CP, or SOB. Objective  Objective:     Patient Vitals for the past 12 hrs:   BP Temp Pulse Resp SpO2   06/30/18 0656 119/51 97.4 °F (36.3 °C) (!) 54 15 96 %   06/30/18 0601 126/50 97.5 °F (36.4 °C) 70 16 100 %   06/30/18 0224 111/61 98.2 °F (36.8 °C) 60 16 98 %   06/29/18 2337 110/56 98 °F (36.7 °C) 68 16 98 %     Recent Labs      06/30/18   0437   HGB  10.0*   HCT  29.8*   INR  2.0*   NA  138   K  2.9*   CL  101   CO2  32   BUN  19*   CREA  1.29   GLU  88     PHYSICAL EXAM  Pt resting in chair eating breakfast.  Lower extremity operative dressing clean/dry/intact. Neurovascularly intact. Dorsalis pedis pulse 2+ bilaterally. Calves soft, nontender. Assessment:     Assessment:   Status post Right total knee replacement, doing well post-operatively         Plan:   1. Continue pain management/ ice to operative area. Recommend minimizing narcotics as much as possible. Discussed tylenol. 2. Continue to progress with PT/OT. WBAT. 3. Discharge planning for SNF - likely tomorrow.

## 2018-06-30 NOTE — PROGRESS NOTES
Hospitalist Progress Note    Patient: Reba Hodgson. MRN: [id number]  CSN: [id number]    YOB: 1935  Age: [de-identified] y.o. Sex: male    DOA: 6/28/2018 LOS:  LOS: 2 days            Assessment/Plan     Principal Problem:    Primary osteoarthritis of right knee (6/28/2018)    Active Problems:    Hypertension ()      Hyperlipemia ()      Cardiac device in situ (7/10/2015)      Overview: EP study 7/10/15 without inducible, sustained arrhythmias. S/p Linq subcutaneous loop recorder. Type II or unspecified type diabetes mellitus with renal manifestations, uncontrolled(250.42) ([facility] [address] (6/30/2018)    OA: sp R. TKA. Post op care and pain control, be caution with narcotics. On Xarelto. Dizziness: 2nd to narcotics use. Will cut down the dose of percocet. Hypokalemia: K+ repletion    Dm2 w hyperglycemia: lantus to 18 untis qhs, lispro 6 units tid w meals    chronic diastolic CHF: compensated. Continue Cardizem. Lasix, BB    CKD: Improving. Monitor Bcer    Dementia: In his baseline     PT/OT    Incentive spirometry    Possible SNF in am    CC:  \" I feel drunk\"          Subjective:     Pt was seen and examined with the nurse in the morning round. C/O dizziness and confusion after took two percocet early am.  No CP/SOB    Review of systems  General: No fevers or chills. Cardiovascular: No chest pain or pressure. No palpitations. Pulmonary: No cough, SOB  Gastrointestinal: No nausea, vomiting. Objective:      Visit Vitals    /84 (BP 1 Location: Right arm, BP Patient Position: At rest)    Pulse 62    Temp 97.3 °F (36.3 °C)    Resp 15    Ht 5' 7\" (1.702 m)    Wt 88.2 kg (194 lb 7.1 oz)    SpO2 100%    BMI 30.45 kg/m2       Physical Exam:    Gen: NAD, non-toxic. Heent:  MMM, NC, AT. Cor: s1s2 RRR. No MRG. PMI mid 5th intercostal space. Resp:  CTA b/l. No w/r/r. Nml effort and diaphragmatic excursion. Abd:  NT ND.  BS positive. No rebound or guarding. No masses. Ext: S/P R. TKR      Intake and Output:  Current Shift:  06/30 0701 - 06/30 1900  In: 240 [P.O.:240]  Out: -   Last three shifts:  06/28 1901 - 06/30 0700  In: 1900 [P.O.:1300; I.V.:600]  Out: 2300 [Urine:2300]    Labs: Results:       Chemistry Recent Labs      06/30/18 0437 06/29/18   0335   GLU  88  322*   NA  138  144   K  2.9*  3.5   CL  101  105   CO2  32  30   BUN  19*  28*   CREA  1.29  1.55*   CA  7.8*  7.7*   AGAP  5  9   BUCR  15  18      CBC w/Diff Recent Labs      06/30/18 0437 06/29/18   0335   WBC  7.4  6.6   RBC  3.32*  3.32*   HGB  10.0*  9.9*   HCT  29.8*  29.8*   PLT  138  133*   GRANS  64  69   LYMPH  22  20*   EOS  2  1      Cardiac Enzymes No results for input(s): CPK, CKND1, ABBY in the last 72 hours. No lab exists for component: CKRMB, TROIP   Coagulation Recent Labs      06/30/18 0437 06/29/18   0335   PTP  22.3*  13.6   INR  2.0*  1.1       Lipid Panel Lab Results   Component Value Date/Time    Cholesterol, total 107 01/24/2015 09:15 AM    HDL Cholesterol 25 (L) 01/24/2015 09:15 AM    LDL, calculated 44 01/24/2015 09:15 AM    VLDL, calculated 38 01/24/2015 09:15 AM    Triglyceride 190 (H) 01/24/2015 09:15 AM    CHOL/HDL Ratio 4.3 01/24/2015 09:15 AM      BNP No results for input(s): BNPP in the last 72 hours. Liver Enzymes No results for input(s): TP, ALB, TBIL, AP, SGOT, GPT in the last 72 hours.     No lab exists for component: DBIL   Thyroid Studies Lab Results   Component Value Date/Time    TSH 2.84 12/04/2015 11:35 AM        Procedures/imaging: see electronic medical records for all procedures/Xrays and details which were not copied into this note but were reviewed prior to creation of Plan      Medications Reviewed  Sol Gillespie MD

## 2018-06-30 NOTE — PROGRESS NOTES
0800  Bedside and Verbal shift change report given to Jovi Schwab RN, by RONNIE Santos RN. Report included the following information SBAR, Kardex, OR Summary, Intake/Output and MAR.

## 2018-06-30 NOTE — PROGRESS NOTES
Problem: Mobility Impaired (Adult and Pediatric)  Goal: *Acute Goals and Plan of Care (Insert Text)  Physical Therapy Goals  Initiated 6/29/2018 and to be accomplished within 3 day(s)  1. Patient will move from supine to sit and sit to supine  in bed with supervision/set-up. 2.  Patient will transfer from bed to chair and chair to bed with supervision/set-up using the least restrictive device. 3.  Patient will perform sit to stand with supervision/set-up. 4.  Patient will ambulate with supervision/set-up for >150 feet with the least restrictive device. 5.  Patient will ascend/descend 3 stairs with handrail(s) with minimal assistance/contact guard assist.   Outcome: Progressing Towards Goal  physical Therapy TREATMENT    Patient: Shukri Friedman [de-identified] y.o. male)  Date: 6/30/2018  Diagnosis: UNILATERAL PRIMARY OSTEOARTHRITIS, RIGHT KNEE  Primary osteoarthritis of right knee Primary osteoarthritis of right knee  Procedure(s) (LRB):  RIGHT TOTAL KNEE REPLACEMENT  (Right) 2 Days Post-Op  Precautions: Fall, WBAT   Chart, physical therapy assessment, plan of care and goals were reviewed. ASSESSMENT:  Pt supine in bed upon arrival, willing to participate after min encouragement. Pt reports dry heaving all night and mild dizziness. Pt completed bed mobility with Kelly/CGA but req approx 2 min sitting EOB before amb d/t dry heaving. Pt states \"this is nothing new, I start dry heaving from time to time. \" Lack of full extension noted with amb on R knee. Pt amb with CGA with GB and RW. He req 3 standing rest breaks while amb d/t \"tiredness\" Unable to get reading on pulse ox, but pt denied any increased dizziness. Pt returned to room with stand to sit into chair with VC req for correct hand placement and eccentric control. Pt left sitting in chair, call bell within reach, nrs notified.    Progression toward goals:  [x]      Improving appropriately and progressing toward goals  []      Improving slowly and progressing toward goals  []      Not making progress toward goals and plan of care will be adjusted     PLAN:  Patient continues to benefit from skilled intervention to address the above impairments. Continue treatment per established plan of care. Discharge Recommendations:  Rehab  Further Equipment Recommendations for Discharge:  none     SUBJECTIVE:   Patient stated Mickeal Good had a rough night.     OBJECTIVE DATA SUMMARY:   Critical Behavior:  Neurologic State: Alert, Appropriate for age  Orientation Level: Oriented X4  Cognition: Appropriate decision making, Appropriate for age attention/concentration, Appropriate safety awareness, Follows commands  Safety/Judgement: Awareness of environment, Fall prevention, Good awareness of safety precautions, Insight into deficits  Functional Mobility Training:  Bed Mobility:  Supine to Sit: Contact guard assistance;Minimum assistance  Sit to Supine: Contact guard assistance  Scooting: Stand-by assistance  Transfers:  Sit to Stand: Contact guard assistance;Minimum assistance  Stand to Sit: Contact guard assistance  Balance:  Sitting: Impaired  Sitting - Dynamic: Prop sitting  Standing: With support  Ambulation/Gait Training:  Distance (ft): 110 Feet (ft)  Assistive Device: Gait belt;Walker, rolling  Ambulation - Level of Assistance: Contact guard assistance  Gait Abnormalities: Step to gait  Right Side Weight Bearing: As tolerated     Base of Support: Widened     Speed/Shreya: Slow  Pain:  Pain Scale 1: Numeric (0 - 10)  Pain Intensity 1: 0  Pain Location 1: Knee  Pain Orientation 1: Right     Pain Intervention(s) 1: Ice  Activity Tolerance:   Fair+  Please refer to the flowsheet for vital signs taken during this treatment.   After treatment:   [x] Patient left in no apparent distress sitting up in chair  [] Patient left in no apparent distress in bed  [x] Call bell left within reach  [x] Nursing notified  [] Caregiver present  [] Bed alarm activated      Jordyn Brown   Time Calculation: 28 mins

## 2018-07-01 VITALS
HEART RATE: 62 BPM | RESPIRATION RATE: 16 BRPM | SYSTOLIC BLOOD PRESSURE: 138 MMHG | WEIGHT: 194.45 LBS | TEMPERATURE: 97.4 F | HEIGHT: 67 IN | OXYGEN SATURATION: 99 % | BODY MASS INDEX: 30.52 KG/M2 | DIASTOLIC BLOOD PRESSURE: 88 MMHG

## 2018-07-01 LAB
ANION GAP SERPL CALC-SCNC: 2 MMOL/L (ref 3–18)
BASOPHILS # BLD: 0 K/UL (ref 0–0.06)
BASOPHILS NFR BLD: 0 % (ref 0–2)
BUN SERPL-MCNC: 19 MG/DL (ref 7–18)
BUN/CREAT SERPL: 14 (ref 12–20)
CALCIUM SERPL-MCNC: 7.5 MG/DL (ref 8.5–10.1)
CHLORIDE SERPL-SCNC: 102 MMOL/L (ref 100–108)
CO2 SERPL-SCNC: 35 MMOL/L (ref 21–32)
CREAT SERPL-MCNC: 1.32 MG/DL (ref 0.6–1.3)
DIFFERENTIAL METHOD BLD: ABNORMAL
EOSINOPHIL # BLD: 0.1 K/UL (ref 0–0.4)
EOSINOPHIL NFR BLD: 1 % (ref 0–5)
ERYTHROCYTE [DISTWIDTH] IN BLOOD BY AUTOMATED COUNT: 13.2 % (ref 11.6–14.5)
GLUCOSE BLD STRIP.AUTO-MCNC: 157 MG/DL (ref 70–110)
GLUCOSE BLD STRIP.AUTO-MCNC: 196 MG/DL (ref 70–110)
GLUCOSE SERPL-MCNC: 159 MG/DL (ref 74–99)
HCT VFR BLD AUTO: 29.5 % (ref 36–48)
HGB BLD-MCNC: 9.8 G/DL (ref 13–16)
INR PPP: 2.4 (ref 0.8–1.2)
LYMPHOCYTES # BLD: 1.7 K/UL (ref 0.9–3.6)
LYMPHOCYTES NFR BLD: 24 % (ref 21–52)
MCH RBC QN AUTO: 30.1 PG (ref 24–34)
MCHC RBC AUTO-ENTMCNC: 33.2 G/DL (ref 31–37)
MCV RBC AUTO: 90.5 FL (ref 74–97)
MONOCYTES # BLD: 0.9 K/UL (ref 0.05–1.2)
MONOCYTES NFR BLD: 12 % (ref 3–10)
NEUTS SEG # BLD: 4.7 K/UL (ref 1.8–8)
NEUTS SEG NFR BLD: 63 % (ref 40–73)
PLATELET # BLD AUTO: 159 K/UL (ref 135–420)
PMV BLD AUTO: 9.7 FL (ref 9.2–11.8)
POTASSIUM SERPL-SCNC: 4 MMOL/L (ref 3.5–5.5)
PROTHROMBIN TIME: 25.1 SEC (ref 11.5–15.2)
RBC # BLD AUTO: 3.26 M/UL (ref 4.7–5.5)
SODIUM SERPL-SCNC: 139 MMOL/L (ref 136–145)
WBC # BLD AUTO: 7.4 K/UL (ref 4.6–13.2)

## 2018-07-01 PROCEDURE — 74011636637 HC RX REV CODE- 636/637: Performed by: INTERNAL MEDICINE

## 2018-07-01 PROCEDURE — 74011250637 HC RX REV CODE- 250/637: Performed by: INTERNAL MEDICINE

## 2018-07-01 PROCEDURE — 85610 PROTHROMBIN TIME: CPT | Performed by: ORTHOPAEDIC SURGERY

## 2018-07-01 PROCEDURE — 80048 BASIC METABOLIC PNL TOTAL CA: CPT | Performed by: ORTHOPAEDIC SURGERY

## 2018-07-01 PROCEDURE — 74011250637 HC RX REV CODE- 250/637: Performed by: PHYSICIAN ASSISTANT

## 2018-07-01 PROCEDURE — 97116 GAIT TRAINING THERAPY: CPT

## 2018-07-01 PROCEDURE — 82962 GLUCOSE BLOOD TEST: CPT

## 2018-07-01 PROCEDURE — 74011636637 HC RX REV CODE- 636/637: Performed by: ORTHOPAEDIC SURGERY

## 2018-07-01 PROCEDURE — 97110 THERAPEUTIC EXERCISES: CPT

## 2018-07-01 PROCEDURE — 85025 COMPLETE CBC W/AUTO DIFF WBC: CPT | Performed by: ORTHOPAEDIC SURGERY

## 2018-07-01 PROCEDURE — 36415 COLL VENOUS BLD VENIPUNCTURE: CPT | Performed by: ORTHOPAEDIC SURGERY

## 2018-07-01 PROCEDURE — 74011250637 HC RX REV CODE- 250/637: Performed by: ORTHOPAEDIC SURGERY

## 2018-07-01 RX ORDER — OXYCODONE AND ACETAMINOPHEN 5; 325 MG/1; MG/1
TABLET ORAL
Qty: 50 TAB | Refills: 0 | Status: SHIPPED | OUTPATIENT
Start: 2018-07-01 | End: 2018-07-01

## 2018-07-01 RX ORDER — OXYCODONE AND ACETAMINOPHEN 5; 325 MG/1; MG/1
TABLET ORAL
Qty: 60 TAB | Refills: 0 | Status: SHIPPED | OUTPATIENT
Start: 2018-07-01 | End: 2018-11-27

## 2018-07-01 RX ADMIN — BUPROPION HYDROCHLORIDE 150 MG: 150 TABLET, FILM COATED, EXTENDED RELEASE ORAL at 06:11

## 2018-07-01 RX ADMIN — INSULIN LISPRO 3 UNITS: 100 INJECTION, SOLUTION INTRAVENOUS; SUBCUTANEOUS at 12:22

## 2018-07-01 RX ADMIN — RIVAROXABAN 20 MG: 10 TABLET, FILM COATED ORAL at 12:22

## 2018-07-01 RX ADMIN — SITAGLIPTIN 100 MG: 100 TABLET, FILM COATED ORAL at 08:46

## 2018-07-01 RX ADMIN — INSULIN LISPRO 6 UNITS: 100 INJECTION, SOLUTION INTRAVENOUS; SUBCUTANEOUS at 12:20

## 2018-07-01 RX ADMIN — OMEPRAZOLE 20 MG: 20 CAPSULE, DELAYED RELEASE ORAL at 08:47

## 2018-07-01 RX ADMIN — METOPROLOL TARTRATE 75 MG: 50 TABLET ORAL at 08:46

## 2018-07-01 RX ADMIN — FUROSEMIDE 20 MG: 20 TABLET ORAL at 08:46

## 2018-07-01 RX ADMIN — VALSARTAN 320 MG: 160 TABLET ORAL at 08:46

## 2018-07-01 RX ADMIN — DILTIAZEM HYDROCHLORIDE 240 MG: 240 CAPSULE, COATED, EXTENDED RELEASE ORAL at 08:47

## 2018-07-01 RX ADMIN — Medication 10 ML: at 06:11

## 2018-07-01 RX ADMIN — OXYCODONE HYDROCHLORIDE AND ACETAMINOPHEN 1 TABLET: 5; 325 TABLET ORAL at 13:22

## 2018-07-01 RX ADMIN — INSULIN LISPRO 6 UNITS: 100 INJECTION, SOLUTION INTRAVENOUS; SUBCUTANEOUS at 08:47

## 2018-07-01 RX ADMIN — OXYCODONE HYDROCHLORIDE AND ACETAMINOPHEN 1 TABLET: 5; 325 TABLET ORAL at 09:43

## 2018-07-01 RX ADMIN — HYDROCHLOROTHIAZIDE 25 MG: 25 TABLET ORAL at 08:48

## 2018-07-01 RX ADMIN — INSULIN LISPRO 3 UNITS: 100 INJECTION, SOLUTION INTRAVENOUS; SUBCUTANEOUS at 08:47

## 2018-07-01 NOTE — PROGRESS NOTES
Problem: Mobility Impaired (Adult and Pediatric)  Goal: *Acute Goals and Plan of Care (Insert Text)  Physical Therapy Goals  Initiated 6/29/2018 and to be accomplished within 3 day(s)  1. Patient will move from supine to sit and sit to supine  in bed with supervision/set-up. 2.  Patient will transfer from bed to chair and chair to bed with supervision/set-up using the least restrictive device. 3.  Patient will perform sit to stand with supervision/set-up. 4.  Patient will ambulate with supervision/set-up for >150 feet with the least restrictive device. 5.  Patient will ascend/descend 3 stairs with handrail(s) with minimal assistance/contact guard assist.   Outcome: Progressing Towards Goal  physical Therapy TREATMENT    Patient: Jerrod Bowers. [de-identified] y.o. male)  Date: 7/1/2018  Diagnosis: UNILATERAL PRIMARY OSTEOARTHRITIS, RIGHT KNEE  Primary osteoarthritis of right knee Primary osteoarthritis of right knee  Procedure(s) (LRB):  RIGHT TOTAL KNEE REPLACEMENT  (Right) 3 Days Post-Op  Precautions: Fall, WBAT   Chart, physical therapy assessment, plan of care and goals were reviewed. ASSESSMENT:  Pt supine in bed, right LE wrapped with ace bandage, willing to participate. Pt able to perform bed mobility with CGA for LE management. He negotiated RW in tight spaces with turns and amb approx 120ft with CGA and multiple rest breaks req. Pt amb with lack of full extension, step to gait, and decreased toe clearance. Pt unable to tolerate prolonged amb due to reported \"tiredness\" but returned to room and able to perform Therex as stated below with fair tolerance. Pt returned to supine in bed with CGA, NAD, all needs met, call bell within reach.   Progression toward goals:  []      Improving appropriately and progressing toward goals  [x]      Improving slowly and progressing toward goals  []      Not making progress toward goals and plan of care will be adjusted     PLAN:  Patient continues to benefit from skilled intervention to address the above impairments. Continue treatment per established plan of care. Discharge Recommendations:  Rehab  Further Equipment Recommendations for Discharge:  TBD     SUBJECTIVE:   Patient stated Sumit Stinson had a better night last night.     OBJECTIVE DATA SUMMARY:   Critical Behavior:  Neurologic State: Alert, Appropriate for age  Orientation Level: Oriented X4  Cognition: Appropriate decision making, Follows commands  Safety/Judgement: Awareness of environment, Fall prevention, Good awareness of safety precautions, Insight into deficits  Functional Mobility Training:  Bed Mobility:  Rolling: Contact guard assistance  Supine to Sit: Contact guard assistance  Sit to Supine: Contact guard assistance  Scooting: Contact guard assistance   Transfers:  Sit to Stand: Contact guard assistance  Stand to Sit: Contact guard assistance  Balance:  Sitting: With support  Sitting - Static: Good (unsupported); Fair (occasional)  Standing: With support  Ambulation/Gait Training:  Distance (ft): 120 Feet (ft)  Assistive Device: Gait belt;Walker, rolling  Ambulation - Level of Assistance: Contact guard assistance;Minimal assistance   Gait Abnormalities: Step to gait  Right Side Weight Bearing: As tolerated  Base of Support: Widened  Speed/Shreya: Slow;Shuffled  Step Length: Left shortened  Stairs: Therapeutic Exercises:   Seated R LAQ: 3x5  Pain:  Pain Scale 1: Numeric (0 - 10)  Pain scale 3/10 in posterior right knee  Activity Tolerance:   good  Please refer to the flowsheet for vital signs taken during this treatment.   After treatment:   [] Patient left in no apparent distress sitting up in chair  [x] Patient left in no apparent distress in bed  [x] Call bell left within reach  [x] Nursing notified  [] Caregiver present  [] Bed alarm activated      Yi Nolasco   Time Calculation: 24 mins

## 2018-07-01 NOTE — PROGRESS NOTES
Progress Note     Patient: Marilu Bateman. MRN: [id number]  SSN: [id number]    YOB: 1935  Age: [de-identified] y.o. Sex: male      POD:    1 Day Post-Op  S/P:    Procedure(s):  RIGHT TOTAL KNEE REPLACEMENT      Subjective:   Pt. is resting in bed. No complaints today. Denies paresthesias, radicular pain, CP, or SOB. Objective  Objective:     Patient Vitals for the past 12 hrs:   BP Temp Pulse Resp SpO2   07/01/18 1057 138/88 97.4 °F (36.3 °C) 62 16 99 %   07/01/18 0736 134/70 98.7 °F (37.1 °C) 75 17 98 %   07/01/18 0310 118/66 98.9 °F (37.2 °C) 73 16 98 %     Recent Labs      07/01/18   0059   HGB  9.8*   HCT  29.5*   INR  2.4*   NA  139   K  4.0   CL  102   CO2  35*   BUN  19*   CREA  1.32*   GLU  159*     PHYSICAL EXAM  Pt resting in chair eating breakfast.  Lower extremity operative dressing clean/dry/intact. Neurovascularly intact. Dorsalis pedis pulse 2+ bilaterally. Calves soft, nontender. Assessment:     Assessment:   Status post Right total knee replacement, doing well post-operatively         Plan:   1. Continue current pain management, ice, elevation. 2. Continue to progress with PT/OT. WBAT. 3. Discharge planning for SNF - today.

## 2018-07-01 NOTE — PROGRESS NOTES
TRANSFER - OUT REPORT:    Verbal report given to Tonie Richards LPN(name) on Charlotte William.  being transferred to [address] [place] [address](unit) for routine post - op       Report consisted of patients Situation, Background, Assessment and   Recommendations(SBAR). Information from the following report(s) SBAR, Kardex, Intake/Output, MAR and Accordion was reviewed with the receiving nurse. Opportunity for questions and clarification was provided.

## 2018-07-01 NOTE — PROGRESS NOTES
1920  Bedside and Verbal shift change report given to EMILE PabonRN by Liliana Vitale RN. Report included the following information SBAR, Kardex, OR Summary, Intake/Output and MAR.

## 2018-07-01 NOTE — PROGRESS NOTES
Problem: Falls - Risk of  Goal: *Absence of Falls  Document Noemi Fall Risk and appropriate interventions in the flowsheet.    Outcome: Progressing Towards Goal  Fall Risk Interventions:  Mobility Interventions: Patient to call before getting OOB    Mentation Interventions: Adequate sleep, hydration, pain control    Medication Interventions: Patient to call before getting OOB    Elimination Interventions: Call light in reach, Patient to call for help with toileting needs    History of Falls Interventions: Door open when patient unattended, Room close to nurse's station

## 2018-07-01 NOTE — PROGRESS NOTES
Hospitalist Progress Note    Patient: Flash Bermeo MRN: [id number]  CSN: [id number]    YOB: 1935  Age: [de-identified] y.o. Sex: male    DOA: 6/28/2018 LOS:  LOS: 3 days            Assessment/Plan     Principal Problem:    Primary osteoarthritis of right knee (6/28/2018)    Active Problems:    Hypertension ()      Hyperlipemia ()      Dizziness (2/27/2015)      Cardiac device in situ (7/10/2015)      Overview: EP study 7/10/15 without inducible, sustained arrhythmias. S/p Linq subcutaneous loop recorder. Type II or unspecified type diabetes mellitus with renal manifestations, uncontrolled(250.42) ([facility]) (8/92/6014)      Diastolic CHF, chronic ([facility]) (6/30/2018)      OA: sp R. TKA. Post op care and pain control, be caution with narcotics. On Xarelto.     Dizziness: 2nd to narcotics use. Improved after cut down the dose of percocet.      Hypokalemia: Resolved after K+ repletion     Dm2 w hyperglycemia: lantus to 18 untis qhs, lispro 6 units tid w meals     chronic diastolic CHF: compensated. Continue Cardizem. Lasix, BB     CKD: Ongoing. Monitor Bcer     Dementia: In his baseline      PT/OT     Incentive spirometry     SNF today.      CC:  \" I am doing better\"          Subjective:      Pt was seen and examined with the nurse in the morning round.      Feeling better. C/O pain to R. Knee, current pain regimen helped his pain. No CP/SOB      Review of systems  General: No fevers or chills. Cardiovascular: No chest pain or pressure. No palpitations. Pulmonary: No cough, SOB  Gastrointestinal: No nausea, vomiting. Objective:      Visit Vitals    /70 (BP 1 Location: Left arm, BP Patient Position: Sitting)    Pulse 75    Temp 98.7 °F (37.1 °C)    Resp 17    Ht 5' 7\" (1.702 m)    Wt 88.2 kg (194 lb 7.1 oz)    SpO2 98%    BMI 30.45 kg/m2       Physical Exam:    Gen: NAD, non-toxic. Heent:  MMM, NC, AT. Cor: s1s2 RRR. No MRG. PMI mid 5th intercostal space. Resp:  CTA b/l.   No w/r/r.  Nml effort and diaphragmatic excursion. Abd:  NT ND.  BS positive. No rebound or guarding. No masses. Ext: R. TKR      Intake and Output:  Current Shift:     Last three shifts:  06/29 1901 - 07/01 0700  In: 600 [P.O.:600]  Out: 2025 [Urine:2025]    Labs: Results:       Chemistry Recent Labs      07/01/18 0059 06/30/18   0437  06/29/18   0335   GLU  159*  88  322*   NA  139  138  144   K  4.0  2.9*  3.5   CL  102  101  105   CO2  35*  32  30   BUN  19*  19*  28*   CREA  1.32*  1.29  1.55*   CA  7.5*  7.8*  7.7*   AGAP  2*  5  9   BUCR  14  15  18      CBC w/Diff Recent Labs      07/01/18 0059 06/30/18 0437 06/29/18   0335   WBC  7.4  7.4  6.6   RBC  3.26*  3.32*  3.32*   HGB  9.8*  10.0*  9.9*   HCT  29.5*  29.8*  29.8*   PLT  159  138  133*   GRANS  63  64  69   LYMPH  24  22  20*   EOS  1  2  1      Cardiac Enzymes No results for input(s): CPK, CKND1, ABBY in the last 72 hours. No lab exists for component: CKRMB, TROIP   Coagulation Recent Labs      07/01/18 0059 06/30/18   0437   PTP  25.1*  22.3*   INR  2.4*  2.0*       Lipid Panel Lab Results   Component Value Date/Time    Cholesterol, total 107 01/24/2015 09:15 AM    HDL Cholesterol 25 (L) 01/24/2015 09:15 AM    LDL, calculated 44 01/24/2015 09:15 AM    VLDL, calculated 38 01/24/2015 09:15 AM    Triglyceride 190 (H) 01/24/2015 09:15 AM    CHOL/HDL Ratio 4.3 01/24/2015 09:15 AM      BNP No results for input(s): BNPP in the last 72 hours. Liver Enzymes No results for input(s): TP, ALB, TBIL, AP, SGOT, GPT in the last 72 hours.     No lab exists for component: DBIL   Thyroid Studies Lab Results   Component Value Date/Time    TSH 2.84 12/04/2015 11:35 AM        Procedures/imaging: see electronic medical records for all procedures/Xrays and details which were not copied into this note but were reviewed prior to creation of Plan      Medications Reviewed  Antwan Hanson MD

## 2018-07-01 NOTE — PROGRESS NOTES
1920 - Received report from Ryder Callaway RN. Assumed care of patient at this time. 2010 - Patient assisted to bathroom and back to bed at this time. A/O x 4. IV to right hand intact. [place] to LLE and plexi's bilaterally. Elastic dressing to RLE CDI. Denies numbness/tingling. Pedal pulses palpable. Lungs CTA. Bowel sounds active to all quadrants. Pain 0/10.      0720 - Bedside and Verbal shift change report given to Sony De Santiago RN by Jo Oglesby RN. Report included the following information SBAR, Kardex, OR Summary, Intake/Output and MAR.

## 2018-07-01 NOTE — PROGRESS NOTES
0715 Assumed care of pt at this time. Pt in chair with no signs of distress. Pt left with call light within reach and encouraged to call for assistance. 7836 Head to toe assessment completed at this time  Patient is A&OX4. Pt denies N/V chest pain and SOB or distress. Pt is calm and cooperative. Pedal pulses are present. Capillary refill less than 3 seconds. Skin in warm and dry with ACE wrap dressing on right knee and is CDI. Lungs are clear bilaterally. Patient instructed on use and reason for incentive spirometer. Bowel sounds are active. Abdomen is soft and non-tender. FRANSISCA on LLE & Plexi in place bilaterally . Positive dorsalis pedis pulse, sensation, warm. No tingling or numbness to lower extremities. 18 g needle in the Rt hand. Pain scale explained to patient. Reasons for taking PRN meds explained to patient. Patient instructed to call for prn when needed. Pain level is 2, medicated as per MAR. Patient was oriented to call bell and bed function. Will continue to monitor    1322 pain 1/10 medicated as per MAR. All medications reviewed individually with patient. Opportunities for questions and concerns provided. Patient discharged via car, with his wife . Patient's arm band appropriately discarded. \

## 2018-07-14 ENCOUNTER — HOME HEALTH ADMISSION (OUTPATIENT)
Dept: HOME HEALTH SERVICES | Facility: HOME HEALTH | Age: 83
End: 2018-07-14
Payer: MEDICARE

## 2018-07-15 ENCOUNTER — HOME CARE VISIT (OUTPATIENT)
Dept: HOME HEALTH SERVICES | Facility: HOME HEALTH | Age: 83
End: 2018-07-15

## 2018-07-16 ENCOUNTER — HOME CARE VISIT (OUTPATIENT)
Dept: SCHEDULING | Facility: HOME HEALTH | Age: 83
End: 2018-07-16
Payer: MEDICARE

## 2018-07-16 ENCOUNTER — HOME CARE VISIT (OUTPATIENT)
Dept: HOME HEALTH SERVICES | Facility: HOME HEALTH | Age: 83
End: 2018-07-16

## 2018-07-16 VITALS
RESPIRATION RATE: 17 BRPM | DIASTOLIC BLOOD PRESSURE: 70 MMHG | TEMPERATURE: 97.8 F | HEART RATE: 73 BPM | SYSTOLIC BLOOD PRESSURE: 110 MMHG

## 2018-07-16 VITALS
DIASTOLIC BLOOD PRESSURE: 67 MMHG | TEMPERATURE: 97.6 F | HEART RATE: 74 BPM | SYSTOLIC BLOOD PRESSURE: 119 MMHG | RESPIRATION RATE: 16 BRPM | OXYGEN SATURATION: 98 % | BODY MASS INDEX: 30.45 KG/M2 | HEIGHT: 67 IN | WEIGHT: 194 LBS

## 2018-07-16 PROCEDURE — 400013 HH SOC

## 2018-07-16 PROCEDURE — 3331090002 HH PPS REVENUE DEBIT

## 2018-07-16 PROCEDURE — G0299 HHS/HOSPICE OF RN EA 15 MIN: HCPCS

## 2018-07-16 PROCEDURE — 3331090001 HH PPS REVENUE CREDIT

## 2018-07-16 PROCEDURE — G0151 HHCP-SERV OF PT,EA 15 MIN: HCPCS

## 2018-07-17 ENCOUNTER — HOME CARE VISIT (OUTPATIENT)
Dept: HOME HEALTH SERVICES | Facility: HOME HEALTH | Age: 83
End: 2018-07-17
Payer: MEDICARE

## 2018-07-17 PROCEDURE — 3331090002 HH PPS REVENUE DEBIT

## 2018-07-17 PROCEDURE — 3331090001 HH PPS REVENUE CREDIT

## 2018-07-18 PROCEDURE — 3331090001 HH PPS REVENUE CREDIT

## 2018-07-18 PROCEDURE — 3331090002 HH PPS REVENUE DEBIT

## 2018-07-19 ENCOUNTER — HOME CARE VISIT (OUTPATIENT)
Dept: SCHEDULING | Facility: HOME HEALTH | Age: 83
End: 2018-07-19
Payer: MEDICARE

## 2018-07-19 PROCEDURE — G0155 HHCP-SVS OF CSW,EA 15 MIN: HCPCS

## 2018-07-19 PROCEDURE — G0157 HHC PT ASSISTANT EA 15: HCPCS

## 2018-07-19 PROCEDURE — 3331090001 HH PPS REVENUE CREDIT

## 2018-07-19 PROCEDURE — 3331090002 HH PPS REVENUE DEBIT

## 2018-07-20 ENCOUNTER — HOME CARE VISIT (OUTPATIENT)
Dept: SCHEDULING | Facility: HOME HEALTH | Age: 83
End: 2018-07-20
Payer: MEDICARE

## 2018-07-20 PROCEDURE — 3331090002 HH PPS REVENUE DEBIT

## 2018-07-20 PROCEDURE — G0157 HHC PT ASSISTANT EA 15: HCPCS

## 2018-07-20 PROCEDURE — 3331090001 HH PPS REVENUE CREDIT

## 2018-07-21 PROCEDURE — 3331090001 HH PPS REVENUE CREDIT

## 2018-07-21 PROCEDURE — 3331090002 HH PPS REVENUE DEBIT

## 2018-07-22 PROCEDURE — 3331090002 HH PPS REVENUE DEBIT

## 2018-07-22 PROCEDURE — 3331090001 HH PPS REVENUE CREDIT

## 2018-07-23 PROCEDURE — 3331090001 HH PPS REVENUE CREDIT

## 2018-07-23 PROCEDURE — 3331090002 HH PPS REVENUE DEBIT

## 2018-07-24 ENCOUNTER — HOME CARE VISIT (OUTPATIENT)
Dept: HOME HEALTH SERVICES | Facility: HOME HEALTH | Age: 83
End: 2018-07-24
Payer: MEDICARE

## 2018-07-24 ENCOUNTER — HOME CARE VISIT (OUTPATIENT)
Dept: SCHEDULING | Facility: HOME HEALTH | Age: 83
End: 2018-07-24
Payer: MEDICARE

## 2018-07-24 VITALS
OXYGEN SATURATION: 99 % | DIASTOLIC BLOOD PRESSURE: 58 MMHG | TEMPERATURE: 98.5 F | HEART RATE: 99 BPM | RESPIRATION RATE: 14 BRPM | SYSTOLIC BLOOD PRESSURE: 101 MMHG

## 2018-07-24 PROCEDURE — 3331090001 HH PPS REVENUE CREDIT

## 2018-07-24 PROCEDURE — G0157 HHC PT ASSISTANT EA 15: HCPCS

## 2018-07-24 PROCEDURE — G0299 HHS/HOSPICE OF RN EA 15 MIN: HCPCS

## 2018-07-24 PROCEDURE — 3331090002 HH PPS REVENUE DEBIT

## 2018-07-25 PROCEDURE — 3331090001 HH PPS REVENUE CREDIT

## 2018-07-25 PROCEDURE — 3331090002 HH PPS REVENUE DEBIT

## 2018-07-26 PROCEDURE — 3331090001 HH PPS REVENUE CREDIT

## 2018-07-26 PROCEDURE — 3331090002 HH PPS REVENUE DEBIT

## 2018-07-27 PROCEDURE — 3331090002 HH PPS REVENUE DEBIT

## 2018-07-27 PROCEDURE — 3331090001 HH PPS REVENUE CREDIT

## 2018-07-28 PROCEDURE — 3331090002 HH PPS REVENUE DEBIT

## 2018-07-28 PROCEDURE — 3331090001 HH PPS REVENUE CREDIT

## 2018-07-29 PROCEDURE — 3331090002 HH PPS REVENUE DEBIT

## 2018-07-29 PROCEDURE — 3331090001 HH PPS REVENUE CREDIT

## 2018-07-30 PROCEDURE — 3331090002 HH PPS REVENUE DEBIT

## 2018-07-30 PROCEDURE — 3331090001 HH PPS REVENUE CREDIT

## 2018-11-20 ENCOUNTER — HOSPITAL ENCOUNTER (EMERGENCY)
Age: 83
Discharge: HOME | End: 2018-11-20
Attending: EMERGENCY MEDICINE
Payer: MEDICARE

## 2018-11-20 ENCOUNTER — APPOINTMENT (OUTPATIENT)
Dept: GENERAL RADIOLOGY | Age: 83
End: 2018-11-20
Attending: EMERGENCY MEDICINE
Payer: MEDICARE

## 2018-11-20 VITALS
DIASTOLIC BLOOD PRESSURE: 63 MMHG | WEIGHT: 184 LBS | HEIGHT: 67 IN | BODY MASS INDEX: 28.88 KG/M2 | OXYGEN SATURATION: 100 % | TEMPERATURE: 98.5 F | RESPIRATION RATE: 16 BRPM | SYSTOLIC BLOOD PRESSURE: 125 MMHG | HEART RATE: 61 BPM

## 2018-11-20 DIAGNOSIS — M25.431 PAIN AND SWELLING OF RIGHT WRIST: ICD-10-CM

## 2018-11-20 DIAGNOSIS — M25.531 RIGHT WRIST PAIN: Primary | ICD-10-CM

## 2018-11-20 DIAGNOSIS — M25.531 PAIN AND SWELLING OF RIGHT WRIST: ICD-10-CM

## 2018-11-20 PROCEDURE — 73110 X-RAY EXAM OF WRIST: CPT

## 2018-11-20 PROCEDURE — 99283 EMERGENCY DEPT VISIT LOW MDM: CPT

## 2018-11-20 PROCEDURE — L3809 WHFO W/O JOINTS PRE OTS: HCPCS

## 2018-11-20 NOTE — ED NOTES
Pt discharged per ambulatory with spouse, no acute distress on discharge, written inst given to pt, verbalizes understanding Patient armband removed and shredded

## 2018-11-20 NOTE — DISCHARGE INSTRUCTIONS
Musculoskeletal Pain: Care Instructions  Your Care Instructions    Different problems with the bones, muscles, nerves, ligaments, and tendons in the body can cause pain. One or more areas of your body may ache or burn. Or they may feel tired, stiff, or sore. The medical term for this type of pain is musculoskeletal pain. It can have many different causes. Sometimes the pain is caused by an injury such as a strain or sprain. Or you might have pain from using one part of your body in the same way over and over again. This is called overuse. In some cases, the cause of the pain is another health problem such as arthritis or fibromyalgia. The doctor will examine you and ask you questions about your health to help find the cause of your pain. Blood tests or imaging tests like an X-ray may also be helpful. But sometimes doctors can't find a cause of the pain. Treatment depends on your symptoms and the cause of the pain, if known. The doctor has checked you carefully, but problems can develop later. If you notice any problems or new symptoms, get medical treatment right away. Follow-up care is a key part of your treatment and safety. Be sure to make and go to all appointments, and call your doctor if you are having problems. It's also a good idea to know your test results and keep a list of the medicines you take. How can you care for yourself at home? · Rest until you feel better. · Do not do anything that makes the pain worse. Return to exercise gradually if you feel better and your doctor says it's okay. · Be safe with medicines. Read and follow all instructions on the label. ? If the doctor gave you a prescription medicine for pain, take it as prescribed. ? If you are not taking a prescription pain medicine, ask your doctor if you can take an over-the-counter medicine. · Put ice or a cold pack on the area for 10 to 20 minutes at a time to ease pain.  Put a thin cloth between the ice and your skin.  When should you call for help? Call your doctor now or seek immediate medical care if:    · You have new pain, or your pain gets worse.     · You have new symptoms such as a fever, a rash, or chills.    Watch closely for changes in your health, and be sure to contact your doctor if:    · You do not get better as expected. Where can you learn more? Go to http://jared-bill.info/. Enter F819 in the search box to learn more about \"Musculoskeletal Pain: Care Instructions. \"  Current as of: June 4, 2018  Content Version: 11.8  © 7095-4719 [employer]. Care instructions adapted under license by [employer] (which disclaims liability or warranty for this information). If you have questions about a medical condition or this instruction, always ask your healthcare professional. [address] any warranty or liability for your use of this information.

## 2018-11-20 NOTE — ED PROVIDER NOTES
EMERGENCY DEPARTMENT HISTORY AND PHYSICAL EXAM 
 
Date: 11/20/2018 Patient Name: Baron Chavez. History of Presenting Illness Chief Complaint Patient presents with  
 Hand Swelling History Provided By: Patient Chief Complaint: right hand Duration: 3 Days Timing:  Worsening Location: right hand Quality: pain and swelling Severity: 10 out of 10 Associated Symptoms: denies any other associated signs or symptoms Additional History (Context):  
7:40 AM  
Baron Barroso is a 82 y.o. male who presents to the emergency department via EMS C/O right hand pain and swelling starting 3 days ago which woke him from his sleep. Reports the pain woke him from sleep twice last night, prompting him to come to the ED for evaluation. His pain is rated 10/10Denies injury or trauma to the hand. Denies etoh use the night prior to symptom onset. Pt denies wounds, and any other sxs or complaints. PCP: Jayla Lowery MD 
 
Current Outpatient Medications Medication Sig Dispense Refill  oxyCODONE-acetaminophen (PERCOCET) 5-325 mg per tablet 1-2 tablets by mouth every 4-6 hours as needed for pain. Indications: Pain (Patient taking differently: Take 2 Tabs by mouth every six (6) hours as needed for Pain. 1-2 tablets by mouth every 4-6 hours as needed for pain. Indications: Pain) 60 Tab 0  
 donepezil (ARICEPT) 5 mg tablet Take 5 mg by mouth nightly.  metFORMIN (GLUCOPHAGE) 500 mg tablet Take 500 mg by mouth daily (with breakfast).  OTHER Take 2 Tabs by mouth two (2) times a day. Berbinex supplement Blood sugar and weight support  FAMOTIDINE (PEPCID PO) Take 20 mg by mouth two (2) times a day.  valsartan-hydroCHLOROthiazide (DIOVAN-HCT) 320-25 mg per tablet Take 1 Tab by mouth daily.  FOLIC ACID/MULTIVIT-MIN/LUTEIN (CENTRUM SILVER PO) Take 1 Tab by mouth daily.     
 VITAMIN D2 50,000 unit capsule TAKE 1 CAPSULE TWICE WEEKLY 52 Cap 0  
  oxybutynin (DITROPAN) 5 mg tablet Take 5 mg by mouth nightly.  metoprolol tartrate 75 mg tab Take 75 mg by mouth every twelve (12) hours. 60 Tab 0  
 rivaroxaban (XARELTO) 20 mg tab tablet Take 1 Tab by mouth daily. (Patient taking differently: Take 20 mg by mouth daily (with lunch). Indications: afib) 30 Tab 2  
 dilTIAZem CD (CARDIZEM CD) 240 mg ER capsule Take 1 Cap by mouth daily. (Patient taking differently: Take 240 mg by mouth daily (with lunch). ) 30 Cap 0  
 Omeprazole delayed release (PRILOSEC D/R) 20 mg tablet Take 20 mg by mouth daily.  cholecalciferol (VITAMIN D3) 1,000 unit tablet Take 1,000 Units by mouth daily.  furosemide (LASIX) 20 mg tablet Take 20 mg by mouth daily. Indications: Edema  melatonin tab tablet Take 5 mg by mouth nightly. Indications: sleep  ACETAMINOPHEN/DIPHENHYDRAMINE (TYLENOL PM PO) Take 1 Tab by mouth nightly. Indications: as needed for sleep  insulin lispro (HUMALOG) 100 unit/mL injection by SubCUTAneous route four (4) times daily. BS-with sliding scale 80-12-=2 units 121-160=4 
161-200=6 
201-250=8 
251- and over 10 units 
with each meal  Indications: type 2 diabetes mellitus, sliding scale  insulin glargine (LANTUS) 100 unit/mL injection 12 Units by SubCUTAneous route nightly. Indications: TYPE 2 DIABETES MELLITUS    
 buPROPion XL (WELLBUTRIN XL) 150 mg tablet Take 150 mg by mouth every morning. Indications: MAJOR DEPRESSIVE DISORDER Past History Past Medical History: 
Past Medical History:  
Diagnosis Date  Arthritis  Atrial fibrillation ([address]  Dementia  Depression  Diabetes ([facility] [address] 1980s  GERD (gastroesophageal reflux disease)  Hypercholesterolemia  Hypertension 2000  Ill-defined condition 2015  
 has passed out-has been tested cannot determine cause  Primary osteoarthritis of right knee 6/28/2018  Ulcer   
 at the anastomotic bite of gastric bypass  Varicose veins  Wide-complex tachycardia ([facility] [address] 2015 Past Surgical History: 
Past Surgical History:  
Procedure Laterality Date  ABDOMEN SURGERY PROC UNLISTED    
 umb hernia Rupture  BIOPSY LIVER  10/05/04  
 EGD  09  
 with biopsy  ENDOSCOPY, COLON, DIAGNOSTIC    
 GASTROSTOMY TUBE  10/05/04  HX CATARACT REMOVAL    
 bilateral  
 HX CHOLECYSTECTOMY  10/05/04  HX GI  10/05/04  
 vertical banded gastroplasty/gastric bypass  HX MOHS PROCEDURES    
 bilateral  
 HX PACEMAKER   [facility] Family History: 
Family History Problem Relation Age of Onset  Heart Attack Father  Diabetes Father  Hypertension Father  Cancer Mother   
     lung  Diabetes Mother  Other Brother   
     obese  Diabetes Brother  Diabetes Brother  Other Maternal Grandmother   
     obese  Diabetes Sister Social History: 
Social History Tobacco Use  Smoking status: Former Smoker Last attempt to quit: 4/3/1967 Years since quittin.6  Smokeless tobacco: Never Used Substance Use Topics  Alcohol use: Yes Alcohol/week: 2.4 oz Types: 3 Glasses of wine, 1 Shots of liquor per week Comment: monthly  Drug use: No  
 
 
Allergies: Allergies Allergen Reactions  Morphine Other (comments) Hallucinate; \"ran all night long\" Review of Systems Review of Systems Musculoskeletal: Positive for arthralgias (right hand pain). Right hand swelling Skin: Negative for wound. All other systems reviewed and are negative. Physical Exam  
 
Vitals:  
 18 0736 BP: 145/73 Pulse: (!) 56 Resp: 16 Temp: 98.5 °F (36.9 °C) SpO2: 100% Weight: 83.5 kg (184 lb) Height: 5' 7\" (1.702 m) Physical Exam 
Vital signs and nursing notes reviewed CONSTITUTIONAL: Alert, in no apparent distress; well-developed; well-nourished. HEAD:  Normocephalic, atraumatic EYES: PERRL; EOM's intact. ENTM: Nose: no rhinorrhea; Throat: no erythema or exudate, mucous membranes moist 
Neck:  No JVD, supple without lymphadenopathy RESP: Chest clear, equal breath sounds. CV: S1 and S2 WNL; No murmurs, gallops or rubs. GI: Normal bowel sounds, abdomen soft and non-tender. No masses or organomegaly. : No costo-vertebral angle tenderness. BACK:  Non-tender UPPER EXT:  Right wrist is swollen and tender. There is pain with movement of the right hand. LOWER EXT: No edema, no calf tenderness. Distal pulses intact. NEURO: CN intact, reflexes 2/4 and sym, strength 5/5 and sym, sensation intact. SKIN: No rashes; Normal for age and stage. PSYCH:  Alert and oriented, normal affect. Diagnostic Study Results Labs - No results found for this or any previous visit (from the past 12 hour(s)). Radiologic Studies -  
XR WRIST RT AP/LAT/OBL MIN 3V Final Result IMPRESSION: 
 
Diffusely swollen right wrist and degenerative changes as described without 
acute osseous abnormality or malalignment. As read by the radiologist.   
 
Medications given in the ED- Medications - No data to display Medical Decision Making I am the first provider for this patient. I reviewed the vital signs, available nursing notes, past medical history, past surgical history, family history and social history. Vital Signs-Reviewed the patient's vital signs. Pulse Oximetry Analysis - 100% on room air Records Reviewed: Nursing Notes Provider Notes (Medical Decision Making): Ddx: sprain, strain, fracture, dislocation Procedures: 
Procedures ED Course:  
7:40 AM Initial assessment performed. The patients presenting problems have been discussed, and they are in agreement with the care plan formulated and outlined with them. I have encouraged them to ask questions as they arise throughout their visit. Diagnosis and Disposition Discussion: Patient presents with swelling confined to the right wrist. There is tenderness with movement, and tenderness on exam. X-ray demonstrate no acute fracture. This is likely a contusion or other soft tissue injury. Will splint and have the patient follow up with his primary care provider in 1 week. The patient will take home Tylenol for pain as needed and will discharge home DISCHARGE NOTE: 
8:40 AM  
Bell Christy Guzman's  results have been reviewed with him. He has been counseled regarding his diagnosis, treatment, and plan. He verbally conveys understanding and agreement of the signs, symptoms, diagnosis, treatment and prognosis and additionally agrees to follow up as discussed. He also agrees with the care-plan and conveys that all of his questions have been answered. I have also provided discharge instructions for him that include: educational information regarding their diagnosis and treatment, and list of reasons why they would want to return to the ED prior to their follow-up appointment, should his condition change. He has been provided with education for proper emergency department utilization. CLINICAL IMPRESSION: 
 
1. Right wrist pain 2. Pain and swelling of right wrist   
 
 
PLAN: 
1. D/C Home 2. Current Discharge Medication List  
  
CONTINUE these medications which have NOT CHANGED Details  
oxyCODONE-acetaminophen (PERCOCET) 5-325 mg per tablet 1-2 tablets by mouth every 4-6 hours as needed for pain. Indications: Pain 
Qty: 60 Tab, Refills: 0 Associated Diagnoses: Primary osteoarthritis of right knee  
  
donepezil (ARICEPT) 5 mg tablet Take 5 mg by mouth nightly. metFORMIN (GLUCOPHAGE) 500 mg tablet Take 500 mg by mouth daily (with breakfast). OTHER Take 2 Tabs by mouth two (2) times a day. Berbinex supplement Blood sugar and weight support FAMOTIDINE (PEPCID PO) Take 20 mg by mouth two (2) times a day. valsartan-hydroCHLOROthiazide (DIOVAN-HCT) 320-25 mg per tablet Take 1 Tab by mouth daily. FOLIC ACID/MULTIVIT-MIN/LUTEIN (CENTRUM SILVER PO) Take 1 Tab by mouth daily. VITAMIN D2 50,000 unit capsule TAKE 1 CAPSULE TWICE WEEKLY Qty: 52 Cap, Refills: 0  
  
oxybutynin (DITROPAN) 5 mg tablet Take 5 mg by mouth nightly. metoprolol tartrate 75 mg tab Take 75 mg by mouth every twelve (12) hours. Qty: 60 Tab, Refills: 0  
  
rivaroxaban (XARELTO) 20 mg tab tablet Take 1 Tab by mouth daily. Qty: 30 Tab, Refills: 2  
  
dilTIAZem CD (CARDIZEM CD) 240 mg ER capsule Take 1 Cap by mouth daily. Qty: 30 Cap, Refills: 0 Omeprazole delayed release (PRILOSEC D/R) 20 mg tablet Take 20 mg by mouth daily. cholecalciferol (VITAMIN D3) 1,000 unit tablet Take 1,000 Units by mouth daily. furosemide (LASIX) 20 mg tablet Take 20 mg by mouth daily. Indications: Edema  
  
melatonin tab tablet Take 5 mg by mouth nightly. Indications: sleep ACETAMINOPHEN/DIPHENHYDRAMINE (TYLENOL PM PO) Take 1 Tab by mouth nightly. Indications: as needed for sleep  
  
insulin lispro (HUMALOG) 100 unit/mL injection by SubCUTAneous route four (4) times daily. BS-with sliding scale 80-12-=2 units 121-160=4 
161-200=6 
201-250=8 
251- and over 10 units 
with each meal  Indications: type 2 diabetes mellitus, sliding scale  
  
insulin glargine (LANTUS) 100 unit/mL injection 12 Units by SubCUTAneous route nightly. Indications: TYPE 2 DIABETES MELLITUS  
  
buPROPion XL (WELLBUTRIN XL) 150 mg tablet Take 150 mg by mouth every morning. Indications: MAJOR DEPRESSIVE DISORDER 3. Follow-up Information None 
  
 
_______________________________ Attestations: This note is prepared by Zakia Gaitan, acting as Scribe for Jann Ferrari MD. 
 
Jann Ferrari MD:  The scribe's documentation has been prepared under my direction and personally reviewed by me in its entirety.   I confirm that the note above accurately reflects all work, treatment, procedures, and medical decision making performed by me. 
_______________________________

## 2018-11-27 ENCOUNTER — HOSPITAL ENCOUNTER (OUTPATIENT)
Age: 83
Setting detail: OBSERVATION
Discharge: HOME HEALTH | End: 2018-11-28
Attending: EMERGENCY MEDICINE | Admitting: INTERNAL MEDICINE
Payer: MEDICARE

## 2018-11-27 ENCOUNTER — APPOINTMENT (OUTPATIENT)
Dept: CT IMAGING | Age: 83
End: 2018-11-27
Attending: EMERGENCY MEDICINE
Payer: MEDICARE

## 2018-11-27 ENCOUNTER — APPOINTMENT (OUTPATIENT)
Dept: GENERAL RADIOLOGY | Age: 83
End: 2018-11-27
Attending: EMERGENCY MEDICINE
Payer: MEDICARE

## 2018-11-27 DIAGNOSIS — R42 DIZZINESS: ICD-10-CM

## 2018-11-27 DIAGNOSIS — R91.1 LUNG NODULE: ICD-10-CM

## 2018-11-27 DIAGNOSIS — R55 NEAR SYNCOPE: Primary | ICD-10-CM

## 2018-11-27 PROBLEM — G43.109 MIGRAINE WITH VERTIGO: Status: ACTIVE | Noted: 2018-11-27

## 2018-11-27 LAB
ALBUMIN SERPL-MCNC: 2.7 G/DL (ref 3.4–5)
ALBUMIN/GLOB SERPL: 0.8 {RATIO} (ref 0.8–1.7)
ALP SERPL-CCNC: 136 U/L (ref 45–117)
ALT SERPL-CCNC: 16 U/L (ref 16–61)
ANION GAP SERPL CALC-SCNC: 13 MMOL/L (ref 3–18)
APPEARANCE UR: CLEAR
APTT PPP: 30.9 SEC (ref 23–36.4)
ARTERIAL PATENCY WRIST A: YES
AST SERPL-CCNC: 17 U/L (ref 15–37)
BASE EXCESS BLD CALC-SCNC: 8 MMOL/L
BASOPHILS # BLD: 0 K/UL (ref 0–0.1)
BASOPHILS NFR BLD: 0 % (ref 0–2)
BDY SITE: ABNORMAL
BILIRUB DIRECT SERPL-MCNC: <0.1 MG/DL (ref 0–0.2)
BILIRUB SERPL-MCNC: 0.4 MG/DL (ref 0.2–1)
BILIRUB UR QL: NEGATIVE
BUN SERPL-MCNC: 25 MG/DL (ref 7–18)
BUN/CREAT SERPL: 15
CALCIUM SERPL-MCNC: 8.2 MG/DL (ref 8.5–10.1)
CHLORIDE SERPL-SCNC: 102 MMOL/L (ref 100–108)
CK MB CFR SERPL CALC: 1.6 % (ref 0–4)
CK MB SERPL-MCNC: 1.2 NG/ML (ref 5–25)
CK SERPL-CCNC: 73 U/L (ref 39–308)
CO2 SERPL-SCNC: 27 MMOL/L (ref 21–32)
COLOR UR: YELLOW
CREAT SERPL-MCNC: 1.69 MG/DL (ref 0.6–1.3)
DIFFERENTIAL METHOD BLD: ABNORMAL
EOSINOPHIL # BLD: 0.1 K/UL (ref 0–0.4)
EOSINOPHIL NFR BLD: 2 % (ref 0–5)
ERYTHROCYTE [DISTWIDTH] IN BLOOD BY AUTOMATED COUNT: 12.9 % (ref 11.6–14.5)
GAS FLOW.O2 O2 DELIVERY SYS: ABNORMAL L/MIN
GLOBULIN SER CALC-MCNC: 3.2 G/DL (ref 2–4)
GLUCOSE BLD STRIP.AUTO-MCNC: 214 MG/DL (ref 70–110)
GLUCOSE BLD STRIP.AUTO-MCNC: 321 MG/DL (ref 70–110)
GLUCOSE SERPL-MCNC: 192 MG/DL (ref 74–99)
GLUCOSE UR STRIP.AUTO-MCNC: 100 MG/DL
HCO3 BLD-SCNC: 31.8 MMOL/L (ref 22–26)
HCT VFR BLD AUTO: 35.5 % (ref 36–48)
HGB BLD-MCNC: 11.9 G/DL (ref 13–16)
HGB UR QL STRIP: NEGATIVE
INR PPP: 1.8 (ref 0.8–1.2)
KETONES UR QL STRIP.AUTO: ABNORMAL MG/DL
LEUKOCYTE ESTERASE UR QL STRIP.AUTO: NEGATIVE
LYMPHOCYTES # BLD: 2.8 K/UL (ref 0.9–3.6)
LYMPHOCYTES NFR BLD: 41 % (ref 21–52)
MAGNESIUM SERPL-MCNC: 1.8 MG/DL (ref 1.6–2.6)
MCH RBC QN AUTO: 29.6 PG (ref 24–34)
MCHC RBC AUTO-ENTMCNC: 33.5 G/DL (ref 31–37)
MCV RBC AUTO: 88.3 FL (ref 74–97)
MONOCYTES # BLD: 0.5 K/UL (ref 0.05–1.2)
MONOCYTES NFR BLD: 7 % (ref 3–10)
NEUTS SEG # BLD: 3.4 K/UL (ref 1.8–8)
NEUTS SEG NFR BLD: 50 % (ref 40–73)
NITRITE UR QL STRIP.AUTO: NEGATIVE
O2/TOTAL GAS SETTING VFR VENT: 21 %
PCO2 BLD: 45.5 MMHG (ref 35–45)
PH BLD: 7.45 [PH] (ref 7.35–7.45)
PH UR STRIP: 8.5 [PH] (ref 5–8)
PLATELET # BLD AUTO: 226 K/UL (ref 135–420)
PMV BLD AUTO: 8.9 FL (ref 9.2–11.8)
PO2 BLD: 72 MMHG (ref 80–100)
POTASSIUM SERPL-SCNC: 3.4 MMOL/L (ref 3.5–5.5)
PROT SERPL-MCNC: 5.9 G/DL (ref 6.4–8.2)
PROT UR STRIP-MCNC: NEGATIVE MG/DL
PROTHROMBIN TIME: 21.1 SEC (ref 11.5–15.2)
RBC # BLD AUTO: 4.02 M/UL (ref 4.7–5.5)
SAO2 % BLD: 95 % (ref 92–97)
SERVICE CMNT-IMP: ABNORMAL
SODIUM SERPL-SCNC: 142 MMOL/L (ref 136–145)
SP GR UR REFRACTOMETRY: >1.03 (ref 1–1.03)
SPECIMEN TYPE: ABNORMAL
TOTAL RESP. RATE, ITRR: 8
TROPONIN I SERPL-MCNC: <0.02 NG/ML (ref 0–0.04)
UROBILINOGEN UR QL STRIP.AUTO: 1 EU/DL (ref 0.2–1)
WBC # BLD AUTO: 6.8 K/UL (ref 4.6–13.2)

## 2018-11-27 PROCEDURE — 82962 GLUCOSE BLOOD TEST: CPT

## 2018-11-27 PROCEDURE — 85730 THROMBOPLASTIN TIME PARTIAL: CPT

## 2018-11-27 PROCEDURE — 93005 ELECTROCARDIOGRAM TRACING: CPT

## 2018-11-27 PROCEDURE — 82803 BLOOD GASES ANY COMBINATION: CPT

## 2018-11-27 PROCEDURE — 80048 BASIC METABOLIC PNL TOTAL CA: CPT

## 2018-11-27 PROCEDURE — 81003 URINALYSIS AUTO W/O SCOPE: CPT

## 2018-11-27 PROCEDURE — 94762 N-INVAS EAR/PLS OXIMTRY CONT: CPT

## 2018-11-27 PROCEDURE — 96361 HYDRATE IV INFUSION ADD-ON: CPT

## 2018-11-27 PROCEDURE — 71275 CT ANGIOGRAPHY CHEST: CPT

## 2018-11-27 PROCEDURE — 74011250636 HC RX REV CODE- 250/636: Performed by: EMERGENCY MEDICINE

## 2018-11-27 PROCEDURE — 80076 HEPATIC FUNCTION PANEL: CPT

## 2018-11-27 PROCEDURE — 74011250637 HC RX REV CODE- 250/637: Performed by: INTERNAL MEDICINE

## 2018-11-27 PROCEDURE — 99218 HC RM OBSERVATION: CPT

## 2018-11-27 PROCEDURE — 74011636320 HC RX REV CODE- 636/320: Performed by: EMERGENCY MEDICINE

## 2018-11-27 PROCEDURE — 70450 CT HEAD/BRAIN W/O DYE: CPT

## 2018-11-27 PROCEDURE — 71045 X-RAY EXAM CHEST 1 VIEW: CPT

## 2018-11-27 PROCEDURE — 99285 EMERGENCY DEPT VISIT HI MDM: CPT

## 2018-11-27 PROCEDURE — 96374 THER/PROPH/DIAG INJ IV PUSH: CPT

## 2018-11-27 PROCEDURE — 74011636637 HC RX REV CODE- 636/637: Performed by: INTERNAL MEDICINE

## 2018-11-27 PROCEDURE — 36600 WITHDRAWAL OF ARTERIAL BLOOD: CPT

## 2018-11-27 PROCEDURE — 85025 COMPLETE CBC W/AUTO DIFF WBC: CPT

## 2018-11-27 PROCEDURE — 82553 CREATINE MB FRACTION: CPT

## 2018-11-27 PROCEDURE — 83735 ASSAY OF MAGNESIUM: CPT

## 2018-11-27 PROCEDURE — 74011250636 HC RX REV CODE- 250/636: Performed by: INTERNAL MEDICINE

## 2018-11-27 PROCEDURE — 85610 PROTHROMBIN TIME: CPT

## 2018-11-27 RX ORDER — DONEPEZIL HYDROCHLORIDE 5 MG/1
5 TABLET, FILM COATED ORAL
Status: DISCONTINUED | OUTPATIENT
Start: 2018-11-27 | End: 2018-11-28 | Stop reason: HOSPADM

## 2018-11-27 RX ORDER — FLUTICASONE PROPIONATE 50 MCG
2 SPRAY, SUSPENSION (ML) NASAL DAILY
Status: DISCONTINUED | OUTPATIENT
Start: 2018-11-28 | End: 2018-11-28 | Stop reason: HOSPADM

## 2018-11-27 RX ORDER — INSULIN GLARGINE 100 [IU]/ML
15 INJECTION, SOLUTION SUBCUTANEOUS
Status: DISCONTINUED | OUTPATIENT
Start: 2018-11-27 | End: 2018-11-28 | Stop reason: HOSPADM

## 2018-11-27 RX ORDER — FAMOTIDINE 20 MG/1
20 TABLET, FILM COATED ORAL DAILY
Status: DISCONTINUED | OUTPATIENT
Start: 2018-11-28 | End: 2018-11-28 | Stop reason: HOSPADM

## 2018-11-27 RX ORDER — DILTIAZEM HYDROCHLORIDE 240 MG/1
240 CAPSULE, COATED, EXTENDED RELEASE ORAL
COMMUNITY

## 2018-11-27 RX ORDER — FAMOTIDINE 20 MG/1
20 TABLET, FILM COATED ORAL 2 TIMES DAILY
Status: DISCONTINUED | OUTPATIENT
Start: 2018-11-27 | End: 2018-11-27 | Stop reason: DRUGHIGH

## 2018-11-27 RX ORDER — DEXTROSE 50 % IN WATER (D50W) INTRAVENOUS SYRINGE
25-50 AS NEEDED
Status: DISCONTINUED | OUTPATIENT
Start: 2018-11-27 | End: 2018-11-28 | Stop reason: HOSPADM

## 2018-11-27 RX ORDER — MAGNESIUM SULFATE 100 %
4 CRYSTALS MISCELLANEOUS AS NEEDED
Status: DISCONTINUED | OUTPATIENT
Start: 2018-11-27 | End: 2018-11-28 | Stop reason: HOSPADM

## 2018-11-27 RX ORDER — MECLIZINE HCL 12.5 MG 12.5 MG/1
25 TABLET ORAL
Status: COMPLETED | OUTPATIENT
Start: 2018-11-27 | End: 2018-11-27

## 2018-11-27 RX ORDER — OXYBUTYNIN CHLORIDE 5 MG/1
5 TABLET ORAL
Status: DISCONTINUED | OUTPATIENT
Start: 2018-11-27 | End: 2018-11-28 | Stop reason: HOSPADM

## 2018-11-27 RX ORDER — MECLIZINE HCL 12.5 MG 12.5 MG/1
25 TABLET ORAL EVERY 6 HOURS
Status: DISCONTINUED | OUTPATIENT
Start: 2018-11-27 | End: 2018-11-28 | Stop reason: HOSPADM

## 2018-11-27 RX ORDER — FAMOTIDINE 20 MG/1
20 TABLET, FILM COATED ORAL 2 TIMES DAILY
COMMUNITY
End: 2018-11-28

## 2018-11-27 RX ORDER — BUPROPION HYDROCHLORIDE 150 MG/1
150 TABLET, EXTENDED RELEASE ORAL DAILY
COMMUNITY

## 2018-11-27 RX ORDER — CYCLOSPORINE 0.5 MG/ML
1 EMULSION OPHTHALMIC AS NEEDED
COMMUNITY

## 2018-11-27 RX ORDER — DILTIAZEM HYDROCHLORIDE 240 MG/1
240 CAPSULE, COATED, EXTENDED RELEASE ORAL
Status: DISCONTINUED | OUTPATIENT
Start: 2018-11-28 | End: 2018-11-28 | Stop reason: HOSPADM

## 2018-11-27 RX ORDER — FLUTICASONE PROPIONATE 50 MCG
2 SPRAY, SUSPENSION (ML) NASAL
COMMUNITY

## 2018-11-27 RX ORDER — BUPROPION HYDROCHLORIDE 150 MG/1
150 TABLET, EXTENDED RELEASE ORAL DAILY
Status: DISCONTINUED | OUTPATIENT
Start: 2018-11-28 | End: 2018-11-28 | Stop reason: HOSPADM

## 2018-11-27 RX ORDER — ONDANSETRON 2 MG/ML
4 INJECTION INTRAMUSCULAR; INTRAVENOUS
Status: COMPLETED | OUTPATIENT
Start: 2018-11-27 | End: 2018-11-27

## 2018-11-27 RX ORDER — INSULIN LISPRO 100 [IU]/ML
INJECTION, SOLUTION INTRAVENOUS; SUBCUTANEOUS
Status: DISCONTINUED | OUTPATIENT
Start: 2018-11-27 | End: 2018-11-28 | Stop reason: HOSPADM

## 2018-11-27 RX ORDER — SODIUM CHLORIDE 9 MG/ML
125 INJECTION, SOLUTION INTRAVENOUS CONTINUOUS
Status: DISCONTINUED | OUTPATIENT
Start: 2018-11-27 | End: 2018-11-28 | Stop reason: HOSPADM

## 2018-11-27 RX ORDER — METFORMIN HYDROCHLORIDE 500 MG/1
1000 TABLET ORAL 2 TIMES DAILY WITH MEALS
COMMUNITY
End: 2019-07-09

## 2018-11-27 RX ADMIN — ONDANSETRON 4 MG: 2 INJECTION INTRAMUSCULAR; INTRAVENOUS at 11:57

## 2018-11-27 RX ADMIN — MECLIZINE 25 MG: 12.5 TABLET ORAL at 20:11

## 2018-11-27 RX ADMIN — INSULIN GLARGINE 15 UNITS: 100 INJECTION, SOLUTION SUBCUTANEOUS at 22:22

## 2018-11-27 RX ADMIN — SODIUM CHLORIDE 1000 ML: 900 INJECTION, SOLUTION INTRAVENOUS at 14:06

## 2018-11-27 RX ADMIN — OXYBUTYNIN CHLORIDE 5 MG: 5 TABLET ORAL at 22:21

## 2018-11-27 RX ADMIN — SODIUM CHLORIDE 125 ML/HR: 900 INJECTION, SOLUTION INTRAVENOUS at 21:00

## 2018-11-27 RX ADMIN — DONEPEZIL HYDROCHLORIDE 5 MG: 5 TABLET, FILM COATED ORAL at 22:21

## 2018-11-27 RX ADMIN — INSULIN LISPRO 8 UNITS: 100 INJECTION, SOLUTION INTRAVENOUS; SUBCUTANEOUS at 22:22

## 2018-11-27 RX ADMIN — MECLIZINE 25 MG: 12.5 TABLET ORAL at 12:03

## 2018-11-27 RX ADMIN — IOPAMIDOL 100 ML: 755 INJECTION, SOLUTION INTRAVENOUS at 13:48

## 2018-11-27 NOTE — PROGRESS NOTES
Admission Medication Reconciliation has been performed on this ED patient consisting of interview of the patient regarding their PTA Home Medication List, Allergies and PMH as well as obtaining outpatient pharmacy information. Chief complaint:  
Chief Complaint Patient presents with  Dizziness Pt has PMH of  
Past Medical History:  
Diagnosis Date  Arthritis  Atrial fibrillation ([facility] [address]  Dementia  Depression  Diabetes ([facility] [address] 1980s  GERD (gastroesophageal reflux disease)  Hypercholesterolemia  Hypertension 2000  Ill-defined condition 2015  
 has passed out-has been tested cannot determine cause  Primary osteoarthritis of right knee 6/28/2018  Ulcer   
 at the anastomotic bite of gastric bypass  Varicose veins  Wide-complex tachycardia ([facility] [address] 5/2/2015 Interviewed patient who was very interactive but a poor historian Patient did  provide a written list of home medications which was reviewed with the wife. This list was incomplete and/or contained inaccurate information. Discussed with pt the importance of and strategies they could employ to maintain a current and readily available home medication list . Medications are managed by: self and wife Patient's outpatient [facility] PAML was  marked complete and did  require modification. Admission orders have not already been written. Medication Compliance Issues and/or Medication Concerns: none Rupesh Swanson [facility] Clinical Pharmacist 
[phone]

## 2018-11-27 NOTE — ROUTINE PROCESS
TRANSFER - OUT REPORT: 
 
Verbal report given to [facility] RN on Dustin Diehl.  being transferred to telemetry unit for routine progression of care Report consisted of patients Situation, Background, Assessment and  
Recommendations(SBAR). Information from the following report(s) SBAR, ED Summary, Procedure Summary, MAR, Recent Results and Cardiac Rhythm SR was reviewed with the receiving nurse. Lines:  
Peripheral IV 11/27/18 Left;Posterior Forearm (Active) Site Assessment Clean, dry, & intact 11/27/2018 11:37 AM  
Phlebitis Assessment 0 11/27/2018 11:37 AM  
Infiltration Assessment 0 11/27/2018 11:37 AM  
Dressing Status Clean, dry, & intact 11/27/2018 11:37 AM  
Dressing Type Transparent 11/27/2018 11:37 AM  
Hub Color/Line Status Green 11/27/2018 11:37 AM  
Action Taken Blood drawn 11/27/2018 11:37 AM  
Alcohol Cap Used Yes 11/27/2018 11:37 AM  
  
 
Opportunity for questions and clarification was provided. Patient transported with: 
 Monitor Registered Nurse

## 2018-11-27 NOTE — ED PROVIDER NOTES
EMERGENCY DEPARTMENT HISTORY AND PHYSICAL EXAM 
 
Date: 11/27/2018 Patient Name: Mars Love. History of Presenting Illness Chief Complaint Patient presents with  Dizziness History Provided By: Patient and EMS Chief Complaint: dizziness Duration: this morning Timing:  Acute Location: head Quality: room-spinning Associated Symptoms: blurred vision Additional History (Context):  
11:13 AM  
Mars Damon is a 82 y.o. male with PMHX of A-fib, CHF, DM, dementia who presents to the emergency department C/O room-spinning dizziness starting this morning when he was watching TV. Associated sxs include intermittent blurred vision. Patient reports \"blacking out\"  Reports hx of similar sxs for which he was seen and admitted, stating he was diagnosed with A-fib. Reports blood thinner use of Xarelto. Pt denies chest pain, shortness of breath, and any other sxs or complaints. PCP: Berry Bae MD 
 
Current Outpatient Medications Medication Sig Dispense Refill  oxyCODONE-acetaminophen (PERCOCET) 5-325 mg per tablet 1-2 tablets by mouth every 4-6 hours as needed for pain. Indications: Pain (Patient taking differently: Take 2 Tabs by mouth every six (6) hours as needed for Pain. 1-2 tablets by mouth every 4-6 hours as needed for pain. Indications: Pain) 60 Tab 0  
 donepezil (ARICEPT) 5 mg tablet Take 5 mg by mouth nightly.  metFORMIN (GLUCOPHAGE) 500 mg tablet Take 500 mg by mouth daily (with breakfast).  OTHER Take 2 Tabs by mouth two (2) times a day. Berbinex supplement Blood sugar and weight support  FAMOTIDINE (PEPCID PO) Take 20 mg by mouth two (2) times a day.  valsartan-hydroCHLOROthiazide (DIOVAN-HCT) 320-25 mg per tablet Take 1 Tab by mouth daily.  FOLIC ACID/MULTIVIT-MIN/LUTEIN (CENTRUM SILVER PO) Take 1 Tab by mouth daily.     
 VITAMIN D2 50,000 unit capsule TAKE 1 CAPSULE TWICE WEEKLY 52 Cap 0  
  oxybutynin (DITROPAN) 5 mg tablet Take 5 mg by mouth nightly.  metoprolol tartrate 75 mg tab Take 75 mg by mouth every twelve (12) hours. 60 Tab 0  
 rivaroxaban (XARELTO) 20 mg tab tablet Take 1 Tab by mouth daily. (Patient taking differently: Take 20 mg by mouth daily (with lunch). Indications: afib) 30 Tab 2  
 dilTIAZem CD (CARDIZEM CD) 240 mg ER capsule Take 1 Cap by mouth daily. (Patient taking differently: Take 240 mg by mouth daily (with lunch). ) 30 Cap 0  
 Omeprazole delayed release (PRILOSEC D/R) 20 mg tablet Take 20 mg by mouth daily.  cholecalciferol (VITAMIN D3) 1,000 unit tablet Take 1,000 Units by mouth daily.  furosemide (LASIX) 20 mg tablet Take 20 mg by mouth daily. Indications: Edema  melatonin tab tablet Take 5 mg by mouth nightly. Indications: sleep  ACETAMINOPHEN/DIPHENHYDRAMINE (TYLENOL PM PO) Take 1 Tab by mouth nightly. Indications: as needed for sleep  insulin lispro (HUMALOG) 100 unit/mL injection by SubCUTAneous route four (4) times daily. BS-with sliding scale 80-12-=2 units 121-160=4 
161-200=6 
201-250=8 
251- and over 10 units 
with each meal  Indications: type 2 diabetes mellitus, sliding scale  insulin glargine (LANTUS) 100 unit/mL injection 12 Units by SubCUTAneous route nightly. Indications: TYPE 2 DIABETES MELLITUS    
 buPROPion XL (WELLBUTRIN XL) 150 mg tablet Take 150 mg by mouth every morning. Indications: MAJOR DEPRESSIVE DISORDER Past History Past Medical History: 
Past Medical History:  
Diagnosis Date  Arthritis  Atrial fibrillation ([address]  Dementia  Depression  Diabetes ([facility] [address] 1980s  GERD (gastroesophageal reflux disease)  Hypercholesterolemia  Hypertension 2000  Ill-defined condition 2015  
 has passed out-has been tested cannot determine cause  Primary osteoarthritis of right knee 6/28/2018  Ulcer   
 at the anastomotic bite of gastric bypass  Varicose veins  Wide-complex tachycardia ([facility] [address] 2015 Past Surgical History: 
Past Surgical History:  
Procedure Laterality Date  ABDOMEN SURGERY PROC UNLISTED    
 umb hernia Rupture  BIOPSY LIVER  10/05/04  
 EGD  09  
 with biopsy  ENDOSCOPY, COLON, DIAGNOSTIC    
 GASTROSTOMY TUBE  10/05/04  HX CATARACT REMOVAL    
 bilateral  
 HX CHOLECYSTECTOMY  10/05/04  HX GI  10/05/04  
 vertical banded gastroplasty/gastric bypass  HX MOHS PROCEDURES    
 bilateral  
 HX PACEMAKER   [facility] Cardiac Monitor Family History: 
Family History Problem Relation Age of Onset  Heart Attack Father  Diabetes Father  Hypertension Father  Cancer Mother   
     lung  Diabetes Mother  Other Brother   
     obese  Diabetes Brother  Diabetes Brother  Other Maternal Grandmother   
     obese  Diabetes Sister Social History: 
Social History Tobacco Use  Smoking status: Former Smoker Last attempt to quit: 4/3/1967 Years since quittin.6  Smokeless tobacco: Never Used Substance Use Topics  Alcohol use: Yes Alcohol/week: 2.4 oz Types: 3 Glasses of wine, 1 Shots of liquor per week Comment: monthly  Drug use: No  
 
 
Allergies: Allergies Allergen Reactions  Morphine Other (comments) Hallucinate; \"ran all night long\" Review of Systems Review of Systems Eyes: Positive for visual disturbance (intermittent, blurred). Respiratory: Negative for shortness of breath. Cardiovascular: Negative for chest pain. Neurological: Positive for dizziness and syncope. All other systems reviewed and are negative. Physical Exam  
 
Vitals:  
 18 1130 18 1135 18 1200 18 1406 BP: 141/63 141/63 117/57 129/62 Pulse: 72 63 (!) 56 (!) 56 Resp: 21 20 15 16 SpO2: 100% 100% 100% 99% Weight:  83.5 kg (184 lb) Height:  5' 8\" (1.727 m) Physical Exam  
 Nursing note and vitals reviewed. Constitutional: Elderly, in moderate distress Head: Normocephalic, Atraumatic Eyes: Clenching eyes shut. Pupils are equal, round, and reactive to light, EOMI Neck: Supple, non-tender Cardiovascular: Regular rate and rhythm, no murmurs, rubs, or gallops Chest: Normal work of breathing and chest excursion bilaterally Lungs: Clear to ausculation bilaterally Abdomen: Soft, non tender, non distended, normoactive bowel sounds Back: No evidence of trauma or deformity Extremities: No evidence of trauma or deformity, no LE edema Skin: Warm and dry, normal cap refill Neuro: Alert and appropriate, CN intact, normal speech, strength and sensation full and symmetric bilaterally, past pointing bilaterally on finger to nose Psychiatric: Anxious mood and affect Diagnostic Study Results Labs - Recent Results (from the past 12 hour(s)) GLUCOSE, POC Collection Time: 11/27/18 11:16 AM  
Result Value Ref Range Glucose (POC) 214 (H) 70 - 110 mg/dL EKG, 12 LEAD, INITIAL Collection Time: 11/27/18 11:31 AM  
Result Value Ref Range Ventricular Rate 74 BPM  
 Atrial Rate 74 BPM  
 P-R Interval 160 ms QRS Duration 82 ms Q-T Interval 440 ms QTC Calculation (Bezet) 488 ms Calculated P Axis 17 degrees Calculated R Axis -35 degrees Calculated T Axis -4 degrees Diagnosis Sinus rhythm with occasional premature ventricular complexes Left axis deviation Low voltage QRS Prolonged QT Abnormal ECG When compared with ECG of 03-APR-2018 15:10, 
premature ventricular complexes are now present QT has lengthened CBC WITH AUTOMATED DIFF Collection Time: 11/27/18 11:40 AM  
Result Value Ref Range WBC 6.8 4.6 - 13.2 K/uL  
 RBC 4.02 (L) 4.70 - 5.50 M/uL  
 HGB 11.9 (L) 13.0 - 16.0 g/dL HCT 35.5 (L) 36.0 - 48.0 % MCV 88.3 74.0 - 97.0 FL  
 MCH 29.6 24.0 - 34.0 PG  
 MCHC 33.5 31.0 - 37.0 g/dL  
 RDW 12.9 11.6 - 14.5 % PLATELET 401 235 - 721 K/uL MPV 8.9 (L) 9.2 - 11.8 FL  
 NEUTROPHILS 50 40 - 73 % LYMPHOCYTES 41 21 - 52 % MONOCYTES 7 3 - 10 % EOSINOPHILS 2 0 - 5 % BASOPHILS 0 0 - 2 %  
 ABS. NEUTROPHILS 3.4 1.8 - 8.0 K/UL  
 ABS. LYMPHOCYTES 2.8 0.9 - 3.6 K/UL  
 ABS. MONOCYTES 0.5 0.05 - 1.2 K/UL  
 ABS. EOSINOPHILS 0.1 0.0 - 0.4 K/UL  
 ABS. BASOPHILS 0.0 0.0 - 0.1 K/UL  
 DF AUTOMATED PROTHROMBIN TIME + INR Collection Time: 11/27/18 11:40 AM  
Result Value Ref Range Prothrombin time 21.1 (H) 11.5 - 15.2 sec INR 1.8 (H) 0.8 - 1.2 PTT Collection Time: 11/27/18 11:40 AM  
Result Value Ref Range aPTT 30.9 23.0 - 36.4 SEC METABOLIC PANEL, BASIC Collection Time: 11/27/18 11:40 AM  
Result Value Ref Range Sodium 142 136 - 145 mmol/L Potassium 3.4 (L) 3.5 - 5.5 mmol/L Chloride 102 100 - 108 mmol/L  
 CO2 27 21 - 32 mmol/L Anion gap 13 3.0 - 18 mmol/L Glucose 192 (H) 74 - 99 mg/dL BUN 25 (H) 7.0 - 18 MG/DL Creatinine 1.69 (H) 0.6 - 1.3 MG/DL  
 BUN/Creatinine ratio 15 GFR est AA 47 (L) >60 ml/min/1.73m2 GFR est non-AA 39 (L) >60 ml/min/1.73m2 Calcium 8.2 (L) 8.5 - 10.1 MG/DL  
HEPATIC FUNCTION PANEL Collection Time: 11/27/18 11:40 AM  
Result Value Ref Range Protein, total 5.9 (L) 6.4 - 8.2 g/dL Albumin 2.7 (L) 3.4 - 5.0 g/dL Globulin 3.2 2.0 - 4.0 g/dL A-G Ratio 0.8 0.8 - 1.7 Bilirubin, total 0.4 0.2 - 1.0 MG/DL Bilirubin, direct <0.1 0.0 - 0.2 MG/DL Alk. phosphatase 136 (H) 45 - 117 U/L  
 AST (SGOT) 17 15 - 37 U/L  
 ALT (SGPT) 16 16 - 61 U/L  
CARDIAC PANEL,(CK, CKMB & TROPONIN) Collection Time: 11/27/18 11:40 AM  
Result Value Ref Range CK 73 39 - 308 U/L  
 CK - MB 1.2 <3.6 ng/ml CK-MB Index 1.6 0.0 - 4.0 % Troponin-I, Qt. <0.02 0.0 - 0.045 NG/ML  
MAGNESIUM Collection Time: 11/27/18 11:40 AM  
Result Value Ref Range Magnesium 1.8 1.6 - 2.6 mg/dL POC G3  Collection Time: 11/27/18  1:06 PM  
 Result Value Ref Range Device: ROOM AIR    
 FIO2 (POC) 21 % pH (POC) 7.453 (H) 7.35 - 7.45    
 pCO2 (POC) 45.5 (H) 35.0 - 45.0 MMHG  
 pO2 (POC) 72 (L) 80 - 100 MMHG  
 HCO3 (POC) 31.8 (H) 22 - 26 MMOL/L  
 sO2 (POC) 95 92 - 97 % Base excess (POC) 8 mmol/L Allens test (POC) YES Total resp. rate 8 Site RIGHT RADIAL Specimen type (POC) ARTERIAL Performed by Dereje Cabral Radiologic Studies -  
CT Results  (Last 48 hours)  
          
 11/27/18 1359  CTA CHEST W OR W WO CONT Final result Impression:  IMPRESSION:  
   
1. No evidence of pulmonary embolism. 2.  No acute cardiopulmonary process. Cardiomegaly. 3. Nonspecific bilateral pulmonary nodules, the largest measuring 6 mm. Please  
see recommended follow-up as directed below.  
   
======== Fleischner Society pulmonary nodule guidelines (revised 2017): Multiple solid nodules <6 mm:  
-Low risk for lung cancer: No follow-up. -High risk for lung cancer: Optional chest CT in 12 months. Narrative:  EXAM: CTA Chest  
   
INDICATION: Chest pain and shortness of breath. COMPARISON: 12/27/2016. TECHNIQUE: Axial CT imaging from the thoracic inlet through the diaphragm with  
intravenous contrast. Coronal and sagittal MIP reformats were generated. One or more dose reduction techniques were used on this CT: automated exposure  
control, adjustment of the mAs and/or kVp according to patient's size, and  
iterative reconstruction techniques. The specific techniques utilized on this CT  
exam have been documented in the patient's electronic medical record. One or more dose reduction techniques were used on this CT: automated exposure  
control, adjustment of the mAs and/or kVp according to patient's size, and  
iterative reconstruction techniques. The specific techniques utilized on this CT  
exam have been documented in the patient's electronic medical record.  
_______________ FINDINGS:  
   
EXAM QUALITY: Adequate. PULMONARY ARTERIES: No evidence of pulmonary embolism. MEDIASTINUM: Cardiomegaly with coronary artery atherosclerotic calcifications. No findings of thoracic aortic aneurysm or dissection. LUNGS: No focal consolidation. Bilateral pulmonary nodules:   
  > Lateral left lower lobe noncalcified 6 cm pulmonary nodule (image 80). > Subpleural peripheral right middle lobe 3 mm pulmonary nodule (image 77). > Lateral right lower lobe subpleural 6 mm pulmonary nodule (image 90). Ramez Stutsman PLEURA: No pneumothorax or effusion. Ramez Stutsman AIRWAY: Patent. LYMPH NODES: No pathologically enlarged lymph nodes. Left hilar calcified lymph  
nodes. Ramez Stutsman UPPER ABDOMEN: No acute process. Previous gastric surgery with small air-fluid  
level in the excluded stomach. Prior cholecystectomy. Multiple splenic granuloma  
are present. OTHER: No acute or aggressive osseous abnormalities identified. _______________  
   
  
 11/27/18 1128  CT HEAD WO CONT Final result Impression:  IMPRESSION:  
   
1. No acute intracranial abnormality. Stable examination. 2. Mild, unchanged periventricular white matter hypoattenuation; unchanged and  
nonspecific, likely reflecting chronic ischemic microvascular change. CRITICAL RESULT: CODE S findings called to  in the ER prior to dictation  
at 1133 am on 11/27/2018. Narrative:  EXAM: CT head INDICATION: Ataxia, stroke. COMPARISON: March 20, 2017 TECHNIQUE: Axial CT imaging of the head was performed without intravenous  
contrast.  
   
One or more dose reduction techniques were used on this CT: automated exposure  
control, adjustment of the mAs and/or kVp according to patient's size, and  
iterative reconstruction techniques. The specific techniques utilized on this CT  
exam have been documented in the patient's electronic medical record.   
   
_______________ FINDINGS:  
   
BRAIN AND POSTERIOR FOSSA: Mild cortical and cerebellar volume loss  
redemonstrated. Ventricular size and configuration stable. Minor periventricular  
white matter hypoattenuation is present. Tiny physiologic bilateral basal  
ganglia calcifications redemonstrated. There is no intracranial hemorrhage, mass  
effect, or midline shift. The gray-white matter differentiation is within normal  
limits. EXTRA-AXIAL SPACES AND MENINGES: There are no abnormal extra-axial fluid  
collections. CALVARIUM: No acute osseous abnormality. SINUSES: The imaged paranasal sinuses and mastoid air cells are clear. OTHER: Faint atherosclerotic calcifications of the carotid siphons noted. Trace  
amount of gas within the cavernous sinus and posterior confluence of sinuses  
most in keeping with sequela of intravenous access. _______________ CXR Results  (Last 48 hours)  
          
 11/27/18 1158  XR CHEST PORT Final result Impression:  Impression: Moderately underexpanded lungs without superimposed acute radiographic  
cardiopulmonary abnormality. Narrative:  Chest, single view Indication: Ataxia, suspected stroke Comparison: 4/3/2018 Findings:  Portable upright AP view of the chest was obtained. Progressive  
pulmonary hypoinflation noted without focal pneumonic consolidation,  
pneumothorax, or pleural effusion. The cardiac size and mediastinal contours are  
stable. No acute osseous abnormality demonstrated. Medications given in the ED- Medications  
meclizine (ANTIVERT) tablet 25 mg (25 mg Oral Given 11/27/18 1203) ondansetron [facility]) injection 4 mg (4 mg IntraVENous Given 11/27/18 1157)  
sodium chloride 0.9 % bolus infusion 1,000 mL (1,000 mL IntraVENous New Bag 11/27/18 1406) iopamidol (ISOVUE-370) 76 % injection 100 mL (100 mL IntraVENous Given 11/27/18 1348) Medical Decision Making I am the first provider for this patient. I reviewed the vital signs, available nursing notes, past medical history, past surgical history, family history and social history. Vital Signs-Reviewed the patient's vital signs. Pulse Oximetry Analysis - 100% on room air Cardiac Monitor: 
Rate: 64 bpm 
Rhythm: NSR 
 
EKG interpretation: (Preliminary) 11:31 AM  
Sinus rhythm. Rate 74 bpm. Occasional PVCs. QRS duration 82 ms. EKG read by Nuzhat Longo MD at 11:32 AM  
 
Records Reviewed: Nursing Notes and Old Medical Records Provider Notes (Medical Decision Making): 82year old male presenting after near syncopal episode with dizziness. Activated Code S. The patient was evaluated by the teleneurologist. No stroke. However, he had repeated episodes of hypoxia. CTA showed no PE. Will require further inpatient evaluation here for further near syncopal evaluation and hypoxia. Procedures: 
Procedures ED Course:  
11:13 AM Initial assessment performed. Code S protocol called and initiated. 11:18 AM Discussed patient's history, exam, and available diagnostics results with Levi Jack MD, teleneurology, who will evaluate the patient after he returns from CT. 
 
11:33 AM Received a call from Shreyas Mcdonough MD, radiology, who reports the patient's CT head shows no acute process. 11:45 AM Levi Jack MD has evaluate the patient. He states there is no evidence of stroke and recommends a near-syncope work up. 2:35 PM The patient feels better, but is still dizzy. 3:00 PM Discussed patient's history, exam, and available diagnostics results with Laura Garcia MD, internal medicine, who would like to evaluate the patient to decide on placement for admission. Diagnosis and Disposition Core Measures: 
For Hospitalized Patients: 
 
1. Hospitalization Decision Time: The decision to hospitalize the patient was made by Nuzhat Longo MD at 2:30 PM on 11/27/2018 2. Aspirin: Aspirin was not given because the patient did not present with a stroke at the time of their Emergency Department evaluation 3:45 PM Patient is being admitted to the hospital by Ander Ahn MD to Telemetry. The results of their tests and reasons for their admission have been discussed with them and/or available family. They convey agreement and understanding for the need to be admitted and for their admission diagnosis. CONDITIONS ON ADMISSION: 
Sepsis is not present at the time of admission. Deep Vein Thrombosis is not present at the time of admission. Thrombosis is not present at the time of admission. Urinary Tract Infection is not present at the time of admission. Pneumonia is not present at the time of admission. MRSA is not present at the time of admission. Wound infection is not present at the time of admission. Pressure Ulcer is not present at the time of admission. CLINICAL IMPRESSION: 
 
1. Near syncope 2. Dizziness 3. Lung nodule   
  
_______________________________ Attestations: This note is prepared by Logan Chapa, acting as Scribe for Beverly Enriquez MD. Beverly Enriquez MD:  The scribe's documentation has been prepared under my direction and personally reviewed by me in its entirety. I confirm that the note above accurately reflects all work, treatment, procedures, and medical decision making performed by me. 
_______________________________

## 2018-11-27 NOTE — ED TRIAGE NOTES
Patient arrived via EMS for dizziness , nausea and vomiting since 0730. Hx CHF, DM, and gastric bypass, patient actively vomiting, blood glucose 238 via EMS patient has generalized weakness

## 2018-11-27 NOTE — H&P
History & Physical 
Patient: Yaya Geronimo MRN: [id number]  CSN: [id number] YOB: 1935  Age: 82 y.o. Sex: male DOA: 11/27/2018 Primary Care Provider:  Lobito Dominguez MD 
 
 
Assessment/Plan Patient Active Problem List  
Diagnosis Code  Hypertension I10  
 Diabetes mellitus ([facility] [address] E11.9  Hyperlipemia E78.5  Depression F32.9  Varicose veins I83.90  Arthritis M19.90  
 Encephalopathy G93.40  Encephalopathy acute G93.40  Syncope R55  Dizziness R42  Nausea R11.0  Tinnitus H93.19  
 Atrial fibrillation (HCC) I48.91  
 Wide-complex tachycardia (HCC) I47.2  Syncope and collapse R55  Cardiac device in situ Z95.9  Atrial flutter (HCC) I48.92  
 Acute on chronic diastolic congestive heart failure (HCC) I50.33  
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  
 Migraine with vertigo G43.109  
 
83 y/o gentleman here with sensation of  rotary movement incited by moving / turing the head. Symptoms and exam suggest vertigo. Hx + for BPPV in the past.   
 
Vertigo + hx of BPPV. Afib/flutter. HTN. DM2. Dyslipidemia. Dementia. GERD. Osteoarthritis. CHF diastolic chronic. 
 
-Admit obs. 
-Hydrate. 
-Rx Antivert q 6hrs. 
-Neuro checks as per Tele Neuro recs. -Fall precautions. -PT/OT. -DVT px via home med Xarelto. 
-Dispo TBD. -Full code. CC: room spinning. HPI:  
 
Yaya Geronimo is a 82 y.o. male who has multiple chronic medical issues and a hx of syncope and vertigo. Presented to the ER today with rotary sensation of room spinning.   Symptom calms at rest.  Provoked with position changes or turning the head rapidly. Seen by Tele-Neurology in the ER. Not thought to be a CVA or other neurological malady.,  Asked to  admit for obs and treatment of vertigo. Past Medical History:  
Diagnosis Date  Arthritis  Atrial fibrillation ([address]  Dementia  Depression  Diabetes ([address] 1980s  GERD (gastroesophageal reflux disease)  Hypercholesterolemia  Hypertension   Ill-defined condition   
 has passed out-has been tested cannot determine cause  Primary osteoarthritis of right knee 2018  Ulcer   
 at the anastomotic bite of gastric bypass  Varicose veins  Wide-complex tachycardia ([address] 2015 Past Surgical History:  
Procedure Laterality Date  ABDOMEN SURGERY PROC UNLISTED    
 umb hernia Rupture  BIOPSY LIVER  10/05/04  
 EGD  09  
 with biopsy  ENDOSCOPY, COLON, DIAGNOSTIC    
 GASTROSTOMY TUBE  10/05/04  HX CATARACT REMOVAL    
 bilateral  
 HX CHOLECYSTECTOMY  10/05/04  HX GI  10/05/04  
 vertical banded gastroplasty/gastric bypass  HX MOHS PROCEDURES    
 bilateral  
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   
     lung  Diabetes Mother  Other Brother   
     obese  Diabetes Brother  Diabetes Brother  Other Maternal Grandmother   
     obese  Diabetes Sister Social History Socioeconomic History  Marital status:  Spouse name: Not on file  Number of children: Not on file  Years of education: Not on file  Highest education level: Not on file Tobacco Use  Smoking status: Former Smoker Last attempt to quit: 4/3/1967 Years since quittin.6  Smokeless tobacco: Never Used Substance and Sexual Activity  Alcohol use: Yes   Alcohol/week: 2.4 oz  
 Types: 3 Glasses of wine, 1 Shots of liquor per week Comment: monthly  Drug use: No  
 Sexual activity: No  
 
 
Prior to Admission medications Medication Sig Start Date End Date Taking? Authorizing Provider buPROPion SR (WELLBUTRIN SR) 150 mg SR tablet Take 150 mg by mouth daily. Yes Provider, Historical  
dilTIAZem CD (CARDIZEM CD) 240 mg ER capsule Take 240 mg by mouth daily (after lunch). Yes Provider, Historical  
famotidine (PEPCID) 20 mg tablet Take 20 mg by mouth two (2) times a day. Yes Provider, Historical  
multivitamin, tx-iron-ca-min (THERA-M W/ IRON) 9 mg iron-400 mcg tab tablet Take 1 Tab by mouth daily. Formulary substitution for DAILY MULTIVITAMIN centrum silver   Yes Provider, Historical  
insulin detemir U-100 (LEVEMIR U-100 INSULIN) 100 unit/mL injection 15 Units by SubCUTAneous route nightly. Yes Provider, Historical  
metFORMIN (GLUCOPHAGE) 500 mg tablet Take 1,000 mg by mouth two (2) times daily (with meals). Yes Provider, Historical  
rivaroxaban (XARELTO) 20 mg tab tablet Take 20 mg by mouth daily (with lunch). Yes Provider, Historical  
fluticasone (FLONASE) 50 mcg/actuation nasal spray 2 Sprays by Both Nostrils route daily as needed for Rhinitis. Yes Provider, Historical  
cycloSPORINE (RESTASIS) 0.05 % dpet Administer 1 Drop to left eye as needed. Yes Provider, Historical  
donepezil (ARICEPT) 5 mg tablet Take 5 mg by mouth nightly. Yes Provider, Historical  
oxybutynin (DITROPAN) 5 mg tablet Take 5 mg by mouth nightly. Yes Other, MD Lobo  
cholecalciferol (VITAMIN D3) 1,000 unit tablet Take 1,000 Units by mouth daily. Yes Provider, Historical  
ACETAMINOPHEN/DIPHENHYDRAMINE (TYLENOL PM PO) Take 1 Tab by mouth nightly. Indications: as needed for sleep   Yes Provider, Historical  
valsartan-hydroCHLOROthiazide (DIOVAN-HCT) 320-25 mg per tablet Take 1 Tab by mouth daily.     Other, MD Lobo  
 VITAMIN D2 50,000 unit capsule TAKE 1 CAPSULE TWICE WEEKLY 11/20/17   Te Babin MD  
metoprolol tartrate 75 mg tab Take 75 mg by mouth every twelve (12) hours. 12/29/16   Lyla Miranda MD  
Omeprazole delayed release (PRILOSEC D/R) 20 mg tablet Take 20 mg by mouth daily. Provider, Historical  
furosemide (LASIX) 20 mg tablet Take 20 mg by mouth daily. Indications: Edema    Provider, Historical  
melatonin tab tablet Take 5 mg by mouth nightly. Indications: sleep    Provider, Historical  
insulin lispro (HUMALOG) 100 unit/mL injection by SubCUTAneous route four (4) times daily. BS-with sliding scale 80-12-=2 units 121-160=4 
161-200=6 
201-250=8 
251- and over 10 units 
with each meal  Indications: type 2 diabetes mellitus, sliding scale    Provider, Historical  
 
 
Allergies Allergen Reactions  Morphine Other (comments) Hallucinate; \"ran all night long\" Review of Systems Gen: No fever, chills, malaise, weight loss/gain. Heent: + horizontal nystagmus right side. Heart: No chest pain, palpitations, WHITFIELD, pnd, or orthopnea. Resp: No cough, hemoptysis, wheezing and shortness of breath. GI: No nausea, vomiting, diarrhea, constipation, melena or hematochezia. : No urinary obstruction, dysuria or hematuria. Derm: No rash, new skin lesion or pruritis. Musc/skeletal: no bone or joint complains. Vasc: No edema, cyanosis or claudication. Endo: No heat/cold intolerance, no polyuria,polydipsia or polyphagia. Neuro: No unilateral weakness, numbness, tingling. No seizures. Mere Dasen not  Attempted. Heme: No easy bruising or bleeding. Physical Exam:  
 
Physical Exam: 
Visit Vitals /72 (BP 1 Location: Right arm, BP Patient Position: At rest) Pulse 65 Temp 98.8 °F (37.1 °C) Resp 16 Ht 5' 8\" (1.727 m) Wt 87.5 kg (193 lb) SpO2 100% BMI 29.35 kg/m² O2 Device: Room air Temp (24hrs), Av.9 °F (36.6 °C), Min:97.4 °F (36.3 °C), Max:98.8 °F (37.1 °C) 
  701 -  1900 In: 1000 [I.V.:1000] Out: -    No intake/output data recorded. General:  Awake, cooperative, no distress. Head:  Normocephalic, without obvious abnormality, atraumatic. Eyes:  Conjunctivae/corneas clear, sclera anicteric, PERRL, EOMs intact. Nose: Nares normal. No drainage or sinus tenderness. Throat: Lips, mucosa, and tongue normal.   
Neck: Supple, symmetrical, trachea midline, no adenopathy. Lungs:   Clear to auscultation bilaterally. Heart:  Regular rate and rhythm, S1, S2 normal, no murmur, click, rub or gallop. Abdomen: Soft, non-tender. Bowel sounds normal. No masses,  No organomegaly. Extremities: Extremities normal, atraumatic, no cyanosis or edema. Capillary refill normal.  
Pulses: 2+ and symmetric all extremities. Skin: Skin color pink/pale/mottled/flushed, turgor normal. No rashes or lesions Neurologic: CNII-XII intact. No focal motor or sensory deficit. Labs Reviewed: All lab results for the last 24 hours reviewed. Recent Results (from the past 24 hour(s)) GLUCOSE, POC Collection Time: 18 11:16 AM  
Result Value Ref Range Glucose (POC) 214 (H) 70 - 110 mg/dL EKG, 12 LEAD, INITIAL Collection Time: 18 11:31 AM  
Result Value Ref Range Ventricular Rate 74 BPM  
 Atrial Rate 74 BPM  
 P-R Interval 160 ms QRS Duration 82 ms Q-T Interval 440 ms QTC Calculation (Bezet) 488 ms Calculated P Axis 17 degrees Calculated R Axis -35 degrees Calculated T Axis -4 degrees Diagnosis Sinus rhythm with occasional premature ventricular complexes Left axis deviation Low voltage QRS Prolonged QT Abnormal ECG When compared with ECG of 2018 15:10, 
premature ventricular complexes are now present QT has lengthened CBC WITH AUTOMATED DIFF Collection Time: 18 11:40 AM  
Result Value Ref Range WBC 6.8 4.6 - 13.2 K/uL  
 RBC 4.02 (L) 4.70 - 5.50 M/uL  
 HGB 11.9 (L) 13.0 - 16.0 g/dL HCT 35.5 (L) 36.0 - 48.0 % MCV 88.3 74.0 - 97.0 FL  
 MCH 29.6 24.0 - 34.0 PG  
 MCHC 33.5 31.0 - 37.0 g/dL  
 RDW 12.9 11.6 - 14.5 % PLATELET 625 174 - 625 K/uL MPV 8.9 (L) 9.2 - 11.8 FL  
 NEUTROPHILS 50 40 - 73 % LYMPHOCYTES 41 21 - 52 % MONOCYTES 7 3 - 10 % EOSINOPHILS 2 0 - 5 % BASOPHILS 0 0 - 2 %  
 ABS. NEUTROPHILS 3.4 1.8 - 8.0 K/UL  
 ABS. LYMPHOCYTES 2.8 0.9 - 3.6 K/UL  
 ABS. MONOCYTES 0.5 0.05 - 1.2 K/UL  
 ABS. EOSINOPHILS 0.1 0.0 - 0.4 K/UL  
 ABS. BASOPHILS 0.0 0.0 - 0.1 K/UL  
 DF AUTOMATED PROTHROMBIN TIME + INR Collection Time: 11/27/18 11:40 AM  
Result Value Ref Range Prothrombin time 21.1 (H) 11.5 - 15.2 sec INR 1.8 (H) 0.8 - 1.2 PTT Collection Time: 11/27/18 11:40 AM  
Result Value Ref Range aPTT 30.9 23.0 - 36.4 SEC METABOLIC PANEL, BASIC Collection Time: 11/27/18 11:40 AM  
Result Value Ref Range Sodium 142 136 - 145 mmol/L Potassium 3.4 (L) 3.5 - 5.5 mmol/L Chloride 102 100 - 108 mmol/L  
 CO2 27 21 - 32 mmol/L Anion gap 13 3.0 - 18 mmol/L Glucose 192 (H) 74 - 99 mg/dL BUN 25 (H) 7.0 - 18 MG/DL Creatinine 1.69 (H) 0.6 - 1.3 MG/DL  
 BUN/Creatinine ratio 15 GFR est AA 47 (L) >60 ml/min/1.73m2 GFR est non-AA 39 (L) >60 ml/min/1.73m2 Calcium 8.2 (L) 8.5 - 10.1 MG/DL  
HEPATIC FUNCTION PANEL Collection Time: 11/27/18 11:40 AM  
Result Value Ref Range Protein, total 5.9 (L) 6.4 - 8.2 g/dL Albumin 2.7 (L) 3.4 - 5.0 g/dL Globulin 3.2 2.0 - 4.0 g/dL A-G Ratio 0.8 0.8 - 1.7 Bilirubin, total 0.4 0.2 - 1.0 MG/DL Bilirubin, direct <0.1 0.0 - 0.2 MG/DL Alk. phosphatase 136 (H) 45 - 117 U/L  
 AST (SGOT) 17 15 - 37 U/L  
 ALT (SGPT) 16 16 - 61 U/L  
CARDIAC PANEL,(CK, CKMB & TROPONIN) Collection Time: 11/27/18 11:40 AM  
Result Value Ref Range CK 73 39 - 308 U/L  
 CK - MB 1.2 <3.6 ng/ml CK-MB Index 1.6 0.0 - 4.0 % Troponin-I, Qt. <0.02 0.0 - 0.045 NG/ML  
MAGNESIUM Collection Time: 11/27/18 11:40 AM  
Result Value Ref Range Magnesium 1.8 1.6 - 2.6 mg/dL POC G3 Collection Time: 11/27/18  1:06 PM  
Result Value Ref Range Device: ROOM AIR    
 FIO2 (POC) 21 % pH (POC) 7.453 (H) 7.35 - 7.45    
 pCO2 (POC) 45.5 (H) 35.0 - 45.0 MMHG  
 pO2 (POC) 72 (L) 80 - 100 MMHG  
 HCO3 (POC) 31.8 (H) 22 - 26 MMOL/L  
 sO2 (POC) 95 92 - 97 % Base excess (POC) 8 mmol/L Allens test (POC) YES Total resp. rate 8 Site RIGHT RADIAL Specimen type (POC) ARTERIAL Performed by Madison Howard URINALYSIS W/ RFLX MICROSCOPIC Collection Time: 11/27/18  3:32 PM  
Result Value Ref Range Color YELLOW Appearance CLEAR Specific gravity >1.030 (H) 1.005 - 1.030  
 pH (UA) 8.5 (H) 5.0 - 8.0 Protein NEGATIVE  NEG mg/dL Glucose 100 (A) NEG mg/dL Ketone TRACE (A) NEG mg/dL Bilirubin NEGATIVE  NEG Blood NEGATIVE  NEG Urobilinogen 1.0 0.2 - 1.0 EU/dL Nitrites NEGATIVE  NEG  Leukocyte Esterase NEGATIVE  NEG    
 
 
 
 
 
CC: Colonel Vern MD

## 2018-11-28 VITALS
HEIGHT: 68 IN | RESPIRATION RATE: 17 BRPM | DIASTOLIC BLOOD PRESSURE: 69 MMHG | WEIGHT: 192.9 LBS | OXYGEN SATURATION: 98 % | TEMPERATURE: 97.3 F | SYSTOLIC BLOOD PRESSURE: 121 MMHG | HEART RATE: 66 BPM | BODY MASS INDEX: 29.24 KG/M2

## 2018-11-28 LAB
ANION GAP SERPL CALC-SCNC: 2 MMOL/L (ref 3–18)
BUN SERPL-MCNC: 23 MG/DL (ref 7–18)
BUN/CREAT SERPL: 16
CALCIUM SERPL-MCNC: 7.8 MG/DL (ref 8.5–10.1)
CHLORIDE SERPL-SCNC: 107 MMOL/L (ref 100–108)
CO2 SERPL-SCNC: 34 MMOL/L (ref 21–32)
CREAT SERPL-MCNC: 1.47 MG/DL (ref 0.6–1.3)
ERYTHROCYTE [DISTWIDTH] IN BLOOD BY AUTOMATED COUNT: 13.5 % (ref 11.6–14.5)
EST. AVERAGE GLUCOSE BLD GHB EST-MCNC: 223 MG/DL
GLUCOSE BLD STRIP.AUTO-MCNC: 128 MG/DL (ref 70–110)
GLUCOSE BLD STRIP.AUTO-MCNC: 179 MG/DL (ref 70–110)
GLUCOSE SERPL-MCNC: 107 MG/DL (ref 74–99)
HBA1C MFR BLD: 9.4 % (ref 4.2–5.6)
HCT VFR BLD AUTO: 33.2 % (ref 36–48)
HGB BLD-MCNC: 10.6 G/DL (ref 13–16)
MCH RBC QN AUTO: 29.3 PG (ref 24–34)
MCHC RBC AUTO-ENTMCNC: 31.9 G/DL (ref 31–37)
MCV RBC AUTO: 91.7 FL (ref 74–97)
PLATELET # BLD AUTO: 229 K/UL (ref 135–420)
PMV BLD AUTO: 9.6 FL (ref 9.2–11.8)
POTASSIUM SERPL-SCNC: 3.9 MMOL/L (ref 3.5–5.5)
RBC # BLD AUTO: 3.62 M/UL (ref 4.7–5.5)
SODIUM SERPL-SCNC: 143 MMOL/L (ref 136–145)
WBC # BLD AUTO: 5.1 K/UL (ref 4.6–13.2)

## 2018-11-28 PROCEDURE — G8980 MOBILITY D/C STATUS: HCPCS

## 2018-11-28 PROCEDURE — 80048 BASIC METABOLIC PNL TOTAL CA: CPT

## 2018-11-28 PROCEDURE — 82962 GLUCOSE BLOOD TEST: CPT

## 2018-11-28 PROCEDURE — 96361 HYDRATE IV INFUSION ADD-ON: CPT

## 2018-11-28 PROCEDURE — 85027 COMPLETE CBC AUTOMATED: CPT

## 2018-11-28 PROCEDURE — 74011250636 HC RX REV CODE- 250/636: Performed by: INTERNAL MEDICINE

## 2018-11-28 PROCEDURE — 99218 HC RM OBSERVATION: CPT

## 2018-11-28 PROCEDURE — 97161 PT EVAL LOW COMPLEX 20 MIN: CPT

## 2018-11-28 PROCEDURE — 74011636637 HC RX REV CODE- 636/637: Performed by: INTERNAL MEDICINE

## 2018-11-28 PROCEDURE — 36415 COLL VENOUS BLD VENIPUNCTURE: CPT

## 2018-11-28 PROCEDURE — G8978 MOBILITY CURRENT STATUS: HCPCS

## 2018-11-28 PROCEDURE — 97116 GAIT TRAINING THERAPY: CPT

## 2018-11-28 PROCEDURE — 83036 HEMOGLOBIN GLYCOSYLATED A1C: CPT

## 2018-11-28 PROCEDURE — 74011250637 HC RX REV CODE- 250/637: Performed by: INTERNAL MEDICINE

## 2018-11-28 PROCEDURE — G8979 MOBILITY GOAL STATUS: HCPCS

## 2018-11-28 RX ORDER — MECLIZINE HYDROCHLORIDE 25 MG/1
25 TABLET ORAL EVERY 6 HOURS
Qty: 40 TAB | Refills: 0 | Status: SHIPPED | OUTPATIENT
Start: 2018-11-28 | End: 2018-12-08

## 2018-11-28 RX ADMIN — BUPROPION HYDROCHLORIDE 150 MG: 150 TABLET, EXTENDED RELEASE ORAL at 09:47

## 2018-11-28 RX ADMIN — HYDROCHLOROTHIAZIDE: 25 TABLET ORAL at 10:00

## 2018-11-28 RX ADMIN — MECLIZINE 25 MG: 12.5 TABLET ORAL at 02:14

## 2018-11-28 RX ADMIN — MECLIZINE 25 MG: 12.5 TABLET ORAL at 07:46

## 2018-11-28 RX ADMIN — DILTIAZEM HYDROCHLORIDE 240 MG: 240 CAPSULE, COATED, EXTENDED RELEASE ORAL at 12:26

## 2018-11-28 RX ADMIN — INSULIN LISPRO 2 UNITS: 100 INJECTION, SOLUTION INTRAVENOUS; SUBCUTANEOUS at 12:25

## 2018-11-28 RX ADMIN — FAMOTIDINE 20 MG: 20 TABLET ORAL at 09:47

## 2018-11-28 RX ADMIN — SODIUM CHLORIDE 125 ML/HR: 900 INJECTION, SOLUTION INTRAVENOUS at 08:01

## 2018-11-28 RX ADMIN — MECLIZINE 25 MG: 12.5 TABLET ORAL at 12:26

## 2018-11-28 RX ADMIN — RIVAROXABAN 20 MG: 10 TABLET, FILM COATED ORAL at 12:26

## 2018-11-28 NOTE — PROGRESS NOTES
Problem: Falls - Risk of 
Goal: *Absence of Falls Document Edward Shadow Fall Risk and appropriate interventions in the flowsheet. Outcome: Progressing Towards Goal 
Fall Risk Interventions: 
  
 
  
 
Medication Interventions: Teach patient to arise slowly, Patient to call before getting OOB History of Falls Interventions: Evaluate medications/consider consulting pharmacy

## 2018-11-28 NOTE — DIABETES MGMT
NUTRITION / GLYCEMIC CONTROL PLAN OF CARE Diabetes Management:  
-known h/o T2DM, HbA1c not within recommended range for age + comorbids r/t non -adherence to basal/SSI + oral home regimen 
-s/p right knee replacement June 2018, pt reports previous A1c in 7% range, higher BG r/t non-adherence to SSI (pt states he often forgets his lunch time dose (h/o dementia) 
- recommend: monitor BG trends and adjust as needed, pt may benefit from mealtime insulin 
- education: (see GC RN note) 
- goals: *BG will be in target range of  mg/dl (non-ICU) by 12/3 *PO intake will be 75% of meals offered by 12/3 *Pt will follow up with OP PCP for Diabetes Management by 12/30 
- TDD: 23 (15 Lantus + 8 units - Humalog Normal Insulin Sensitivity Corrective Coverage) - BG range: 107-321 mg/dl - Hypo: no 
- BG in target range (non-ICU: <140; ICU<180): [] Yes  [x] No 
- Steroids: no 
Current Insulin regimen:  
Lantus 15 units daily - Humalog Normal Insulin Sensitivity Corrective Coverage Home medication/insulin regimen: 
Levemir 24 units daily (PTA says 14 units) Humalog SSI Metformin 1000 mg BID Recent Glucose Results:  
Lab Results Component Value Date/Time  (H) 11/28/2018 04:44 AM  
 GLUCPOC 179 (H) 11/28/2018 11:16 AM  
 GLUCPOC 128 (H) 11/28/2018 06:23 AM  
 GLUCPOC 321 (H) 11/27/2018 09:15 PM  
 
 
Adequate glycemic control PTA:  [] Yes  [x] No 
 
HbA1c: equivalent  to ave BGlucose of 223 mg/dl for 2-3 months prior to admission Lab Results Component Value Date/Time Hemoglobin A1c 9.4 (H) 11/28/2018 04:44 AM  
 
Diet:  
Active Orders Diet DIET DIABETIC WITH OPTIONS Consistent Carb 2000kcal; Regular Gloria Gagnon MS, RN, CDE Glycemic Control Team 
[phone] Pager [phone] (M-TH 8:00-4:30P) *After Hours pager [phone]

## 2018-11-28 NOTE — PROGRESS NOTES
Discharge instructions given to pt., verbalized understanding. No questions at this time. Pt to set up his own appt. With pcp later today.

## 2018-11-28 NOTE — PROGRESS NOTES
Reason for Admission:   Baron Barroso is a 82 y.o. male with PMHX of A-fib, CHF, DM, dementia who presents to the emergency department C/O room-spinning dizziness starting this morning when he was watching TV. Associated sxs include intermittent blurred vision. Patient reports \"blacking out\"  Reports hx of similar sxs for which he was seen and admitted, stating he was diagnosed with A-fib. Reports blood thinner use of Xarelto. Pt denies chest pain, shortness of breath, and any other sxs or complaints. Patient admitted for observation for medical management of Near Syncope RRAT Score:   28 Resources/supports as identified by patient/family:   Supportive family has medicare for insurance and has Dr. Sulma Moore Top Challenges facing patient (as identified by patient/family and CM):  tbd Finances/Medication cost?   Has medicare Transportation? Patient drives 
          t Support system or lack thereof? Has supportive wife Living arrangements? Lives with wife Self-care/ADLs/Cognition?  independent Current Advanced Directive/Advance Care Plan:  please consult pastoral care or palliative care with need to address advance care plan 
                       
Plan for utilizing home health:    declines Likelihood of readmission: low Transition of Care Plan:      Met with pateint  At bedside he lives with her  does not use any DME. Has pcp Dr. Sulma Moore. Has medicare was admitted under observation. patient is to follow up with Dr. Sulma Moore and have him refer him to ENT MD. He does not want cm to make appointment for him Care Management Interventions PCP Verified by CM: Yes Transition of Care Consult (CM Consult): Discharge Planning Current Support Network: Lives with Spouse Confirm Follow Up Transport: Family Plan discussed with Pt/Family/Caregiver:  Yes 
 Freedom of Choice Offered: Yes Discharge Location Discharge Placement: Home with family assistance

## 2018-11-28 NOTE — DIABETES MGMT
Diabetes Patient/Family Education RecordFactors That  May Influence Patients Ability  to Learn or  Comply with Recommendations []   Language barrier    []   Cultural needs   []   Motivation  
 [x]   Cognitive limitation (dementia)   []   Physical   []   Education  
 []   Physiological factors   []   Hearing/vision/speaking impairment   []   Advent beliefs []   Financial factors   []  Other:   []  No factors identified at this time. Person Instructed: [x]   Patient   []   Family   []  Other Preference for Learning: 
 [x]   Verbal   []   Written   []  Demonstration Level of Comprehension & Competence:   
[]  Good                                      [x] Fair                                     []  Poor                             [x]  Needs Reinforcement  
[]  Teachback completed Education Component:  
[x]  Medication management, including confirmation of home regimen  
[]  Nutritional management -obtain usual meal pattern  
[]  Exercise  
[]  Signs, symptoms, and treatment of hyperglycemia and hypoglycemia  
[] Prevention, recognition and treatment of hyperglycemia and hypoglycemia [x]  Importance of blood glucose monitoring    
[]  Instruction on use of the blood glucose meter [x]  Discuss the importance of HbA1C monitoring   
[]  Sick day guidelines  
[]  Proper use and disposal of lancets, needles, syringes or insulin pens (if appropriate) []  Potential long-term complications (retinopathy, kidney disease, neuropathy, foot care)  
[] Information about whom to contact in case of emergency or for more information   
[x]  Goal:  Patient/family will demonstrate understanding of Diabetes Self Management Skills by: 12/30 Plan for post-discharge education or self-management support: 
  [] Outpatient class schedule provided            [] Patient Declined 
  [] Scheduled for outpatient classes (date) _______ Verify: Does patient understand how diabetes medications work? yes Does patient know what their most recent A1c is? yes Does patient monitor glucose at home? yes Does patient have a glucometer, testing supplies or difficulty obtaining diabetes medications? Pt does not have difficulty obtaining supplies Bridget Lyons MS, RN, CDE Glycemic Control Team 
[phone] Pager [phone] (M-TH 8:00-4:30P) *After Hours pager [phone]

## 2018-11-28 NOTE — DISCHARGE SUMMARY
Discharge Summary Patient: Dustin Bishop MRN: [id number]  CSN: [id number] YOB: 1935  Age: 82 y.o. Sex: male DOA: 11/27/2018 LOS:  LOS: 0 days   Discharge Date: 11/28/2018 Primary Care Provider:  Colonel Vern MD 
 
Admission Diagnoses: Syncope Discharge Diagnoses:   
Problem List as of 11/28/2018 Date Reviewed: 6/30/2018 Codes Class Noted - Resolved Migraine with vertigo ICD-10-CM: G43.109 ICD-9-CM: 346.80, 780.4  11/27/2018 - Present Type II or unspecified type diabetes mellitus with renal manifestations, uncontrolled(250.42) (HCC) ICD-10-CM: E11.29, E11.65 ICD-9-CM: 250.42  6/30/2018 - Present Diastolic CHF, chronic (HCC) ICD-10-CM: I50.32 
ICD-9-CM: 428.32, 428.0  6/30/2018 - Present Primary osteoarthritis of right knee (Chronic) ICD-10-CM: M17.11 ICD-9-CM: 715.16  6/28/2018 - Present CHF (congestive heart failure), NYHA class II (HCC) ICD-10-CM: I50.9 ICD-9-CM: 428.0  12/27/2016 - Present Combined systolic and diastolic congestive heart failure, NYHA class 3 (HCC) ICD-10-CM: I50.40 ICD-9-CM: 428.40, 428.0  12/27/2016 - Present Elevated BP ICD-10-CM: CUY6435 ICD-9-CM: [place]  12/27/2016 - Present Atrial flutter ([facility] [address] ICD-10-CM: S89.20 
ICD-9-CM: 427.32  11/16/2016 - Present Acute on chronic diastolic congestive heart failure (HCC) ICD-10-CM: I50.33 ICD-9-CM: 428.33, 428.0  11/16/2016 - Present Cardiac device in situ ICD-10-CM: Z95.9 ICD-9-CM: V45.00  7/10/2015 - Present Overview Signed 7/10/2015  9:06 AM by Jan Ryan MD  
  EP study 7/10/15 without inducible, sustained arrhythmias. S/p Linq subcutaneous loop recorder. Wide-complex tachycardia (HCC) ICD-10-CM: I47.2 ICD-9-CM: 427.1  5/2/2015 - Present Syncope and collapse ICD-10-CM: R55 
ICD-9-CM: 780.2  5/2/2015 - Present Dizziness ICD-10-CM: V76 ICD-9-CM: 780.4  2/27/2015 - Present Nausea ICD-10-CM: R11.0 ICD-9-CM: 787.02  2/27/2015 - Present Tinnitus ICD-10-CM: H93.19 ICD-9-CM: 388.30  2/27/2015 - Present Atrial fibrillation [facility]) ICD-10-CM: I48.91 
ICD-9-CM: 427.31  2/27/2015 - Present Encephalopathy acute ICD-10-CM: G93.40 ICD-9-CM: 348.30  1/24/2015 - Present Syncope ICD-10-CM: R55 
ICD-9-CM: 780.2  1/24/2015 - Present Encephalopathy ICD-10-CM: G93.40 ICD-9-CM: 348.30  9/20/2014 - Present Hyperlipemia ICD-10-CM: E78.5 ICD-9-CM: 272.4  Unknown - Present Depression ICD-10-CM: F32.9 ICD-9-CM: 311  Unknown - Present Varicose veins ICD-10-CM: I83.90 ICD-9-CM: 454.9  Unknown - Present Arthritis ICD-10-CM: M19.90 ICD-9-CM: 716.90  Unknown - Present Diabetes mellitus ([facility] [address] ICD-10-CM: E11.9 ICD-9-CM: 250.00  10/6/2011 - Present Hypertension ICD-10-CM: I10 
ICD-9-CM: 401.9  Unknown - Present RESOLVED: Blurred vision ICD-10-CM: H53.8 ICD-9-CM: 368.8  9/26/2017 - 9/26/2017 RESOLVED: SOB (shortness of breath) ICD-10-CM: R06.02 
ICD-9-CM: 786.05  12/27/2016 - 6/29/2018 RESOLVED: Acute exacerbation of CHF (congestive heart failure) (HCC) ICD-10-CM: I50.9 ICD-9-CM: 428.0  12/27/2016 - 6/29/2018 RESOLVED: Acute renal insufficiency ICD-10-CM: N28.9 ICD-9-CM: 593.9  11/17/2016 - 6/29/2018 RESOLVED: Pneumonia ICD-10-CM: J18.9 ICD-9-CM: 966  11/16/2016 - 6/29/2018 RESOLVED: Blurred vision ICD-10-CM: H53.8 ICD-9-CM: 368.8  8/23/2016 - 8/23/2016 Discharge Medications:    
Discharge Medication List as of 11/28/2018  2:09 PM  
  
START taking these medications Details  
meclizine (ANTIVERT) 25 mg tablet Take 1 Tab by mouth every six (6) hours for 10 days. , Normal, Disp-40 Tab, R-0  
  
  
CONTINUE these medications which have NOT CHANGED Details buPROPion SR (WELLBUTRIN SR) 150 mg SR tablet Take 150 mg by mouth daily. , Historical Med  
  
 dilTIAZem CD (CARDIZEM CD) 240 mg ER capsule Take 240 mg by mouth daily (after lunch). , Historical Med  
  
multivitamin, tx-iron-ca-min (THERA-M W/ IRON) 9 mg iron-400 mcg tab tablet Take 1 Tab by mouth daily. Formulary substitution for DAILY MULTIVITAMIN centrum silver, Historical Med  
  
insulin detemir U-100 (LEVEMIR U-100 INSULIN) 100 unit/mL injection 15 Units by SubCUTAneous route nightly., Historical Med  
  
metFORMIN (GLUCOPHAGE) 500 mg tablet Take 1,000 mg by mouth two (2) times daily (with meals). , Historical Med  
  
rivaroxaban (XARELTO) 20 mg tab tablet Take 20 mg by mouth daily (with lunch). , Historical Med  
  
fluticasone (FLONASE) 50 mcg/actuation nasal spray 2 Sprays by Both Nostrils route daily as needed for Rhinitis., Historical Med  
  
cycloSPORINE (RESTASIS) 0.05 % dpet Administer 1 Drop to left eye as needed., Historical Med  
  
donepezil (ARICEPT) 5 mg tablet Take 5 mg by mouth nightly., Historical Med  
  
oxybutynin (DITROPAN) 5 mg tablet Take 5 mg by mouth nightly., Historical Med  
  
cholecalciferol (VITAMIN D3) 1,000 unit tablet Take 1,000 Units by mouth daily. , Historical Med  
  
valsartan-hydroCHLOROthiazide (DIOVAN-HCT) 320-25 mg per tablet Take 1 Tab by mouth daily. , Historical Med  
  
VITAMIN D2 50,000 unit capsule TAKE 1 CAPSULE TWICE WEEKLY, Normal, Disp-52 Cap, R-0  
  
metoprolol tartrate 75 mg tab Take 75 mg by mouth every twelve (12) hours. , Print, Disp-60 Tab, R-0 Omeprazole delayed release (PRILOSEC D/R) 20 mg tablet Take 20 mg by mouth daily. , Historical Med  
  
furosemide (LASIX) 20 mg tablet Take 20 mg by mouth daily. Indications: Edema, Historical Med  
  
melatonin tab tablet Take 5 mg by mouth nightly. Indications: sleep, Historical Med  
  
insulin lispro (HUMALOG) 100 unit/mL injection by SubCUTAneous route four (4) times daily. BS-with sliding scale 80-12-=2 units 121-160=4 
161-200=6 
201-250=8 
251- and over 10 units with each meal  Indications: type 2 diabetes mellitus, sliding scale, Historical Med  
  
  
STOP taking these medications  
  
 famotidine (PEPCID) 20 mg tablet Comments:  
Reason for Stopping: ACETAMINOPHEN/DIPHENHYDRAMINE (TYLENOL PM PO) Comments:  
Reason for Stopping:   
   
  
 
 
Discharge Condition: Good PHYSICAL EXAM  
Visit Vitals /69 (BP 1 Location: Left arm, BP Patient Position: At rest) Pulse 66 Temp 97.3 °F (36.3 °C) Resp 17 Ht 5' 8\" (1.727 m) Wt 87.5 kg (192 lb 14.4 oz) SpO2 98% BMI 29.33 kg/m² General: Awake, cooperative, no acute distress   
HEENT: NC, Atraumatic. PERRLA, EOMI. Anicteric sclerae. Lungs:  CTA Bilaterally. No Wheezing/Rhonchi/Rales. Heart:  Regular  rhythm,  No murmur, No Rubs, No Gallops Abdomen: Soft, Non distended, Non tender. +Bowel sounds, Extremities: No c/c/e Psych:   Not anxious or agitated. Neurologic:  No acute neurological deficits. Admission HPI :  
Gabriela Anne is a 82 y.o. male who has multiple chronic medical issues and a hx of syncope and vertigo. Presented to the ER today with rotary sensation of room spinning. Symptom calms at rest.  Provoked with position changes or turning the head rapidly. Seen by Tele-Neurology in the ER. Not thought to be a CVA or other neurological malady.,  Asked to  admit for obs and treatment of vertigo. Hospital Course :  
Patient was admitted to telemetry, he did not had any acute events during hospitalization. Vertigo - likely BPPV, his head CT with no acute findings. He was started on meclizine. He reported that his symptoms are better now. Cannot do MRI as he has some transmitter/stimulator implanted. He is advised to follow up with his PCP. Activity: Activity as tolerated Diet: Diabetic Diet Follow-up: PCP Disposition: home with home health Minutes spent on discharge: 40 Labs: Results: Chemistry Recent Labs  
  11/28/18 
0444 11/27/18 
1140 * 192*  142  
K 3.9 3.4*  
 102 CO2 34* 27 BUN 23* 25* CREA 1.47* 1.69* CA 7.8* 8.2* AGAP 2* 13  
BUCR 16 15 AP  --  136* TP  --  5.9* ALB  --  2.7*  
GLOB  --  3.2 AGRAT  --  0.8 CBC w/Diff Recent Labs  
  11/28/18 
0444 11/27/18 
1140 WBC 5.1 6.8  
RBC 3.62* 4.02* HGB 10.6* 11.9*  
HCT 33.2* 35.5*  226 GRANS  --  50  
LYMPH  --  41 EOS  --  2 Cardiac Enzymes Recent Labs  
  11/27/18 
1140 CPK 73 CKND1 1.6 Coagulation Recent Labs  
  11/27/18 
1140 PTP 21.1* INR 1.8* APTT 30.9 Lipid Panel Lab Results Component Value Date/Time Cholesterol, total 107 01/24/2015 09:15 AM  
 HDL Cholesterol 25 (L) 01/24/2015 09:15 AM  
 LDL, calculated 44 01/24/2015 09:15 AM  
 VLDL, calculated 38 01/24/2015 09:15 AM  
 Triglyceride 190 (H) 01/24/2015 09:15 AM  
 CHOL/HDL Ratio 4.3 01/24/2015 09:15 AM  
  
BNP No results for input(s): BNPP in the last 72 hours. Liver Enzymes Recent Labs  
  11/27/18 
1140 TP 5.9* ALB 2.7* * SGOT 17 Thyroid Studies Lab Results Component Value Date/Time TSH 2.84 12/04/2015 11:35 AM  
    
 
 
Significant Diagnostic Studies: Xr Wrist Rt Ap/lat/obl Min 3v Result Date: 11/20/2018 EXAM: Right wrist 3 views INDICATION: Right wrist pain and swelling. COMPARISON: None. _______________ FINDINGS: PA, oblique, lateral views of the right wrist performed and interpreted without comparison. Osseous alignment is normal. No fracture demonstrated. There is mild osteoarthritis involving the radiocarpal articulation, as well as the STT and thumb carpometacarpal joints. Soft tissue swelling is present about the right wrist diffusely. Scattered atherosclerotic vascular calcifications are noted. No retained radiopaque foreign object. Additional osteoarthritis demonstrated involving the interphalangeal joint of the thumb. _______________ IMPRESSION: Diffusely swollen right wrist and degenerative changes as described without acute osseous abnormality or malalignment. Ct Head Wo Cont Result Date: 11/27/2018 EXAM: CT head INDICATION: Ataxia, stroke. COMPARISON: March 20, 2017 TECHNIQUE: Axial CT imaging of the head was performed without intravenous contrast. One or more dose reduction techniques were used on this CT: automated exposure control, adjustment of the mAs and/or kVp according to patient's size, and iterative reconstruction techniques. The specific techniques utilized on this CT exam have been documented in the patient's electronic medical record. _______________ FINDINGS: BRAIN AND POSTERIOR FOSSA: Mild cortical and cerebellar volume loss redemonstrated. Ventricular size and configuration stable. Minor periventricular white matter hypoattenuation is present. Tiny physiologic bilateral basal ganglia calcifications redemonstrated. There is no intracranial hemorrhage, mass effect, or midline shift. The gray-white matter differentiation is within normal limits. EXTRA-AXIAL SPACES AND MENINGES: There are no abnormal extra-axial fluid collections. CALVARIUM: No acute osseous abnormality. SINUSES: The imaged paranasal sinuses and mastoid air cells are clear. OTHER: Faint atherosclerotic calcifications of the carotid siphons noted. Trace amount of gas within the cavernous sinus and posterior confluence of sinuses most in keeping with sequela of intravenous access. _______________ IMPRESSION: 1. No acute intracranial abnormality. Stable examination. 2. Mild, unchanged periventricular white matter hypoattenuation; unchanged and nonspecific, likely reflecting chronic ischemic microvascular change. CRITICAL RESULT: CODE S findings called to  in the ER prior to dictation at 1133 am on 11/27/2018. Cta Chest W Or W Wo Cont Result Date: 11/27/2018 EXAM: CTA Chest INDICATION: Chest pain and shortness of breath. COMPARISON: 12/27/2016. TECHNIQUE: Axial CT imaging from the thoracic inlet through the diaphragm with intravenous contrast. Coronal and sagittal MIP reformats were generated. One or more dose reduction techniques were used on this CT: automated exposure control, adjustment of the mAs and/or kVp according to patient's size, and iterative reconstruction techniques. The specific techniques utilized on this CT exam have been documented in the patient's electronic medical record. One or more dose reduction techniques were used on this CT: automated exposure control, adjustment of the mAs and/or kVp according to patient's size, and iterative reconstruction techniques. The specific techniques utilized on this CT exam have been documented in the patient's electronic medical record. _______________ FINDINGS: EXAM QUALITY: Adequate. PULMONARY ARTERIES: No evidence of pulmonary embolism. MEDIASTINUM: Cardiomegaly with coronary artery atherosclerotic calcifications. No findings of thoracic aortic aneurysm or dissection. LUNGS: No focal consolidation. Bilateral pulmonary nodules:   > Lateral left lower lobe noncalcified 6 cm pulmonary nodule (image 80). > Subpleural peripheral right middle lobe 3 mm pulmonary nodule (image 77). > Lateral right lower lobe subpleural 6 mm pulmonary nodule (image 90). Erica Copper PLEURA: No pneumothorax or effusion. . AIRWAY: Patent. LYMPH NODES: No pathologically enlarged lymph nodes. Left hilar calcified lymph nodes. Erica Copper UPPER ABDOMEN: No acute process. Previous gastric surgery with small air-fluid level in the excluded stomach. Prior cholecystectomy. Multiple splenic granuloma are present. OTHER: No acute or aggressive osseous abnormalities identified. _______________ IMPRESSION: 1. No evidence of pulmonary embolism. 2.  No acute cardiopulmonary process. Cardiomegaly. 3. Nonspecific bilateral pulmonary nodules, the largest measuring 6 mm.  Please see recommended follow-up as directed below. ======== Fleischner Society pulmonary nodule guidelines (revised 2017): Multiple solid nodules <6 mm: -Low risk for lung cancer: No follow-up. -High risk for lung cancer: Optional chest CT in 12 months. Xr Chest [facility] Result Date: 11/27/2018 Chest, single view Indication: Ataxia, suspected stroke Comparison: 4/3/2018 Findings:  Portable upright AP view of the chest was obtained. Progressive pulmonary hypoinflation noted without focal pneumonic consolidation, pneumothorax, or pleural effusion. The cardiac size and mediastinal contours are stable. No acute osseous abnormality demonstrated. Impression: Moderately underexpanded lungs without superimposed acute radiographic cardiopulmonary abnormality. No results found for this or any previous visit.  
 
 
 
CC: Viry Headley MD

## 2018-11-28 NOTE — DIABETES MGMT
NUTRITIONAL ASSESSMENT GLYCEMIC CONTROL/ PLAN OF CARE Luci Leyden.           82 y.o.           11/27/2018 1. Near syncope 2. Dizziness 3. Lung nodule PMHx: HTN, Diabetes type II w/ renal manifestations, Hyperlipidemia, Depression, Arthritis, Dizziness, A-fib, Wide-complex tachycardia, Syncope and collapse, cardiac device, Atrial flutter, CHF, osteoarthritis of right knee, Migraine w/ vertigo INTERVENTIONS/PLAN:  
-Recommend Diabetic Consistent Carb Diet. Currently on Regular Diet.  
-Provide Diabetes Self-Mgt Education. ASSESSMENT:  
Nutritional Status: Overweight per BMI 29.3 kg/m2 (25.0-29.9 kg/m2) Diabetes Management:  
Recent Glucose Results:  
Lab Results Component Value Date/Time  (H) 11/27/2018 11:40 AM  
 GLUCPOC 128 (H) 11/28/2018 06:23 AM  
 GLUCPOC 321 (H) 11/27/2018 09:15 PM  
 GLUCPOC 214 (H) 11/27/2018 11:16 AM  
 
   
Within target range (non-ICU: <140; ICU<180): [] Yes   [x]  No 
 
Current Insulin regimen: 
Lantus 15 units Humalog, corrective, normal insulin sensitivity Home medication/insulin regimen:  
Levemir 15 units * Pt states he take 24 units. Metformin 1000 mg 2x daily Humalog (ss) HbA1c:(11/28/18) 9.4% equivalent to average blood glucose level 223 mg/dL. Adequate glycemic control PTA:  [] Yes  [x] No 
  
 
SUBJECTIVE/OBJECTIVE: Information obtained from: Pt confirmed diabetes PTA medications. States he takes around 24 units of Levemir. Pt takes Humalog SS 4x daily, occasionally misses his lunch time dose. Pt denies hypoglycemic episodes. Pt checks his fasting BG every morning and usually is running in the mid-100s. Pt reports having had a operation on his knee last month. Pt interested in nutrition and open to education. Will follow-up w/ nutrition education. Diet: DIET REGULAR Medications: [x]                Reviewed Most Recent POC Glucose:  
Recent Labs  
  11/27/18 
1140 * Labs: Lab Results Component Value Date/Time Hemoglobin A1c 9.4 (H) 11/28/2018 04:44 AM  
 
Lab Results Component Value Date/Time Sodium 142 11/27/2018 11:40 AM  
 Potassium 3.4 (L) 11/27/2018 11:40 AM  
 Chloride 102 11/27/2018 11:40 AM  
 CO2 27 11/27/2018 11:40 AM  
 Anion gap 13 11/27/2018 11:40 AM  
 Glucose 192 (H) 11/27/2018 11:40 AM  
 BUN 25 (H) 11/27/2018 11:40 AM  
 Creatinine 1.69 (H) 11/27/2018 11:40 AM  
 Calcium 8.2 (L) 11/27/2018 11:40 AM  
 Magnesium 1.8 11/27/2018 11:40 AM  
     
 Albumin 2.7 (L) 11/27/2018 11:40 AM  
 
 
Anthropometrics: BMI (calculated): 29.3 Wt Readings from Last 1 Encounters:  
11/28/18 87.5 kg (192 lb 14.4 oz) Ht Readings from Last 1 Encounters:  
11/27/18 5' 8\" (1.727 m) Estimated Nutrition Needs: 1851 kcal/day (MSJ x 1.2), 88 g PRO/day ( 1 g PRO/kg), 2188 mL fluid/day (25 mL fluid/ kg) Based on:   [x]          Actual BW: 87.5 Nutrition Diagnoses: Altered nutrition-related lab value related to recent surgery and occasionally difficulty w/ remembering lunch time Humalog dose as evidenced by A1C 7.9%. Nutrition Interventions: 
-Recommend starting Diabetic Consistent Carbohydrate Diet Goal:  
Blood glucose will be within target range of  mg/dL by 12/1/18. Nutrition Monitoring and Evaluation   
 
[x]     Monitor po intake [x]     Continue inpatient monitoring and intervention 
[]     Other: 
 
 
Nutrition Risk:  []   High     []  Moderate    [x]  Minimal/Uncompromised Asia Weaver, TADEO 
pgr [phone]

## 2018-11-28 NOTE — PROGRESS NOTES
Verbal bedside received from [address] off going nurse. Assumed patient care, bed in low position, call light within reach, white board updated. 2000 Scheduled meds administered. Patient in bed, no distress noted. Reminded to call the nurse for standby assist to the bathroom. 0400 No complains of pain reported or observed. 0600 Patient had uneventful night. No complains of pain was reported or observed. NAD Bedside and Verbal shift change report given to Rashmi Lazar RN (oncoming nurse) by Khloe Valerio (offgoing nurse). Report included the following information SBAR, Kardex and MAR.

## 2018-11-28 NOTE — PROGRESS NOTES
Problem: Falls - Risk of 
Goal: *Absence of Falls Document Heidi Youssef Fall Risk and appropriate interventions in the flowsheet. Outcome: Progressing Towards Goal 
Fall Risk Interventions: 
  
 
  
 
Medication Interventions: Teach patient to arise slowly, Patient to call before getting OOB History of Falls Interventions: Evaluate medications/consider consulting pharmacy

## 2018-11-28 NOTE — PROGRESS NOTES
Problem: Mobility Impaired (Adult and Pediatric) Goal: *Acute Goals and Plan of Care (Insert Text) Physical Therapy Goals Initiated 11/28/2018 and to be accomplished within 3-7 day(s) 1. Patient will move from supine to sit and sit to supine  in bed with supervision/set-up. 2.  Patient will transfer from bed to chair and chair to bed with supervision/set-up using the least restrictive device. 3.  Patient will perform sit to stand with supervision/set-up. 4.  Patient will ambulate with supervision/set-up for 150 feet with the least restrictive device. Outcome: Resolved/Met Date Met: 11/28/18 physical Therapy EVALUATION & Discharge Patient: Gabriela Anne (82 y.o. male) Date: 11/28/2018 Primary Diagnosis: Syncope Precautions:   Fall ASSESSMENT AND RECOMMENDATIONS: 
Based on the objective data described below, the patient presents with lower extremity weakness, decreased gait quality and endurance, impaired bed mobility and transfers, and overall limitations in functional mobility. Pt reported vertigo a few times in the past however \"this time was like nothing I had before. \" Pt performed supine to sit with supervision, sit to stand with supervision. Patient ambulated 300 feet without AD, GB applied, supervision/SBA. Pt demonstrated no LOB or unsteadiness during session. Pt educated on procedures for vertigo pt declined at this time. Pt educated on possibility for outpatient vestibular PT. Discharge Recommendations: Outpatient Further Equipment Recommendations for Discharge: N/A   
 
SUBJECTIVE:  
Patient stated I am doing ok.  OBJECTIVE DATA SUMMARY:  
 
Past Medical History:  
Diagnosis Date  Arthritis  Atrial fibrillation ([facility]  Dementia  Depression  Diabetes ([facility] [address] 1980s  GERD (gastroesophageal reflux disease)  Hypercholesterolemia  Hypertension 2000  Ill-defined condition 2015  
 has passed out-has been tested cannot determine cause  Primary osteoarthritis of right knee 6/28/2018  Ulcer   
 at the anastomotic bite of gastric bypass  Varicose veins  Wide-complex tachycardia ([address] 5/2/2015 Past Surgical History:  
Procedure Laterality Date  ABDOMEN SURGERY PROC UNLISTED  1998  
 umb hernia Rupture  BIOPSY LIVER  10/05/04  
 EGD  12/29/09  
 with biopsy  ENDOSCOPY, COLON, DIAGNOSTIC    
 GASTROSTOMY TUBE  10/05/04  HX CATARACT REMOVAL    
 bilateral  
 HX CHOLECYSTECTOMY  10/05/04  HX GI  10/05/04  
 vertical banded gastroplasty/gastric bypass  HX MOHS PROCEDURES  1991/1995  
 bilateral  
 HX PACEMAKER  2015 [facility] Cardiac Monitor Barriers to Learning/Limitations: None Compensate with: N/A Prior Level of Function/Home Situation: Independent amb s/AD Home Situation Home Environment: Private residence # Steps to Enter: 4 One/Two Story Residence: Two story, live on 1st floor # of Interior Steps: 11 Height of Each Step (in): 4 inches Interior Rails: Both Lift Chair Available: No 
Living Alone: No 
Support Systems: Family member(s) Patient Expects to be Discharged to[de-identified] Private residence Current DME Used/Available at Home: teddy Perez[employer]ried[employer] Behavior: 
Neurologic State: Alert Orientation Level: Oriented X4 Cognition: Appropriate decision making; Appropriate for age attention/concentration; Appropriate safety awareness; Follows commands Psychosocial 
Purposeful Interaction: Yes Pt Identified Daily Priority: Clinical issues (comment) Mayras Process: Teaching/learning; Attend basic human needs Caring Interventions: Therapeutic modalities Reassure: Therapeutic listening; Informing Therapeutic Modalities: Humor; Intentional therapeutic touchStrength:   
Strength: Generally decreased, functional 
Tone & Sensation:  
Tone: Normal 
Sensation: Intact Range Of Motion: 
AROM: Generally decreased, functional 
Functional Mobility: 
Bed Mobility: 
Supine to Sit: Supervision Sit to Supine: Supervision Transfers: 
Sit to Stand: Supervision Stand to Sit: Supervision Balance:  
Sitting: Intact Standing: Intact; Without supportAmbulation/Gait Training: 
Distance (ft): 200 Feet (ft) Assistive Device: Gait belt Ambulation - Level of Assistance: Stand-by assistance;Supervision Gait Description (WDL): Exceptions to [facility] Gait Abnormalities: Decreased step clearance; Antalgic; Step to gait Speed/Shreya: Slow Step Length: Right shortened;Left shortened Pain: 
Pain Scale 1: Numeric (0 - 10) Pain Intensity 1: 0 Activity Tolerance:  
Fair Please refer to the flowsheet for vital signs taken during this treatment. After treatment:  
[]         Patient left in no apparent distress sitting up in chair 
[x]         Patient left in no apparent distress in bed 
[x]         Call bell left within reach [x]         Nursing notified 
[]         Caregiver present 
[]         Bed alarm activated COMMUNICATION/EDUCATION:  
[x]         Fall prevention education was provided and the patient/caregiver indicated understanding. [x]         Patient/family have participated as able in goal setting and plan of care. [x]         Patient/family agree to work toward stated goals and plan of care. []         Patient understands intent and goals of therapy, but is neutral about his/her participation. []         Patient is unable to participate in goal setting and plan of care. Thank you for this referral. 
Lenoard Cuff Time Calculation: 32 mins Eval Complexity: History: MEDIUM  Complexity : 1-2 comorbidities / personal factors will impact the outcome/ POC Exam:MEDIUM Complexity : 3 Standardized tests and measures addressing body structure, function, activity limitation and / or participation in recreation  Presentation: LOW Complexity : Stable, uncomplicated  Clinical Decision Making:Low Complexity amb >30 ft c/RW, CGA for safety Overall Complexity:LOW Mobility [id number] Current  CI= 1-19%   Goal  CI= 1-19%  D/C  CI= 1-19%. The severity rating is based on the Level of Assistance required for Functional Mobility and ADLs.

## 2018-11-28 NOTE — ROUTINE PROCESS
Bedside and Verbal shift change report given to DAVID Gerber (oncoming nurse) by Shirley (offgoing nurse). Report included the following information SBAR, Kardex, Intake/Output, MAR, Recent Results and Cardiac Rhythm NSR. NS @ 125 mL/hr cont needed and SCD's.

## 2018-11-28 NOTE — PROGRESS NOTES
0754:Received verbal bedside report from off going nurse ORA Lou Patient care received. Patient alert and oriented x 4. Patient resting in bed denies pain. Patient stable. Call light with in reach bed in lowest position. 0476: Informed patient had PVC's for 15 seconds. Assessed patient. Patient stable. 1202: During  rounds Informed  pt had 15 seconds of PVC's at 0917 for 15 seconds. No further orders given.   
1210:  gave verbal orders to discontinue MRI because patient reports having transmitter in left chest.

## 2018-11-28 NOTE — DISCHARGE INSTRUCTIONS
ACE Inhibitors: Care Instructions  Your Care Instructions    ACE (angiotensin-converting enzyme) inhibitors lower blood pressure. They also treat heart failure and prevent heart attacks and strokes. They block an enzyme that makes blood vessels narrow. As a result, the blood vessels relax and widen. This lowers blood pressure. These medicines also put more water and salt into the urine. This lowers blood pressure too. These medicines are a good choice for people with diabetes. They don't affect blood sugar levels, and they may protect the kidneys. Examples include:  · Benazepril (Lotensin). · Lisinopril (Prinivil, Zestril). · Ramipril (Altace). Before you start taking this medicine, make sure your doctor knows if you take other medicines, especially water pills (diuretics) or potassium tablets. And tell your doctor if you use a salt substitute. You should not take an ACE inhibitor if you are pregnant or planning to become pregnant. You may need regular blood tests. Follow-up care is a key part of your treatment and safety. Be sure to make and go to all appointments, and call your doctor if you are having problems. It's also a good idea to know your test results and keep a list of the medicines you take. How can you care for yourself at home? · Take your medicines exactly as prescribed. Be sure to take high blood pressure medicine every day. Since high blood pressure often has no symptoms, it is easy to forget to take the pills. Call your doctor if you think you are having a problem with your medicine. · ACE inhibitors can cause a dry cough. If the cough is bad, talk to your doctor. You may need to try a different medicine. · ACE inhibitors can cause an allergic reaction of the skin. Symptoms may range from mild swelling to painful welts. Severe swelling can make it hard to breathe, but this is very rare. · Check with your doctor or pharmacist before you use any other medicines.  This includes over-the-counter medicines. Make sure your doctor knows all of the medicines, vitamins, herbal products, and supplements you take. Taking some medicines together can cause problems. When should you call for help? Call your doctor now or seek immediate medical care if:    · You develop a new cough.     · You think you are having problems with your medicine.    Watch closely for changes in your health, and be sure to contact your doctor if you have any problems. Where can you learn more? Go to http://jared-bill.info/. Enter S730 in the search box to learn more about \"ACE Inhibitors: Care Instructions. \"  Current as of: December 6, 2017  Content Version: 11.8  © 8771-7825 [employer]. Care instructions adapted under license by [employer] (which disclaims liability or warranty for this information). If you have questions about a medical condition or this instruction, always ask your healthcare professional. [address] any warranty or liability for your use of this information. DISCHARGE SUMMARY from Nurse    PATIENT INSTRUCTIONS:    What to do at Home:    Recommended activity: Activity as tolerated,     *  Please give a list of your current medications to your Primary Care Provider. *  Please update this list whenever your medications are discontinued, doses are      changed, or new medications (including over-the-counter products) are added. *  Please carry medication information at all times in case of emergency situations. These are general instructions for a healthy lifestyle:    No smoking/ No tobacco products/ Avoid exposure to second hand smoke  Surgeon General's Warning:  Quitting smoking now greatly reduces serious risk to your health.     Obesity, smoking, and sedentary lifestyle greatly increases your risk for illness    A healthy diet, regular physical exercise & weight monitoring are important for maintaining a healthy lifestyle    You may be retaining fluid if you have a history of heart failure or if you experience any of the following symptoms:  Weight gain of 3 pounds or more overnight or 5 pounds in a week, increased swelling in our hands or feet or shortness of breath while lying flat in bed. Please call your doctor as soon as you notice any of these symptoms; do not wait until your next office visit. Recognize signs and symptoms of STROKE:    F-face looks uneven    A-arms unable to move or move unevenly    S-speech slurred or non-existent    T-time-call 911 as soon as signs and symptoms begin-DO NOT go       Back to bed or wait to see if you get better-TIME IS BRAIN. Warning Signs of HEART ATTACK     Call 911 if you have these symptoms:   Chest discomfort. Most heart attacks involve discomfort in the center of the chest that lasts more than a few minutes, or that goes away and comes back. It can feel like uncomfortable pressure, squeezing, fullness, or pain.  Discomfort in other areas of the upper body. Symptoms can include pain or discomfort in one or both arms, the back, neck, jaw, or stomach.  Shortness of breath with or without chest discomfort.  Other signs may include breaking out in a cold sweat, nausea, or lightheadedness. Don't wait more than five minutes to call 911 - MINUTES MATTER! Fast action can save your life. Calling 911 is almost always the fastest way to get lifesaving treatment. Emergency Medical Services staff can begin treatment when they arrive -- up to an hour sooner than if someone gets to the hospital by car. The discharge information has been reviewed with the patient. The patient verbalized understanding.   Discharge medications reviewed with the patient and appropriate educational materials and side effects teaching were provided.'    Patient armband removed and shredded    ___________________________________________________________________________________________________________________________________

## 2018-12-05 NOTE — PROGRESS NOTES
Occupational/Physical/Speech Therapy: 
 
Addendum to chart for charge purposes only for [place] [facility], PT.  Charges based on documentation in chart by original evaluating/treating Occupational/Physica/Speech Therapist. 
 
Lizz Garnica, OTR/L

## 2019-01-25 ENCOUNTER — HOSPITAL ENCOUNTER (EMERGENCY)
Age: 84
Discharge: HOME | End: 2019-01-25
Attending: EMERGENCY MEDICINE
Payer: MEDICARE

## 2019-01-25 ENCOUNTER — APPOINTMENT (OUTPATIENT)
Dept: GENERAL RADIOLOGY | Age: 84
End: 2019-01-25
Attending: EMERGENCY MEDICINE
Payer: MEDICARE

## 2019-01-25 ENCOUNTER — APPOINTMENT (OUTPATIENT)
Dept: VASCULAR SURGERY | Age: 84
End: 2019-01-25
Attending: EMERGENCY MEDICINE
Payer: MEDICARE

## 2019-01-25 VITALS
HEART RATE: 61 BPM | RESPIRATION RATE: 15 BRPM | HEIGHT: 67 IN | BODY MASS INDEX: 30.45 KG/M2 | TEMPERATURE: 98.4 F | SYSTOLIC BLOOD PRESSURE: 171 MMHG | WEIGHT: 194 LBS | DIASTOLIC BLOOD PRESSURE: 84 MMHG | OXYGEN SATURATION: 100 %

## 2019-01-25 DIAGNOSIS — R60.0 BILATERAL LEG EDEMA: Primary | ICD-10-CM

## 2019-01-25 DIAGNOSIS — N28.9 RENAL INSUFFICIENCY: ICD-10-CM

## 2019-01-25 LAB
ALBUMIN SERPL-MCNC: 2.7 G/DL (ref 3.4–5)
ALBUMIN/GLOB SERPL: 0.9 {RATIO} (ref 0.8–1.7)
ALP SERPL-CCNC: 141 U/L (ref 45–117)
ALT SERPL-CCNC: 26 U/L (ref 16–61)
ANION GAP SERPL CALC-SCNC: 4 MMOL/L (ref 3–18)
APPEARANCE UR: CLEAR
AST SERPL-CCNC: 29 U/L (ref 15–37)
BASOPHILS # BLD: 0 K/UL (ref 0–0.1)
BASOPHILS NFR BLD: 0 % (ref 0–2)
BILIRUB SERPL-MCNC: 0.3 MG/DL (ref 0.2–1)
BILIRUB UR QL: NEGATIVE
BNP SERPL-MCNC: 775 PG/ML (ref 0–1800)
BUN SERPL-MCNC: 17 MG/DL (ref 7–18)
BUN/CREAT SERPL: 14 (ref 12–20)
CALCIUM SERPL-MCNC: 8.1 MG/DL (ref 8.5–10.1)
CHLORIDE SERPL-SCNC: 110 MMOL/L (ref 100–108)
CK MB CFR SERPL CALC: 2.1 % (ref 0–4)
CK MB SERPL-MCNC: 2.8 NG/ML (ref 5–25)
CK SERPL-CCNC: 133 U/L (ref 39–308)
CO2 SERPL-SCNC: 29 MMOL/L (ref 21–32)
COLOR UR: YELLOW
CREAT SERPL-MCNC: 1.22 MG/DL (ref 0.6–1.3)
DIFFERENTIAL METHOD BLD: ABNORMAL
EOSINOPHIL # BLD: 0.2 K/UL (ref 0–0.4)
EOSINOPHIL NFR BLD: 4 % (ref 0–5)
ERYTHROCYTE [DISTWIDTH] IN BLOOD BY AUTOMATED COUNT: 14.3 % (ref 11.6–14.5)
GLOBULIN SER CALC-MCNC: 3 G/DL (ref 2–4)
GLUCOSE SERPL-MCNC: 137 MG/DL (ref 74–99)
GLUCOSE UR STRIP.AUTO-MCNC: NEGATIVE MG/DL
HCT VFR BLD AUTO: 33.3 % (ref 36–48)
HGB BLD-MCNC: 10.7 G/DL (ref 13–16)
HGB UR QL STRIP: NEGATIVE
INR PPP: 1.6 (ref 0.8–1.2)
KETONES UR QL STRIP.AUTO: 15 MG/DL
LEUKOCYTE ESTERASE UR QL STRIP.AUTO: NEGATIVE
LYMPHOCYTES # BLD: 1.4 K/UL (ref 0.9–3.6)
LYMPHOCYTES NFR BLD: 27 % (ref 21–52)
MCH RBC QN AUTO: 29 PG (ref 24–34)
MCHC RBC AUTO-ENTMCNC: 32.1 G/DL (ref 31–37)
MCV RBC AUTO: 90.2 FL (ref 74–97)
MONOCYTES # BLD: 0.5 K/UL (ref 0.05–1.2)
MONOCYTES NFR BLD: 10 % (ref 3–10)
NEUTS SEG # BLD: 3.1 K/UL (ref 1.8–8)
NEUTS SEG NFR BLD: 59 % (ref 40–73)
NITRITE UR QL STRIP.AUTO: NEGATIVE
PH UR STRIP: 5 [PH] (ref 5–8)
PLATELET # BLD AUTO: 197 K/UL (ref 135–420)
PMV BLD AUTO: 9.1 FL (ref 9.2–11.8)
POTASSIUM SERPL-SCNC: 4.6 MMOL/L (ref 3.5–5.5)
PROT SERPL-MCNC: 5.7 G/DL (ref 6.4–8.2)
PROT UR STRIP-MCNC: NEGATIVE MG/DL
PROTHROMBIN TIME: 19.3 SEC (ref 11.5–15.2)
RBC # BLD AUTO: 3.69 M/UL (ref 4.7–5.5)
SODIUM SERPL-SCNC: 143 MMOL/L (ref 136–145)
SP GR UR REFRACTOMETRY: 1.02 (ref 1–1.03)
TROPONIN I SERPL-MCNC: 0.02 NG/ML (ref 0–0.04)
UROBILINOGEN UR QL STRIP.AUTO: 0.2 EU/DL (ref 0.2–1)
WBC # BLD AUTO: 5.2 K/UL (ref 4.6–13.2)

## 2019-01-25 PROCEDURE — 83880 ASSAY OF NATRIURETIC PEPTIDE: CPT

## 2019-01-25 PROCEDURE — 80053 COMPREHEN METABOLIC PANEL: CPT

## 2019-01-25 PROCEDURE — 93970 EXTREMITY STUDY: CPT

## 2019-01-25 PROCEDURE — 71045 X-RAY EXAM CHEST 1 VIEW: CPT

## 2019-01-25 PROCEDURE — 82550 ASSAY OF CK (CPK): CPT

## 2019-01-25 PROCEDURE — 99284 EMERGENCY DEPT VISIT MOD MDM: CPT

## 2019-01-25 PROCEDURE — 81003 URINALYSIS AUTO W/O SCOPE: CPT

## 2019-01-25 PROCEDURE — 93005 ELECTROCARDIOGRAM TRACING: CPT

## 2019-01-25 PROCEDURE — 85610 PROTHROMBIN TIME: CPT

## 2019-01-25 PROCEDURE — 85025 COMPLETE CBC W/AUTO DIFF WBC: CPT

## 2019-01-25 NOTE — ED NOTES
Pt c/o bilateral lower extremity swelling that began 5 days ago. Pt reports the right leg is more swollen than the left, w/ no injury to either leg recently. Pt denies SOB and CP. Pt able to bear weight and ambulate on legs.

## 2019-01-25 NOTE — ED NOTES
2 unsuccessful PIV attempts, ER tech will attempt shortly. Pt aware a urine specimen is needed, reports he is unable to urinate at this time.

## 2019-01-25 NOTE — ED PROVIDER NOTES
EMERGENCY DEPARTMENT HISTORY AND PHYSICAL EXAM 
 
Date: 1/25/2019 Patient Name: Elizabet Welsh. History of Presenting Illness Chief Complaint Patient presents with  Ankle swelling  
  bilateral   
 
 
 
History Provided By: Patient Chief Complaint: bilateral leg swelling Duration: 5 days ago Timing:  Gradual and Worsening Location: bilateral legs and right greater than left Quality: swelling Severity: 0 out of 10 pain Associated Symptoms: denies any other associated signs or symptoms Additional History (Context):  
12:15 PM 
Elizabet Campoverde is a 82 y.o. male with PMHX of AFIB (diagnosed 2 years ago after syncopal episode; on Xarelto) who presents to the emergency department C/O bilateral leg swelling onset 5 days ago (right greater than left). Pt denies SOB, CP, hx of liver failure or renal failure, and any other sxs or complaints. PCP: Rex Davis MD 
 
Current Outpatient Medications Medication Sig Dispense Refill  Comp. Stocking,Thigh,Long,X-Lrg misc 1 Units by Does Not Apply route daily. 1 Units 0  
 buPROPion SR (WELLBUTRIN SR) 150 mg SR tablet Take 150 mg by mouth daily.  dilTIAZem CD (CARDIZEM CD) 240 mg ER capsule Take 240 mg by mouth daily (after lunch).  VITAMIN D2 50,000 unit capsule TAKE 1 CAPSULE TWICE WEEKLY 52 Cap 0  
 metoprolol tartrate 75 mg tab Take 75 mg by mouth every twelve (12) hours. 60 Tab 0  cholecalciferol (VITAMIN D3) 1,000 unit tablet Take 1,000 Units by mouth daily.  multivitamin, tx-iron-ca-min (THERA-M W/ IRON) 9 mg iron-400 mcg tab tablet Take 1 Tab by mouth daily. Formulary substitution for DAILY MULTIVITAMIN centrum silver  insulin detemir U-100 (LEVEMIR U-100 INSULIN) 100 unit/mL injection 15 Units by SubCUTAneous route nightly.  metFORMIN (GLUCOPHAGE) 500 mg tablet Take 1,000 mg by mouth two (2) times daily (with meals).     
 rivaroxaban (XARELTO) 20 mg tab tablet Take 20 mg by mouth daily (with lunch).  fluticasone (FLONASE) 50 mcg/actuation nasal spray 2 Sprays by Both Nostrils route daily as needed for Rhinitis.  cycloSPORINE (RESTASIS) 0.05 % dpet Administer 1 Drop to left eye as needed.  donepezil (ARICEPT) 5 mg tablet Take 5 mg by mouth nightly.  valsartan-hydroCHLOROthiazide (DIOVAN-HCT) 320-25 mg per tablet Take 1 Tab by mouth daily.  oxybutynin (DITROPAN) 5 mg tablet Take 5 mg by mouth nightly.  Omeprazole delayed release (PRILOSEC D/R) 20 mg tablet Take 20 mg by mouth daily.  furosemide (LASIX) 20 mg tablet Take 20 mg by mouth daily. Indications: Edema  melatonin tab tablet Take 5 mg by mouth nightly. Indications: sleep  insulin lispro (HUMALOG) 100 unit/mL injection by SubCUTAneous route four (4) times daily. BS-with sliding scale 80-12-=2 units 121-160=4 
161-200=6 
201-250=8 
251- and over 10 units 
with each meal  Indications: type 2 diabetes mellitus, sliding scale Past History Past Medical History: 
Past Medical History:  
Diagnosis Date  Arthritis  Atrial fibrillation ([address]  Dementia  Depression  Diabetes ([address] 1980s  GERD (gastroesophageal reflux disease)  Hypercholesterolemia  Hypertension 2000  Ill-defined condition 2015  
 has passed out-has been tested cannot determine cause  Primary osteoarthritis of right knee 6/28/2018  Ulcer   
 at the anastomotic bite of gastric bypass  Varicose veins  Wide-complex tachycardia ([address] 5/2/2015 Past Surgical History: 
Past Surgical History:  
Procedure Laterality Date  ABDOMEN SURGERY PROC UNLISTED  1998  
 umb hernia Rupture  BIOPSY LIVER  10/05/04  
 EGD  12/29/09  
 with biopsy  ENDOSCOPY, COLON, DIAGNOSTIC    
 GASTROSTOMY TUBE  10/05/04  HX CATARACT REMOVAL    
 bilateral  
 HX CHOLECYSTECTOMY  10/05/04  HX GI  10/05/04  
 vertical banded gastroplasty/gastric bypass  HX MOHS PROCEDURES  1991/1995  
 bilateral  
  HX PACEMAKER   [employer]-Reveal [employer] Cardiac Monitor Family History: 
Family History Problem Relation Age of Onset  Heart Attack Father  Diabetes Father  Hypertension Father  Cancer Mother   
     lung  Diabetes Mother  Other Brother   
     obese  Diabetes Brother  Diabetes Brother  Other Maternal Grandmother   
     obese  Diabetes Sister Social History: 
Social History Tobacco Use  Smoking status: Former Smoker Last attempt to quit: 4/3/1967 Years since quittin.8  Smokeless tobacco: Never Used Substance Use Topics  Alcohol use: Yes Alcohol/week: 2.4 oz Types: 3 Glasses of wine, 1 Shots of liquor per week Comment: monthly  Drug use: No  
 
 
Allergies: Allergies Allergen Reactions  Morphine Other (comments) Hallucinate; \"ran all night long\" Review of Systems Review of Systems Constitutional: Negative for activity change, appetite change, fever and unexpected weight change. HENT: Negative for congestion and sore throat. Eyes: Negative for pain and redness. Respiratory: Negative for cough and shortness of breath. Cardiovascular: Positive for leg swelling. Negative for chest pain and palpitations. Gastrointestinal: Negative for abdominal pain, diarrhea, nausea and vomiting. Endocrine: Negative for polydipsia and polyuria. Genitourinary: Negative for difficulty urinating and dysuria. Musculoskeletal: Negative for back pain and neck pain. Skin: Negative for pallor and rash. Neurological: Negative for headaches. All other systems reviewed and are negative. Physical Exam  
 
Vitals:  
 19 1134 19 1443 19 1748 BP: 173/88 162/84 171/84 Pulse: (!) 52 62 61 Resp: 14 16 15 Temp: 98.4 °F (36.9 °C) SpO2: 99% 100% 100% Weight: 88 kg (194 lb) Height: 5' 7\" (1.702 m) Physical Exam  
 Constitutional: He is oriented to person, place, and time. He appears well-developed and well-nourished. HENT:  
Head: Normocephalic and atraumatic. Right Ear: External ear normal.  
Left Ear: External ear normal.  
Nose: Nose normal.  
Mouth/Throat: Oropharynx is clear and moist.  
Eyes: Conjunctivae and EOM are normal. Pupils are equal, round, and reactive to light. Neck: Normal range of motion. Neck supple. No JVD present. No tracheal deviation present. No thyromegaly present. Cardiovascular: Normal rate, regular rhythm, normal heart sounds and intact distal pulses. Exam reveals no gallop and no friction rub. No murmur heard. Pulmonary/Chest: Effort normal and breath sounds normal. No respiratory distress. He has no wheezes. He has no rales. Abdominal: Soft. Bowel sounds are normal. He exhibits no distension and no mass. There is no tenderness. There is no rebound and no guarding. Musculoskeletal: Normal range of motion. He exhibits edema. He exhibits no tenderness or deformity. Bilateral lower extremity edema (right worse than left). Right at 2+ edema and left at 1+ edema. Neurological: He is alert and oriented to person, place, and time. He has normal reflexes. No cranial nerve deficit. He exhibits normal muscle tone. CN II-XII intact, no facial droop or asymmetry; No pronator drift; good  and equal strength 5/5 bilateral upper and lower extremities; DTRs: 2+ upper and lower extremities, symmetric bilaterally; sensation is intact to light touch and position sense upper and lower extremities symmetric bilaterally; gait normal.  
Skin: Skin is warm and dry. No rash noted. Psychiatric: He has a normal mood and affect. His behavior is normal.  
Nursing note and vitals reviewed. Diagnostic Study Results Labs - Recent Results (from the past 12 hour(s)) EKG, 12 LEAD, INITIAL Collection Time: 01/25/19 12:30 PM  
Result Value Ref Range  Ventricular Rate 59 BPM  
 Atrial Rate 59 BPM  
 P-R Interval 172 ms QRS Duration 76 ms  
 Q-T Interval 424 ms QTC Calculation (Bezet) 419 ms Calculated P Axis 39 degrees Calculated R Axis -28 degrees Calculated T Axis 13 degrees Diagnosis Sinus bradycardia Otherwise normal ECG When compared with ECG of 27-NOV-2018 11:31, 
premature ventricular complexes are no longer present QT has shortened CBC WITH AUTOMATED DIFF Collection Time: 01/25/19  1:33 PM  
Result Value Ref Range WBC 5.2 4.6 - 13.2 K/uL  
 RBC 3.69 (L) 4.70 - 5.50 M/uL  
 HGB 10.7 (L) 13.0 - 16.0 g/dL HCT 33.3 (L) 36.0 - 48.0 % MCV 90.2 74.0 - 97.0 FL  
 MCH 29.0 24.0 - 34.0 PG  
 MCHC 32.1 31.0 - 37.0 g/dL  
 RDW 14.3 11.6 - 14.5 % PLATELET 745 065 - 715 K/uL MPV 9.1 (L) 9.2 - 11.8 FL  
 NEUTROPHILS 59 40 - 73 % LYMPHOCYTES 27 21 - 52 % MONOCYTES 10 3 - 10 % EOSINOPHILS 4 0 - 5 % BASOPHILS 0 0 - 2 %  
 ABS. NEUTROPHILS 3.1 1.8 - 8.0 K/UL  
 ABS. LYMPHOCYTES 1.4 0.9 - 3.6 K/UL  
 ABS. MONOCYTES 0.5 0.05 - 1.2 K/UL  
 ABS. EOSINOPHILS 0.2 0.0 - 0.4 K/UL  
 ABS. BASOPHILS 0.0 0.0 - 0.1 K/UL  
 DF AUTOMATED PROTHROMBIN TIME + INR Collection Time: 01/25/19  1:33 PM  
Result Value Ref Range Prothrombin time 19.3 (H) 11.5 - 15.2 sec INR 1.6 (H) 0.8 - 1.2 METABOLIC PANEL, COMPREHENSIVE Collection Time: 01/25/19  1:33 PM  
Result Value Ref Range Sodium 143 136 - 145 mmol/L Potassium 4.6 3.5 - 5.5 mmol/L Chloride 110 (H) 100 - 108 mmol/L  
 CO2 29 21 - 32 mmol/L Anion gap 4 3.0 - 18 mmol/L Glucose 137 (H) 74 - 99 mg/dL BUN 17 7.0 - 18 MG/DL Creatinine 1.22 0.6 - 1.3 MG/DL  
 BUN/Creatinine ratio 14 12 - 20 GFR est AA >60 >60 ml/min/1.73m2 GFR est non-AA 57 (L) >60 ml/min/1.73m2 Calcium 8.1 (L) 8.5 - 10.1 MG/DL Bilirubin, total 0.3 0.2 - 1.0 MG/DL  
 ALT (SGPT) 26 16 - 61 U/L  
 AST (SGOT) 29 15 - 37 U/L Alk.  phosphatase 141 (H) 45 - 117 U/L  
 Protein, total 5.7 (L) 6.4 - 8.2 g/dL Albumin 2.7 (L) 3.4 - 5.0 g/dL Globulin 3.0 2.0 - 4.0 g/dL A-G Ratio 0.9 0.8 - 1.7 NT-PRO BNP Collection Time: 01/25/19  1:33 PM  
Result Value Ref Range NT pro- 0 - 1,800 PG/ML  
CARDIAC PANEL,(CK, CKMB & TROPONIN) Collection Time: 01/25/19  1:33 PM  
Result Value Ref Range  39 - 308 U/L  
 CK - MB 2.8 <3.6 ng/ml CK-MB Index 2.1 0.0 - 4.0 % Troponin-I, QT 0.02 0.0 - 0.045 NG/ML  
URINALYSIS W/ RFLX MICROSCOPIC Collection Time: 01/25/19  4:45 PM  
Result Value Ref Range Color YELLOW Appearance CLEAR Specific gravity 1.019 1.005 - 1.030    
 pH (UA) 5.0 5.0 - 8.0 Protein NEGATIVE  NEG mg/dL Glucose NEGATIVE  NEG mg/dL Ketone 15 (A) NEG mg/dL Bilirubin NEGATIVE  NEG Blood NEGATIVE  NEG Urobilinogen 0.2 0.2 - 1.0 EU/dL Nitrites NEGATIVE  NEG Leukocyte Esterase NEGATIVE  NEG Radiologic Studies -  
 
CT Results  (Last 48 hours) None CXR Results  (Last 48 hours) 01/25/19 1236  XR CHEST PORT Final result Impression:  IMPRESSION:  
   
No radiographic evidence of acute pulmonary process. Cardiac monitoring device  
overlies left inferior thorax. Narrative:  EXAM: Portable chest  
   
HISTORY: Bilateral ankle swelling. COMPARISON: 11/27/2018 TECHNIQUE: Single portable view. _______________ FINDINGS:  
   
SUPPORT DEVICES: None. HEART AND MEDIASTINUM: Cardiac size is normal. Normal pulmonary vasculature. LUNG AND PLEURAL SPACES:  Cardiac monitor overlies left thorax. Lungs clear. BONY THORAX AND SOFT TISSUES: Mild right shoulder osteoarthritis. Thoracic  
spondylosis. _______________ Medications given in the ED- Medications - No data to display Medical Decision Making I am the first provider for this patient.  
 
I reviewed the vital signs, available nursing notes, past medical history, past surgical history, family history and social history. Vital Signs-Reviewed the patient's vital signs. Pulse Oximetry Analysis - 99% on room air. EKG interpretation: (Preliminary) 12:30 PM  
Sinus bradycardia at 59 bpm. QRS duration at 76 ms. EKG read by Thaddeus Mujica MD  
 
Records Reviewed: Nursing Notes and Old Medical Records Provider Notes (Medical Decision Making): DDx: DVT, CHF, renal failure, liver failure. Procedures: 
Procedures ED Course:  
12:15 PM Initial assessment performed. The patients presenting problems have been discussed, and they are in agreement with the care plan formulated and outlined with them. I have encouraged them to ask questions as they arise throughout their visit. Diagnosis and Disposition Discussion: Pt was stable in the ED. Pt had bilateral leg swelling (right greater than left). No CP, SOB, or other sxs. Pt had dependent edema. ECG and troponin showed no evidence of ACS. No DVT found on duplex doppler imaging. CXR unremarkable. Pt found with renal insufficiency but no evidence of CHF or liver failure. Pt is to f/u with PCP and wear compression stockings. DISCHARGE NOTE: 
6:05 PM 
Alfredo Angel Guzman's  results have been reviewed with him. He has been counseled regarding his diagnosis, treatment, and plan. He verbally conveys understanding and agreement of the signs, symptoms, diagnosis, treatment and prognosis and additionally agrees to follow up as discussed. He also agrees with the care-plan and conveys that all of his questions have been answered. I have also provided discharge instructions for him that include: educational information regarding their diagnosis and treatment, and list of reasons why they would want to return to the ED prior to their follow-up appointment, should his condition change. He has been provided with education for proper emergency department utilization. CLINICAL IMPRESSION: 
 
1. Bilateral leg edema 2. Renal insufficiency PLAN: 
1. D/C Home 2. Current Discharge Medication List  
  
START taking these medications Details Comp. Stocking,Thigh,Long,X-Lrg misc 1 Units by Does Not Apply route daily. Qty: 1 Units, Refills: 0  
  
  
 
3. Follow-up Information Follow up With Specialties Details Why Contact Info Valentina Dickerson MD Family Practice Schedule an appointment as soon as possible for a visit For Primary Care Follow Up [address] [facility] 
[phone] [facility] EMERGENCY DEPT Emergency Medicine Go to If symptoms worsen, As needed [address] [id number] 
[phone]  
  
 
_______________________________ Attestations: This note is prepared by Samantha Smith, acting as Scribe for Joana Mata MD. 
 
Joana Mata MD:  The scribe's documentation has been prepared under my direction and personally reviewed by me in its entirety. I confirm that the note above accurately reflects all work, treatment, procedures, and medical decision making performed by me. 
_______________________________

## 2019-01-25 NOTE — ED TRIAGE NOTES
Patient arrives ambulatory with spouse c/o bilateral lower leg swelling, right side more than left, onset 5 days ago. Denies other complaints.

## 2019-01-30 LAB
ATRIAL RATE: 74 BPM
CALCULATED P AXIS, ECG09: 17 DEGREES
CALCULATED R AXIS, ECG10: -35 DEGREES
CALCULATED T AXIS, ECG11: -4 DEGREES
DIAGNOSIS, 93000: NORMAL
P-R INTERVAL, ECG05: 160 MS
Q-T INTERVAL, ECG07: 440 MS
QRS DURATION, ECG06: 82 MS
QTC CALCULATION (BEZET), ECG08: 488 MS
VENTRICULAR RATE, ECG03: 74 BPM

## 2019-04-01 LAB
ATRIAL RATE: 59 BPM
CALCULATED P AXIS, ECG09: 39 DEGREES
CALCULATED R AXIS, ECG10: -28 DEGREES
CALCULATED T AXIS, ECG11: 13 DEGREES
DIAGNOSIS, 93000: NORMAL
P-R INTERVAL, ECG05: 172 MS
Q-T INTERVAL, ECG07: 424 MS
QRS DURATION, ECG06: 76 MS
QTC CALCULATION (BEZET), ECG08: 419 MS
VENTRICULAR RATE, ECG03: 59 BPM

## 2019-07-09 ENCOUNTER — APPOINTMENT (OUTPATIENT)
Dept: GENERAL RADIOLOGY | Age: 84
End: 2019-07-09
Attending: EMERGENCY MEDICINE
Payer: MEDICARE

## 2019-07-09 ENCOUNTER — HOSPITAL ENCOUNTER (EMERGENCY)
Age: 84
Discharge: HOME | End: 2019-07-09
Attending: EMERGENCY MEDICINE
Payer: MEDICARE

## 2019-07-09 ENCOUNTER — APPOINTMENT (OUTPATIENT)
Dept: CT IMAGING | Age: 84
End: 2019-07-09
Attending: PHYSICIAN ASSISTANT
Payer: MEDICARE

## 2019-07-09 VITALS
OXYGEN SATURATION: 99 % | DIASTOLIC BLOOD PRESSURE: 68 MMHG | SYSTOLIC BLOOD PRESSURE: 152 MMHG | HEART RATE: 59 BPM | RESPIRATION RATE: 18 BRPM | WEIGHT: 194 LBS | HEIGHT: 67 IN | TEMPERATURE: 97.7 F | BODY MASS INDEX: 30.45 KG/M2
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
## 2019-07-09 DIAGNOSIS — S20.211A CONTUSION OF RIB ON RIGHT SIDE, INITIAL ENCOUNTER: ICD-10-CM

## 2019-07-09 DIAGNOSIS — S00.83XA CONTUSION OF FACE, INITIAL ENCOUNTER: ICD-10-CM

## 2019-07-09 LAB
ATRIAL RATE: 56 BPM
CALCULATED P AXIS, ECG09: 51 DEGREES
CALCULATED R AXIS, ECG10: -26 DEGREES
CALCULATED T AXIS, ECG11: 38 DEGREES
DIAGNOSIS, 93000: NORMAL
P-R INTERVAL, ECG05: 180 MS
Q-T INTERVAL, ECG07: 430 MS
QRS DURATION, ECG06: 92 MS
QTC CALCULATION (BEZET), ECG08: 414 MS
VENTRICULAR RATE, ECG03: 56 BPM

## 2019-07-09 PROCEDURE — 99283 EMERGENCY DEPT VISIT LOW MDM: CPT

## 2019-07-09 PROCEDURE — 77030027138 HC INCENT SPIROMETER -A

## 2019-07-09 PROCEDURE — 70450 CT HEAD/BRAIN W/O DYE: CPT

## 2019-07-09 PROCEDURE — 93005 ELECTROCARDIOGRAM TRACING: CPT

## 2019-07-09 PROCEDURE — 72125 CT NECK SPINE W/O DYE: CPT

## 2019-07-09 PROCEDURE — 70486 CT MAXILLOFACIAL W/O DYE: CPT

## 2019-07-09 PROCEDURE — 71101 X-RAY EXAM UNILAT RIBS/CHEST: CPT

## 2019-07-09 NOTE — ED TRIAGE NOTES
Triage: pt reports that he got up to the bathroom 3 nights ago and it was dark. Pt fell to floor. Pt with bruising to R eye, complains of pain to R ribs when blowing nose and when taking deep breath.

## 2019-07-09 NOTE — ED PROVIDER NOTES
EMERGENCY DEPARTMENT HISTORY AND PHYSICAL EXAM 
 
Date: 7/9/2019 Patient Name: Margoth Yanez. History of Presenting Illness Chief Complaint Patient presents with  Fall  Rib Pain History Provided By: Patient Chief Complaint: Di Vinson Additional History (Context):  
2:10 PM 
Margoth Yanez. is a 82 y.o. male presents to the emergency department C/O fall 3 nights ago. States he was getting up to go to the bathroom and tripped in the dark hitting his right cheek/head on the floor. Denies loss of consciousness. He denies any headache, neck pain, blurred vision, vomiting, dizziness. The next day he noticed right rib pain worse with taking a deep breath. No shortness of breath. Denies any other pain or complaint. Patient does take Xarelto for A. fib PCP: Nicole Angeles MD 
 
Current Outpatient Medications Medication Sig Dispense Refill  buPROPion SR (WELLBUTRIN SR) 150 mg SR tablet Take 150 mg by mouth daily.  dilTIAZem CD (CARDIZEM CD) 240 mg ER capsule Take 240 mg by mouth daily (after lunch).  insulin detemir U-100 (LEVEMIR U-100 INSULIN) 100 unit/mL injection 15 Units by SubCUTAneous route nightly.  rivaroxaban (XARELTO) 20 mg tab tablet Take 20 mg by mouth daily (with lunch).  cycloSPORINE (RESTASIS) 0.05 % dpet Administer 1 Drop to left eye as needed.  oxybutynin (DITROPAN) 5 mg tablet Take 5 mg by mouth nightly.  metoprolol tartrate 75 mg tab Take 75 mg by mouth every twelve (12) hours. 60 Tab 0  cholecalciferol (VITAMIN D3) 1,000 unit tablet Take 1,000 Units by mouth daily.  furosemide (LASIX) 20 mg tablet Take 20 mg by mouth daily. Indications: Edema  insulin lispro (HUMALOG) 100 unit/mL injection by SubCUTAneous route four (4) times daily. BS-with sliding scale 80-12-=2 units 121-160=4 
161-200=6 
201-250=8 
251- and over 10 units 
with each meal  Indications: type 2 diabetes mellitus, sliding scale  Comp. Stocking,Thigh,Long,X-Lrg misc 1 Units by Does Not Apply route daily. 1 Units 0  
 multivitamin, tx-iron-ca-min (THERA-M W/ IRON) 9 mg iron-400 mcg tab tablet Take 1 Tab by mouth daily. Formulary substitution for DAILY MULTIVITAMIN centrum silver  fluticasone (FLONASE) 50 mcg/actuation nasal spray 2 Sprays by Both Nostrils route daily as needed for Rhinitis.  donepezil (ARICEPT) 5 mg tablet Take 5 mg by mouth nightly.  VITAMIN D2 50,000 unit capsule TAKE 1 CAPSULE TWICE WEEKLY 52 Cap 0  
 Omeprazole delayed release (PRILOSEC D/R) 20 mg tablet Take 20 mg by mouth daily.  melatonin tab tablet Take 5 mg by mouth nightly. Indications: sleep Past History Past Medical History: 
Past Medical History:  
Diagnosis Date  Arthritis  Atrial fibrillation ([address]  Dementia  Depression  Diabetes ([facility] 1980s  GERD (gastroesophageal reflux disease)  Hypercholesterolemia  Hypertension 2000  Ill-defined condition 2015  
 has passed out-has been tested cannot determine cause  Primary osteoarthritis of right knee 6/28/2018  Ulcer   
 at the anastomotic bite of gastric bypass  Varicose veins  Wide-complex tachycardia ([address] 5/2/2015 Past Surgical History: 
Past Surgical History:  
Procedure Laterality Date  ABDOMEN SURGERY PROC UNLISTED  1998  
 umb hernia Rupture  BIOPSY LIVER  10/05/04  
 EGD  12/29/09  
 with biopsy  ENDOSCOPY, COLON, DIAGNOSTIC    
 GASTROSTOMY TUBE  10/05/04  HX CATARACT REMOVAL    
 bilateral  
 HX CHOLECYSTECTOMY  10/05/04  HX GI  10/05/04  
 vertical banded gastroplasty/gastric bypass  HX MOHS PROCEDURES  1991/1995  
 bilateral  
 HX PACEMAKER  2015 Neolane-Reveal Ling Cardiac Monitor Family History: 
Family History Problem Relation Age of Onset  Heart Attack Father  Diabetes Father  Hypertension Father  Cancer Mother   
     lung  Diabetes Mother  Other Brother obese  Diabetes Brother  Diabetes Brother  Other Maternal Grandmother   
     obese  Diabetes Sister Social History: 
Social History Tobacco Use  Smoking status: Former Smoker Last attempt to quit: 4/3/1967 Years since quittin.3  Smokeless tobacco: Never Used Substance Use Topics  Alcohol use: Yes Alcohol/week: 2.4 oz Types: 3 Glasses of wine, 1 Shots of liquor per week Comment: monthly  Drug use: No  
 
 
Allergies: Allergies Allergen Reactions  Morphine Other (comments) Hallucinate; \"ran all night long\" Review of Systems Review of Systems Constitutional: Negative for chills and fever. HENT: Negative for congestion, ear discharge and ear pain. Eyes: Negative for visual disturbance. Respiratory: Negative for shortness of breath. Cardiovascular: Negative for chest pain. Gastrointestinal: Negative for vomiting. Musculoskeletal: Positive for back pain. Negative for neck pain. Skin: Negative for wound. Neurological: Negative for dizziness, syncope, speech difficulty, weakness, light-headedness, numbness and headaches. All other systems reviewed and are negative. Physical Exam  
 
Vitals:  
 19 1406 BP: 152/68 Pulse: (!) 59 Resp: 18 Temp: 97.7 °F (36.5 °C) SpO2: 99% Weight: 88 kg (194 lb) Height: 5' 7\" (1.702 m) Physical Exam  
Constitutional: He is oriented to person, place, and time. He appears well-developed and well-nourished. Patient is alert, oriented x4, no acute distress, sitting up on stretcher HENT:  
Head: Normocephalic and atraumatic. Head is without raccoon's eyes, without Sanchez's sign and without contusion. Right Ear: Tympanic membrane, external ear and ear canal normal. No hemotympanum. Left Ear: Tympanic membrane, external ear and ear canal normal. No hemotympanum.   
Nose: Nose normal. No sinus tenderness, nasal deformity, septal deviation or nasal septal hematoma. No epistaxis. Small area of ecchymoses under the right eye Eyes: Pupils are equal, round, and reactive to light. EOM are normal.  
EOMs intact in all directions bilaterally Neck: Normal range of motion. Neck supple. C-spine nontender Cardiovascular: Normal rate, regular rhythm, normal heart sounds and intact distal pulses. No murmur heard. Pulmonary/Chest: Effort normal and breath sounds normal. No respiratory distress. He has no wheezes. He has no rales. Abdominal: Soft. Bowel sounds are normal. He exhibits no distension. There is no tenderness. There is no rebound, no guarding and no CVA tenderness. Musculoskeletal:  
Full range of motion of all extremities, nontender Neurological: He is alert and oriented to person, place, and time. Skin: Skin is warm and dry. Psychiatric: He has a normal mood and affect. Judgment normal.  
Nursing note and vitals reviewed. Diagnostic Study Results Labs: 
  
Recent Results (from the past 12 hour(s)) EKG, 12 LEAD, INITIAL Collection Time: 07/09/19  2:25 PM  
Result Value Ref Range Ventricular Rate 56 BPM  
 Atrial Rate 56 BPM  
 P-R Interval 180 ms QRS Duration 92 ms Q-T Interval 430 ms QTC Calculation (Bezet) 414 ms Calculated P Axis 51 degrees Calculated R Axis -26 degrees Calculated T Axis 38 degrees Diagnosis Sinus bradycardia Left axis deviation Incomplete right bundle branch block Abnormal ECG Confirmed by Jose De Jesus Rao MD, Samson Becker ([id number]) on 7/9/2019 4:13:57 PM 
  
 
 
Radiologic Studies: XR RIBS RT W PA CXR MIN 3 V Final Result IMPRESSION:  No rib fracture identified. CT SPINE CERV WO CONT Final Result IMPRESSION:  
  
1. No cervical spine fracture or subluxation. 2. Moderate multilevel degenerative changes greatest at C5-C6 including possible  
partially calcified left subarticular disc protrusion. 3. No definite high-grade central stenosis. Please note this cervical spine CT was performed with patient supine. This  
supine study does not evaluate for ligamentous injury or exclude instability. If the patient has persistent symptoms or if otherwise clinically indicated,  
erect cervical spine plain films are recommended. CT MAXILLOFACIAL WO CONT Final Result IMPRESSION:  
  
No acute fracture or dislocation. Mild scattered sialolithiasis without evidence of sialoadenitis. CT HEAD WO CONT Final Result IMPRESSION:  
  
1. No evidence for acute intracranial injury, cortical infarct, hemorrhage, or  
mass effect. CT can be negative in acute setting. 2. Mild atrophy demonstrating nonspecific white matter hypodensities, likely  
chronic microvascular disease. CT Results  (Last 48 hours) 07/09/19 1505  CT SPINE CERV WO CONT Final result Impression:  IMPRESSION:  
   
1. No cervical spine fracture or subluxation. 2. Moderate multilevel degenerative changes greatest at C5-C6 including possible  
partially calcified left subarticular disc protrusion. 3. No definite high-grade central stenosis. Please note this cervical spine CT was performed with patient supine. This  
supine study does not evaluate for ligamentous injury or exclude instability. If the patient has persistent symptoms or if otherwise clinically indicated,  
erect cervical spine plain films are recommended. Narrative:  EXAM: CT CERVICAL SPINE W/O CONTRAST INDICATION: Neck pain following trauma COMPARISON: No prior comparison study TECHNIQUE: Axial CT imaging of the cervical spine was performed from the skull  
base to the upper thoracic spine without intravenous contrast. Multiplanar  
reformats were generated.   One or more dose reduction techniques were used on  
this CT: automated exposure control, adjustment of the mAs and/or kVp according  
to patient's size, and iterative reconstruction techniques. The specific  
techniques utilized on this CT exam have been documented in the patient's  
electronic medical record. Digital Imaging and Communications in Medicine (DICOM) format image data are available to nonaffiliated external healthcare  
facilities or entities on a secure, media free, reciprocally searchable basis  
with patient authorization for at least a 12-month period after this study. _______________ FINDINGS:  
   
VERTEBRAE AND DISCS: Normal cervical vertebral alignment is seen. No fracture or  
subluxation is seen. Multilevel disc height loss with endplate osteophyte  
formation is present. Endplate Schmorl's nodes are suggested vertically C3-4 and C5-6 levels. Mild facet arthropathy is present, greatest towards the left at  
C2-C3. Degenerative changes also seen along the anterior atlantoaxial  
articulation. SPINAL CANAL AND FORAMINA: No definite high-grade central stenosis is seen. Multilevel disc bulges are suggested. Multilevel mild to moderate foraminal  
narrowing is present. Partially calcified left C5-6 disc may be present in the  
left subarticular region, narrowing the left axillary recess and continuing to  
moderate to severe left foraminal stenosis. PREVERTEBRAL SOFT TISSUES: Unremarkable VISIBLE INTRACRANIAL CONTENTS: Unremarkable. LUNG APICES: Clear. OTHER: None.  
   
_______________  
   
  
 07/09/19 1504  CT MAXILLOFACIAL WO CONT Final result Impression:  IMPRESSION:  
   
No acute fracture or dislocation. Mild scattered sialolithiasis without evidence of sialoadenitis. Narrative:  EXAM: CT FACE W/O CONTRAST INDICATION: Fall, right-sided facial brain lesions. COMPARISON: None TECHNIQUE: Multiple axial CT images of the maxillofacial structures including the mandible were performed. Additional coronal and sagittal reformations were  
also performed. One or more dose reduction techniques were used on this CT:  
automated exposure control, adjustment of the mAs and/or kVp according to  
patient's size, and iterative reconstruction techniques. The specific techniques  
utilized on this CT exam have been documented in the patient's electronic  
medical record. Digital Imaging and Communications in Medicine (DICOM) format  
image data are available to nonaffiliated external healthcare facilities or  
entities on a secure, media free, reciprocally searchable basis with patient authorization for at least a 12-month period after this study. _______________ FINDINGS:  
   
OSSEOUS STRUCTURES: No fractures are seen. The mandible is intact. No  
dislocation is seen involving the temporomandibular joint. Degenerative changes  
are present visualized upper cervical spine. GLOBES AND ORBITS:  The bilateral lenses are absent, likely due to prior lens  
replacement. Retro-orbital fat is clear. PARANASAL SINUSES: Clear VISUALIZED MASTOID AIR CELLS:  Clear VISIBLE INTRACRANIAL CONTENTS:  The ventricles and sulci are mildly enlarged  
consistent with diffuse volume loss. OTHER: Punctate calcifications are seen involving the left parotid gland as well  
as the bilateral submandibular glands without adjacent inflammatory changes. _______________  
   
  
 07/09/19 1501  CT HEAD WO CONT Final result Impression:  IMPRESSION:  
   
1. No evidence for acute intracranial injury, cortical infarct, hemorrhage, or  
mass effect. CT can be negative in acute setting. 2. Mild atrophy demonstrating nonspecific white matter hypodensities, likely  
chronic microvascular disease. Narrative:  CT head without contrast  
   
INDICATION: Posttraumatic headache. COMPARISON: CT 11/27/2018. TECHNIQUE: Axial CT imaging of the head was performed without intravenous  
contrast. One or more dose reduction techniques were used on this CT: automated  
exposure control, adjustment of the mAs and/or kVp according to patient size,  
and iterative reconstruction techniques. The specific techniques used on this CT exam have been documented in the patient's electronic medical record. Digital  
Imaging and Communications in Medicine (DICOM) format image data are available  
to nonaffiliated external healthcare facilities or entities on a secure, media  
free, reciprocally searchable basis with patient authorization for at least a  
12-month period after this study. _______________ FINDINGS:  
   
BRAIN AND POSTERIOR FOSSA: There is mild diffuse peripheral atrophy with  
prominence of the sylvian fissures, the cisterns and the sulci pattern without  
midline shift or mass effect. There is nonspecific periventricular and  
subcortical hypodensities, most probably chronic small vessel ischemia. There  
is no evidence of any hemorrhage, acute infarct, or abnormal fluid collection.] EXTRA-AXIAL SPACES AND MENINGES: There are no abnormal extra-axial fluid  
collections. CALVARIUM: Intact. SINUSES: Clear. OTHER: Carotid atherosclerotic calcifications noted. _______________ CXR Results  (Last 48 hours) None Medical Decision Making I am the first provider for this patient. I reviewed the vital signs, available nursing notes, past medical history, past surgical history, family history and social history. Vital Signs: Reviewed the patient's vital signs. Pulse Oximetry Analysis: 99% on RA  
 
 
EKG interpretation: (Preliminary) 2:10 PM  
Sinus bradycardia, rate 56 bpm, incomplete right bundle branch block, no ST changes EKG read by Clearence Kanner, PA-C at 2:25 PM  
 
Records Reviewed: Nursing Notes and Old Medical Records Procedures: Procedures ED Course:  
2:10 PM Initial assessment performed. The patients presenting problems have been discussed, and they are in agreement with the care plan formulated and outlined with them. I have encouraged them to ask questions as they arise throughout their visit. Discussion: Pt presents with right-sided rib pain after a fall 3 days ago. He also had a area of ecchymosis under the right eye. Head CT maxillofacial CT and C-spine CT show no acute process or bleeding. Right rib x-ray shows no fractures or pneumothorax. Will have patient take his Ultram at home for more severe pain. Strict return precautions given, pt offering no questions or complaints. Diagnosis and Disposition DISCHARGE NOTE: 
[facility]  results have been reviewed with him. He has been counseled regarding his diagnosis, treatment, and plan. He verbally conveys understanding and agreement of the signs, symptoms, diagnosis, treatment and prognosis and additionally agrees to follow up as discussed. He also agrees with the care-plan and conveys that all of his questions have been answered. I have also provided discharge instructions for him that include: educational information regarding their diagnosis and treatment, and list of reasons why they would want to return to the ED prior to their follow-up appointment, should his condition change. He has been provided with education for proper emergency department utilization. CLINICAL IMPRESSION: 
 
1. Fall, initial encounter 2. Contusion of face, initial encounter 3. Contusion of rib on right side, initial encounter PLAN: 
1. D/C Home 2. Discharge Medication List as of 7/9/2019  3:53 PM  
  
 
3. Follow-up Information Follow up With Specialties Details Why Contact Info Magdy Pacheco MD Family Practice  call for follow up and recheck  [address] [facility] 
[phone] [facility] EMERGENCY DEPT Emergency Medicine  If symptoms worsen [address] [place] [id number] 
[phone] Please note that this dictation was completed with [employer]ar, the computer voice recognition software. Quite often unanticipated grammatical, syntax, homophones, and other interpretive errors are inadvertently transcribed by the computer software. Please disregard these errors. Please excuse any errors that have escaped final proofreading.

## 2019-07-09 NOTE — DISCHARGE INSTRUCTIONS
Chest Contusion: Care Instructions  Your Care Instructions  A chest contusion, or bruise, is caused by a fall or direct blow to the chest. Car crashes, falls, getting punched, and injury from bicycle handlebars are common causes of chest contusions. A very forceful blow to the chest can injure the heart or blood vessels in the chest, the lungs, the airway, the liver, or the spleen. Pain may be caused by an injury to muscles, cartilage, or ribs. Deep breathing, coughing, or sneezing can increase your pain. Lying on the injured area also can cause pain. Follow-up care is a key part of your treatment and safety. Be sure to make and go to all appointments, and call your doctor if you are having problems. It's also a good idea to know your test results and keep a list of the medicines you take. How can you care for yourself at home? · Rest and protect the injured or sore area. Stop, change, or take a break from any activity that may be causing your pain. · Put ice or a cold pack on the area for 10 to 20 minutes at a time. Put a thin cloth between the ice and your skin. · After 2 or 3 days, if your swelling is gone, apply a heating pad set on low or a warm cloth to your chest. Some doctors suggest that you go back and forth between hot and cold. Put a thin cloth between the heating pad and your skin. · Do not wrap or tape your ribs for support. This may cause you to take smaller breaths, which could increase your risk of pneumonia and lung collapse. · Ask your doctor if you can take an over-the-counter pain medicine, such as acetaminophen (Tylenol), ibuprofen (Advil, Motrin), or naproxen (Aleve). Be safe with medicines. Read and follow all instructions on the label. · Take your medicines exactly as prescribed. Call your doctor if you think you are having a problem with your medicine.   · Gentle stretching and massage may help you feel better after a few days of rest. Stretch slowly to the point just before discomfort begins, then hold the stretch for at least 15 to 30 seconds. Do this 3 or 4 times per day. · As your pain gets better, slowly return to your normal activities. Be patient, because chest bruises can take weeks or months to heal. Any increased pain may be a sign that you need to rest a while longer. When should you call for help? Call 911 anytime you think you may need emergency care. For example, call if:    · You have severe trouble breathing.     · You cough up blood.    Call your doctor now or seek immediate medical care if:    · You have belly pain.     · You are dizzy or lightheaded, or you feel like you may faint.     · You develop new symptoms with the chest pain.     · Your chest pain gets worse.     · You have a fever.     · You have some shortness of breath.     · You have a cough that brings up mucus from the lungs.    Watch closely for changes in your health, and be sure to contact your doctor if:    · Your chest pain is not improving after 1 week. Where can you learn more? Go to http://jared-bill.info/. Enter I174 in the search box to learn more about \"Chest Contusion: Care Instructions. \"  Current as of: September 23, 2018  Content Version: 11.9  © 6993-5577 [employer]. Care instructions adapted under license by [employer] (which disclaims liability or warranty for this information). If you have questions about a medical condition or this instruction, always ask your healthcare professional. [place] [address] any warranty or liability for your use of this information. Preventing Falls: Care Instructions  Your Care Instructions    Getting around your home safely can be a challenge if you have injuries or health problems that make it easy for you to fall. Loose rugs and furniture in walkways are among the dangers for many older people who have problems walking or who have poor eyesight.  People who have conditions such as arthritis, osteoporosis, or dementia also have to be careful not to fall. You can make your home safer with a few simple measures. Follow-up care is a key part of your treatment and safety. Be sure to make and go to all appointments, and call your doctor if you are having problems. It's also a good idea to know your test results and keep a list of the medicines you take. How can you care for yourself at home? Taking care of yourself  · You may get dizzy if you do not drink enough water. To prevent dehydration, drink plenty of fluids, enough so that your urine is light yellow or clear like water. Choose water and other caffeine-free clear liquids. If you have kidney, heart, or liver disease and have to limit fluids, talk with your doctor before you increase the amount of fluids you drink. · Exercise regularly to improve your strength, muscle tone, and balance. Walk if you can. Swimming may be a good choice if you cannot walk easily. · Have your vision and hearing checked each year or any time you notice a change. If you have trouble seeing and hearing, you might not be able to avoid objects and could lose your balance. · Know the side effects of the medicines you take. Ask your doctor or pharmacist whether the medicines you take can affect your balance. Sleeping pills or sedatives can affect your balance. · Limit the amount of alcohol you drink. Alcohol can impair your balance and other senses. · Ask your doctor whether calluses or corns on your feet need to be removed. If you wear loose-fitting shoes because of calluses or corns, you can lose your balance and fall. · Talk to your doctor if you have numbness in your feet. Preventing falls at home  · Remove raised doorway thresholds, throw rugs, and clutter. Repair loose carpet or raised areas in the floor. · Move furniture and electrical cords to keep them out of walking paths.   · Use nonskid floor wax, and wipe up spills right away, especially on ceramic tile floors. · If you use a walker or cane, put rubber tips on it. If you use crutches, clean the bottoms of them regularly with an abrasive pad, such as steel wool. · Keep your house well lit, especially [place], and outside walkways. Use night-lights in areas such as hallways and bathrooms. Add extra light switches or use remote switches (such as switches that go on or off when you clap your hands) to make it easier to turn lights on if you have to get up during the night. · Install sturdy handrails on stairways. · Move items in your cabinets so that the things you use a lot are on the lower shelves (about waist level). · Keep a cordless phone and a flashlight with new batteries by your bed. If possible, put a phone in each of the main rooms of your house, or carry a cell phone in case you fall and cannot reach a phone. Or, you can wear a device around your neck or wrist. You push a button that sends a signal for help. · Wear low-heeled shoes that fit well and give your feet good support. Use footwear with nonskid soles. Check the heels and soles of your shoes for wear. Repair or replace worn heels or soles. · Do not wear socks without shoes on wood floors. · Walk on the grass when the sidewalks are slippery. If you live in an area that gets snow and ice in the winter, sprinkle salt on slippery steps and sidewalks. Preventing falls in the bath  · Install grab bars and nonskid mats inside and outside your shower or tub and near the toilet and sinks. · Use shower chairs and bath benches. · Use a hand-held shower head that will allow you to sit while showering. · Get into a tub or shower by putting the weaker leg in first. Get out of a tub or shower with your strong side first.  · Repair loose toilet seats and consider installing a raised toilet seat to make getting on and off the toilet easier. · Keep your bathroom door unlocked while you are in the shower. Where can you learn more?   Go to http://jared-bill.info/. Enter 0476 [id number] in the search box to learn more about \"Preventing Falls: Care Instructions. \"  Current as of: March 16, 2018  Content Version: 11.8  © 0700-8258 [employer]. Care instructions adapted under license by [employer] (which disclaims liability or warranty for this information). If you have questions about a medical condition or this instruction, always ask your healthcare professional. [address] any warranty or liability for your use of this information.

## 2019-08-18 ENCOUNTER — APPOINTMENT (OUTPATIENT)
Dept: GENERAL RADIOLOGY | Age: 84
End: 2019-08-18
Attending: EMERGENCY MEDICINE
Payer: MEDICARE

## 2019-08-18 ENCOUNTER — APPOINTMENT (OUTPATIENT)
Dept: CT IMAGING | Age: 84
End: 2019-08-18
Attending: EMERGENCY MEDICINE
Payer: MEDICARE

## 2019-08-18 ENCOUNTER — HOSPITAL ENCOUNTER (EMERGENCY)
Age: 84
Discharge: HOME | End: 2019-08-18
Attending: EMERGENCY MEDICINE
Payer: MEDICARE

## 2019-08-18 VITALS
SYSTOLIC BLOOD PRESSURE: 159 MMHG | HEART RATE: 64 BPM | RESPIRATION RATE: 16 BRPM | BODY MASS INDEX: 30.76 KG/M2 | DIASTOLIC BLOOD PRESSURE: 77 MMHG | OXYGEN SATURATION: 100 % | HEIGHT: 67 IN | TEMPERATURE: 99.3 F | WEIGHT: 196 LBS

## 2019-08-18 DIAGNOSIS — S20.212A RIB CONTUSION, LEFT, INITIAL ENCOUNTER: ICD-10-CM

## 2019-08-18 DIAGNOSIS — S00.83XA CONTUSION OF FACE, INITIAL ENCOUNTER: ICD-10-CM

## 2019-08-18 DIAGNOSIS — S62.102A AVULSION FRACTURE OF LEFT WRIST: ICD-10-CM
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
## 2019-08-18 PROCEDURE — 72125 CT NECK SPINE W/O DYE: CPT

## 2019-08-18 PROCEDURE — 73110 X-RAY EXAM OF WRIST: CPT

## 2019-08-18 PROCEDURE — 70450 CT HEAD/BRAIN W/O DYE: CPT

## 2019-08-18 PROCEDURE — 75810000053 HC SPLINT APPLICATION

## 2019-08-18 PROCEDURE — 99284 EMERGENCY DEPT VISIT MOD MDM: CPT

## 2019-08-18 PROCEDURE — 71111 X-RAY EXAM RIBS/CHEST4/> VWS: CPT

## 2019-08-18 NOTE — ED TRIAGE NOTES
Patient with complaints of a ground level fall yesterday, injuring the left wrist and hitting the left side of his face.

## 2019-08-18 NOTE — ED PROVIDER NOTES
EMERGENCY DEPARTMENT HISTORY AND PHYSICAL EXAM    Date: 8/18/2019  Patient Name: John Kathleen. History of Presenting Illness     Chief Complaint   Patient presents with    Wrist Pain    Head Injury         History Provided By: Patient and Patient's Wife     History Joanne العراقي):   3:16 PM  John Baugh is a 82 y.o. 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 Patient was playing with his dog yesterday when he slipped over a piece of wood and fell off of the sidewalk into the grass. Associated sxs include contusion of the face, forehead and mild neck pain with left wrist pain. Pt denies loss of consciousness or any other sxs or complaints. Chief Complaint: fall  Duration: 1 day   Timing:  acute  Location: face and left wrist  Quality: Sharp  Severity: Moderate  Modifying Factors: Nothing makes it better or worse. Associated Symptoms: denies any other associated signs or symptoms    PCP: Deneen Molina MD     Current Outpatient Medications   Medication Sig Dispense Refill    Comp. Stocking,Thigh,Long,X-Lrg misc 1 Units by Does Not Apply route daily. 1 Units 0    buPROPion SR (WELLBUTRIN SR) 150 mg SR tablet Take 150 mg by mouth daily.  dilTIAZem CD (CARDIZEM CD) 240 mg ER capsule Take 240 mg by mouth daily (after lunch).  multivitamin, tx-iron-ca-min (THERA-M W/ IRON) 9 mg iron-400 mcg tab tablet Take 1 Tab by mouth daily. Formulary substitution for DAILY MULTIVITAMIN centrum silver      insulin detemir U-100 (LEVEMIR U-100 INSULIN) 100 unit/mL injection 15 Units by SubCUTAneous route nightly.  rivaroxaban (XARELTO) 20 mg tab tablet Take 20 mg by mouth daily (with lunch).       fluticasone (FLONASE) 50 mcg/actuation nasal spray 2 Sprays by Both Nostrils route daily as needed for Rhinitis.  cycloSPORINE (RESTASIS) 0.05 % dpet Administer 1 Drop to left eye as needed.  donepezil (ARICEPT) 5 mg tablet Take 5 mg by mouth nightly.  VITAMIN D2 50,000 unit capsule TAKE 1 CAPSULE TWICE WEEKLY 52 Cap 0    oxybutynin (DITROPAN) 5 mg tablet Take 5 mg by mouth nightly.  metoprolol tartrate 75 mg tab Take 75 mg by mouth every twelve (12) hours. 60 Tab 0    Omeprazole delayed release (PRILOSEC D/R) 20 mg tablet Take 20 mg by mouth daily.  cholecalciferol (VITAMIN D3) 1,000 unit tablet Take 1,000 Units by mouth daily.  furosemide (LASIX) 20 mg tablet Take 20 mg by mouth daily. Indications: Edema      melatonin tab tablet Take 5 mg by mouth nightly. Indications: sleep      insulin lispro (HUMALOG) 100 unit/mL injection by SubCUTAneous route four (4) times daily.  BS-with sliding scale  80-12-=2 units  121-160=4  161-200=6  201-250=8  251- and over 10 units  with each meal  Indications: type 2 diabetes mellitus, sliding scale         Past History     Past Medical History:  Past Medical History:   Diagnosis Date    Arthritis     Atrial fibrillation ([address]     Dementia     Depression     Diabetes ([address] 1980s    GERD (gastroesophageal reflux disease)     Hypercholesterolemia     Hypertension 2000    Ill-defined condition 2015    has passed out-has been tested cannot determine cause    Primary osteoarthritis of right knee 6/28/2018    Ulcer     at the anastomotic bite of gastric bypass    Varicose veins     Wide-complex tachycardia ([address] 5/2/2015       Past Surgical History:  Past Surgical History:   Procedure Laterality Date    ABDOMEN SURGERY PROC UNLISTED  1998    umb hernia Rupture    BIOPSY LIVER  10/05/04    EGD  12/29/09    with biopsy    ENDOSCOPY, COLON, DIAGNOSTIC      GASTROSTOMY TUBE  10/05/04    HX CATARACT REMOVAL      bilateral    HX CHOLECYSTECTOMY  10/05/04    HX GI  10/05/04    vertical banded gastroplasty/gastric bypass    HX MOHS PROCEDURES      bilateral    HX PACEMAKER      [facility] Cardiac Monitor       Family History:  Family History   Problem Relation Age of Onset    Heart Attack Father     Diabetes Father     Hypertension Father     Cancer Mother         lung    Diabetes Mother     Other Brother         obese    Diabetes Brother     Diabetes Brother     Other Maternal Grandmother         obese    Diabetes Sister        Social History:  Social History     Tobacco Use    Smoking status: Former Smoker     Last attempt to quit: 4/3/1967     Years since quittin.4    Smokeless tobacco: Never Used   Substance Use Topics    Alcohol use: Yes     Alcohol/week: 4.0 standard drinks     Types: 3 Glasses of wine, 1 Shots of liquor per week     Comment: monthly    Drug use: No       Allergies: Allergies   Allergen Reactions    Morphine Other (comments)     Hallucinate; \"ran all night long\"         Review of Systems   Review of Systems   Constitutional: Negative for chills and fever. Respiratory: Negative for shortness of breath. Cardiovascular: Negative for chest pain. Gastrointestinal: Negative for abdominal pain, nausea and vomiting. Neurological: Negative for speech difficulty and light-headedness. All other systems reviewed and are negative. Physical Exam     Vitals:    19 1510 19 1744   BP: 130/59 159/77   Pulse: 66 64   Resp: 20 16   Temp: 99.3 °F (37.4 °C)    SpO2: 100% 100%   Weight: 88.9 kg (196 lb)    Height: 5' 7\" (1.702 m)      Physical Exam   Nursing note and vitals reviewed. Vital signs and nursing notes reviewed  CONSTITUTIONAL: Alert, in no apparent distress; well-developed; well-nourished. HEAD:  Normocephalic, small contusion below the left eye, no raccoon eyes, no love sign. EYES: PERRL; EOM's intact. ENTM: Nose: no rhinorrhea, no septal hematoma;  Throat: no erythema or exudate, mucous membranes moist; Ears: no hemotympanum. Neck:  No JVD, supple without lymphadenopathy. No bony step-offs, mild right-sided cervical paraspinous tenderness. RESP: Chest clear, equal breath sounds. CV: S1 and S2 WNL; No murmurs, gallops or rubs. CHEST: pain over lateral 5-7 ribs on left  GI: Normal bowel sounds, abdomen soft and non-tender. No masses or organomegaly. : No costo-vertebral angle tenderness. BACK:  Non-tender  UPPER EXT:  Normal inspection of right arm.,  Left hand has palmar ecchymosis and pain at the radial and ulnar junction of the carpals, small abrasion at left elbow, otherwise uninjured above the wrist.  LOWER EXT: No edema, no calf tenderness. Distal pulses intact. NEURO: CN intact, reflexes 2/4 and sym, strength 5/5 and sym, sensation intact. SKIN: No rashes; Normal for age and stage. PSYCH:  Alert and oriented, normal affect. Diagnostic Study Results     Labs -   No results found for this or any previous visit (from the past 12 hour(s)). Radiologic Studies -     4:49 PM  RADIOLOGY FINDINGS  Left Wrist X-ray shows likely dorsal wrist avulsion fracture. Pending review by Radiologist  Recorded by Néstor Heredia MD.    6:13 PM  RADIOLOGY FINDINGS  Chest X-ray shows no acute cardiopulmonary process. No displaced rib fracture. Pending review by Radiologist  Recorded by Néstor Heredia MD.      CT HEAD FACIAL WO CONT   Final Result   IMPRESSION:      1. No CT findings of an acute intracranial abnormality. Please note that   noncontrast head CT may be normal in early acute infarct. Stable noncontrast CT   scan of the head. 2. No acute osseous fracture. 3. Mild burden of chronic white matter changes. Mild diffuse age-related volume   loss. CT SPINE CERV WO CONT   Final Result   IMPRESSION:      1. No cervical spine fracture or subluxation. 2. Multilevel degenerative changes greatest at C6-7 including. 3. No definite high-grade central stenosis.       Please note this cervical spine CT was performed with patient supine. This   supine study does not evaluate for ligamentous injury or exclude instability. If the patient has persistent symptoms or if otherwise clinically indicated,   erect cervical spine plain films are recommended. XR WRIST LT AP/LAT/OBL MIN 3V    (Results Pending)   XR RIBS BI W PA CHEST 4 VS    (Results Pending)     CT Results  (Last 48 hours)               08/18/19 1623  CT HEAD FACIAL WO CONT Final result    Impression:  IMPRESSION:       1. No CT findings of an acute intracranial abnormality. Please note that   noncontrast head CT may be normal in early acute infarct. Stable noncontrast CT   scan of the head. 2. No acute osseous fracture. 3. Mild burden of chronic white matter changes. Mild diffuse age-related volume   loss. Narrative:  EXAM: CT Head       INDICATION: Ground-level fall preceding day, hitting left side of face. COMPARISON: CT head from 7/9/2019. TECHNIQUE: Axial CT imaging of the head was performed without intravenous   contrast.   One or more dose reduction techniques were used on this CT: automated exposure   control, adjustment of the mAs and/or kVp according to patient size, and   iterative reconstruction techniques. The specific techniques used on this CT   exam have been documented in the patient's electronic medical record. Digital   Imaging and Communications in Medicine (DICOM) format image data are available   to nonaffiliated external healthcare facilities or entities on a secure, media   free, reciprocally searchable basis with patient authorization for at least a   12-month period after this study. _______________       FINDINGS:       BRAIN:      > Brain volume: Accentuation of the cortical sulcal gyral pattern and sylvian   fissures, compatible with stable mild diffuse volume loss.       > White matter: A mild amount of hypodense white matter lesions are seen   within the periventricular and subcortical white matter which are nonspecific,   but likely represent chronic small vessel changes. > Infarcts, encephalomalacia: None.      > Parenchymal mass: None.      > Parenchymal hemorrhage: None.      > Midline shift: None.      > Miscellaneous: None. EXTRA-AXIAL SPACES: Unremarkable. No fluid collections. CALVARIUM: Intact. SINUSES, MASTOIDS: Clear. OTHER EXTRACRANIAL: Unremarkable.   _______________________________   EXAM: CT MAXILLOFACIAL       INDICATION: Ground-level fall preceding day, hitting left side of face       COMPARISON: CT head from same day and 7/9/2019       TECHNIQUE:  Axial acquired noncontrast CT scan of the face was performed. Coronal and sagittal reformations were also provided. One or more dose reduction techniques were used on this CT: automated exposure   control, adjustment of the mAs and/or kVp according to patient size, and   iterative reconstruction techniques. The specific techniques used on this CT   exam have been documented in the patient's electronic medical record. Digital   Imaging and Communications in Medicine (DICOM) format image data are available   to nonaffiliated external healthcare facilities or entities on a secure, media   free, reciprocally searchable basis with patient authorization for at least a   12-month period after this study. _______________________________   FINDINGS:        -OSSEOUS: No acute or displaced fracture. -ORBITS: Symmetric. No acute process       -NASOPHARYNX, OROPHARYNX, HYPOPHARYNX: Patent       -PARANASAL SINUSES, AERATED MASTOIDS: Clear       - SOFT TISSUES: Unremarkable.   _______________________________       08/18/19 1623  CT SPINE CERV WO CONT Final result    Impression:  IMPRESSION:       1. No cervical spine fracture or subluxation. 2. Multilevel degenerative changes greatest at C6-7 including. 3. No definite high-grade central stenosis.        Please note this cervical spine CT was performed with patient supine. This   supine study does not evaluate for ligamentous injury or exclude instability. If the patient has persistent symptoms or if otherwise clinically indicated,   erect cervical spine plain films are recommended. Narrative:  EXAM: CT CERVICAL SPINE W/O CONTRAST       INDICATION: Neck pain, ground-level fall preceding day stating left-sided face       COMPARISON: 7/9/2019       TECHNIQUE: Axial CT imaging of the cervical spine was performed from the skull   base to the upper thoracic spine without intravenous contrast. Multiplanar   reformats were generated. One or more dose reduction techniques were used on   this CT: automated exposure control, adjustment of the mAs and/or kVp according   to patient's size, and iterative reconstruction techniques. The specific   techniques utilized on this CT exam have been documented in the patient's   electronic medical record. Digital Imaging and Communications in Medicine   (DICOM) format image data are available to nonaffiliated external healthcare   facilities or entities on a secure, media free, reciprocally searchable basis   with patient authorization for at least a 12-month period after this study. _______________       FINDINGS:       VERTEBRAE AND DISCS: Normal cervical vertebral alignment is seen. No fracture or   subluxation is seen. Multilevel disc height loss with endplate osteophyte   formation is present, unchanged prior exam.  Mild facet arthropathy is present,   greatest towards the left at C2-C3. Degenerative changes also seen along the   anterior atlantoaxial articulation. SPINAL CANAL AND FORAMINA: No definite high-grade central stenosis is seen. Multilevel disc bulges are suggested. Multilevel mild to moderate foraminal   narrowing is present.  Partially calcified left C5-6 disc may be present in the   left subarticular region, narrowing the left axillary recess and continuing to   moderate to severe left foraminal stenosis. PREVERTEBRAL SOFT TISSUES: Unremarkable       VISIBLE INTRACRANIAL CONTENTS: Unremarkable. LUNG APICES: Clear. OTHER: None.       _______________               CXR Results  (Last 48 hours)    None          Medications given in the ED-  Medications - No data to display      Medical Decision Making   I am the first provider for this patient. I reviewed the vital signs, available nursing notes, past medical history, past surgical history, family history and social history. Vital Signs-Reviewed the patient's vital signs. Records Reviewed: Nursing Notes    Provider Notes (Medical Decision Making):   DDX: Sprain, strain, fracture, contusion, ICH    Procedures:  Procedures    ED Course:   3:16 PM Initial assessment performed. The patients presenting problems have been discussed, and they are in agreement with the care plan formulated and outlined with them. I have encouraged them to ask questions as they arise throughout their visit.    5:00 PM patient now complaining of left-sided rib pain. Diagnosis and Disposition     Discussion:  82 y.o. male with a mechanical fall yesterday. Patient denies loss of consciousness. He does have worsening left wrist pain since the fall. He also has a contusion of his face. CT of the head, face and C-spine demonstrated no fractures. X-ray of the left wrist demonstrated small avulsion fracture. Chest x-ray shows no displaced rib fracture. Patient will follow-up with orthopedics and his primary care doctor. Patient and family agree with this plan. DISCHARGE NOTE:  6:15 PM   Shelia Mccullough Jr.'s  results have been reviewed with him. He has been counseled regarding his diagnosis, treatment, and plan. He verbally conveys understanding and agreement of the signs, symptoms, diagnosis, treatment and prognosis and additionally agrees to follow up as discussed.   He also agrees with the care-plan and conveys that all of his questions have been answered. I have also provided discharge instructions for him that include: educational information regarding their diagnosis and treatment, and list of reasons why they would want to return to the ED prior to their follow-up appointment, should his condition change. He has been provided with education for proper emergency department utilization. CLINICAL IMPRESSION:    1. Fall, initial encounter    2. Contusion of face, initial encounter    3. Avulsion fracture of left wrist    4. Rib contusion, left, initial encounter        PLAN:  1. D/C Home  2. Current Discharge Medication List        3. Follow-up Information     Follow up With Specialties Details Why Contact Info    Norm Russo MD Family Practice In 1 week For follow up from Emergency Department visit. [address] [facility]      Curt Edgar DO Orthopedic Surgery In 1 week For follow up from Emergency Department visit. [address] [facility] [address]      [facility] EMERGENCY DEPT Emergency Medicine  As needed; If symptoms worsen [address] [address]  [address]     Please note that this dictation was completed with orderbolt, the computer voice recognition software. Quite often unanticipated grammatical, syntax, homophones, and other interpretive errors are inadvertently transcribed by the computer software. Please disregard these errors. Please excuse any errors that have escaped final proofreading.     Leticia Thrasher MD

## 2019-08-18 NOTE — ED NOTES
Wife at bedside    Discharge instructions reviewed with the patient and spouse with opportunity for questions given. The patient and spouse verbalized understanding. Patient armband removed and shredded. Patient in stable condition at time of discharge.

## 2020-01-01 ENCOUNTER — APPOINTMENT (OUTPATIENT)
Dept: GENERAL RADIOLOGY | Age: 85
DRG: 871 | End: 2020-01-01
Attending: INTERNAL MEDICINE
Payer: MEDICARE

## 2020-01-01 ENCOUNTER — ANESTHESIA (OUTPATIENT)
Dept: SURGERY | Age: 85
DRG: 480 | End: 2020-01-01
Payer: MEDICARE

## 2020-01-01 ENCOUNTER — HOSPITAL ENCOUNTER (EMERGENCY)
Dept: CT IMAGING | Age: 85
Discharge: HOME | End: 2020-09-11
Attending: EMERGENCY MEDICINE
Payer: MEDICARE

## 2020-01-01 ENCOUNTER — APPOINTMENT (OUTPATIENT)
Dept: GENERAL RADIOLOGY | Age: 85
DRG: 871 | End: 2020-01-01
Attending: FAMILY MEDICINE
Payer: MEDICARE

## 2020-01-01 ENCOUNTER — APPOINTMENT (OUTPATIENT)
Dept: CT IMAGING | Age: 85
End: 2020-01-01
Attending: EMERGENCY MEDICINE
Payer: MEDICARE

## 2020-01-01 ENCOUNTER — APPOINTMENT (OUTPATIENT)
Dept: GENERAL RADIOLOGY | Age: 85
DRG: 480 | End: 2020-01-01
Attending: EMERGENCY MEDICINE
Payer: MEDICARE

## 2020-01-01 ENCOUNTER — ANESTHESIA EVENT (OUTPATIENT)
Dept: SURGERY | Age: 85
DRG: 480 | End: 2020-01-01
Payer: MEDICARE

## 2020-01-01 ENCOUNTER — APPOINTMENT (OUTPATIENT)
Dept: GENERAL RADIOLOGY | Age: 85
DRG: 480 | End: 2020-01-01
Attending: HOSPITALIST
Payer: MEDICARE

## 2020-01-01 ENCOUNTER — APPOINTMENT (OUTPATIENT)
Dept: VASCULAR SURGERY | Age: 85
DRG: 871 | End: 2020-01-01
Attending: INTERNAL MEDICINE
Payer: MEDICARE

## 2020-01-01 ENCOUNTER — HOSPITAL ENCOUNTER (INPATIENT)
Age: 85
LOS: 3 days | DRG: 871 | End: 2020-11-06
Attending: EMERGENCY MEDICINE | Admitting: FAMILY MEDICINE
Payer: MEDICARE

## 2020-01-01 ENCOUNTER — APPOINTMENT (OUTPATIENT)
Dept: GENERAL RADIOLOGY | Age: 85
DRG: 480 | End: 2020-01-01
Attending: ORTHOPAEDIC SURGERY
Payer: MEDICARE

## 2020-01-01 ENCOUNTER — APPOINTMENT (OUTPATIENT)
Dept: CT IMAGING | Age: 85
DRG: 480 | End: 2020-01-01
Attending: EMERGENCY MEDICINE
Payer: MEDICARE

## 2020-01-01 ENCOUNTER — HOSPITAL ENCOUNTER (INPATIENT)
Age: 85
LOS: 8 days | Discharge: NURSING, REHAB OR LONG-TERM CARE | DRG: 480 | End: 2020-11-01
Attending: EMERGENCY MEDICINE | Admitting: INTERNAL MEDICINE
Payer: MEDICARE

## 2020-01-01 ENCOUNTER — APPOINTMENT (OUTPATIENT)
Dept: GENERAL RADIOLOGY | Age: 85
DRG: 871 | End: 2020-01-01
Attending: EMERGENCY MEDICINE
Payer: MEDICARE

## 2020-01-01 ENCOUNTER — HOSPITAL ENCOUNTER (EMERGENCY)
Age: 85
Discharge: HOME | End: 2020-06-23
Attending: EMERGENCY MEDICINE
Payer: MEDICARE

## 2020-01-01 ENCOUNTER — APPOINTMENT (OUTPATIENT)
Dept: NON INVASIVE DIAGNOSTICS | Age: 85
DRG: 871 | End: 2020-01-01
Attending: INTERNAL MEDICINE
Payer: MEDICARE

## 2020-01-01 ENCOUNTER — APPOINTMENT (OUTPATIENT)
Dept: CT IMAGING | Age: 85
DRG: 480 | End: 2020-01-01
Attending: INTERNAL MEDICINE
Payer: MEDICARE

## 2020-01-01 ENCOUNTER — APPOINTMENT (OUTPATIENT)
Dept: GENERAL RADIOLOGY | Age: 85
End: 2020-01-01
Attending: PHYSICIAN ASSISTANT
Payer: MEDICARE

## 2020-01-01 ENCOUNTER — HOSPITAL ENCOUNTER (EMERGENCY)
Age: 85
Discharge: HOME | End: 2020-09-07
Attending: EMERGENCY MEDICINE
Payer: MEDICARE

## 2020-01-01 ENCOUNTER — HOSPITAL ENCOUNTER (EMERGENCY)
Age: 85
Discharge: HOME | End: 2020-09-11
Attending: EMERGENCY MEDICINE
Payer: MEDICARE

## 2020-01-01 VITALS
TEMPERATURE: 99.3 F | SYSTOLIC BLOOD PRESSURE: 164 MMHG | OXYGEN SATURATION: 98 % | BODY MASS INDEX: 29.25 KG/M2 | DIASTOLIC BLOOD PRESSURE: 85 MMHG | HEIGHT: 68 IN | WEIGHT: 193 LBS | RESPIRATION RATE: 17 BRPM | HEART RATE: 70 BPM

## 2020-01-01 VITALS
OXYGEN SATURATION: 92 % | HEIGHT: 72 IN | RESPIRATION RATE: 20 BRPM | BODY MASS INDEX: 25.29 KG/M2 | HEART RATE: 64 BPM | WEIGHT: 186.73 LBS | TEMPERATURE: 97.3 F | SYSTOLIC BLOOD PRESSURE: 97 MMHG | DIASTOLIC BLOOD PRESSURE: 49 MMHG

## 2020-01-01 VITALS
SYSTOLIC BLOOD PRESSURE: 157 MMHG | OXYGEN SATURATION: 100 % | RESPIRATION RATE: 14 BRPM | DIASTOLIC BLOOD PRESSURE: 74 MMHG | TEMPERATURE: 97.9 F | HEART RATE: 66 BPM

## 2020-01-01 VITALS
HEIGHT: 67 IN | OXYGEN SATURATION: 90 % | HEART RATE: 80 BPM | WEIGHT: 191.8 LBS | BODY MASS INDEX: 30.1 KG/M2 | TEMPERATURE: 98.8 F | SYSTOLIC BLOOD PRESSURE: 137 MMHG | DIASTOLIC BLOOD PRESSURE: 71 MMHG | RESPIRATION RATE: 16 BRPM

## 2020-01-01 VITALS
TEMPERATURE: 98.8 F | RESPIRATION RATE: 20 BRPM | OXYGEN SATURATION: 100 % | BODY MASS INDEX: 30.45 KG/M2 | SYSTOLIC BLOOD PRESSURE: 157 MMHG | HEART RATE: 93 BPM | WEIGHT: 194 LBS | HEIGHT: 67 IN | DIASTOLIC BLOOD PRESSURE: 78 MMHG

## 2020-01-01 DIAGNOSIS — R53.81 DEBILITY: ICD-10-CM
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
## 2020-01-01 DIAGNOSIS — S72.001A CLOSED RIGHT HIP FRACTURE, INITIAL ENCOUNTER (HCC): Primary | ICD-10-CM

## 2020-01-01 DIAGNOSIS — Z79.01 ANTICOAGULANT LONG-TERM USE: ICD-10-CM

## 2020-01-01 DIAGNOSIS — F03.90 DEMENTIA WITHOUT BEHAVIORAL DISTURBANCE, UNSPECIFIED DEMENTIA TYPE: ICD-10-CM

## 2020-01-01 DIAGNOSIS — R09.02 HYPOXIA: ICD-10-CM

## 2020-01-01 DIAGNOSIS — S22.42XA MULTIPLE FRACTURES OF RIBS, LEFT SIDE, INITIAL ENCOUNTER FOR CLOSED FRACTURE: Primary | ICD-10-CM

## 2020-01-01 DIAGNOSIS — S42.102A CLOSED FRACTURE OF LEFT SCAPULA, UNSPECIFIED PART OF SCAPULA, INITIAL ENCOUNTER: ICD-10-CM
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
## 2020-01-01 DIAGNOSIS — Z71.89 ADVANCED CARE PLANNING/COUNSELING DISCUSSION: ICD-10-CM

## 2020-01-01 DIAGNOSIS — R41.82 ALTERED MENTAL STATUS, UNSPECIFIED ALTERED MENTAL STATUS TYPE: ICD-10-CM
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
## 2020-01-01 DIAGNOSIS — S22.42XA CLOSED FRACTURE OF MULTIPLE RIBS OF LEFT SIDE, INITIAL ENCOUNTER: Primary | ICD-10-CM

## 2020-01-01 DIAGNOSIS — J96.01 ACUTE HYPOXEMIC RESPIRATORY FAILURE (HCC): ICD-10-CM

## 2020-01-01 DIAGNOSIS — S22.49XA MULTIPLE RIB FRACTURES INVOLVING FOUR OR MORE RIBS: ICD-10-CM

## 2020-01-01 DIAGNOSIS — R09.02 HYPOXIA: Primary | ICD-10-CM

## 2020-01-01 DIAGNOSIS — J18.9 PNEUMONIA OF BOTH LUNGS DUE TO INFECTIOUS ORGANISM, UNSPECIFIED PART OF LUNG: ICD-10-CM

## 2020-01-01 LAB
ABO + RH BLD: NORMAL
ABO + RH BLD: NORMAL
ALBUMIN SERPL-MCNC: 1.6 G/DL (ref 3.4–5)
ALBUMIN SERPL-MCNC: 1.6 G/DL (ref 3.4–5)
ALBUMIN SERPL-MCNC: 1.7 G/DL (ref 3.4–5)
ALBUMIN SERPL-MCNC: 1.7 G/DL (ref 3.4–5)
ALBUMIN SERPL-MCNC: 1.8 G/DL (ref 3.4–5)
ALBUMIN SERPL-MCNC: 2.8 G/DL (ref 3.4–5)
ALBUMIN SERPL-MCNC: 3 G/DL (ref 3.4–5)
ALBUMIN/GLOB SERPL: 0.4 {RATIO} (ref 0.8–1.7)
ALBUMIN/GLOB SERPL: 0.5 {RATIO} (ref 0.8–1.7)
ALBUMIN/GLOB SERPL: 0.8 {RATIO} (ref 0.8–1.7)
ALBUMIN/GLOB SERPL: 0.9 {RATIO} (ref 0.8–1.7)
ALP SERPL-CCNC: 129 U/L (ref 45–117)
ALP SERPL-CCNC: 134 U/L (ref 45–117)
ALP SERPL-CCNC: 137 U/L (ref 45–117)
ALP SERPL-CCNC: 148 U/L (ref 45–117)
ALP SERPL-CCNC: 150 U/L (ref 45–117)
ALP SERPL-CCNC: 154 U/L (ref 45–117)
ALP SERPL-CCNC: 154 U/L (ref 45–117)
ALP SERPL-CCNC: 158 U/L (ref 45–117)
ALP SERPL-CCNC: 159 U/L (ref 45–117)
ALP SERPL-CCNC: 162 U/L (ref 45–117)
ALT SERPL-CCNC: 11 U/L (ref 16–61)
ALT SERPL-CCNC: 12 U/L (ref 16–61)
ALT SERPL-CCNC: 12 U/L (ref 16–61)
ALT SERPL-CCNC: 13 U/L (ref 16–61)
ALT SERPL-CCNC: 13 U/L (ref 16–61)
ALT SERPL-CCNC: 14 U/L (ref 16–61)
ALT SERPL-CCNC: 15 U/L (ref 16–61)
ALT SERPL-CCNC: 16 U/L (ref 16–61)
ALT SERPL-CCNC: 16 U/L (ref 16–61)
ALT SERPL-CCNC: 21 U/L (ref 16–61)
AMORPH CRY URNS QL MICRO: ABNORMAL
ANION GAP SERPL CALC-SCNC: 4 MMOL/L (ref 3–18)
ANION GAP SERPL CALC-SCNC: 5 MMOL/L (ref 3–18)
ANION GAP SERPL CALC-SCNC: 6 MMOL/L (ref 3–18)
ANION GAP SERPL CALC-SCNC: 7 MMOL/L (ref 3–18)
ANION GAP SERPL CALC-SCNC: 8 MMOL/L (ref 3–18)
ANION GAP SERPL CALC-SCNC: 9 MMOL/L (ref 3–18)
APPEARANCE UR: ABNORMAL
APPEARANCE UR: ABNORMAL
APPEARANCE UR: CLEAR
APTT PPP: 27.7 SEC (ref 23–36.4)
APTT PPP: 32.2 SEC (ref 23–36.4)
APTT PPP: 32.6 SEC (ref 23–36.4)
APTT PPP: 38.9 SEC (ref 23–36.4)
APTT PPP: 44.2 SEC (ref 23–36.4)
APTT PPP: 45.2 SEC (ref 23–36.4)
APTT PPP: 47.6 SEC (ref 23–36.4)
APTT PPP: 66.1 SEC (ref 23–36.4)
APTT PPP: NORMAL SEC (ref 23–36.4)
ARTERIAL PATENCY WRIST A: ABNORMAL
ARTERIAL PATENCY WRIST A: YES
AST SERPL-CCNC: 13 U/L (ref 10–38)
AST SERPL-CCNC: 16 U/L (ref 10–38)
AST SERPL-CCNC: 17 U/L (ref 10–38)
AST SERPL-CCNC: 17 U/L (ref 10–38)
AST SERPL-CCNC: 18 U/L (ref 10–38)
AST SERPL-CCNC: 19 U/L (ref 10–38)
AST SERPL-CCNC: 19 U/L (ref 10–38)
AST SERPL-CCNC: 21 U/L (ref 10–38)
AST SERPL-CCNC: 23 U/L (ref 10–38)
AST SERPL-CCNC: 38 U/L (ref 10–38)
ATRIAL RATE: 107 BPM
ATRIAL RATE: 64 BPM
ATRIAL RATE: 77 BPM
ATRIAL RATE: 83 BPM
AV VELOCITY RATIO: 0.78
AV VTI RATIO: 0.8
B PERT DNA SPEC QL NAA+PROBE: NOT DETECTED
BACTERIA SPEC CULT: NORMAL
BACTERIA URNS QL MICRO: ABNORMAL /HPF
BACTERIA URNS QL MICRO: ABNORMAL /HPF
BASE EXCESS BLD CALC-SCNC: 3 MMOL/L
BASE EXCESS BLD CALC-SCNC: 5 MMOL/L
BASOPHILS # BLD: 0 K/UL (ref 0–0.1)
BASOPHILS NFR BLD: 0 % (ref 0–2)
BDY SITE: ABNORMAL
BDY SITE: ABNORMAL
BILIRUB SERPL-MCNC: 0.2 MG/DL (ref 0.2–1)
BILIRUB SERPL-MCNC: 0.3 MG/DL (ref 0.2–1)
BILIRUB SERPL-MCNC: 0.4 MG/DL (ref 0.2–1)
BILIRUB UR QL: NEGATIVE
BLOOD GROUP ANTIBODIES SERPL: NORMAL
BLOOD GROUP ANTIBODIES SERPL: NORMAL
BNP SERPL-MCNC: 5156 PG/ML (ref 0–1800)
BODY TEMPERATURE: 101.3
BODY TEMPERATURE: 98.6
BORDETELLA PARAPERTUSSIS PCR, BORPAR: NOT DETECTED
BUN SERPL-MCNC: 13 MG/DL (ref 7–18)
BUN SERPL-MCNC: 13 MG/DL (ref 7–18)
BUN SERPL-MCNC: 15 MG/DL (ref 7–18)
BUN SERPL-MCNC: 15 MG/DL (ref 7–18)
BUN SERPL-MCNC: 16 MG/DL (ref 7–18)
BUN SERPL-MCNC: 16 MG/DL (ref 7–18)
BUN SERPL-MCNC: 17 MG/DL (ref 7–18)
BUN SERPL-MCNC: 19 MG/DL (ref 7–18)
BUN SERPL-MCNC: 21 MG/DL (ref 7–18)
BUN SERPL-MCNC: 21 MG/DL (ref 7–18)
BUN SERPL-MCNC: 22 MG/DL (ref 7–18)
BUN SERPL-MCNC: 23 MG/DL (ref 7–18)
BUN SERPL-MCNC: 23 MG/DL (ref 7–18)
BUN SERPL-MCNC: 26 MG/DL (ref 7–18)
BUN/CREAT SERPL: 13 (ref 12–20)
BUN/CREAT SERPL: 15 (ref 12–20)
BUN/CREAT SERPL: 18 (ref 12–20)
BUN/CREAT SERPL: 19 (ref 12–20)
BUN/CREAT SERPL: 20 (ref 12–20)
BUN/CREAT SERPL: 21 (ref 12–20)
BUN/CREAT SERPL: 21 (ref 12–20)
BUN/CREAT SERPL: 22 (ref 12–20)
C PNEUM DNA SPEC QL NAA+PROBE: NOT DETECTED
CA-I SERPL-SCNC: 1.07 MMOL/L (ref 1.12–1.32)
CA-I SERPL-SCNC: 1.11 MMOL/L (ref 1.12–1.32)
CALCIUM SERPL-MCNC: 7.4 MG/DL (ref 8.5–10.1)
CALCIUM SERPL-MCNC: 7.6 MG/DL (ref 8.5–10.1)
CALCIUM SERPL-MCNC: 7.7 MG/DL (ref 8.5–10.1)
CALCIUM SERPL-MCNC: 7.8 MG/DL (ref 8.5–10.1)
CALCIUM SERPL-MCNC: 7.8 MG/DL (ref 8.5–10.1)
CALCIUM SERPL-MCNC: 7.9 MG/DL (ref 8.5–10.1)
CALCIUM SERPL-MCNC: 8.1 MG/DL (ref 8.5–10.1)
CALCIUM SERPL-MCNC: 8.2 MG/DL (ref 8.5–10.1)
CALCIUM SERPL-MCNC: 8.4 MG/DL (ref 8.5–10.1)
CALCULATED P AXIS, ECG09: 37 DEGREES
CALCULATED P AXIS, ECG09: 39 DEGREES
CALCULATED P AXIS, ECG09: 44 DEGREES
CALCULATED R AXIS, ECG10: -28 DEGREES
CALCULATED R AXIS, ECG10: -28 DEGREES
CALCULATED R AXIS, ECG10: -32 DEGREES
CALCULATED R AXIS, ECG10: -34 DEGREES
CALCULATED T AXIS, ECG11: 10 DEGREES
CALCULATED T AXIS, ECG11: 21 DEGREES
CALCULATED T AXIS, ECG11: 34 DEGREES
CALCULATED T AXIS, ECG11: 64 DEGREES
CHLORIDE SERPL-SCNC: 101 MMOL/L (ref 100–111)
CHLORIDE SERPL-SCNC: 102 MMOL/L (ref 100–111)
CHLORIDE SERPL-SCNC: 103 MMOL/L (ref 100–111)
CHLORIDE SERPL-SCNC: 105 MMOL/L (ref 100–111)
CHLORIDE SERPL-SCNC: 105 MMOL/L (ref 100–111)
CHLORIDE SERPL-SCNC: 106 MMOL/L (ref 100–111)
CHLORIDE SERPL-SCNC: 106 MMOL/L (ref 100–111)
CHLORIDE SERPL-SCNC: 108 MMOL/L (ref 100–111)
CK MB CFR SERPL CALC: 1.7 % (ref 0–4)
CK MB CFR SERPL CALC: 1.7 % (ref 0–4)
CK MB CFR SERPL CALC: 1.8 % (ref 0–4)
CK MB CFR SERPL CALC: 1.9 % (ref 0–4)
CK MB CFR SERPL CALC: 2.1 % (ref 0–4)
CK MB CFR SERPL CALC: 2.5 % (ref 0–4)
CK MB CFR SERPL CALC: 3.2 % (ref 0–4)
CK MB CFR SERPL CALC: 3.4 % (ref 0–4)
CK MB SERPL-MCNC: 1.2 NG/ML (ref 5–25)
CK MB SERPL-MCNC: 1.5 NG/ML (ref 5–25)
CK MB SERPL-MCNC: 1.7 NG/ML (ref 5–25)
CK MB SERPL-MCNC: 1.8 NG/ML (ref 5–25)
CK MB SERPL-MCNC: 2 NG/ML (ref 5–25)
CK MB SERPL-MCNC: 2.1 NG/ML (ref 5–25)
CK MB SERPL-MCNC: 2.2 NG/ML (ref 5–25)
CK MB SERPL-MCNC: 2.3 NG/ML (ref 5–25)
CK SERPL-CCNC: 103 U/L (ref 39–308)
CK SERPL-CCNC: 118 U/L (ref 39–308)
CK SERPL-CCNC: 58 U/L (ref 39–308)
CK SERPL-CCNC: 68 U/L (ref 39–308)
CK SERPL-CCNC: 69 U/L (ref 39–308)
CK SERPL-CCNC: 71 U/L (ref 39–308)
CK SERPL-CCNC: 83 U/L (ref 39–308)
CK SERPL-CCNC: 99 U/L (ref 39–308)
CO2 SERPL-SCNC: 25 MMOL/L (ref 21–32)
CO2 SERPL-SCNC: 26 MMOL/L (ref 21–32)
CO2 SERPL-SCNC: 27 MMOL/L (ref 21–32)
CO2 SERPL-SCNC: 27 MMOL/L (ref 21–32)
CO2 SERPL-SCNC: 28 MMOL/L (ref 21–32)
CO2 SERPL-SCNC: 29 MMOL/L (ref 21–32)
CO2 SERPL-SCNC: 29 MMOL/L (ref 21–32)
CO2 SERPL-SCNC: 30 MMOL/L (ref 21–32)
CO2 SERPL-SCNC: 31 MMOL/L (ref 21–32)
CO2 SERPL-SCNC: 32 MMOL/L (ref 21–32)
CO2 SERPL-SCNC: 35 MMOL/L (ref 21–32)
COLOR UR: YELLOW
COVID-19 RAPID TEST, COVR: NOT DETECTED
CREAT SERPL-MCNC: 0.98 MG/DL (ref 0.6–1.3)
CREAT SERPL-MCNC: 0.99 MG/DL (ref 0.6–1.3)
CREAT SERPL-MCNC: 1.01 MG/DL (ref 0.6–1.3)
CREAT SERPL-MCNC: 1.02 MG/DL (ref 0.6–1.3)
CREAT SERPL-MCNC: 1.02 MG/DL (ref 0.6–1.3)
CREAT SERPL-MCNC: 1.03 MG/DL (ref 0.6–1.3)
CREAT SERPL-MCNC: 1.05 MG/DL (ref 0.6–1.3)
CREAT SERPL-MCNC: 1.06 MG/DL (ref 0.6–1.3)
CREAT SERPL-MCNC: 1.09 MG/DL (ref 0.6–1.3)
CREAT SERPL-MCNC: 1.1 MG/DL (ref 0.6–1.3)
CREAT SERPL-MCNC: 1.13 MG/DL (ref 0.6–1.3)
CREAT SERPL-MCNC: 1.13 MG/DL (ref 0.6–1.3)
CREAT SERPL-MCNC: 1.15 MG/DL (ref 0.6–1.3)
CREAT SERPL-MCNC: 1.15 MG/DL (ref 0.6–1.3)
CREAT SERPL-MCNC: 1.21 MG/DL (ref 0.6–1.3)
CREAT SERPL-MCNC: 1.26 MG/DL (ref 0.6–1.3)
D DIMER PPP FEU-MCNC: 1.49 UG/ML(FEU)
D DIMER PPP FEU-MCNC: 1.87 UG/ML(FEU)
D DIMER PPP FEU-MCNC: 2.42 UG/ML(FEU)
D DIMER PPP FEU-MCNC: 3.28 UG/ML(FEU)
D DIMER PPP FEU-MCNC: 6.44 UG/ML(FEU)
D DIMER PPP FEU-MCNC: NORMAL UG/ML(FEU)
DIAGNOSIS, 93000: NORMAL
DIFFERENTIAL METHOD BLD: ABNORMAL
ECHO AV ANNULUS DIAM: 3.05 CM
ECHO AV AREA PEAK VELOCITY: 2.57 CM2
ECHO AV AREA VTI: 2.72 CM2
ECHO AV AREA/BSA PEAK VELOCITY: 1.2 CM2/M2
ECHO AV AREA/BSA VTI: 1.3 CM2/M2
ECHO AV MEAN GRADIENT: 4.54 MMHG
ECHO AV MEAN VELOCITY: 1.02 M/S
ECHO AV PEAK GRADIENT: 6.99 MMHG
ECHO AV PEAK VELOCITY: 132 CM/S
ECHO AV VTI: 23.81 CM
ECHO EST RA PRESSURE: 5 MMHG
ECHO IVC PROX: 2 CM
ECHO LA AREA 2C: 27.17 CM2
ECHO LA AREA 4C: 23.7 CM2
ECHO LA MAJOR AXIS: 5.06 CM
ECHO LA MINOR AXIS: 2.41 CM
ECHO LA VOL 2C: 96.93 ML (ref 18–58)
ECHO LA VOL 4C: 72.43 ML (ref 18–58)
ECHO LA VOL BP: 90.98 ML (ref 18–58)
ECHO LV E' LATERAL VELOCITY: 8 CM/S
ECHO LV E' SEPTAL VELOCITY: 7 CM/S
ECHO LV EDV A2C: 183.17 ML
ECHO LV EDV A4C: 193.66 ML
ECHO LV EDV BP: 190.04 ML (ref 67–155)
ECHO LV EDV TEICHHOLZ: 0.88 ML
ECHO LV EJECTION FRACTION A2C: 51 %
ECHO LV EJECTION FRACTION A4C: 45 %
ECHO LV EJECTION FRACTION BIPLANE: 48 % (ref 55–100)
ECHO LV ESV A2C: 90.58 ML
ECHO LV ESV A4C: 105.59 ML
ECHO LV ESV BP: 98.83 ML (ref 22–58)
ECHO LV ESV TEICHHOLZ: 0.47 ML
ECHO LV INTERNAL DIMENSION DIASTOLIC: 5.72 CM (ref 4.2–5.9)
ECHO LV INTERNAL DIMENSION SYSTOLIC: 4.36 CM
ECHO LV IVSD: 1.31 CM (ref 0.6–1)
ECHO LV MASS 2D: 295.3 G (ref 88–224)
ECHO LV MASS INDEX 2D: 140.7 G/M2 (ref 49–115)
ECHO LV POSTERIOR WALL DIASTOLIC: 1.12 CM (ref 0.6–1)
ECHO LV POSTERIOR WALL SYSTOLIC: 0 CM
ECHO LVOT CARDIAC OUTPUT: 13.75 L/MIN
ECHO LVOT DIAM: 2.05 CM
ECHO LVOT PEAK GRADIENT: 4.22 MMHG
ECHO LVOT PEAK VELOCITY: 103 CM/S
ECHO LVOT SV: 64.9 ML
ECHO LVOT VTI: 19.6 CM
ECHO MV A VELOCITY: 114 CM/S
ECHO MV AREA PHT: 9.57 CM2
ECHO MV E DECELERATION TIME (DT): 79.25 MS
ECHO MV E VELOCITY: 77 CM/S
ECHO MV E/A RATIO: 0.68
ECHO MV E/E' LATERAL: 9.63
ECHO MV E/E' RATIO (AVERAGED): 10.31
ECHO MV E/E' SEPTAL: 11
ECHO MV MEAN INFLOW VELOCITY: 4.94 M/S
ECHO MV PRESSURE HALF TIME (PHT): 22.98 MS
ECHO MV REGURGITANT PEAK GRADIENT: 144.7 MMHG
ECHO MV REGURGITANT PEAK VELOCITY: 601.48 CM/S
ECHO MV REGURGITANT VTIA: 176.88 CM
ECHO PV REGURGITANT MAX VELOCITY: 601 CM/S
ECHO RA AREA 4C: 14.92 CM2
ECHO RA VOLUME: 36.4 ML
ECHO RV INTERNAL DIMENSION: 3.99 CM
ECHO RV TAPSE: 2.8 CM (ref 1.5–2)
ECHO TV MEAN GRADIENT: 103.78 MMHG
ECHO TV REGURGITANT MAX VELOCITY: 359 CM/S
ECHO TV REGURGITANT PEAK GRADIENT: 51.6 MMHG
EOSINOPHIL # BLD: 0 K/UL (ref 0–0.4)
EOSINOPHIL # BLD: 0.1 K/UL (ref 0–0.4)
EOSINOPHIL # BLD: 0.1 K/UL (ref 0–0.4)
EOSINOPHIL # BLD: 0.2 K/UL (ref 0–0.4)
EOSINOPHIL # BLD: 0.2 K/UL (ref 0–0.4)
EOSINOPHIL # BLD: 0.3 K/UL (ref 0–0.4)
EOSINOPHIL NFR BLD: 0 % (ref 0–5)
EOSINOPHIL NFR BLD: 1 % (ref 0–5)
EOSINOPHIL NFR BLD: 1 % (ref 0–5)
EOSINOPHIL NFR BLD: 2 % (ref 0–5)
EOSINOPHIL NFR BLD: 3 % (ref 0–5)
EOSINOPHIL NFR BLD: 4 % (ref 0–5)
EOSINOPHIL NFR BLD: 4 % (ref 0–5)
EPITH CASTS URNS QL MICRO: ABNORMAL /LPF (ref 0–5)
EPITH CASTS URNS QL MICRO: NEGATIVE /LPF (ref 0–5)
ERYTHROCYTE [DISTWIDTH] IN BLOOD BY AUTOMATED COUNT: 15.1 % (ref 11.6–14.5)
ERYTHROCYTE [DISTWIDTH] IN BLOOD BY AUTOMATED COUNT: 15.7 % (ref 11.6–14.5)
ERYTHROCYTE [DISTWIDTH] IN BLOOD BY AUTOMATED COUNT: 15.8 % (ref 11.6–14.5)
ERYTHROCYTE [DISTWIDTH] IN BLOOD BY AUTOMATED COUNT: 15.8 % (ref 11.6–14.5)
ERYTHROCYTE [DISTWIDTH] IN BLOOD BY AUTOMATED COUNT: 15.9 % (ref 11.6–14.5)
ERYTHROCYTE [DISTWIDTH] IN BLOOD BY AUTOMATED COUNT: 15.9 % (ref 11.6–14.5)
ERYTHROCYTE [DISTWIDTH] IN BLOOD BY AUTOMATED COUNT: 16 % (ref 11.6–14.5)
ERYTHROCYTE [DISTWIDTH] IN BLOOD BY AUTOMATED COUNT: 16.5 % (ref 11.6–14.5)
ERYTHROCYTE [DISTWIDTH] IN BLOOD BY AUTOMATED COUNT: 16.7 % (ref 11.6–14.5)
ERYTHROCYTE [DISTWIDTH] IN BLOOD BY AUTOMATED COUNT: 16.7 % (ref 11.6–14.5)
ERYTHROCYTE [DISTWIDTH] IN BLOOD BY AUTOMATED COUNT: 17.1 % (ref 11.6–14.5)
ERYTHROCYTE [DISTWIDTH] IN BLOOD BY AUTOMATED COUNT: 17.3 % (ref 11.6–14.5)
ERYTHROCYTE [DISTWIDTH] IN BLOOD BY AUTOMATED COUNT: 17.8 % (ref 11.6–14.5)
EST. AVERAGE GLUCOSE BLD GHB EST-MCNC: 157 MG/DL
FERRITIN SERPL-MCNC: 281 NG/ML (ref 8–388)
FIBRINOGEN PPP-MCNC: 443 MG/DL (ref 210–451)
FIBRINOGEN PPP-MCNC: 489 MG/DL (ref 210–451)
FIBRINOGEN PPP-MCNC: 516 MG/DL (ref 210–451)
FIBRINOGEN PPP-MCNC: 554 MG/DL (ref 210–451)
FIBRINOGEN PPP-MCNC: 566 MG/DL (ref 210–451)
FIBRINOGEN PPP-MCNC: NORMAL MG/DL (ref 210–451)
FLUAV AG NPH QL IA: NEGATIVE
FLUAV H1 2009 PAND RNA SPEC QL NAA+PROBE: NOT DETECTED
FLUAV H1 RNA SPEC QL NAA+PROBE: NOT DETECTED
FLUAV H3 RNA SPEC QL NAA+PROBE: NOT DETECTED
FLUAV SUBTYP SPEC NAA+PROBE: NOT DETECTED
FLUBV AG NOSE QL IA: NEGATIVE
FLUBV RNA SPEC QL NAA+PROBE: NOT DETECTED
GAS FLOW.O2 O2 DELIVERY SYS: ABNORMAL L/MIN
GAS FLOW.O2 O2 DELIVERY SYS: ABNORMAL L/MIN
GAS FLOW.O2 SETTING OXYMISER: 15 L/M
GAS FLOW.O2 SETTING OXYMISER: 5 L/M
GLOBULIN SER CALC-MCNC: 3.4 G/DL (ref 2–4)
GLOBULIN SER CALC-MCNC: 3.4 G/DL (ref 2–4)
GLOBULIN SER CALC-MCNC: 3.5 G/DL (ref 2–4)
GLOBULIN SER CALC-MCNC: 3.6 G/DL (ref 2–4)
GLOBULIN SER CALC-MCNC: 3.6 G/DL (ref 2–4)
GLOBULIN SER CALC-MCNC: 3.8 G/DL (ref 2–4)
GLUCOSE BLD STRIP.AUTO-MCNC: 101 MG/DL (ref 70–110)
GLUCOSE BLD STRIP.AUTO-MCNC: 105 MG/DL (ref 70–110)
GLUCOSE BLD STRIP.AUTO-MCNC: 120 MG/DL (ref 70–110)
GLUCOSE BLD STRIP.AUTO-MCNC: 122 MG/DL (ref 70–110)
GLUCOSE BLD STRIP.AUTO-MCNC: 123 MG/DL (ref 70–110)
GLUCOSE BLD STRIP.AUTO-MCNC: 125 MG/DL (ref 70–110)
GLUCOSE BLD STRIP.AUTO-MCNC: 132 MG/DL (ref 70–110)
GLUCOSE BLD STRIP.AUTO-MCNC: 133 MG/DL (ref 70–110)
GLUCOSE BLD STRIP.AUTO-MCNC: 137 MG/DL (ref 70–110)
GLUCOSE BLD STRIP.AUTO-MCNC: 144 MG/DL (ref 70–110)
GLUCOSE BLD STRIP.AUTO-MCNC: 146 MG/DL (ref 70–110)
GLUCOSE BLD STRIP.AUTO-MCNC: 151 MG/DL (ref 70–110)
GLUCOSE BLD STRIP.AUTO-MCNC: 156 MG/DL (ref 70–110)
GLUCOSE BLD STRIP.AUTO-MCNC: 159 MG/DL (ref 70–110)
GLUCOSE BLD STRIP.AUTO-MCNC: 169 MG/DL (ref 70–110)
GLUCOSE BLD STRIP.AUTO-MCNC: 170 MG/DL (ref 70–110)
GLUCOSE BLD STRIP.AUTO-MCNC: 170 MG/DL (ref 70–110)
GLUCOSE BLD STRIP.AUTO-MCNC: 173 MG/DL (ref 70–110)
GLUCOSE BLD STRIP.AUTO-MCNC: 179 MG/DL (ref 70–110)
GLUCOSE BLD STRIP.AUTO-MCNC: 188 MG/DL (ref 70–110)
GLUCOSE BLD STRIP.AUTO-MCNC: 189 MG/DL (ref 70–110)
GLUCOSE BLD STRIP.AUTO-MCNC: 189 MG/DL (ref 70–110)
GLUCOSE BLD STRIP.AUTO-MCNC: 190 MG/DL (ref 70–110)
GLUCOSE BLD STRIP.AUTO-MCNC: 191 MG/DL (ref 70–110)
GLUCOSE BLD STRIP.AUTO-MCNC: 194 MG/DL (ref 70–110)
GLUCOSE BLD STRIP.AUTO-MCNC: 196 MG/DL (ref 70–110)
GLUCOSE BLD STRIP.AUTO-MCNC: 201 MG/DL (ref 70–110)
GLUCOSE BLD STRIP.AUTO-MCNC: 201 MG/DL (ref 70–110)
GLUCOSE BLD STRIP.AUTO-MCNC: 212 MG/DL (ref 70–110)
GLUCOSE BLD STRIP.AUTO-MCNC: 215 MG/DL (ref 70–110)
GLUCOSE BLD STRIP.AUTO-MCNC: 218 MG/DL (ref 70–110)
GLUCOSE BLD STRIP.AUTO-MCNC: 218 MG/DL (ref 70–110)
GLUCOSE BLD STRIP.AUTO-MCNC: 221 MG/DL (ref 70–110)
GLUCOSE BLD STRIP.AUTO-MCNC: 221 MG/DL (ref 70–110)
GLUCOSE BLD STRIP.AUTO-MCNC: 223 MG/DL (ref 70–110)
GLUCOSE BLD STRIP.AUTO-MCNC: 224 MG/DL (ref 70–110)
GLUCOSE BLD STRIP.AUTO-MCNC: 226 MG/DL (ref 70–110)
GLUCOSE BLD STRIP.AUTO-MCNC: 232 MG/DL (ref 70–110)
GLUCOSE BLD STRIP.AUTO-MCNC: 236 MG/DL (ref 70–110)
GLUCOSE BLD STRIP.AUTO-MCNC: 236 MG/DL (ref 70–110)
GLUCOSE BLD STRIP.AUTO-MCNC: 240 MG/DL (ref 70–110)
GLUCOSE BLD STRIP.AUTO-MCNC: 242 MG/DL (ref 70–110)
GLUCOSE BLD STRIP.AUTO-MCNC: 251 MG/DL (ref 70–110)
GLUCOSE BLD STRIP.AUTO-MCNC: 252 MG/DL (ref 70–110)
GLUCOSE BLD STRIP.AUTO-MCNC: 264 MG/DL (ref 70–110)
GLUCOSE BLD STRIP.AUTO-MCNC: 298 MG/DL (ref 70–110)
GLUCOSE BLD STRIP.AUTO-MCNC: 329 MG/DL (ref 70–110)
GLUCOSE BLD STRIP.AUTO-MCNC: 338 MG/DL (ref 70–110)
GLUCOSE BLD STRIP.AUTO-MCNC: 351 MG/DL (ref 70–110)
GLUCOSE BLD STRIP.AUTO-MCNC: 384 MG/DL (ref 70–110)
GLUCOSE BLD STRIP.AUTO-MCNC: 396 MG/DL (ref 70–110)
GLUCOSE SERPL-MCNC: 101 MG/DL (ref 74–99)
GLUCOSE SERPL-MCNC: 102 MG/DL (ref 74–99)
GLUCOSE SERPL-MCNC: 109 MG/DL (ref 74–99)
GLUCOSE SERPL-MCNC: 116 MG/DL (ref 74–99)
GLUCOSE SERPL-MCNC: 121 MG/DL (ref 74–99)
GLUCOSE SERPL-MCNC: 126 MG/DL (ref 74–99)
GLUCOSE SERPL-MCNC: 136 MG/DL (ref 74–99)
GLUCOSE SERPL-MCNC: 148 MG/DL (ref 74–99)
GLUCOSE SERPL-MCNC: 168 MG/DL (ref 74–99)
GLUCOSE SERPL-MCNC: 175 MG/DL (ref 74–99)
GLUCOSE SERPL-MCNC: 176 MG/DL (ref 74–99)
GLUCOSE SERPL-MCNC: 183 MG/DL (ref 74–99)
GLUCOSE SERPL-MCNC: 196 MG/DL (ref 74–99)
GLUCOSE SERPL-MCNC: 215 MG/DL (ref 74–99)
GLUCOSE SERPL-MCNC: 239 MG/DL (ref 74–99)
GLUCOSE SERPL-MCNC: 249 MG/DL (ref 74–99)
GLUCOSE UR STRIP.AUTO-MCNC: 250 MG/DL
GLUCOSE UR STRIP.AUTO-MCNC: NEGATIVE MG/DL
GLUCOSE UR STRIP.AUTO-MCNC: NEGATIVE MG/DL
HADV DNA SPEC QL NAA+PROBE: NOT DETECTED
HBA1C MFR BLD: 7.1 % (ref 4.2–5.6)
HCO3 BLD-SCNC: 26.5 MMOL/L (ref 22–26)
HCO3 BLD-SCNC: 27.8 MMOL/L (ref 22–26)
HCOV 229E RNA SPEC QL NAA+PROBE: NOT DETECTED
HCOV HKU1 RNA SPEC QL NAA+PROBE: NOT DETECTED
HCOV NL63 RNA SPEC QL NAA+PROBE: NOT DETECTED
HCOV OC43 RNA SPEC QL NAA+PROBE: NOT DETECTED
HCT VFR BLD AUTO: 24.1 % (ref 36–48)
HCT VFR BLD AUTO: 24.5 % (ref 36–48)
HCT VFR BLD AUTO: 24.7 % (ref 36–48)
HCT VFR BLD AUTO: 26.3 % (ref 36–48)
HCT VFR BLD AUTO: 26.6 % (ref 36–48)
HCT VFR BLD AUTO: 27.1 % (ref 36–48)
HCT VFR BLD AUTO: 27.1 % (ref 36–48)
HCT VFR BLD AUTO: 27.6 % (ref 36–48)
HCT VFR BLD AUTO: 27.7 % (ref 36–48)
HCT VFR BLD AUTO: 27.8 % (ref 36–48)
HCT VFR BLD AUTO: 28.2 % (ref 36–48)
HCT VFR BLD AUTO: 28.3 % (ref 36–48)
HCT VFR BLD AUTO: 28.4 % (ref 36–48)
HCT VFR BLD AUTO: 28.9 % (ref 36–48)
HCT VFR BLD AUTO: 32.7 % (ref 36–48)
HCT VFR BLD AUTO: 32.8 % (ref 36–48)
HEALTH STATUS, XMCV2T: NORMAL
HGB BLD-MCNC: 10.4 G/DL (ref 13–16)
HGB BLD-MCNC: 7.7 G/DL (ref 13–16)
HGB BLD-MCNC: 7.7 G/DL (ref 13–16)
HGB BLD-MCNC: 7.8 G/DL (ref 13–16)
HGB BLD-MCNC: 8.1 G/DL (ref 13–16)
HGB BLD-MCNC: 8.2 G/DL (ref 13–16)
HGB BLD-MCNC: 8.5 G/DL (ref 13–16)
HGB BLD-MCNC: 8.6 G/DL (ref 13–16)
HGB BLD-MCNC: 8.6 G/DL (ref 13–16)
HGB BLD-MCNC: 8.7 G/DL (ref 13–16)
HGB BLD-MCNC: 8.7 G/DL (ref 13–16)
HGB BLD-MCNC: 8.8 G/DL (ref 13–16)
HGB BLD-MCNC: 8.9 G/DL (ref 13–16)
HGB BLD-MCNC: 9 G/DL (ref 13–16)
HGB BLD-MCNC: 9.3 G/DL (ref 13–16)
HGB BLD-MCNC: 9.8 G/DL (ref 13–16)
HGB UR QL STRIP: ABNORMAL
HGB UR QL STRIP: NEGATIVE
HGB UR QL STRIP: NEGATIVE
HMPV RNA SPEC QL NAA+PROBE: NOT DETECTED
HPIV1 RNA SPEC QL NAA+PROBE: NOT DETECTED
HPIV2 RNA SPEC QL NAA+PROBE: NOT DETECTED
HPIV3 RNA SPEC QL NAA+PROBE: NOT DETECTED
HPIV4 RNA SPEC QL NAA+PROBE: NOT DETECTED
INR PPP: 1.1 (ref 0.8–1.2)
INR PPP: 1.4 (ref 0.8–1.2)
INR PPP: 1.5 (ref 0.8–1.2)
INR PPP: 1.5 (ref 0.8–1.2)
INR PPP: 1.9 (ref 0.8–1.2)
INR PPP: 2.9 (ref 0.8–1.2)
INR PPP: NORMAL (ref 0.8–1.2)
KETONES UR QL STRIP.AUTO: 15 MG/DL
KETONES UR QL STRIP.AUTO: 15 MG/DL
KETONES UR QL STRIP.AUTO: NEGATIVE MG/DL
L PNEUMO AG UR QL IA: NEGATIVE
LACTATE SERPL-SCNC: 1.2 MMOL/L (ref 0.4–2)
LACTATE SERPL-SCNC: 1.4 MMOL/L (ref 0.4–2)
LDH SERPL L TO P-CCNC: 276 U/L (ref 81–234)
LEUKOCYTE ESTERASE UR QL STRIP.AUTO: NEGATIVE
LVFS 2D: 23.75 %
LVOT MG: 2.24 MMHG
LVOT MV: 0.69 CM/S
LVSV (MOD BI): 43.24 ML
LVSV (MOD SINGLE 4C): 41.75 ML
LVSV (MOD SINGLE): 43.89 ML
LVSV (TEICH): 35.77 ML
LYMPHOCYTES # BLD: 0.4 K/UL (ref 0.9–3.6)
LYMPHOCYTES # BLD: 0.5 K/UL (ref 0.9–3.6)
LYMPHOCYTES # BLD: 0.8 K/UL (ref 0.9–3.6)
LYMPHOCYTES # BLD: 0.9 K/UL (ref 0.9–3.6)
LYMPHOCYTES # BLD: 1.1 K/UL (ref 0.9–3.6)
LYMPHOCYTES # BLD: 1.1 K/UL (ref 0.9–3.6)
LYMPHOCYTES # BLD: 1.5 K/UL (ref 0.9–3.6)
LYMPHOCYTES NFR BLD: 12 % (ref 21–52)
LYMPHOCYTES NFR BLD: 13 % (ref 21–52)
LYMPHOCYTES NFR BLD: 14 % (ref 21–52)
LYMPHOCYTES NFR BLD: 21 % (ref 21–52)
LYMPHOCYTES NFR BLD: 21 % (ref 21–52)
LYMPHOCYTES NFR BLD: 3 % (ref 21–52)
LYMPHOCYTES NFR BLD: 6 % (ref 21–52)
LYMPHOCYTES NFR BLD: 8 % (ref 21–52)
LYMPHOCYTES NFR BLD: 9 % (ref 21–52)
M PNEUMO DNA SPEC QL NAA+PROBE: NOT DETECTED
MAGNESIUM SERPL-MCNC: 1.7 MG/DL (ref 1.6–2.6)
MAGNESIUM SERPL-MCNC: 1.7 MG/DL (ref 1.6–2.6)
MAGNESIUM SERPL-MCNC: 1.8 MG/DL (ref 1.6–2.6)
MAGNESIUM SERPL-MCNC: 1.8 MG/DL (ref 1.6–2.6)
MAGNESIUM SERPL-MCNC: 1.9 MG/DL (ref 1.6–2.6)
MAGNESIUM SERPL-MCNC: 2 MG/DL (ref 1.6–2.6)
MAGNESIUM SERPL-MCNC: 2.1 MG/DL (ref 1.6–2.6)
MCH RBC QN AUTO: 25.6 PG (ref 24–34)
MCH RBC QN AUTO: 26.5 PG (ref 24–34)
MCH RBC QN AUTO: 27.6 PG (ref 24–34)
MCH RBC QN AUTO: 27.7 PG (ref 24–34)
MCH RBC QN AUTO: 27.9 PG (ref 24–34)
MCH RBC QN AUTO: 27.9 PG (ref 24–34)
MCH RBC QN AUTO: 28 PG (ref 24–34)
MCH RBC QN AUTO: 28 PG (ref 24–34)
MCH RBC QN AUTO: 28.1 PG (ref 24–34)
MCH RBC QN AUTO: 28.1 PG (ref 24–34)
MCH RBC QN AUTO: 28.2 PG (ref 24–34)
MCH RBC QN AUTO: 28.2 PG (ref 24–34)
MCH RBC QN AUTO: 28.4 PG (ref 24–34)
MCH RBC QN AUTO: 28.5 PG (ref 24–34)
MCH RBC QN AUTO: 28.6 PG (ref 24–34)
MCHC RBC AUTO-ENTMCNC: 30 G/DL (ref 31–37)
MCHC RBC AUTO-ENTMCNC: 30.5 G/DL (ref 31–37)
MCHC RBC AUTO-ENTMCNC: 31.2 G/DL (ref 31–37)
MCHC RBC AUTO-ENTMCNC: 31.3 G/DL (ref 31–37)
MCHC RBC AUTO-ENTMCNC: 31.4 G/DL (ref 31–37)
MCHC RBC AUTO-ENTMCNC: 31.7 G/DL (ref 31–37)
MCHC RBC AUTO-ENTMCNC: 32.2 G/DL (ref 31–37)
MCHC RBC AUTO-ENTMCNC: 32.4 G/DL (ref 31–37)
MCV RBC AUTO: 83.7 FL (ref 74–97)
MCV RBC AUTO: 85.4 FL (ref 74–97)
MCV RBC AUTO: 87.6 FL (ref 74–97)
MCV RBC AUTO: 88.2 FL (ref 74–97)
MCV RBC AUTO: 88.7 FL (ref 74–97)
MCV RBC AUTO: 88.7 FL (ref 74–97)
MCV RBC AUTO: 88.8 FL (ref 74–97)
MCV RBC AUTO: 88.9 FL (ref 74–97)
MCV RBC AUTO: 89.1 FL (ref 74–97)
MCV RBC AUTO: 89.7 FL (ref 74–97)
MCV RBC AUTO: 89.8 FL (ref 74–97)
MCV RBC AUTO: 90.2 FL (ref 74–97)
MCV RBC AUTO: 90.4 FL (ref 74–97)
MCV RBC AUTO: 90.8 FL (ref 74–97)
MCV RBC AUTO: 91.1 FL (ref 74–97)
MONOCYTES # BLD: 0.4 K/UL (ref 0.05–1.2)
MONOCYTES # BLD: 0.6 K/UL (ref 0.05–1.2)
MONOCYTES # BLD: 0.7 K/UL (ref 0.05–1.2)
MONOCYTES # BLD: 0.7 K/UL (ref 0.05–1.2)
MONOCYTES # BLD: 0.9 K/UL (ref 0.05–1.2)
MONOCYTES # BLD: 1.6 K/UL (ref 0.05–1.2)
MONOCYTES NFR BLD: 10 % (ref 3–10)
MONOCYTES NFR BLD: 12 % (ref 3–10)
MONOCYTES NFR BLD: 4 % (ref 3–10)
MONOCYTES NFR BLD: 5 % (ref 3–10)
MONOCYTES NFR BLD: 7 % (ref 3–10)
MONOCYTES NFR BLD: 7 % (ref 3–10)
MONOCYTES NFR BLD: 8 % (ref 3–10)
MONOCYTES NFR BLD: 9 % (ref 3–10)
MONOCYTES NFR BLD: 9 % (ref 3–10)
MV DEC SLOPE: 9.71
NEUTS SEG # BLD: 10.2 K/UL (ref 1.8–8)
NEUTS SEG # BLD: 11.8 K/UL (ref 1.8–8)
NEUTS SEG # BLD: 16.7 K/UL (ref 1.8–8)
NEUTS SEG # BLD: 3.5 K/UL (ref 1.8–8)
NEUTS SEG # BLD: 4.5 K/UL (ref 1.8–8)
NEUTS SEG # BLD: 5.1 K/UL (ref 1.8–8)
NEUTS SEG # BLD: 6 K/UL (ref 1.8–8)
NEUTS SEG # BLD: 7.5 K/UL (ref 1.8–8)
NEUTS SEG # BLD: 7.9 K/UL (ref 1.8–8)
NEUTS SEG NFR BLD: 64 % (ref 40–73)
NEUTS SEG NFR BLD: 66 % (ref 40–73)
NEUTS SEG NFR BLD: 73 % (ref 40–73)
NEUTS SEG NFR BLD: 74 % (ref 40–73)
NEUTS SEG NFR BLD: 78 % (ref 40–73)
NEUTS SEG NFR BLD: 82 % (ref 40–73)
NEUTS SEG NFR BLD: 83 % (ref 40–73)
NEUTS SEG NFR BLD: 89 % (ref 40–73)
NEUTS SEG NFR BLD: 92 % (ref 40–73)
NITRITE UR QL STRIP.AUTO: NEGATIVE
O2/TOTAL GAS SETTING VFR VENT: 1 %
O2/TOTAL GAS SETTING VFR VENT: 40 %
P-R INTERVAL, ECG05: 156 MS
P-R INTERVAL, ECG05: 166 MS
P-R INTERVAL, ECG05: 168 MS
PCO2 BLD: 35.2 MMHG (ref 35–45)
PCO2 BLD: 37.2 MMHG (ref 35–45)
PH BLD: 7.46 [PH] (ref 7.35–7.45)
PH BLD: 7.51 [PH] (ref 7.35–7.45)
PH UR STRIP: 5 [PH] (ref 5–8)
PH UR STRIP: 5 [PH] (ref 5–8)
PH UR STRIP: 8 [PH] (ref 5–8)
PHOSPHATE SERPL-MCNC: 2.1 MG/DL (ref 2.5–4.9)
PLATELET # BLD AUTO: 175 K/UL (ref 135–420)
PLATELET # BLD AUTO: 243 K/UL (ref 135–420)
PLATELET # BLD AUTO: 253 K/UL (ref 135–420)
PLATELET # BLD AUTO: 277 K/UL (ref 135–420)
PLATELET # BLD AUTO: 316 K/UL (ref 135–420)
PLATELET # BLD AUTO: 373 K/UL (ref 135–420)
PLATELET # BLD AUTO: 379 K/UL (ref 135–420)
PLATELET # BLD AUTO: 385 K/UL (ref 135–420)
PLATELET # BLD AUTO: 387 K/UL (ref 135–420)
PLATELET # BLD AUTO: 401 K/UL (ref 135–420)
PLATELET # BLD AUTO: 404 K/UL (ref 135–420)
PLATELET # BLD AUTO: 419 K/UL (ref 135–420)
PLATELET # BLD AUTO: 428 K/UL (ref 135–420)
PLATELET # BLD AUTO: 446 K/UL (ref 135–420)
PLATELET # BLD AUTO: 503 K/UL (ref 135–420)
PMV BLD AUTO: 8.7 FL (ref 9.2–11.8)
PMV BLD AUTO: 8.9 FL (ref 9.2–11.8)
PMV BLD AUTO: 9.1 FL (ref 9.2–11.8)
PMV BLD AUTO: 9.2 FL (ref 9.2–11.8)
PMV BLD AUTO: 9.2 FL (ref 9.2–11.8)
PMV BLD AUTO: 9.3 FL (ref 9.2–11.8)
PMV BLD AUTO: 9.4 FL (ref 9.2–11.8)
PMV BLD AUTO: 9.6 FL (ref 9.2–11.8)
PMV BLD AUTO: 9.6 FL (ref 9.2–11.8)
PMV BLD AUTO: 9.8 FL (ref 9.2–11.8)
PMV BLD AUTO: 9.9 FL (ref 9.2–11.8)
PO2 BLD: 62 MMHG (ref 80–100)
PO2 BLD: 97 MMHG (ref 80–100)
POTASSIUM SERPL-SCNC: 3.1 MMOL/L (ref 3.5–5.5)
POTASSIUM SERPL-SCNC: 3.2 MMOL/L (ref 3.5–5.5)
POTASSIUM SERPL-SCNC: 3.3 MMOL/L (ref 3.5–5.5)
POTASSIUM SERPL-SCNC: 3.6 MMOL/L (ref 3.5–5.5)
POTASSIUM SERPL-SCNC: 3.6 MMOL/L (ref 3.5–5.5)
POTASSIUM SERPL-SCNC: 3.7 MMOL/L (ref 3.5–5.5)
POTASSIUM SERPL-SCNC: 3.8 MMOL/L (ref 3.5–5.5)
POTASSIUM SERPL-SCNC: 4 MMOL/L (ref 3.5–5.5)
POTASSIUM SERPL-SCNC: 4.1 MMOL/L (ref 3.5–5.5)
POTASSIUM SERPL-SCNC: 4.2 MMOL/L (ref 3.5–5.5)
PROCALCITONIN SERPL-MCNC: <0.05 NG/ML
PROT SERPL-MCNC: 5 G/DL (ref 6.4–8.2)
PROT SERPL-MCNC: 5.1 G/DL (ref 6.4–8.2)
PROT SERPL-MCNC: 5.2 G/DL (ref 6.4–8.2)
PROT SERPL-MCNC: 5.3 G/DL (ref 6.4–8.2)
PROT SERPL-MCNC: 5.3 G/DL (ref 6.4–8.2)
PROT SERPL-MCNC: 5.4 G/DL (ref 6.4–8.2)
PROT SERPL-MCNC: 5.4 G/DL (ref 6.4–8.2)
PROT SERPL-MCNC: 5.5 G/DL (ref 6.4–8.2)
PROT SERPL-MCNC: 6.2 G/DL (ref 6.4–8.2)
PROT SERPL-MCNC: 6.5 G/DL (ref 6.4–8.2)
PROT UR STRIP-MCNC: 100 MG/DL
PROT UR STRIP-MCNC: 100 MG/DL
PROT UR STRIP-MCNC: NEGATIVE MG/DL
PROTHROMBIN TIME: 14 SEC (ref 11.5–15.2)
PROTHROMBIN TIME: 16.5 SEC (ref 11.5–15.2)
PROTHROMBIN TIME: 16.7 SEC (ref 11.5–15.2)
PROTHROMBIN TIME: 16.8 SEC (ref 11.5–15.2)
PROTHROMBIN TIME: 17.7 SEC (ref 11.5–15.2)
PROTHROMBIN TIME: 17.9 SEC (ref 11.5–15.2)
PROTHROMBIN TIME: 21.3 SEC (ref 11.5–15.2)
PROTHROMBIN TIME: 29.8 SEC (ref 11.5–15.2)
PROTHROMBIN TIME: NORMAL SEC (ref 11.5–15.2)
Q-T INTERVAL, ECG07: 350 MS
Q-T INTERVAL, ECG07: 392 MS
Q-T INTERVAL, ECG07: 406 MS
Q-T INTERVAL, ECG07: 414 MS
QRS DURATION, ECG06: 90 MS
QRS DURATION, ECG06: 92 MS
QRS DURATION, ECG06: 92 MS
QRS DURATION, ECG06: 94 MS
QTC CALCULATION (BEZET), ECG08: 427 MS
QTC CALCULATION (BEZET), ECG08: 459 MS
QTC CALCULATION (BEZET), ECG08: 460 MS
QTC CALCULATION (BEZET), ECG08: 471 MS
RBC # BLD AUTO: 2.75 M/UL (ref 4.7–5.5)
RBC # BLD AUTO: 2.78 M/UL (ref 4.7–5.5)
RBC # BLD AUTO: 2.91 M/UL (ref 4.7–5.5)
RBC # BLD AUTO: 2.93 M/UL (ref 4.7–5.5)
RBC # BLD AUTO: 3.02 M/UL (ref 4.7–5.5)
RBC # BLD AUTO: 3.04 M/UL (ref 4.7–5.5)
RBC # BLD AUTO: 3.05 M/UL (ref 4.7–5.5)
RBC # BLD AUTO: 3.06 M/UL (ref 4.7–5.5)
RBC # BLD AUTO: 3.12 M/UL (ref 4.7–5.5)
RBC # BLD AUTO: 3.14 M/UL (ref 4.7–5.5)
RBC # BLD AUTO: 3.19 M/UL (ref 4.7–5.5)
RBC # BLD AUTO: 3.22 M/UL (ref 4.7–5.5)
RBC # BLD AUTO: 3.26 M/UL (ref 4.7–5.5)
RBC # BLD AUTO: 3.83 M/UL (ref 4.7–5.5)
RBC # BLD AUTO: 3.92 M/UL (ref 4.7–5.5)
RBC #/AREA URNS HPF: ABNORMAL /HPF (ref 0–5)
RBC #/AREA URNS HPF: NEGATIVE /HPF (ref 0–5)
RSV RNA SPEC QL NAA+PROBE: NOT DETECTED
RV+EV RNA SPEC QL NAA+PROBE: NOT DETECTED
S PNEUM AG UR QL: NEGATIVE
SAO2 % BLD: 93 % (ref 92–97)
SAO2 % BLD: 98 % (ref 92–97)
SARS-COV-2, COV2NT: NOT DETECTED
SERVICE CMNT-IMP: ABNORMAL
SERVICE CMNT-IMP: ABNORMAL
SERVICE CMNT-IMP: NORMAL
SODIUM SERPL-SCNC: 135 MMOL/L (ref 136–145)
SODIUM SERPL-SCNC: 136 MMOL/L (ref 136–145)
SODIUM SERPL-SCNC: 137 MMOL/L (ref 136–145)
SODIUM SERPL-SCNC: 139 MMOL/L (ref 136–145)
SODIUM SERPL-SCNC: 140 MMOL/L (ref 136–145)
SODIUM SERPL-SCNC: 141 MMOL/L (ref 136–145)
SODIUM SERPL-SCNC: 142 MMOL/L (ref 136–145)
SODIUM SERPL-SCNC: 143 MMOL/L (ref 136–145)
SODIUM SERPL-SCNC: 144 MMOL/L (ref 136–145)
SODIUM SERPL-SCNC: 144 MMOL/L (ref 136–145)
SOURCE, COVRS: NORMAL
SP GR UR REFRACTOMETRY: 1.01 (ref 1–1.03)
SP GR UR REFRACTOMETRY: 1.02 (ref 1–1.03)
SP GR UR REFRACTOMETRY: >1.03 (ref 1–1.03)
SPECIMEN EXP DATE BLD: NORMAL
SPECIMEN EXP DATE BLD: NORMAL
SPECIMEN TYPE, XMCV1T: NORMAL
SPECIMEN TYPE: ABNORMAL
SPECIMEN TYPE: ABNORMAL
TOTAL RESP. RATE, ITRR: 20
TOTAL RESP. RATE, ITRR: 33
TROPONIN I SERPL-MCNC: 0.02 NG/ML (ref 0–0.04)
TROPONIN I SERPL-MCNC: 0.07 NG/ML (ref 0–0.04)
TROPONIN I SERPL-MCNC: 0.1 NG/ML (ref 0–0.04)
TROPONIN I SERPL-MCNC: <0.02 NG/ML (ref 0–0.04)
TROPONIN I SERPL-MCNC: <0.02 NG/ML (ref 0–0.04)
TSH SERPL DL<=0.05 MIU/L-ACNC: 2.61 UIU/ML (ref 0.36–3.74)
UROBILINOGEN UR QL STRIP.AUTO: 0.2 EU/DL (ref 0.2–1)
UROBILINOGEN UR QL STRIP.AUTO: 1 EU/DL (ref 0.2–1)
UROBILINOGEN UR QL STRIP.AUTO: 1 EU/DL (ref 0.2–1)
VANCOMYCIN SERPL-MCNC: 29.8 UG/ML (ref 5–40)
VANCOMYCIN TROUGH SERPL-MCNC: 13.5 UG/ML (ref 10–20)
VANCOMYCIN TROUGH SERPL-MCNC: 14.8 UG/ML (ref 10–20)
VENTRICULAR RATE, ECG03: 109 BPM
VENTRICULAR RATE, ECG03: 64 BPM
VENTRICULAR RATE, ECG03: 77 BPM
VENTRICULAR RATE, ECG03: 83 BPM
WBC # BLD AUTO: 10.9 K/UL (ref 4.6–13.2)
WBC # BLD AUTO: 11.9 K/UL (ref 4.6–13.2)
WBC # BLD AUTO: 12.1 K/UL (ref 4.6–13.2)
WBC # BLD AUTO: 12.4 K/UL (ref 4.6–13.2)
WBC # BLD AUTO: 12.9 K/UL (ref 4.6–13.2)
WBC # BLD AUTO: 13 K/UL (ref 4.6–13.2)
WBC # BLD AUTO: 17.4 K/UL (ref 4.6–13.2)
WBC # BLD AUTO: 20 K/UL (ref 4.6–13.2)
WBC # BLD AUTO: 5.4 K/UL (ref 4.6–13.2)
WBC # BLD AUTO: 6.9 K/UL (ref 4.6–13.2)
WBC # BLD AUTO: 7 K/UL (ref 4.6–13.2)
WBC # BLD AUTO: 8 K/UL (ref 4.6–13.2)
WBC # BLD AUTO: 8.5 K/UL (ref 4.6–13.2)
WBC # BLD AUTO: 8.8 K/UL (ref 4.6–13.2)
WBC # BLD AUTO: 9.5 K/UL (ref 4.6–13.2)
WBC URNS QL MICRO: ABNORMAL /HPF (ref 0–5)
WBC URNS QL MICRO: NEGATIVE /HPF (ref 0–5)

## 2020-01-01 PROCEDURE — 85384 FIBRINOGEN ACTIVITY: CPT

## 2020-01-01 PROCEDURE — 65660000000 HC RM CCU STEPDOWN

## 2020-01-01 PROCEDURE — 74011250637 HC RX REV CODE- 250/637: Performed by: PHYSICIAN ASSISTANT

## 2020-01-01 PROCEDURE — 74011250637 HC RX REV CODE- 250/637: Performed by: INTERNAL MEDICINE

## 2020-01-01 PROCEDURE — 83036 HEMOGLOBIN GLYCOSYLATED A1C: CPT

## 2020-01-01 PROCEDURE — 90471 IMMUNIZATION ADMIN: CPT

## 2020-01-01 PROCEDURE — 85379 FIBRIN DEGRADATION QUANT: CPT

## 2020-01-01 PROCEDURE — 74011250636 HC RX REV CODE- 250/636: Performed by: EMERGENCY MEDICINE

## 2020-01-01 PROCEDURE — C9113 INJ PANTOPRAZOLE SODIUM, VIA: HCPCS | Performed by: INTERNAL MEDICINE

## 2020-01-01 PROCEDURE — 82330 ASSAY OF CALCIUM: CPT

## 2020-01-01 PROCEDURE — 99233 SBSQ HOSP IP/OBS HIGH 50: CPT | Performed by: NURSE PRACTITIONER

## 2020-01-01 PROCEDURE — 74011250636 HC RX REV CODE- 250/636: Performed by: INTERNAL MEDICINE

## 2020-01-01 PROCEDURE — 83735 ASSAY OF MAGNESIUM: CPT

## 2020-01-01 PROCEDURE — 74011000258 HC RX REV CODE- 258: Performed by: FAMILY MEDICINE

## 2020-01-01 PROCEDURE — 99285 EMERGENCY DEPT VISIT HI MDM: CPT

## 2020-01-01 PROCEDURE — 70450 CT HEAD/BRAIN W/O DYE: CPT

## 2020-01-01 PROCEDURE — 82550 ASSAY OF CK (CPK): CPT

## 2020-01-01 PROCEDURE — 80048 BASIC METABOLIC PNL TOTAL CA: CPT

## 2020-01-01 PROCEDURE — 72170 X-RAY EXAM OF PELVIS: CPT

## 2020-01-01 PROCEDURE — 74011636637 HC RX REV CODE- 636/637: Performed by: HOSPITALIST

## 2020-01-01 PROCEDURE — 85027 COMPLETE CBC AUTOMATED: CPT

## 2020-01-01 PROCEDURE — 76010000149 HC OR TIME 1 TO 1.5 HR: Performed by: ORTHOPAEDIC SURGERY

## 2020-01-01 PROCEDURE — 65270000029 HC RM PRIVATE

## 2020-01-01 PROCEDURE — 97535 SELF CARE MNGMENT TRAINING: CPT

## 2020-01-01 PROCEDURE — 74011250636 HC RX REV CODE- 250/636: Performed by: HOSPITALIST

## 2020-01-01 PROCEDURE — C9113 INJ PANTOPRAZOLE SODIUM, VIA: HCPCS | Performed by: HOSPITALIST

## 2020-01-01 PROCEDURE — 82962 GLUCOSE BLOOD TEST: CPT

## 2020-01-01 PROCEDURE — 87635 SARS-COV-2 COVID-19 AMP PRB: CPT

## 2020-01-01 PROCEDURE — 73130 X-RAY EXAM OF HAND: CPT

## 2020-01-01 PROCEDURE — 77010033678 HC OXYGEN DAILY

## 2020-01-01 PROCEDURE — 74011000250 HC RX REV CODE- 250: Performed by: INTERNAL MEDICINE

## 2020-01-01 PROCEDURE — 83615 LACTATE (LD) (LDH) ENZYME: CPT

## 2020-01-01 PROCEDURE — C1713 ANCHOR/SCREW BN/BN,TIS/BN: HCPCS | Performed by: ORTHOPAEDIC SURGERY

## 2020-01-01 PROCEDURE — 96374 THER/PROPH/DIAG INJ IV PUSH: CPT

## 2020-01-01 PROCEDURE — 77010033711 HC HIGH FLOW OXYGEN

## 2020-01-01 PROCEDURE — 76210000006 HC OR PH I REC 0.5 TO 1 HR: Performed by: ORTHOPAEDIC SURGERY

## 2020-01-01 PROCEDURE — 85025 COMPLETE CBC W/AUTO DIFF WBC: CPT

## 2020-01-01 PROCEDURE — 99232 SBSQ HOSP IP/OBS MODERATE 35: CPT | Performed by: NURSE PRACTITIONER

## 2020-01-01 PROCEDURE — 77030008683 HC TU ET CUF COVD -A: Performed by: ANESTHESIOLOGY

## 2020-01-01 PROCEDURE — 74011636637 HC RX REV CODE- 636/637: Performed by: FAMILY MEDICINE

## 2020-01-01 PROCEDURE — 80053 COMPREHEN METABOLIC PANEL: CPT

## 2020-01-01 PROCEDURE — 74011000258 HC RX REV CODE- 258: Performed by: EMERGENCY MEDICINE

## 2020-01-01 PROCEDURE — 97163 PT EVAL HIGH COMPLEX 45 MIN: CPT

## 2020-01-01 PROCEDURE — 74011250637 HC RX REV CODE- 250/637: Performed by: HOSPITALIST

## 2020-01-01 PROCEDURE — 74011000258 HC RX REV CODE- 258: Performed by: INTERNAL MEDICINE

## 2020-01-01 PROCEDURE — 85730 THROMBOPLASTIN TIME PARTIAL: CPT

## 2020-01-01 PROCEDURE — 2709999900 HC NON-CHARGEABLE SUPPLY

## 2020-01-01 PROCEDURE — 97167 OT EVAL HIGH COMPLEX 60 MIN: CPT

## 2020-01-01 PROCEDURE — 80202 ASSAY OF VANCOMYCIN: CPT

## 2020-01-01 PROCEDURE — 99283 EMERGENCY DEPT VISIT LOW MDM: CPT

## 2020-01-01 PROCEDURE — 86900 BLOOD TYPING SEROLOGIC ABO: CPT

## 2020-01-01 PROCEDURE — 74011250636 HC RX REV CODE- 250/636: Performed by: FAMILY MEDICINE

## 2020-01-01 PROCEDURE — 97110 THERAPEUTIC EXERCISES: CPT

## 2020-01-01 PROCEDURE — 51702 INSERT TEMP BLADDER CATH: CPT

## 2020-01-01 PROCEDURE — 74011636320 HC RX REV CODE- 636/320: Performed by: EMERGENCY MEDICINE

## 2020-01-01 PROCEDURE — 74011636637 HC RX REV CODE- 636/637: Performed by: ANESTHESIOLOGY

## 2020-01-01 PROCEDURE — 74011000272 HC RX REV CODE- 272: Performed by: ORTHOPAEDIC SURGERY

## 2020-01-01 PROCEDURE — 93306 TTE W/DOPPLER COMPLETE: CPT

## 2020-01-01 PROCEDURE — 92611 MOTION FLUOROSCOPY/SWALLOW: CPT

## 2020-01-01 PROCEDURE — 65610000006 HC RM INTENSIVE CARE

## 2020-01-01 PROCEDURE — 74011250636 HC RX REV CODE- 250/636: Performed by: ANESTHESIOLOGY

## 2020-01-01 PROCEDURE — 77030008462 HC STPLR SKN PROX J&J -A: Performed by: ORTHOPAEDIC SURGERY

## 2020-01-01 PROCEDURE — 71045 X-RAY EXAM CHEST 1 VIEW: CPT

## 2020-01-01 PROCEDURE — 36415 COLL VENOUS BLD VENIPUNCTURE: CPT

## 2020-01-01 PROCEDURE — 0100U RESPIRATORY PANEL,PCR,NASOPHARYNGEAL: CPT

## 2020-01-01 PROCEDURE — 73030 X-RAY EXAM OF SHOULDER: CPT

## 2020-01-01 PROCEDURE — 74230 X-RAY XM SWLNG FUNCJ C+: CPT

## 2020-01-01 PROCEDURE — 74011250636 HC RX REV CODE- 250/636: Performed by: PHYSICIAN ASSISTANT

## 2020-01-01 PROCEDURE — 87450 LEGIONELLA PNEUMOPHILA AG, URINE: CPT

## 2020-01-01 PROCEDURE — C1769 GUIDE WIRE: HCPCS | Performed by: ORTHOPAEDIC SURGERY

## 2020-01-01 PROCEDURE — 94640 AIRWAY INHALATION TREATMENT: CPT

## 2020-01-01 PROCEDURE — 77030031139 HC SUT VCRL2 J&J -A: Performed by: ORTHOPAEDIC SURGERY

## 2020-01-01 PROCEDURE — 72125 CT NECK SPINE W/O DYE: CPT

## 2020-01-01 PROCEDURE — 74011636637 HC RX REV CODE- 636/637: Performed by: INTERNAL MEDICINE

## 2020-01-01 PROCEDURE — 76060000033 HC ANESTHESIA 1 TO 1.5 HR: Performed by: ORTHOPAEDIC SURGERY

## 2020-01-01 PROCEDURE — 71275 CT ANGIOGRAPHY CHEST: CPT

## 2020-01-01 PROCEDURE — 74011000636 HC RX REV CODE- 636: Performed by: INTERNAL MEDICINE

## 2020-01-01 PROCEDURE — 97530 THERAPEUTIC ACTIVITIES: CPT

## 2020-01-01 PROCEDURE — 74011000250 HC RX REV CODE- 250: Performed by: NURSE ANESTHETIST, CERTIFIED REGISTERED

## 2020-01-01 PROCEDURE — 77030008477 HC STYL SATN SLP COVD -A

## 2020-01-01 PROCEDURE — 93005 ELECTROCARDIOGRAM TRACING: CPT

## 2020-01-01 PROCEDURE — 81003 URINALYSIS AUTO W/O SCOPE: CPT

## 2020-01-01 PROCEDURE — 99221 1ST HOSP IP/OBS SF/LOW 40: CPT | Performed by: NURSE PRACTITIONER

## 2020-01-01 PROCEDURE — 87804 INFLUENZA ASSAY W/OPTIC: CPT

## 2020-01-01 PROCEDURE — 99284 EMERGENCY DEPT VISIT MOD MDM: CPT

## 2020-01-01 PROCEDURE — 74011000636 HC RX REV CODE- 636: Performed by: EMERGENCY MEDICINE

## 2020-01-01 PROCEDURE — 90715 TDAP VACCINE 7 YRS/> IM: CPT | Performed by: EMERGENCY MEDICINE

## 2020-01-01 PROCEDURE — 92526 ORAL FUNCTION THERAPY: CPT

## 2020-01-01 PROCEDURE — 85018 HEMOGLOBIN: CPT

## 2020-01-01 PROCEDURE — 74011000255 HC RX REV CODE- 255: Performed by: FAMILY MEDICINE

## 2020-01-01 PROCEDURE — 0QS604Z REPOSITION RIGHT UPPER FEMUR WITH INTERNAL FIXATION DEVICE, OPEN APPROACH: ICD-10-PCS | Performed by: ORTHOPAEDIC SURGERY

## 2020-01-01 PROCEDURE — 2709999900 HC NON-CHARGEABLE SUPPLY: Performed by: ORTHOPAEDIC SURGERY

## 2020-01-01 PROCEDURE — 74011000250 HC RX REV CODE- 250: Performed by: HOSPITALIST

## 2020-01-01 PROCEDURE — 71101 X-RAY EXAM UNILAT RIBS/CHEST: CPT

## 2020-01-01 PROCEDURE — 83605 ASSAY OF LACTIC ACID: CPT

## 2020-01-01 PROCEDURE — 36600 WITHDRAWAL OF ARTERIAL BLOOD: CPT

## 2020-01-01 PROCEDURE — 74177 CT ABD & PELVIS W/CONTRAST: CPT

## 2020-01-01 PROCEDURE — 82728 ASSAY OF FERRITIN: CPT

## 2020-01-01 PROCEDURE — 84484 ASSAY OF TROPONIN QUANT: CPT

## 2020-01-01 PROCEDURE — 82803 BLOOD GASES ANY COMBINATION: CPT

## 2020-01-01 PROCEDURE — 85610 PROTHROMBIN TIME: CPT

## 2020-01-01 PROCEDURE — 81001 URINALYSIS AUTO W/SCOPE: CPT

## 2020-01-01 PROCEDURE — 77030003862 HC BIT DRL SYNT -B: Performed by: ORTHOPAEDIC SURGERY

## 2020-01-01 PROCEDURE — 77030040361 HC SLV COMPR DVT MDII -B

## 2020-01-01 PROCEDURE — 83880 ASSAY OF NATRIURETIC PEPTIDE: CPT

## 2020-01-01 PROCEDURE — 87040 BLOOD CULTURE FOR BACTERIA: CPT

## 2020-01-01 PROCEDURE — 93970 EXTREMITY STUDY: CPT

## 2020-01-01 PROCEDURE — 77030040392 HC DRSG OPTIFOAM MDII -A

## 2020-01-01 PROCEDURE — 92610 EVALUATE SWALLOWING FUNCTION: CPT

## 2020-01-01 PROCEDURE — 84443 ASSAY THYROID STIM HORMONE: CPT

## 2020-01-01 PROCEDURE — 77030040361 HC SLV COMPR DVT MDII -B: Performed by: ORTHOPAEDIC SURGERY

## 2020-01-01 PROCEDURE — 77030003029 HC SUT VCRL J&J -B: Performed by: ORTHOPAEDIC SURGERY

## 2020-01-01 PROCEDURE — 76450000000

## 2020-01-01 PROCEDURE — 73700 CT LOWER EXTREMITY W/O DYE: CPT

## 2020-01-01 PROCEDURE — 73080 X-RAY EXAM OF ELBOW: CPT

## 2020-01-01 PROCEDURE — 74011250636 HC RX REV CODE- 250/636: Performed by: NURSE ANESTHETIST, CERTIFIED REGISTERED

## 2020-01-01 PROCEDURE — 96365 THER/PROPH/DIAG IV INF INIT: CPT

## 2020-01-01 PROCEDURE — P9047 ALBUMIN (HUMAN), 25%, 50ML: HCPCS | Performed by: FAMILY MEDICINE

## 2020-01-01 PROCEDURE — 84145 PROCALCITONIN (PCT): CPT

## 2020-01-01 PROCEDURE — 74011000250 HC RX REV CODE- 250: Performed by: ORTHOPAEDIC SURGERY

## 2020-01-01 PROCEDURE — 74011250637 HC RX REV CODE- 250/637: Performed by: EMERGENCY MEDICINE

## 2020-01-01 PROCEDURE — 87449 NOS EACH ORGANISM AG IA: CPT

## 2020-01-01 PROCEDURE — 84100 ASSAY OF PHOSPHORUS: CPT

## 2020-01-01 PROCEDURE — 77030020454 HC TU ET CUF HI/LO COVD -B

## 2020-01-01 PROCEDURE — 74011250637 HC RX REV CODE- 250/637: Performed by: FAMILY MEDICINE

## 2020-01-01 DEVICE — SCREW BNE L44MM DIA4.5MM PROX CORT TIB S STL ST LOK FULL: Type: IMPLANTABLE DEVICE | Site: HIP | Status: FUNCTIONAL

## 2020-01-01 DEVICE — IMPLANTABLE DEVICE: Type: IMPLANTABLE DEVICE | Site: HIP | Status: FUNCTIONAL

## 2020-01-01 DEVICE — SCREW BNE L38MM DIA4.5MM PROX CORT TIB S STL ST LOK FULL: Type: IMPLANTABLE DEVICE | Site: HIP | Status: FUNCTIONAL

## 2020-01-01 DEVICE — SCREW BNE L42MM DIA4.5MM PROX CORT TIB S STL ST LOK FULL: Type: IMPLANTABLE DEVICE | Site: HIP | Status: FUNCTIONAL

## 2020-01-01 RX ORDER — CYCLOSPORINE 0.5 MG/ML
1 EMULSION OPHTHALMIC EVERY 12 HOURS
Status: DISCONTINUED | OUTPATIENT
Start: 2020-01-01 | End: 2020-01-01 | Stop reason: HOSPADM

## 2020-01-01 RX ORDER — SUCCINYLCHOLINE CHLORIDE 100 MG/5ML
SYRINGE (ML) INTRAVENOUS AS NEEDED
Status: DISCONTINUED | OUTPATIENT
Start: 2020-01-01 | End: 2020-01-01 | Stop reason: HOSPADM

## 2020-01-01 RX ORDER — OXYBUTYNIN CHLORIDE 5 MG/1
5 TABLET ORAL
Status: DISCONTINUED | OUTPATIENT
Start: 2020-01-01 | End: 2020-01-01 | Stop reason: HOSPADM

## 2020-01-01 RX ORDER — CALCIUM GLUCONATE 20 MG/ML
2 INJECTION, SOLUTION INTRAVENOUS ONCE
Status: COMPLETED | OUTPATIENT
Start: 2020-01-01 | End: 2020-01-01

## 2020-01-01 RX ORDER — DEXTROSE MONOHYDRATE 100 MG/ML
125-250 INJECTION, SOLUTION INTRAVENOUS AS NEEDED
Status: DISCONTINUED | OUTPATIENT
Start: 2020-01-01 | End: 2020-01-01 | Stop reason: SDUPTHER

## 2020-01-01 RX ORDER — PROMETHAZINE HYDROCHLORIDE 25 MG/1
12.5 TABLET ORAL
Status: DISCONTINUED | OUTPATIENT
Start: 2020-01-01 | End: 2020-01-01 | Stop reason: HOSPADM

## 2020-01-01 RX ORDER — KETAMINE HYDROCHLORIDE 10 MG/ML
INJECTION, SOLUTION INTRAMUSCULAR; INTRAVENOUS AS NEEDED
Status: DISCONTINUED | OUTPATIENT
Start: 2020-01-01 | End: 2020-01-01 | Stop reason: HOSPADM

## 2020-01-01 RX ORDER — POTASSIUM CHLORIDE 7.45 MG/ML
10 INJECTION INTRAVENOUS
Status: DISPENSED | OUTPATIENT
Start: 2020-01-01 | End: 2020-01-01

## 2020-01-01 RX ORDER — SODIUM CHLORIDE 0.9 % (FLUSH) 0.9 %
5-40 SYRINGE (ML) INJECTION EVERY 8 HOURS
Status: DISCONTINUED | OUTPATIENT
Start: 2020-01-01 | End: 2020-01-01 | Stop reason: HOSPADM

## 2020-01-01 RX ORDER — GLYCOPYRROLATE 0.2 MG/ML
INJECTION INTRAMUSCULAR; INTRAVENOUS AS NEEDED
Status: DISCONTINUED | OUTPATIENT
Start: 2020-01-01 | End: 2020-01-01 | Stop reason: HOSPADM

## 2020-01-01 RX ORDER — CEFAZOLIN SODIUM/WATER 2 G/20 ML
2 SYRINGE (ML) INTRAVENOUS EVERY 8 HOURS
Status: CANCELLED | OUTPATIENT
Start: 2020-01-01 | End: 2020-01-01

## 2020-01-01 RX ORDER — PANTOPRAZOLE SODIUM 40 MG/1
40 TABLET, DELAYED RELEASE ORAL
Status: DISCONTINUED | OUTPATIENT
Start: 2020-01-01 | End: 2020-01-01 | Stop reason: HOSPADM

## 2020-01-01 RX ORDER — LANOLIN ALCOHOL/MO/W.PET/CERES
1 CREAM (GRAM) TOPICAL 2 TIMES DAILY WITH MEALS
Status: DISCONTINUED | OUTPATIENT
Start: 2020-01-01 | End: 2020-01-01 | Stop reason: HOSPADM

## 2020-01-01 RX ORDER — ENOXAPARIN SODIUM 100 MG/ML
40 INJECTION SUBCUTANEOUS DAILY
Status: DISCONTINUED | OUTPATIENT
Start: 2020-01-01 | End: 2020-01-01

## 2020-01-01 RX ORDER — DEXAMETHASONE SODIUM PHOSPHATE 4 MG/ML
6 INJECTION, SOLUTION INTRA-ARTICULAR; INTRALESIONAL; INTRAMUSCULAR; INTRAVENOUS; SOFT TISSUE DAILY
Status: DISCONTINUED | OUTPATIENT
Start: 2020-01-01 | End: 2020-01-01

## 2020-01-01 RX ORDER — VANCOMYCIN 2 GRAM/500 ML IN 0.9 % SODIUM CHLORIDE INTRAVENOUS
2000
Status: DISCONTINUED | OUTPATIENT
Start: 2020-01-01 | End: 2020-01-01

## 2020-01-01 RX ORDER — INSULIN LISPRO 100 [IU]/ML
INJECTION, SOLUTION INTRAVENOUS; SUBCUTANEOUS
Status: DISCONTINUED | OUTPATIENT
Start: 2020-01-01 | End: 2020-01-01 | Stop reason: HOSPADM

## 2020-01-01 RX ORDER — ACETAMINOPHEN 325 MG/1
650 TABLET ORAL
Status: DISCONTINUED | OUTPATIENT
Start: 2020-01-01 | End: 2020-01-01 | Stop reason: HOSPADM

## 2020-01-01 RX ORDER — FUROSEMIDE 10 MG/ML
40 INJECTION INTRAMUSCULAR; INTRAVENOUS ONCE
Status: COMPLETED | OUTPATIENT
Start: 2020-01-01 | End: 2020-01-01

## 2020-01-01 RX ORDER — ONDANSETRON 4 MG/1
4 TABLET, ORALLY DISINTEGRATING ORAL
Status: CANCELLED | OUTPATIENT
Start: 2020-01-01

## 2020-01-01 RX ORDER — ACETAMINOPHEN 650 MG/1
650 SUPPOSITORY RECTAL
Status: COMPLETED | OUTPATIENT
Start: 2020-01-01 | End: 2020-01-01

## 2020-01-01 RX ORDER — CEFAZOLIN SODIUM/WATER 2 G/20 ML
2 SYRINGE (ML) INTRAVENOUS ONCE
Status: DISPENSED | OUTPATIENT
Start: 2020-01-01 | End: 2020-01-01

## 2020-01-01 RX ORDER — POLYETHYLENE GLYCOL 3350 17 G/17G
17 POWDER, FOR SOLUTION ORAL DAILY PRN
Status: DISCONTINUED | OUTPATIENT
Start: 2020-01-01 | End: 2020-01-01 | Stop reason: HOSPADM

## 2020-01-01 RX ORDER — CYCLOSPORINE 0.5 MG/ML
1 EMULSION OPHTHALMIC AS NEEDED
Status: DISCONTINUED | OUTPATIENT
Start: 2020-01-01 | End: 2020-01-01 | Stop reason: HOSPADM

## 2020-01-01 RX ORDER — DIPHENHYDRAMINE HYDROCHLORIDE 50 MG/ML
12.5 INJECTION, SOLUTION INTRAMUSCULAR; INTRAVENOUS
Status: DISCONTINUED | OUTPATIENT
Start: 2020-01-01 | End: 2020-01-01 | Stop reason: HOSPADM

## 2020-01-01 RX ORDER — DEXAMETHASONE 4 MG/1
6 TABLET ORAL DAILY
Status: DISCONTINUED | OUTPATIENT
Start: 2020-01-01 | End: 2020-01-01

## 2020-01-01 RX ORDER — FENTANYL CITRATE 50 UG/ML
INJECTION, SOLUTION INTRAMUSCULAR; INTRAVENOUS AS NEEDED
Status: DISCONTINUED | OUTPATIENT
Start: 2020-01-01 | End: 2020-01-01 | Stop reason: HOSPADM

## 2020-01-01 RX ORDER — DIPHENHYDRAMINE HYDROCHLORIDE 50 MG/ML
12.5 INJECTION, SOLUTION INTRAMUSCULAR; INTRAVENOUS ONCE
Status: COMPLETED | OUTPATIENT
Start: 2020-01-01 | End: 2020-01-01

## 2020-01-01 RX ORDER — ESMOLOL HYDROCHLORIDE 10 MG/ML
INJECTION INTRAVENOUS AS NEEDED
Status: DISCONTINUED | OUTPATIENT
Start: 2020-01-01 | End: 2020-01-01 | Stop reason: HOSPADM

## 2020-01-01 RX ORDER — DONEPEZIL HYDROCHLORIDE 5 MG/1
5 TABLET, FILM COATED ORAL
Status: DISCONTINUED | OUTPATIENT
Start: 2020-01-01 | End: 2020-01-01 | Stop reason: HOSPADM

## 2020-01-01 RX ORDER — ACETAMINOPHEN 650 MG/1
650 SUPPOSITORY RECTAL
Status: DISCONTINUED | OUTPATIENT
Start: 2020-01-01 | End: 2020-01-01 | Stop reason: HOSPADM

## 2020-01-01 RX ORDER — INSULIN LISPRO 100 [IU]/ML
INJECTION, SOLUTION INTRAVENOUS; SUBCUTANEOUS
Status: DISCONTINUED | OUTPATIENT
Start: 2020-01-01 | End: 2020-01-01

## 2020-01-01 RX ORDER — FENTANYL CITRATE 50 UG/ML
50 INJECTION, SOLUTION INTRAMUSCULAR; INTRAVENOUS
Status: COMPLETED | OUTPATIENT
Start: 2020-01-01 | End: 2020-01-01

## 2020-01-01 RX ORDER — DEXTROSE MONOHYDRATE 100 MG/ML
125-250 INJECTION, SOLUTION INTRAVENOUS AS NEEDED
Status: DISCONTINUED | OUTPATIENT
Start: 2020-01-01 | End: 2020-01-01 | Stop reason: HOSPADM

## 2020-01-01 RX ORDER — INSULIN LISPRO 100 [IU]/ML
INJECTION, SOLUTION INTRAVENOUS; SUBCUTANEOUS ONCE
Status: COMPLETED | OUTPATIENT
Start: 2020-01-01 | End: 2020-01-01

## 2020-01-01 RX ORDER — FERROUS SULFATE, DRIED 160(50) MG
1 TABLET, EXTENDED RELEASE ORAL
Status: DISCONTINUED | OUTPATIENT
Start: 2020-01-01 | End: 2020-01-01 | Stop reason: HOSPADM

## 2020-01-01 RX ORDER — ALBUMIN HUMAN 250 G/1000ML
25 SOLUTION INTRAVENOUS EVERY 6 HOURS
Status: DISCONTINUED | OUTPATIENT
Start: 2020-01-01 | End: 2020-01-01

## 2020-01-01 RX ORDER — ARIPIPRAZOLE 2 MG/1
2 TABLET ORAL DAILY
Status: DISCONTINUED | OUTPATIENT
Start: 2020-01-01 | End: 2020-01-01 | Stop reason: HOSPADM

## 2020-01-01 RX ORDER — SODIUM CHLORIDE 0.9 % (FLUSH) 0.9 %
5-40 SYRINGE (ML) INJECTION AS NEEDED
Status: DISCONTINUED | OUTPATIENT
Start: 2020-01-01 | End: 2020-01-01 | Stop reason: HOSPADM

## 2020-01-01 RX ORDER — DILTIAZEM HYDROCHLORIDE 240 MG/1
240 CAPSULE, COATED, EXTENDED RELEASE ORAL
Status: DISCONTINUED | OUTPATIENT
Start: 2020-01-01 | End: 2020-01-01 | Stop reason: ALTCHOICE

## 2020-01-01 RX ORDER — INSULIN GLARGINE 100 [IU]/ML
10 INJECTION, SOLUTION SUBCUTANEOUS
Status: DISCONTINUED | OUTPATIENT
Start: 2020-01-01 | End: 2020-01-01

## 2020-01-01 RX ORDER — ONDANSETRON 2 MG/ML
4 INJECTION INTRAMUSCULAR; INTRAVENOUS ONCE
Status: DISCONTINUED | OUTPATIENT
Start: 2020-01-01 | End: 2020-01-01 | Stop reason: HOSPADM

## 2020-01-01 RX ORDER — SODIUM CHLORIDE, SODIUM LACTATE, POTASSIUM CHLORIDE, CALCIUM CHLORIDE 600; 310; 30; 20 MG/100ML; MG/100ML; MG/100ML; MG/100ML
150 INJECTION, SOLUTION INTRAVENOUS CONTINUOUS
Status: DISCONTINUED | OUTPATIENT
Start: 2020-01-01 | End: 2020-01-01 | Stop reason: HOSPADM

## 2020-01-01 RX ORDER — BUPROPION HYDROCHLORIDE 75 MG/1
75 TABLET ORAL 2 TIMES DAILY
Status: DISCONTINUED | OUTPATIENT
Start: 2020-01-01 | End: 2020-01-01 | Stop reason: HOSPADM

## 2020-01-01 RX ORDER — DILTIAZEM HYDROCHLORIDE 240 MG/1
240 CAPSULE, COATED, EXTENDED RELEASE ORAL
Status: DISCONTINUED | OUTPATIENT
Start: 2020-01-01 | End: 2020-01-01

## 2020-01-01 RX ORDER — BUDESONIDE 0.5 MG/2ML
500 INHALANT ORAL
Status: DISCONTINUED | OUTPATIENT
Start: 2020-01-01 | End: 2020-01-01 | Stop reason: HOSPADM

## 2020-01-01 RX ORDER — SODIUM CHLORIDE 0.9 % (FLUSH) 0.9 %
5-10 SYRINGE (ML) INJECTION AS NEEDED
Status: DISCONTINUED | OUTPATIENT
Start: 2020-01-01 | End: 2020-01-01 | Stop reason: HOSPADM

## 2020-01-01 RX ORDER — NALOXONE HYDROCHLORIDE 0.4 MG/ML
0.4 INJECTION, SOLUTION INTRAMUSCULAR; INTRAVENOUS; SUBCUTANEOUS AS NEEDED
Status: DISCONTINUED | OUTPATIENT
Start: 2020-01-01 | End: 2020-01-01 | Stop reason: HOSPADM

## 2020-01-01 RX ORDER — TRAMADOL HYDROCHLORIDE 50 MG/1
50 TABLET ORAL
Qty: 12 TAB | Refills: 0 | Status: SHIPPED | OUTPATIENT
Start: 2020-01-01 | End: 2020-01-01

## 2020-01-01 RX ORDER — BUDESONIDE 0.5 MG/2ML
500 INHALANT ORAL
Status: DISCONTINUED | OUTPATIENT
Start: 2020-01-01 | End: 2020-01-01

## 2020-01-01 RX ORDER — BUPROPION HYDROCHLORIDE 150 MG/1
150 TABLET, EXTENDED RELEASE ORAL DAILY
Status: DISCONTINUED | OUTPATIENT
Start: 2020-01-01 | End: 2020-01-01 | Stop reason: ALTCHOICE

## 2020-01-01 RX ORDER — ONDANSETRON 2 MG/ML
INJECTION INTRAMUSCULAR; INTRAVENOUS AS NEEDED
Status: DISCONTINUED | OUTPATIENT
Start: 2020-01-01 | End: 2020-01-01 | Stop reason: HOSPADM

## 2020-01-01 RX ORDER — ACETAMINOPHEN 325 MG/1
650 TABLET ORAL
Status: DISCONTINUED | OUTPATIENT
Start: 2020-01-01 | End: 2020-01-01

## 2020-01-01 RX ORDER — PANTOPRAZOLE SODIUM 40 MG/10ML
40 INJECTION, POWDER, LYOPHILIZED, FOR SOLUTION INTRAVENOUS EVERY 24 HOURS
Status: DISCONTINUED | OUTPATIENT
Start: 2020-01-01 | End: 2020-01-01 | Stop reason: CLARIF

## 2020-01-01 RX ORDER — SODIUM CHLORIDE, SODIUM LACTATE, POTASSIUM CHLORIDE, CALCIUM CHLORIDE 600; 310; 30; 20 MG/100ML; MG/100ML; MG/100ML; MG/100ML
125 INJECTION, SOLUTION INTRAVENOUS CONTINUOUS
Status: DISCONTINUED | OUTPATIENT
Start: 2020-01-01 | End: 2020-01-01

## 2020-01-01 RX ORDER — AMOXICILLIN 250 MG
1 CAPSULE ORAL 2 TIMES DAILY
Status: DISCONTINUED | OUTPATIENT
Start: 2020-01-01 | End: 2020-01-01 | Stop reason: HOSPADM

## 2020-01-01 RX ORDER — LIDOCAINE HYDROCHLORIDE 20 MG/ML
INJECTION, SOLUTION EPIDURAL; INFILTRATION; INTRACAUDAL; PERINEURAL AS NEEDED
Status: DISCONTINUED | OUTPATIENT
Start: 2020-01-01 | End: 2020-01-01 | Stop reason: HOSPADM

## 2020-01-01 RX ORDER — HALOPERIDOL 5 MG/ML
5 INJECTION INTRAMUSCULAR
Status: DISCONTINUED | OUTPATIENT
Start: 2020-01-01 | End: 2020-01-01 | Stop reason: HOSPADM

## 2020-01-01 RX ORDER — MAGNESIUM SULFATE 100 %
4 CRYSTALS MISCELLANEOUS AS NEEDED
Status: DISCONTINUED | OUTPATIENT
Start: 2020-01-01 | End: 2020-01-01 | Stop reason: SDUPTHER

## 2020-01-01 RX ORDER — CEFAZOLIN SODIUM 2 G/50ML
2 SOLUTION INTRAVENOUS ONCE
Status: DISCONTINUED | OUTPATIENT
Start: 2020-01-01 | End: 2020-01-01 | Stop reason: SDUPTHER

## 2020-01-01 RX ORDER — MELATONIN
1000 DAILY
Status: DISCONTINUED | OUTPATIENT
Start: 2020-01-01 | End: 2020-01-01 | Stop reason: HOSPADM

## 2020-01-01 RX ORDER — MAGNESIUM SULFATE 100 %
16 CRYSTALS MISCELLANEOUS AS NEEDED
Status: DISCONTINUED | OUTPATIENT
Start: 2020-01-01 | End: 2020-01-01 | Stop reason: HOSPADM

## 2020-01-01 RX ORDER — INSULIN GLARGINE 100 [IU]/ML
15 INJECTION, SOLUTION SUBCUTANEOUS
Status: DISCONTINUED | OUTPATIENT
Start: 2020-01-01 | End: 2020-01-01 | Stop reason: HOSPADM

## 2020-01-01 RX ORDER — LIDOCAINE 50 MG/G
PATCH TOPICAL
Qty: 15 EACH | Refills: 0 | Status: SHIPPED | OUTPATIENT
Start: 2020-01-01

## 2020-01-01 RX ORDER — FUROSEMIDE 20 MG/1
20 TABLET ORAL DAILY
Status: DISCONTINUED | OUTPATIENT
Start: 2020-01-01 | End: 2020-01-01 | Stop reason: HOSPADM

## 2020-01-01 RX ORDER — BUPIVACAINE HYDROCHLORIDE 5 MG/ML
INJECTION, SOLUTION EPIDURAL; INTRACAUDAL AS NEEDED
Status: DISCONTINUED | OUTPATIENT
Start: 2020-01-01 | End: 2020-01-01 | Stop reason: HOSPADM

## 2020-01-01 RX ORDER — AMOXICILLIN AND CLAVULANATE POTASSIUM 500; 125 MG/1; MG/1
1 TABLET, FILM COATED ORAL 2 TIMES DAILY
Qty: 10 TAB | Refills: 0 | Status: SHIPPED
Start: 2020-01-01

## 2020-01-01 RX ORDER — ROCURONIUM BROMIDE 10 MG/ML
INJECTION, SOLUTION INTRAVENOUS AS NEEDED
Status: DISCONTINUED | OUTPATIENT
Start: 2020-01-01 | End: 2020-01-01 | Stop reason: HOSPADM

## 2020-01-01 RX ORDER — VANCOMYCIN 2 GRAM/500 ML IN 0.9 % SODIUM CHLORIDE INTRAVENOUS
2000 ONCE
Status: COMPLETED | OUTPATIENT
Start: 2020-01-01 | End: 2020-01-01

## 2020-01-01 RX ORDER — ALBUTEROL SULFATE 0.83 MG/ML
2.5 SOLUTION RESPIRATORY (INHALATION) AS NEEDED
Status: DISCONTINUED | OUTPATIENT
Start: 2020-01-01 | End: 2020-01-01 | Stop reason: HOSPADM

## 2020-01-01 RX ORDER — ONDANSETRON 2 MG/ML
4 INJECTION INTRAMUSCULAR; INTRAVENOUS
Status: DISCONTINUED | OUTPATIENT
Start: 2020-01-01 | End: 2020-01-01 | Stop reason: HOSPADM

## 2020-01-01 RX ORDER — LANOLIN ALCOHOL/MO/W.PET/CERES
325 CREAM (GRAM) TOPICAL 2 TIMES DAILY WITH MEALS
Qty: 10 TAB | Refills: 0 | Status: SHIPPED
Start: 2020-01-01

## 2020-01-01 RX ORDER — INSULIN GLARGINE 100 [IU]/ML
12 INJECTION, SOLUTION SUBCUTANEOUS
Status: DISCONTINUED | OUTPATIENT
Start: 2020-01-01 | End: 2020-01-01 | Stop reason: HOSPADM

## 2020-01-01 RX ORDER — ACETAMINOPHEN 325 MG/1
975 TABLET ORAL
Status: DISCONTINUED | OUTPATIENT
Start: 2020-01-01 | End: 2020-01-01 | Stop reason: HOSPADM

## 2020-01-01 RX ORDER — ACETAMINOPHEN 325 MG/1
650 TABLET ORAL
Status: DISCONTINUED | OUTPATIENT
Start: 2020-01-01 | End: 2020-01-01 | Stop reason: SDUPTHER

## 2020-01-01 RX ORDER — ASCORBIC ACID 250 MG
250 TABLET ORAL DAILY
Status: DISCONTINUED | OUTPATIENT
Start: 2020-01-01 | End: 2020-01-01 | Stop reason: HOSPADM

## 2020-01-01 RX ORDER — POTASSIUM CHLORIDE 7.45 MG/ML
10 INJECTION INTRAVENOUS
Status: COMPLETED | OUTPATIENT
Start: 2020-01-01 | End: 2020-01-01

## 2020-01-01 RX ORDER — ZINC SULFATE 50(220)MG
1 CAPSULE ORAL DAILY
Status: DISCONTINUED | OUTPATIENT
Start: 2020-01-01 | End: 2020-01-01 | Stop reason: HOSPADM

## 2020-01-01 RX ORDER — NALOXONE HYDROCHLORIDE 0.4 MG/ML
0.1 INJECTION, SOLUTION INTRAMUSCULAR; INTRAVENOUS; SUBCUTANEOUS AS NEEDED
Status: DISCONTINUED | OUTPATIENT
Start: 2020-01-01 | End: 2020-01-01 | Stop reason: HOSPADM

## 2020-01-01 RX ORDER — FLUTICASONE PROPIONATE 50 MCG
2 SPRAY, SUSPENSION (ML) NASAL
Status: DISCONTINUED | OUTPATIENT
Start: 2020-01-01 | End: 2020-01-01 | Stop reason: HOSPADM

## 2020-01-01 RX ORDER — ACETAMINOPHEN 650 MG/1
650 SUPPOSITORY RECTAL
Status: DISCONTINUED | OUTPATIENT
Start: 2020-01-01 | End: 2020-01-01

## 2020-01-01 RX ORDER — DILTIAZEM HYDROCHLORIDE 240 MG/1
240 CAPSULE, COATED, EXTENDED RELEASE ORAL
Status: DISCONTINUED | OUTPATIENT
Start: 2020-01-01 | End: 2020-01-01 | Stop reason: HOSPADM

## 2020-01-01 RX ORDER — ENOXAPARIN SODIUM 100 MG/ML
1 INJECTION SUBCUTANEOUS EVERY 12 HOURS
Status: DISCONTINUED | OUTPATIENT
Start: 2020-01-01 | End: 2020-01-01 | Stop reason: HOSPADM

## 2020-01-01 RX ORDER — DILTIAZEM HYDROCHLORIDE 240 MG/1
240 CAPSULE, COATED, EXTENDED RELEASE ORAL DAILY
Status: COMPLETED | OUTPATIENT
Start: 2020-01-01 | End: 2020-01-01

## 2020-01-01 RX ORDER — FENTANYL CITRATE 50 UG/ML
25 INJECTION, SOLUTION INTRAMUSCULAR; INTRAVENOUS
Status: DISCONTINUED | OUTPATIENT
Start: 2020-01-01 | End: 2020-01-01 | Stop reason: HOSPADM

## 2020-01-01 RX ORDER — HYDROCODONE BITARTRATE AND ACETAMINOPHEN 5; 325 MG/1; MG/1
1 TABLET ORAL
Qty: 12 TAB | Refills: 0 | Status: SHIPPED | OUTPATIENT
Start: 2020-01-01 | End: 2020-01-01

## 2020-01-01 RX ORDER — INSULIN LISPRO 100 [IU]/ML
INJECTION, SOLUTION INTRAVENOUS; SUBCUTANEOUS EVERY 6 HOURS
Status: DISCONTINUED | OUTPATIENT
Start: 2020-01-01 | End: 2020-01-01 | Stop reason: HOSPADM

## 2020-01-01 RX ORDER — PROPOFOL 10 MG/ML
INJECTION, EMULSION INTRAVENOUS AS NEEDED
Status: DISCONTINUED | OUTPATIENT
Start: 2020-01-01 | End: 2020-01-01 | Stop reason: HOSPADM

## 2020-01-01 RX ORDER — CEFAZOLIN SODIUM 2 G/50ML
2 SOLUTION INTRAVENOUS ONCE
Status: DISCONTINUED | OUTPATIENT
Start: 2020-01-01 | End: 2020-01-01

## 2020-01-01 RX ORDER — MAGNESIUM SULFATE 100 %
4 CRYSTALS MISCELLANEOUS AS NEEDED
Status: DISCONTINUED | OUTPATIENT
Start: 2020-01-01 | End: 2020-01-01 | Stop reason: HOSPADM

## 2020-01-01 RX ORDER — HALOPERIDOL 5 MG/ML
5 INJECTION INTRAMUSCULAR ONCE
Status: COMPLETED | OUTPATIENT
Start: 2020-01-01 | End: 2020-01-01

## 2020-01-01 RX ORDER — LANOLIN ALCOHOL/MO/W.PET/CERES
325 CREAM (GRAM) TOPICAL 2 TIMES DAILY WITH MEALS
Status: DISCONTINUED | OUTPATIENT
Start: 2020-01-01 | End: 2020-01-01 | Stop reason: HOSPADM

## 2020-01-01 RX ORDER — FUROSEMIDE 10 MG/ML
20 INJECTION INTRAMUSCULAR; INTRAVENOUS DAILY
Status: COMPLETED | OUTPATIENT
Start: 2020-01-01 | End: 2020-01-01

## 2020-01-01 RX ORDER — DILTIAZEM HYDROCHLORIDE 60 MG/1
60 TABLET, FILM COATED ORAL 4 TIMES DAILY
Status: DISCONTINUED | OUTPATIENT
Start: 2020-01-01 | End: 2020-01-01 | Stop reason: HOSPADM

## 2020-01-01 RX ORDER — CHOLECALCIFEROL (VITAMIN D3) 125 MCG
5 CAPSULE ORAL
Status: DISCONTINUED | OUTPATIENT
Start: 2020-01-01 | End: 2020-01-01 | Stop reason: HOSPADM

## 2020-01-01 RX ORDER — ACETAMINOPHEN 650 MG/1
650 SUPPOSITORY RECTAL
Status: DISCONTINUED | OUTPATIENT
Start: 2020-01-01 | End: 2020-01-01 | Stop reason: SDUPTHER

## 2020-01-01 RX ORDER — NEOSTIGMINE METHYLSULFATE 1 MG/ML
INJECTION, SOLUTION INTRAVENOUS AS NEEDED
Status: DISCONTINUED | OUTPATIENT
Start: 2020-01-01 | End: 2020-01-01 | Stop reason: HOSPADM

## 2020-01-01 RX ORDER — INSULIN LISPRO 100 [IU]/ML
4 INJECTION, SOLUTION INTRAVENOUS; SUBCUTANEOUS
Status: DISCONTINUED | OUTPATIENT
Start: 2020-01-01 | End: 2020-01-01 | Stop reason: HOSPADM

## 2020-01-01 RX ORDER — BUPROPION HYDROCHLORIDE 150 MG/1
150 TABLET, EXTENDED RELEASE ORAL DAILY
Status: DISCONTINUED | OUTPATIENT
Start: 2020-01-01 | End: 2020-01-01 | Stop reason: HOSPADM

## 2020-01-01 RX ORDER — SODIUM CHLORIDE, SODIUM LACTATE, POTASSIUM CHLORIDE, CALCIUM CHLORIDE 600; 310; 30; 20 MG/100ML; MG/100ML; MG/100ML; MG/100ML
125 INJECTION, SOLUTION INTRAVENOUS CONTINUOUS
Status: DISPENSED | OUTPATIENT
Start: 2020-01-01 | End: 2020-01-01

## 2020-01-01 RX ORDER — AMOXICILLIN 250 MG
1 CAPSULE ORAL 2 TIMES DAILY
Qty: 10 TAB | Refills: 0 | Status: SHIPPED
Start: 2020-01-01

## 2020-01-01 RX ORDER — FACIAL-BODY WIPES
10 EACH TOPICAL DAILY PRN
Status: DISCONTINUED | OUTPATIENT
Start: 2020-01-01 | End: 2020-01-01 | Stop reason: HOSPADM

## 2020-01-01 RX ORDER — ARIPIPRAZOLE 2 MG/1
2 TABLET ORAL DAILY
COMMUNITY

## 2020-01-01 RX ADMIN — PIPERACILLIN AND TAZOBACTAM 4.5 G: 4; .5 INJECTION, POWDER, LYOPHILIZED, FOR SOLUTION INTRAVENOUS; PARENTERAL at 15:57

## 2020-01-01 RX ADMIN — SODIUM CHLORIDE 40 MG: 9 INJECTION, SOLUTION INTRAMUSCULAR; INTRAVENOUS; SUBCUTANEOUS at 09:26

## 2020-01-01 RX ADMIN — BUDESONIDE 500 MCG: 0.5 INHALANT RESPIRATORY (INHALATION) at 20:13

## 2020-01-01 RX ADMIN — FUROSEMIDE 20 MG: 20 TABLET ORAL at 09:39

## 2020-01-01 RX ADMIN — FUROSEMIDE 20 MG: 20 TABLET ORAL at 08:30

## 2020-01-01 RX ADMIN — INSULIN LISPRO 6 UNITS: 100 INJECTION, SOLUTION INTRAVENOUS; SUBCUTANEOUS at 16:30

## 2020-01-01 RX ADMIN — ENOXAPARIN SODIUM 90 MG: 100 INJECTION SUBCUTANEOUS at 10:50

## 2020-01-01 RX ADMIN — INSULIN LISPRO 2 UNITS: 100 INJECTION, SOLUTION INTRAVENOUS; SUBCUTANEOUS at 00:45

## 2020-01-01 RX ADMIN — Medication 5 MG: at 21:16

## 2020-01-01 RX ADMIN — SODIUM CHLORIDE 40 MG: 9 INJECTION INTRAMUSCULAR; INTRAVENOUS; SUBCUTANEOUS at 09:36

## 2020-01-01 RX ADMIN — BUPROPION HYDROCHLORIDE 150 MG: 150 TABLET, EXTENDED RELEASE ORAL at 09:38

## 2020-01-01 RX ADMIN — Medication 1 TABLET: at 09:12

## 2020-01-01 RX ADMIN — Medication 1 TABLET: at 08:53

## 2020-01-01 RX ADMIN — PIPERACILLIN AND TAZOBACTAM 3.38 G: 3; .375 INJECTION, POWDER, LYOPHILIZED, FOR SOLUTION INTRAVENOUS at 23:14

## 2020-01-01 RX ADMIN — SODIUM CHLORIDE 40 MG: 9 INJECTION INTRAMUSCULAR; INTRAVENOUS; SUBCUTANEOUS at 10:41

## 2020-01-01 RX ADMIN — CYCLOSPORINE 1 DROP: 0.5 EMULSION OPHTHALMIC at 09:00

## 2020-01-01 RX ADMIN — BUPROPION HYDROCHLORIDE 150 MG: 150 TABLET, EXTENDED RELEASE ORAL at 08:30

## 2020-01-01 RX ADMIN — INSULIN GLARGINE 12 UNITS: 100 INJECTION, SOLUTION SUBCUTANEOUS at 21:14

## 2020-01-01 RX ADMIN — CALCIUM GLUCONATE 2 G: 20 INJECTION, SOLUTION INTRAVENOUS at 11:55

## 2020-01-01 RX ADMIN — GLYCOPYRROLATE 0.3 MG: 0.2 INJECTION INTRAMUSCULAR; INTRAVENOUS at 19:05

## 2020-01-01 RX ADMIN — ROCURONIUM BROMIDE 20 MG: 10 INJECTION, SOLUTION INTRAVENOUS at 18:21

## 2020-01-01 RX ADMIN — INSULIN LISPRO 4 UNITS: 100 INJECTION, SOLUTION INTRAVENOUS; SUBCUTANEOUS at 12:00

## 2020-01-01 RX ADMIN — ALBUMIN (HUMAN) 25 G: 0.25 INJECTION, SOLUTION INTRAVENOUS at 08:24

## 2020-01-01 RX ADMIN — SODIUM CHLORIDE 40 MG: 9 INJECTION, SOLUTION INTRAMUSCULAR; INTRAVENOUS; SUBCUTANEOUS at 12:32

## 2020-01-01 RX ADMIN — INSULIN LISPRO 4 UNITS: 100 INJECTION, SOLUTION INTRAVENOUS; SUBCUTANEOUS at 06:31

## 2020-01-01 RX ADMIN — HALOPERIDOL LACTATE 5 MG: 5 INJECTION, SOLUTION INTRAMUSCULAR at 23:48

## 2020-01-01 RX ADMIN — FENTANYL CITRATE 50 MCG: 50 INJECTION, SOLUTION INTRAMUSCULAR; INTRAVENOUS at 18:31

## 2020-01-01 RX ADMIN — ZINC SULFATE 220 MG (50 MG) CAPSULE 1 CAPSULE: CAPSULE at 08:54

## 2020-01-01 RX ADMIN — SODIUM CHLORIDE, SODIUM LACTATE, POTASSIUM CHLORIDE, AND CALCIUM CHLORIDE 125 ML/HR: 600; 310; 30; 20 INJECTION, SOLUTION INTRAVENOUS at 21:36

## 2020-01-01 RX ADMIN — FENTANYL CITRATE 25 MCG: 50 INJECTION, SOLUTION INTRAMUSCULAR; INTRAVENOUS at 19:11

## 2020-01-01 RX ADMIN — Medication 5 MG: at 22:08

## 2020-01-01 RX ADMIN — ARIPIPRAZOLE 2 MG: 2 TABLET ORAL at 08:30

## 2020-01-01 RX ADMIN — ONDANSETRON HYDROCHLORIDE 4 MG: 2 INJECTION INTRAMUSCULAR; INTRAVENOUS at 19:08

## 2020-01-01 RX ADMIN — BUPROPION HYDROCHLORIDE 150 MG: 150 TABLET, EXTENDED RELEASE ORAL at 09:10

## 2020-01-01 RX ADMIN — ARIPIPRAZOLE 2 MG: 2 TABLET ORAL at 08:54

## 2020-01-01 RX ADMIN — BUPROPION HYDROCHLORIDE 150 MG: 150 TABLET, EXTENDED RELEASE ORAL at 08:41

## 2020-01-01 RX ADMIN — DILTIAZEM HYDROCHLORIDE 240 MG: 240 CAPSULE, COATED, EXTENDED RELEASE ORAL at 14:40

## 2020-01-01 RX ADMIN — CYCLOSPORINE 1 DROP: 0.5 EMULSION OPHTHALMIC at 21:00

## 2020-01-01 RX ADMIN — INSULIN LISPRO 4 UNITS: 100 INJECTION, SOLUTION INTRAVENOUS; SUBCUTANEOUS at 17:29

## 2020-01-01 RX ADMIN — BUDESONIDE 500 MCG: 0.5 INHALANT RESPIRATORY (INHALATION) at 07:27

## 2020-01-01 RX ADMIN — METHYLPREDNISOLONE SODIUM SUCCINATE 20 MG: 40 INJECTION, POWDER, FOR SOLUTION INTRAMUSCULAR; INTRAVENOUS at 13:54

## 2020-01-01 RX ADMIN — CYCLOSPORINE 1 DROP: 0.5 EMULSION OPHTHALMIC at 21:19

## 2020-01-01 RX ADMIN — PIPERACILLIN AND TAZOBACTAM 3.38 G: 3; .375 INJECTION, POWDER, LYOPHILIZED, FOR SOLUTION INTRAVENOUS at 01:36

## 2020-01-01 RX ADMIN — FUROSEMIDE 20 MG: 10 INJECTION, SOLUTION INTRAMUSCULAR; INTRAVENOUS at 09:26

## 2020-01-01 RX ADMIN — PIPERACILLIN AND TAZOBACTAM 3.38 G: 3; .375 INJECTION, POWDER, LYOPHILIZED, FOR SOLUTION INTRAVENOUS at 06:12

## 2020-01-01 RX ADMIN — Medication 10 ML: at 06:48

## 2020-01-01 RX ADMIN — KETAMINE HYDROCHLORIDE 30 MG: 10 INJECTION, SOLUTION INTRAMUSCULAR; INTRAVENOUS at 18:12

## 2020-01-01 RX ADMIN — SODIUM CHLORIDE 40 MG: 9 INJECTION INTRAMUSCULAR; INTRAVENOUS; SUBCUTANEOUS at 09:39

## 2020-01-01 RX ADMIN — SODIUM CHLORIDE 1000 ML: 900 INJECTION, SOLUTION INTRAVENOUS at 02:42

## 2020-01-01 RX ADMIN — INSULIN LISPRO 4 UNITS: 100 INJECTION, SOLUTION INTRAVENOUS; SUBCUTANEOUS at 16:13

## 2020-01-01 RX ADMIN — ARIPIPRAZOLE 2 MG: 2 TABLET ORAL at 09:09

## 2020-01-01 RX ADMIN — Medication 5 MG: at 22:21

## 2020-01-01 RX ADMIN — ACETAMINOPHEN 650 MG: 325 TABLET ORAL at 14:37

## 2020-01-01 RX ADMIN — PIPERACILLIN AND TAZOBACTAM 3.38 G: 3; .375 INJECTION, POWDER, LYOPHILIZED, FOR SOLUTION INTRAVENOUS at 17:06

## 2020-01-01 RX ADMIN — Medication 1 TABLET: at 16:54

## 2020-01-01 RX ADMIN — ENOXAPARIN SODIUM 40 MG: 40 INJECTION SUBCUTANEOUS at 08:31

## 2020-01-01 RX ADMIN — ENOXAPARIN SODIUM 90 MG: 100 INJECTION SUBCUTANEOUS at 22:18

## 2020-01-01 RX ADMIN — INSULIN LISPRO 2 UNITS: 100 INJECTION, SOLUTION INTRAVENOUS; SUBCUTANEOUS at 21:57

## 2020-01-01 RX ADMIN — PIPERACILLIN AND TAZOBACTAM 4.5 G: 4; .5 INJECTION, POWDER, LYOPHILIZED, FOR SOLUTION INTRAVENOUS; PARENTERAL at 16:58

## 2020-01-01 RX ADMIN — INSULIN LISPRO 15 UNITS: 100 INJECTION, SOLUTION INTRAVENOUS; SUBCUTANEOUS at 21:34

## 2020-01-01 RX ADMIN — INSULIN LISPRO 4 UNITS: 100 INJECTION, SOLUTION INTRAVENOUS; SUBCUTANEOUS at 18:16

## 2020-01-01 RX ADMIN — Medication 10 ML: at 06:50

## 2020-01-01 RX ADMIN — IOPAMIDOL 100 ML: 755 INJECTION, SOLUTION INTRAVENOUS at 13:24

## 2020-01-01 RX ADMIN — PANTOPRAZOLE SODIUM 40 MG: 40 TABLET, DELAYED RELEASE ORAL at 06:38

## 2020-01-01 RX ADMIN — INSULIN LISPRO 2 UNITS: 100 INJECTION, SOLUTION INTRAVENOUS; SUBCUTANEOUS at 16:55

## 2020-01-01 RX ADMIN — Medication 5 MG: at 22:38

## 2020-01-01 RX ADMIN — FERROUS SULFATE TAB 325 MG (65 MG ELEMENTAL FE) 325 MG: 325 (65 FE) TAB at 17:29

## 2020-01-01 RX ADMIN — SODIUM CHLORIDE 637 ML: 900 INJECTION, SOLUTION INTRAVENOUS at 03:35

## 2020-01-01 RX ADMIN — ZINC SULFATE 220 MG (50 MG) CAPSULE 1 CAPSULE: CAPSULE at 08:30

## 2020-01-01 RX ADMIN — CYCLOSPORINE 1 DROP: 0.5 EMULSION OPHTHALMIC at 20:39

## 2020-01-01 RX ADMIN — INSULIN LISPRO 4 UNITS: 100 INJECTION, SOLUTION INTRAVENOUS; SUBCUTANEOUS at 08:29

## 2020-01-01 RX ADMIN — ROCURONIUM BROMIDE 10 MG: 10 INJECTION, SOLUTION INTRAVENOUS at 18:12

## 2020-01-01 RX ADMIN — LIDOCAINE HYDROCHLORIDE 80 MG: 20 INJECTION, SOLUTION EPIDURAL; INFILTRATION; INTRACAUDAL; PERINEURAL at 18:12

## 2020-01-01 RX ADMIN — INSULIN LISPRO 2 UNITS: 100 INJECTION, SOLUTION INTRAVENOUS; SUBCUTANEOUS at 09:12

## 2020-01-01 RX ADMIN — DILTIAZEM HYDROCHLORIDE 60 MG: 60 TABLET, FILM COATED ORAL at 13:23

## 2020-01-01 RX ADMIN — ARIPIPRAZOLE 2 MG: 2 TABLET ORAL at 10:35

## 2020-01-01 RX ADMIN — FUROSEMIDE 20 MG: 20 TABLET ORAL at 13:09

## 2020-01-01 RX ADMIN — CYCLOSPORINE 1 DROP: 0.5 EMULSION OPHTHALMIC at 21:52

## 2020-01-01 RX ADMIN — VANCOMYCIN HYDROCHLORIDE 1250 MG: 1.25 INJECTION, POWDER, LYOPHILIZED, FOR SOLUTION INTRAVENOUS at 14:20

## 2020-01-01 RX ADMIN — BUDESONIDE 500 MCG: 0.5 INHALANT RESPIRATORY (INHALATION) at 20:00

## 2020-01-01 RX ADMIN — INSULIN LISPRO 4 UNITS: 100 INJECTION, SOLUTION INTRAVENOUS; SUBCUTANEOUS at 12:26

## 2020-01-01 RX ADMIN — PIPERACILLIN AND TAZOBACTAM 3.38 G: 3; .375 INJECTION, POWDER, LYOPHILIZED, FOR SOLUTION INTRAVENOUS at 18:17

## 2020-01-01 RX ADMIN — FUROSEMIDE 20 MG: 20 TABLET ORAL at 11:46

## 2020-01-01 RX ADMIN — BUDESONIDE 500 MCG: 0.5 INHALANT RESPIRATORY (INHALATION) at 20:30

## 2020-01-01 RX ADMIN — SODIUM CHLORIDE 40 MG: 9 INJECTION INTRAMUSCULAR; INTRAVENOUS; SUBCUTANEOUS at 09:01

## 2020-01-01 RX ADMIN — INSULIN GLARGINE 12 UNITS: 100 INJECTION, SOLUTION SUBCUTANEOUS at 22:39

## 2020-01-01 RX ADMIN — INSULIN LISPRO 4 UNITS: 100 INJECTION, SOLUTION INTRAVENOUS; SUBCUTANEOUS at 12:16

## 2020-01-01 RX ADMIN — DILTIAZEM HYDROCHLORIDE 240 MG: 240 CAPSULE, COATED, EXTENDED RELEASE ORAL at 13:20

## 2020-01-01 RX ADMIN — IPRATROPIUM BROMIDE AND ALBUTEROL 1 PUFF: 20; 100 SPRAY, METERED RESPIRATORY (INHALATION) at 16:39

## 2020-01-01 RX ADMIN — INSULIN LISPRO 4 UNITS: 100 INJECTION, SOLUTION INTRAVENOUS; SUBCUTANEOUS at 09:08

## 2020-01-01 RX ADMIN — Medication 10 ML: at 06:04

## 2020-01-01 RX ADMIN — ACETAMINOPHEN 650 MG: 325 TABLET ORAL at 20:34

## 2020-01-01 RX ADMIN — SODIUM CHLORIDE 40 MG: 9 INJECTION INTRAMUSCULAR; INTRAVENOUS; SUBCUTANEOUS at 08:31

## 2020-01-01 RX ADMIN — PIPERACILLIN AND TAZOBACTAM 3.38 G: 3; .375 INJECTION, POWDER, LYOPHILIZED, FOR SOLUTION INTRAVENOUS at 06:03

## 2020-01-01 RX ADMIN — ACETAMINOPHEN 650 MG: 325 TABLET ORAL at 21:16

## 2020-01-01 RX ADMIN — DILTIAZEM HYDROCHLORIDE 60 MG: 60 TABLET, FILM COATED ORAL at 22:17

## 2020-01-01 RX ADMIN — INSULIN LISPRO 6 UNITS: 100 INJECTION, SOLUTION INTRAVENOUS; SUBCUTANEOUS at 06:48

## 2020-01-01 RX ADMIN — DILTIAZEM HYDROCHLORIDE 240 MG: 240 CAPSULE, COATED, EXTENDED RELEASE ORAL at 13:29

## 2020-01-01 RX ADMIN — Medication 10 ML: at 06:56

## 2020-01-01 RX ADMIN — HUMAN INSULIN 10 UNITS: 100 INJECTION, SOLUTION SUBCUTANEOUS at 23:12

## 2020-01-01 RX ADMIN — Medication 10 ML: at 22:09

## 2020-01-01 RX ADMIN — INSULIN LISPRO 2 UNITS: 100 INJECTION, SOLUTION INTRAVENOUS; SUBCUTANEOUS at 12:16

## 2020-01-01 RX ADMIN — POTASSIUM CHLORIDE 10 MEQ: 10 INJECTION, SOLUTION INTRAVENOUS at 12:20

## 2020-01-01 RX ADMIN — INSULIN LISPRO 6 UNITS: 100 INJECTION, SOLUTION INTRAVENOUS; SUBCUTANEOUS at 22:05

## 2020-01-01 RX ADMIN — FUROSEMIDE 20 MG: 20 TABLET ORAL at 08:41

## 2020-01-01 RX ADMIN — IOPAMIDOL 100 ML: 612 INJECTION, SOLUTION INTRAVENOUS at 17:55

## 2020-01-01 RX ADMIN — PIPERACILLIN AND TAZOBACTAM 4.5 G: 4; .5 INJECTION, POWDER, LYOPHILIZED, FOR SOLUTION INTRAVENOUS; PARENTERAL at 00:18

## 2020-01-01 RX ADMIN — INSULIN LISPRO 6 UNITS: 100 INJECTION, SOLUTION INTRAVENOUS; SUBCUTANEOUS at 21:13

## 2020-01-01 RX ADMIN — CALCIUM GLUCONATE 2 G: 20 INJECTION, SOLUTION INTRAVENOUS at 14:15

## 2020-01-01 RX ADMIN — FENTANYL CITRATE 50 MCG: 50 INJECTION, SOLUTION INTRAMUSCULAR; INTRAVENOUS at 18:05

## 2020-01-01 RX ADMIN — Medication 10 ML: at 15:31

## 2020-01-01 RX ADMIN — INSULIN LISPRO 4 UNITS: 100 INJECTION, SOLUTION INTRAVENOUS; SUBCUTANEOUS at 06:50

## 2020-01-01 RX ADMIN — DIPHENHYDRAMINE HYDROCHLORIDE 12.5 MG: 50 INJECTION, SOLUTION INTRAMUSCULAR; INTRAVENOUS at 23:55

## 2020-01-01 RX ADMIN — PANTOPRAZOLE SODIUM 40 MG: 40 TABLET, DELAYED RELEASE ORAL at 06:49

## 2020-01-01 RX ADMIN — Medication 250 MG: at 08:30

## 2020-01-01 RX ADMIN — DOXYCYCLINE 100 MG: 100 INJECTION, POWDER, LYOPHILIZED, FOR SOLUTION INTRAVENOUS at 22:17

## 2020-01-01 RX ADMIN — Medication 10 ML: at 21:58

## 2020-01-01 RX ADMIN — PIPERACILLIN AND TAZOBACTAM 4.5 G: 4; .5 INJECTION, POWDER, LYOPHILIZED, FOR SOLUTION INTRAVENOUS; PARENTERAL at 08:16

## 2020-01-01 RX ADMIN — Medication 10 ML: at 03:51

## 2020-01-01 RX ADMIN — IOPAMIDOL 85 ML: 755 INJECTION, SOLUTION INTRAVENOUS at 01:07

## 2020-01-01 RX ADMIN — SODIUM CHLORIDE 40 MG: 9 INJECTION, SOLUTION INTRAMUSCULAR; INTRAVENOUS; SUBCUTANEOUS at 09:40

## 2020-01-01 RX ADMIN — DILTIAZEM HYDROCHLORIDE 240 MG: 240 CAPSULE, COATED, EXTENDED RELEASE ORAL at 12:53

## 2020-01-01 RX ADMIN — FENTANYL CITRATE 50 MCG: 50 INJECTION, SOLUTION INTRAMUSCULAR; INTRAVENOUS at 18:17

## 2020-01-01 RX ADMIN — INSULIN LISPRO 4 UNITS: 100 INJECTION, SOLUTION INTRAVENOUS; SUBCUTANEOUS at 09:13

## 2020-01-01 RX ADMIN — INSULIN LISPRO 2 UNITS: 100 INJECTION, SOLUTION INTRAVENOUS; SUBCUTANEOUS at 12:53

## 2020-01-01 RX ADMIN — DOXYCYCLINE 100 MG: 100 INJECTION, POWDER, LYOPHILIZED, FOR SOLUTION INTRAVENOUS at 14:15

## 2020-01-01 RX ADMIN — Medication 250 MG: at 09:38

## 2020-01-01 RX ADMIN — INSULIN LISPRO 4 UNITS: 100 INJECTION, SOLUTION INTRAVENOUS; SUBCUTANEOUS at 18:18

## 2020-01-01 RX ADMIN — Medication 1 TABLET: at 18:16

## 2020-01-01 RX ADMIN — CYCLOSPORINE 1 DROP: 0.5 EMULSION OPHTHALMIC at 09:51

## 2020-01-01 RX ADMIN — ENOXAPARIN SODIUM 90 MG: 100 INJECTION SUBCUTANEOUS at 09:27

## 2020-01-01 RX ADMIN — POTASSIUM CHLORIDE 10 MEQ: 10 INJECTION, SOLUTION INTRAVENOUS at 09:51

## 2020-01-01 RX ADMIN — RIVAROXABAN 20 MG: 10 TABLET, FILM COATED ORAL at 08:41

## 2020-01-01 RX ADMIN — Medication 5 MG: at 21:56

## 2020-01-01 RX ADMIN — INSULIN LISPRO 9 UNITS: 100 INJECTION, SOLUTION INTRAVENOUS; SUBCUTANEOUS at 09:36

## 2020-01-01 RX ADMIN — PROPOFOL 80 MG: 10 INJECTION, EMULSION INTRAVENOUS at 18:12

## 2020-01-01 RX ADMIN — CYCLOSPORINE 1 DROP: 0.5 EMULSION OPHTHALMIC at 09:37

## 2020-01-01 RX ADMIN — INSULIN LISPRO 3 UNITS: 100 INJECTION, SOLUTION INTRAVENOUS; SUBCUTANEOUS at 11:20

## 2020-01-01 RX ADMIN — PIPERACILLIN AND TAZOBACTAM 4.5 G: 4; .5 INJECTION, POWDER, LYOPHILIZED, FOR SOLUTION INTRAVENOUS; PARENTERAL at 07:43

## 2020-01-01 RX ADMIN — Medication 10 ML: at 14:00

## 2020-01-01 RX ADMIN — DOXYCYCLINE 100 MG: 100 INJECTION, POWDER, LYOPHILIZED, FOR SOLUTION INTRAVENOUS at 11:02

## 2020-01-01 RX ADMIN — ACETAMINOPHEN 650 MG: 325 TABLET ORAL at 01:02

## 2020-01-01 RX ADMIN — PIPERACILLIN AND TAZOBACTAM 3.38 G: 3; .375 INJECTION, POWDER, LYOPHILIZED, FOR SOLUTION INTRAVENOUS at 17:00

## 2020-01-01 RX ADMIN — DOXYCYCLINE 100 MG: 100 INJECTION, POWDER, LYOPHILIZED, FOR SOLUTION INTRAVENOUS at 22:04

## 2020-01-01 RX ADMIN — INSULIN LISPRO 2 UNITS: 100 INJECTION, SOLUTION INTRAVENOUS; SUBCUTANEOUS at 11:55

## 2020-01-01 RX ADMIN — DILTIAZEM HYDROCHLORIDE 60 MG: 60 TABLET, FILM COATED ORAL at 18:18

## 2020-01-01 RX ADMIN — PIPERACILLIN AND TAZOBACTAM 4.5 G: 4; .5 INJECTION, POWDER, LYOPHILIZED, FOR SOLUTION INTRAVENOUS; PARENTERAL at 02:56

## 2020-01-01 RX ADMIN — SODIUM CHLORIDE 1000 ML: 900 INJECTION, SOLUTION INTRAVENOUS at 04:18

## 2020-01-01 RX ADMIN — FERROUS SULFATE TAB 325 MG (65 MG ELEMENTAL FE) 325 MG: 325 (65 FE) TAB at 16:54

## 2020-01-01 RX ADMIN — Medication 1 TABLET: at 08:52

## 2020-01-01 RX ADMIN — DEXAMETHASONE 6 MG: 4 TABLET ORAL at 09:38

## 2020-01-01 RX ADMIN — INSULIN LISPRO 4 UNITS: 100 INJECTION, SOLUTION INTRAVENOUS; SUBCUTANEOUS at 16:54

## 2020-01-01 RX ADMIN — INSULIN LISPRO 2 UNITS: 100 INJECTION, SOLUTION INTRAVENOUS; SUBCUTANEOUS at 22:00

## 2020-01-01 RX ADMIN — Medication 1 TABLET: at 08:41

## 2020-01-01 RX ADMIN — ENOXAPARIN SODIUM 40 MG: 100 INJECTION SUBCUTANEOUS at 01:33

## 2020-01-01 RX ADMIN — FERROUS SULFATE TAB 325 MG (65 MG ELEMENTAL FE) 325 MG: 325 (65 FE) TAB at 18:16

## 2020-01-01 RX ADMIN — FERROUS SULFATE TAB 325 MG (65 MG ELEMENTAL FE) 325 MG: 325 (65 FE) TAB at 08:42

## 2020-01-01 RX ADMIN — PIPERACILLIN AND TAZOBACTAM 3.38 G: 3; .375 INJECTION, POWDER, LYOPHILIZED, FOR SOLUTION INTRAVENOUS at 12:52

## 2020-01-01 RX ADMIN — ARIPIPRAZOLE 2 MG: 2 TABLET ORAL at 13:18

## 2020-01-01 RX ADMIN — CYCLOSPORINE 1 DROP: 0.5 EMULSION OPHTHALMIC at 12:50

## 2020-01-01 RX ADMIN — VANCOMYCIN HYDROCHLORIDE 2000 MG: 10 INJECTION, POWDER, LYOPHILIZED, FOR SOLUTION INTRAVENOUS at 13:11

## 2020-01-01 RX ADMIN — IPRATROPIUM BROMIDE AND ALBUTEROL 2 PUFF: 20; 100 SPRAY, METERED RESPIRATORY (INHALATION) at 16:06

## 2020-01-01 RX ADMIN — BUPROPION HYDROCHLORIDE 150 MG: 150 TABLET, EXTENDED RELEASE ORAL at 13:09

## 2020-01-01 RX ADMIN — FERROUS SULFATE TAB 325 MG (65 MG ELEMENTAL FE) 325 MG: 325 (65 FE) TAB at 09:08

## 2020-01-01 RX ADMIN — VANCOMYCIN HYDROCHLORIDE 1250 MG: 1.25 INJECTION, POWDER, LYOPHILIZED, FOR SOLUTION INTRAVENOUS at 15:30

## 2020-01-01 RX ADMIN — PIPERACILLIN AND TAZOBACTAM 3.38 G: 3; .375 INJECTION, POWDER, LYOPHILIZED, FOR SOLUTION INTRAVENOUS at 00:12

## 2020-01-01 RX ADMIN — Medication 1 TABLET: at 13:29

## 2020-01-01 RX ADMIN — Medication 10 ML: at 21:13

## 2020-01-01 RX ADMIN — VANCOMYCIN HYDROCHLORIDE 1250 MG: 1.25 INJECTION, POWDER, LYOPHILIZED, FOR SOLUTION INTRAVENOUS at 14:21

## 2020-01-01 RX ADMIN — Medication 250 MG: at 11:49

## 2020-01-01 RX ADMIN — PIPERACILLIN AND TAZOBACTAM 3.38 G: 3; .375 INJECTION, POWDER, LYOPHILIZED, FOR SOLUTION INTRAVENOUS at 18:28

## 2020-01-01 RX ADMIN — POTASSIUM CHLORIDE 10 MEQ: 7.46 INJECTION, SOLUTION INTRAVENOUS at 10:39

## 2020-01-01 RX ADMIN — Medication 1 TABLET: at 08:30

## 2020-01-01 RX ADMIN — FUROSEMIDE 40 MG: 10 INJECTION, SOLUTION INTRAMUSCULAR; INTRAVENOUS at 12:28

## 2020-01-01 RX ADMIN — ARIPIPRAZOLE 2 MG: 2 TABLET ORAL at 11:45

## 2020-01-01 RX ADMIN — ZINC SULFATE 220 MG (50 MG) CAPSULE 1 CAPSULE: CAPSULE at 08:41

## 2020-01-01 RX ADMIN — BUPROPION HYDROCHLORIDE 150 MG: 150 TABLET, EXTENDED RELEASE ORAL at 10:39

## 2020-01-01 RX ADMIN — ESMOLOL HYDROCHLORIDE 20 MG: 10 INJECTION, SOLUTION INTRAVENOUS at 19:12

## 2020-01-01 RX ADMIN — DOCUSATE SODIUM 50 MG AND SENNOSIDES 8.6 MG 1 TABLET: 8.6; 5 TABLET, FILM COATED ORAL at 09:09

## 2020-01-01 RX ADMIN — POTASSIUM CHLORIDE 10 MEQ: 7.46 INJECTION, SOLUTION INTRAVENOUS at 11:09

## 2020-01-01 RX ADMIN — INSULIN LISPRO 4 UNITS: 100 INJECTION, SOLUTION INTRAVENOUS; SUBCUTANEOUS at 16:30

## 2020-01-01 RX ADMIN — INSULIN LISPRO 4 UNITS: 100 INJECTION, SOLUTION INTRAVENOUS; SUBCUTANEOUS at 15:03

## 2020-01-01 RX ADMIN — Medication 10 ML: at 22:18

## 2020-01-01 RX ADMIN — VANCOMYCIN HYDROCHLORIDE 1250 MG: 1.25 INJECTION, POWDER, LYOPHILIZED, FOR SOLUTION INTRAVENOUS at 13:13

## 2020-01-01 RX ADMIN — BARIUM SULFATE 30 G: 960 POWDER, FOR SUSPENSION ORAL at 11:47

## 2020-01-01 RX ADMIN — DOCUSATE SODIUM 50 MG AND SENNOSIDES 8.6 MG 1 TABLET: 8.6; 5 TABLET, FILM COATED ORAL at 21:12

## 2020-01-01 RX ADMIN — INSULIN LISPRO 4 UNITS: 100 INJECTION, SOLUTION INTRAVENOUS; SUBCUTANEOUS at 02:00

## 2020-01-01 RX ADMIN — DONEPEZIL HYDROCHLORIDE 5 MG: 5 TABLET, FILM COATED ORAL at 22:17

## 2020-01-01 RX ADMIN — PIPERACILLIN AND TAZOBACTAM 3.38 G: 3; .375 INJECTION, POWDER, LYOPHILIZED, FOR SOLUTION INTRAVENOUS at 06:50

## 2020-01-01 RX ADMIN — INSULIN LISPRO 15 UNITS: 100 INJECTION, SOLUTION INTRAVENOUS; SUBCUTANEOUS at 22:39

## 2020-01-01 RX ADMIN — DILTIAZEM HYDROCHLORIDE 240 MG: 240 CAPSULE, COATED, EXTENDED RELEASE ORAL at 14:20

## 2020-01-01 RX ADMIN — PIPERACILLIN AND TAZOBACTAM 3.38 G: 3; .375 INJECTION, POWDER, LYOPHILIZED, FOR SOLUTION INTRAVENOUS at 06:34

## 2020-01-01 RX ADMIN — FUROSEMIDE 20 MG: 10 INJECTION, SOLUTION INTRAMUSCULAR; INTRAVENOUS at 09:40

## 2020-01-01 RX ADMIN — METHYLPREDNISOLONE SODIUM SUCCINATE 20 MG: 40 INJECTION, POWDER, FOR SOLUTION INTRAMUSCULAR; INTRAVENOUS at 22:05

## 2020-01-01 RX ADMIN — Medication 1 TABLET: at 15:03

## 2020-01-01 RX ADMIN — PIPERACILLIN AND TAZOBACTAM 3.38 G: 3; .375 INJECTION, POWDER, LYOPHILIZED, FOR SOLUTION INTRAVENOUS at 11:29

## 2020-01-01 RX ADMIN — PIPERACILLIN AND TAZOBACTAM 4.5 G: 4; .5 INJECTION, POWDER, LYOPHILIZED, FOR SOLUTION INTRAVENOUS; PARENTERAL at 01:39

## 2020-01-01 RX ADMIN — BUPROPION HYDROCHLORIDE 150 MG: 150 TABLET, EXTENDED RELEASE ORAL at 08:54

## 2020-01-01 RX ADMIN — Medication 10 ML: at 22:33

## 2020-01-01 RX ADMIN — VANCOMYCIN HYDROCHLORIDE 2000 MG: 10 INJECTION, POWDER, LYOPHILIZED, FOR SOLUTION INTRAVENOUS at 03:49

## 2020-01-01 RX ADMIN — POTASSIUM CHLORIDE 10 MEQ: 10 INJECTION, SOLUTION INTRAVENOUS at 09:05

## 2020-01-01 RX ADMIN — PIPERACILLIN AND TAZOBACTAM 3.38 G: 3; .375 INJECTION, POWDER, LYOPHILIZED, FOR SOLUTION INTRAVENOUS at 19:25

## 2020-01-01 RX ADMIN — DEXAMETHASONE 6 MG: 4 TABLET ORAL at 13:09

## 2020-01-01 RX ADMIN — DILTIAZEM HYDROCHLORIDE 240 MG: 240 CAPSULE, COATED, EXTENDED RELEASE ORAL at 11:12

## 2020-01-01 RX ADMIN — POTASSIUM CHLORIDE 10 MEQ: 7.46 INJECTION, SOLUTION INTRAVENOUS at 11:30

## 2020-01-01 RX ADMIN — DOCUSATE SODIUM 50 MG AND SENNOSIDES 8.6 MG 1 TABLET: 8.6; 5 TABLET, FILM COATED ORAL at 08:30

## 2020-01-01 RX ADMIN — INSULIN LISPRO 9 UNITS: 100 INJECTION, SOLUTION INTRAVENOUS; SUBCUTANEOUS at 11:30

## 2020-01-01 RX ADMIN — ACETAMINOPHEN 650 MG: 325 TABLET ORAL at 18:16

## 2020-01-01 RX ADMIN — PANTOPRAZOLE SODIUM 40 MG: 40 TABLET, DELAYED RELEASE ORAL at 06:47

## 2020-01-01 RX ADMIN — FUROSEMIDE 20 MG: 20 TABLET ORAL at 09:09

## 2020-01-01 RX ADMIN — PIPERACILLIN AND TAZOBACTAM 3.38 G: 3; .375 INJECTION, POWDER, LYOPHILIZED, FOR SOLUTION INTRAVENOUS at 01:02

## 2020-01-01 RX ADMIN — Medication 250 MG: at 13:09

## 2020-01-01 RX ADMIN — Medication 100 MG: at 18:12

## 2020-01-01 RX ADMIN — INSULIN GLARGINE 12 UNITS: 100 INJECTION, SOLUTION SUBCUTANEOUS at 21:56

## 2020-01-01 RX ADMIN — FENTANYL CITRATE 50 MCG: 50 INJECTION, SOLUTION INTRAMUSCULAR; INTRAVENOUS at 19:15

## 2020-01-01 RX ADMIN — Medication 5 MG: at 21:12

## 2020-01-01 RX ADMIN — DEXAMETHASONE 6 MG: 4 TABLET ORAL at 11:48

## 2020-01-01 RX ADMIN — FUROSEMIDE 20 MG: 20 TABLET ORAL at 10:35

## 2020-01-01 RX ADMIN — DOCUSATE SODIUM 50 MG AND SENNOSIDES 8.6 MG 1 TABLET: 8.6; 5 TABLET, FILM COATED ORAL at 08:41

## 2020-01-01 RX ADMIN — INSULIN LISPRO 4 UNITS: 100 INJECTION, SOLUTION INTRAVENOUS; SUBCUTANEOUS at 18:00

## 2020-01-01 RX ADMIN — POTASSIUM CHLORIDE 10 MEQ: 7.46 INJECTION, SOLUTION INTRAVENOUS at 12:08

## 2020-01-01 RX ADMIN — TETANUS TOXOID, REDUCED DIPHTHERIA TOXOID AND ACELLULAR PERTUSSIS VACCINE, ADSORBED 0.5 ML: 5; 2.5; 8; 8; 2.5 SUSPENSION INTRAMUSCULAR at 12:41

## 2020-01-01 RX ADMIN — IPRATROPIUM BROMIDE AND ALBUTEROL 1 PUFF: 20; 100 SPRAY, METERED RESPIRATORY (INHALATION) at 09:30

## 2020-01-01 RX ADMIN — Medication 250 MG: at 08:54

## 2020-01-01 RX ADMIN — ACETAMINOPHEN 650 MG: 325 TABLET ORAL at 01:04

## 2020-01-01 RX ADMIN — ZINC SULFATE 220 MG (50 MG) CAPSULE 1 CAPSULE: CAPSULE at 13:09

## 2020-01-01 RX ADMIN — RIVAROXABAN 20 MG: 10 TABLET, FILM COATED ORAL at 09:08

## 2020-01-01 RX ADMIN — IPRATROPIUM BROMIDE AND ALBUTEROL 1 PUFF: 20; 100 SPRAY, METERED RESPIRATORY (INHALATION) at 22:00

## 2020-01-01 RX ADMIN — DOCUSATE SODIUM 50 MG AND SENNOSIDES 8.6 MG 1 TABLET: 8.6; 5 TABLET, FILM COATED ORAL at 21:16

## 2020-01-01 RX ADMIN — INSULIN LISPRO 2 UNITS: 100 INJECTION, SOLUTION INTRAVENOUS; SUBCUTANEOUS at 17:30

## 2020-01-01 RX ADMIN — INSULIN LISPRO 12 UNITS: 100 INJECTION, SOLUTION INTRAVENOUS; SUBCUTANEOUS at 18:15

## 2020-01-01 RX ADMIN — ACETAMINOPHEN 650 MG: 650 SUPPOSITORY RECTAL at 03:20

## 2020-01-01 RX ADMIN — PIPERACILLIN AND TAZOBACTAM 3.38 G: 3; .375 INJECTION, POWDER, LYOPHILIZED, FOR SOLUTION INTRAVENOUS at 12:00

## 2020-01-01 RX ADMIN — ZINC SULFATE 220 MG (50 MG) CAPSULE 1 CAPSULE: CAPSULE at 09:09

## 2020-01-01 RX ADMIN — OXYBUTYNIN CHLORIDE 5 MG: 5 TABLET ORAL at 22:17

## 2020-01-01 RX ADMIN — PIPERACILLIN AND TAZOBACTAM 4.5 G: 4; .5 INJECTION, POWDER, LYOPHILIZED, FOR SOLUTION INTRAVENOUS; PARENTERAL at 16:05

## 2020-01-01 RX ADMIN — RIVAROXABAN 20 MG: 10 TABLET, FILM COATED ORAL at 08:54

## 2020-01-01 RX ADMIN — ARIPIPRAZOLE 2 MG: 2 TABLET ORAL at 08:42

## 2020-01-01 RX ADMIN — PIPERACILLIN AND TAZOBACTAM 3.38 G: 3; .375 INJECTION, POWDER, LYOPHILIZED, FOR SOLUTION INTRAVENOUS at 05:45

## 2020-01-01 RX ADMIN — CYCLOSPORINE 1 DROP: 0.5 EMULSION OPHTHALMIC at 20:31

## 2020-01-01 RX ADMIN — INSULIN LISPRO 9 UNITS: 100 INJECTION, SOLUTION INTRAVENOUS; SUBCUTANEOUS at 06:38

## 2020-01-01 RX ADMIN — PIPERACILLIN AND TAZOBACTAM 3.38 G: 3; .375 INJECTION, POWDER, LYOPHILIZED, FOR SOLUTION INTRAVENOUS at 06:16

## 2020-01-01 RX ADMIN — INSULIN LISPRO 4 UNITS: 100 INJECTION, SOLUTION INTRAVENOUS; SUBCUTANEOUS at 08:42

## 2020-01-01 RX ADMIN — PIPERACILLIN AND TAZOBACTAM 3.38 G: 3; .375 INJECTION, POWDER, LYOPHILIZED, FOR SOLUTION INTRAVENOUS at 23:12

## 2020-01-01 RX ADMIN — Medication 2 MG: at 19:05

## 2020-01-01 RX ADMIN — Medication 10 ML: at 18:15

## 2020-01-01 RX ADMIN — ENOXAPARIN SODIUM 90 MG: 100 INJECTION SUBCUTANEOUS at 11:01

## 2020-01-01 RX ADMIN — PHENYLEPHRINE HYDROCHLORIDE 100 MCG: 10 INJECTION INTRAVENOUS at 19:01

## 2020-01-01 RX ADMIN — Medication 1 TABLET: at 09:11

## 2020-01-01 RX ADMIN — DOXYCYCLINE 100 MG: 100 INJECTION, POWDER, LYOPHILIZED, FOR SOLUTION INTRAVENOUS at 09:26

## 2020-01-01 RX ADMIN — SODIUM CHLORIDE, SODIUM LACTATE, POTASSIUM CHLORIDE, AND CALCIUM CHLORIDE 125 ML/HR: 600; 310; 30; 20 INJECTION, SOLUTION INTRAVENOUS at 05:45

## 2020-01-01 RX ADMIN — PIPERACILLIN AND TAZOBACTAM 3.38 G: 3; .375 INJECTION, POWDER, LYOPHILIZED, FOR SOLUTION INTRAVENOUS at 19:12

## 2020-01-01 RX ADMIN — IPRATROPIUM BROMIDE AND ALBUTEROL 1 PUFF: 20; 100 SPRAY, METERED RESPIRATORY (INHALATION) at 16:52

## 2020-01-01 RX ADMIN — Medication 1 TABLET: at 12:16

## 2020-01-01 RX ADMIN — PIPERACILLIN AND TAZOBACTAM 3.38 G: 3; .375 INJECTION, POWDER, LYOPHILIZED, FOR SOLUTION INTRAVENOUS at 23:50

## 2020-01-01 RX ADMIN — PIPERACILLIN AND TAZOBACTAM 3.38 G: 3; .375 INJECTION, POWDER, LYOPHILIZED, FOR SOLUTION INTRAVENOUS at 12:26

## 2020-01-01 RX ADMIN — ZINC SULFATE 220 MG (50 MG) CAPSULE 1 CAPSULE: CAPSULE at 11:48

## 2020-01-01 RX ADMIN — BARIUM SULFATE 20 ML: 400 PASTE ORAL at 11:47

## 2020-01-01 RX ADMIN — ZINC SULFATE 220 MG (50 MG) CAPSULE 1 CAPSULE: CAPSULE at 09:38

## 2020-01-01 RX ADMIN — VANCOMYCIN HYDROCHLORIDE 1500 MG: 1.5 INJECTION, POWDER, LYOPHILIZED, FOR SOLUTION INTRAVENOUS at 03:30

## 2020-01-01 RX ADMIN — CYCLOSPORINE 1 DROP: 0.5 EMULSION OPHTHALMIC at 08:31

## 2020-01-01 RX ADMIN — INSULIN LISPRO 4 UNITS: 100 INJECTION, SOLUTION INTRAVENOUS; SUBCUTANEOUS at 21:14

## 2020-01-01 RX ADMIN — VANCOMYCIN HYDROCHLORIDE 1500 MG: 1.5 INJECTION, POWDER, LYOPHILIZED, FOR SOLUTION INTRAVENOUS at 04:39

## 2020-01-01 RX ADMIN — INSULIN LISPRO 4 UNITS: 100 INJECTION, SOLUTION INTRAVENOUS; SUBCUTANEOUS at 13:29

## 2020-01-01 RX ADMIN — Medication 1 TABLET: at 11:48

## 2020-01-01 RX ADMIN — Medication 10 ML: at 18:18

## 2020-01-01 RX ADMIN — INSULIN GLARGINE 10 UNITS: 100 INJECTION, SOLUTION SUBCUTANEOUS at 21:13

## 2020-01-01 RX ADMIN — PIPERACILLIN AND TAZOBACTAM 3.38 G: 3; .375 INJECTION, POWDER, LYOPHILIZED, FOR SOLUTION INTRAVENOUS at 12:32

## 2020-01-01 RX ADMIN — PIPERACILLIN AND TAZOBACTAM 3.38 G: 3; .375 INJECTION, POWDER, LYOPHILIZED, FOR SOLUTION INTRAVENOUS at 06:27

## 2020-01-01 RX ADMIN — IPRATROPIUM BROMIDE AND ALBUTEROL 1 PUFF: 20; 100 SPRAY, METERED RESPIRATORY (INHALATION) at 11:01

## 2020-01-01 RX ADMIN — DILTIAZEM HYDROCHLORIDE 240 MG: 240 CAPSULE, COATED, EXTENDED RELEASE ORAL at 12:57

## 2020-01-01 RX ADMIN — FERROUS SULFATE TAB 325 MG (65 MG ELEMENTAL FE) 325 MG: 325 (65 FE) TAB at 18:18

## 2020-01-01 RX ADMIN — INSULIN LISPRO 4 UNITS: 100 INJECTION, SOLUTION INTRAVENOUS; SUBCUTANEOUS at 13:30

## 2020-01-01 RX ADMIN — INSULIN LISPRO 2 UNITS: 100 INJECTION, SOLUTION INTRAVENOUS; SUBCUTANEOUS at 16:23

## 2020-01-01 RX ADMIN — POTASSIUM CHLORIDE 10 MEQ: 7.46 INJECTION, SOLUTION INTRAVENOUS at 09:30

## 2020-01-01 RX ADMIN — DILTIAZEM HYDROCHLORIDE 240 MG: 240 CAPSULE, COATED, EXTENDED RELEASE ORAL at 20:31

## 2020-01-01 RX ADMIN — BUPROPION HYDROCHLORIDE 75 MG: 75 TABLET, FILM COATED ORAL at 20:31

## 2020-01-01 RX ADMIN — HALOPERIDOL LACTATE 5 MG: 5 INJECTION, SOLUTION INTRAMUSCULAR at 03:18

## 2020-01-01 RX ADMIN — Medication 5 MG: at 21:27

## 2020-01-01 RX ADMIN — INSULIN LISPRO 4 UNITS: 100 INJECTION, SOLUTION INTRAVENOUS; SUBCUTANEOUS at 00:04

## 2020-01-01 RX ADMIN — BUDESONIDE 500 MCG: 0.5 INHALANT RESPIRATORY (INHALATION) at 07:35

## 2020-01-01 RX ADMIN — ARIPIPRAZOLE 2 MG: 2 TABLET ORAL at 09:38

## 2020-01-01 RX ADMIN — METHYLPREDNISOLONE SODIUM SUCCINATE 20 MG: 40 INJECTION, POWDER, FOR SOLUTION INTRAMUSCULAR; INTRAVENOUS at 22:32

## 2020-01-01 RX ADMIN — METHYLPREDNISOLONE SODIUM SUCCINATE 20 MG: 40 INJECTION, POWDER, FOR SOLUTION INTRAMUSCULAR; INTRAVENOUS at 09:51

## 2020-01-01 RX ADMIN — Medication 1 TABLET: at 12:25

## 2020-01-01 RX ADMIN — INSULIN GLARGINE 15 UNITS: 100 INJECTION, SOLUTION SUBCUTANEOUS at 22:07

## 2020-01-01 RX ADMIN — SODIUM CHLORIDE 40 MG: 9 INJECTION INTRAMUSCULAR; INTRAVENOUS; SUBCUTANEOUS at 09:54

## 2020-01-01 RX ADMIN — PIPERACILLIN AND TAZOBACTAM 3.38 G: 3; .375 INJECTION, POWDER, LYOPHILIZED, FOR SOLUTION INTRAVENOUS at 18:15

## 2020-01-01 RX ADMIN — VANCOMYCIN HYDROCHLORIDE 1250 MG: 1.25 INJECTION, POWDER, LYOPHILIZED, FOR SOLUTION INTRAVENOUS at 12:53

## 2020-01-01 RX ADMIN — POTASSIUM CHLORIDE 10 MEQ: 7.46 INJECTION, SOLUTION INTRAVENOUS at 09:57

## 2020-01-01 RX ADMIN — Medication 20 ML: at 14:00

## 2020-01-01 RX ADMIN — Medication 10 ML: at 13:12

## 2020-01-01 RX ADMIN — CYCLOSPORINE 1 DROP: 0.5 EMULSION OPHTHALMIC at 21:38

## 2020-01-01 RX ADMIN — Medication 250 MG: at 08:42

## 2020-01-01 RX ADMIN — ENOXAPARIN SODIUM 90 MG: 100 INJECTION SUBCUTANEOUS at 22:07

## 2020-01-01 RX ADMIN — INSULIN GLARGINE 12 UNITS: 100 INJECTION, SOLUTION SUBCUTANEOUS at 22:07

## 2020-01-01 RX ADMIN — Medication 10 ML: at 21:06

## 2020-01-01 RX ADMIN — ACETAMINOPHEN 650 MG: 325 TABLET ORAL at 01:41

## 2020-01-01 RX ADMIN — PIPERACILLIN AND TAZOBACTAM 3.38 G: 3; .375 INJECTION, POWDER, LYOPHILIZED, FOR SOLUTION INTRAVENOUS at 11:39

## 2020-01-01 RX ADMIN — INSULIN LISPRO 3 UNITS: 100 INJECTION, SOLUTION INTRAVENOUS; SUBCUTANEOUS at 19:38

## 2020-01-01 RX ADMIN — INSULIN GLARGINE 12 UNITS: 100 INJECTION, SOLUTION SUBCUTANEOUS at 21:16

## 2020-01-01 RX ADMIN — Medication 1 TABLET: at 12:53

## 2020-01-01 RX ADMIN — ACETAMINOPHEN 650 MG: 325 TABLET ORAL at 19:59

## 2020-01-01 RX ADMIN — ARIPIPRAZOLE 2 MG: 2 TABLET ORAL at 13:09

## 2020-01-01 RX ADMIN — Medication 1 TABLET: at 17:29

## 2020-01-01 RX ADMIN — INSULIN LISPRO 6 UNITS: 100 INJECTION, SOLUTION INTRAVENOUS; SUBCUTANEOUS at 14:07

## 2020-01-01 RX ADMIN — PIPERACILLIN AND TAZOBACTAM 3.38 G: 3; .375 INJECTION, POWDER, LYOPHILIZED, FOR SOLUTION INTRAVENOUS at 13:08

## 2020-01-01 RX ADMIN — PIPERACILLIN AND TAZOBACTAM 4.5 G: 4; .5 INJECTION, POWDER, LYOPHILIZED, FOR SOLUTION INTRAVENOUS; PARENTERAL at 23:56

## 2020-01-01 RX ADMIN — Medication 5 ML: at 14:00

## 2020-01-01 RX ADMIN — Medication 5 MG: at 21:06

## 2020-01-01 RX ADMIN — FENTANYL CITRATE 25 MCG: 50 INJECTION, SOLUTION INTRAMUSCULAR; INTRAVENOUS at 19:13

## 2020-01-01 RX ADMIN — INSULIN LISPRO 4 UNITS: 100 INJECTION, SOLUTION INTRAVENOUS; SUBCUTANEOUS at 12:53

## 2020-01-01 RX ADMIN — METOPROLOL TARTRATE 75 MG: 25 TABLET, FILM COATED ORAL at 20:31

## 2020-01-01 RX ADMIN — Medication 10 ML: at 13:57

## 2020-01-01 RX ADMIN — FUROSEMIDE 20 MG: 20 TABLET ORAL at 08:54

## 2020-01-01 RX ADMIN — Medication 10 ML: at 06:51

## 2020-01-01 RX ADMIN — Medication 10 ML: at 13:15

## 2020-01-01 RX ADMIN — PIPERACILLIN AND TAZOBACTAM 3.38 G: 3; .375 INJECTION, POWDER, LYOPHILIZED, FOR SOLUTION INTRAVENOUS at 23:48

## 2020-01-01 RX ADMIN — VANCOMYCIN HYDROCHLORIDE 1250 MG: 1.25 INJECTION, POWDER, LYOPHILIZED, FOR SOLUTION INTRAVENOUS at 15:03

## 2020-01-01 RX ADMIN — FERROUS SULFATE TAB 325 MG (65 MG ELEMENTAL FE) 325 MG: 325 (65 FE) TAB at 08:30

## 2020-01-01 RX ADMIN — INSULIN LISPRO 4 UNITS: 100 INJECTION, SOLUTION INTRAVENOUS; SUBCUTANEOUS at 12:27

## 2020-01-01 RX ADMIN — ACETAMINOPHEN 650 MG: 325 TABLET ORAL at 06:12

## 2020-01-01 RX ADMIN — PIPERACILLIN AND TAZOBACTAM 4.5 G: 4; .5 INJECTION, POWDER, LYOPHILIZED, FOR SOLUTION INTRAVENOUS; PARENTERAL at 07:35

## 2020-01-01 RX ADMIN — Medication 10 ML: at 21:43

## 2020-01-01 RX ADMIN — Medication 1 TABLET: at 09:00

## 2020-01-01 RX ADMIN — Medication 10 ML: at 06:35

## 2020-01-01 RX ADMIN — Medication 250 MG: at 09:12

## 2020-01-01 RX ADMIN — Medication 10 ML: at 06:05

## 2020-01-01 RX ADMIN — INSULIN GLARGINE 15 UNITS: 100 INJECTION, SOLUTION SUBCUTANEOUS at 22:23

## 2020-01-01 RX ADMIN — POTASSIUM CHLORIDE 10 MEQ: 10 INJECTION, SOLUTION INTRAVENOUS at 11:06

## 2020-01-01 RX ADMIN — DOCUSATE SODIUM 50 MG AND SENNOSIDES 8.6 MG 1 TABLET: 8.6; 5 TABLET, FILM COATED ORAL at 22:08

## 2020-01-01 RX ADMIN — DOXYCYCLINE 100 MG: 100 INJECTION, POWDER, LYOPHILIZED, FOR SOLUTION INTRAVENOUS at 22:32

## 2020-01-01 RX ADMIN — DILTIAZEM HYDROCHLORIDE 240 MG: 240 CAPSULE, COATED, EXTENDED RELEASE ORAL at 13:08

## 2020-01-01 RX ADMIN — FERROUS SULFATE TAB 325 MG (65 MG ELEMENTAL FE) 325 MG: 325 (65 FE) TAB at 08:54

## 2020-01-01 RX ADMIN — METHYLPREDNISOLONE SODIUM SUCCINATE 20 MG: 40 INJECTION, POWDER, FOR SOLUTION INTRAMUSCULAR; INTRAVENOUS at 06:50

## 2020-06-23 NOTE — ED PROVIDER NOTES
EMERGENCY DEPARTMENT HISTORY AND PHYSICAL EXAM 
 
Date: 6/23/2020 Patient Name: Elyssa Rivera. History of Presenting Illness Chief Complaint Patient presents with  Back Pain History Provided By: Patient 11:39 AM 
Elyssa Rivera. is a 82 y.o. male with PMHX of atrial fibrillation on Xarelto, hypertension, diabetes who presents to the emergency department C/O neck, back pain. Per patient on Saturday, 3 days ago, he accidentally flipped his ride on lawnmower into a ditch. He states the neighbor happened by and was able to get the lawnmower off of him and help him up. He denies striking his head or loss of consciousness. He reports he got an abrasion to his right wrist which he thought was his only injury that time but since then he has had progressively worsening right-sided neck and back pain. He states it is worse with any movement. He denies any chest pain, shortness of breath, abdominal pain, fever, cough, other complaints. PCP: Ethan Segura MD 
 
Current Outpatient Medications Medication Sig Dispense Refill  traMADoL (Ultram) 50 mg tablet Take 1 Tab by mouth every six (6) hours as needed for Pain for up to 3 days. Max Daily Amount: 200 mg. 12 Tab 0  
 lidocaine (Lidoderm) 5 % Apply patch to the affected area for 12 hours a day and remove for 12 hours a day. 15 Each 0  
 Comp. Stocking,Thigh,Long,X-Lrg misc 1 Units by Does Not Apply route daily. 1 Units 0  
 buPROPion SR (WELLBUTRIN SR) 150 mg SR tablet Take 150 mg by mouth daily.  dilTIAZem CD (CARDIZEM CD) 240 mg ER capsule Take 240 mg by mouth daily (after lunch).  multivitamin, tx-iron-ca-min (THERA-M W/ IRON) 9 mg iron-400 mcg tab tablet Take 1 Tab by mouth daily. Formulary substitution for DAILY MULTIVITAMIN centrum silver  insulin detemir U-100 (LEVEMIR U-100 INSULIN) 100 unit/mL injection 15 Units by SubCUTAneous route nightly.  rivaroxaban (XARELTO) 20 mg tab tablet Take 20 mg by mouth daily (with lunch).  fluticasone (FLONASE) 50 mcg/actuation nasal spray 2 Sprays by Both Nostrils route daily as needed for Rhinitis.  cycloSPORINE (RESTASIS) 0.05 % dpet Administer 1 Drop to left eye as needed.  donepezil (ARICEPT) 5 mg tablet Take 5 mg by mouth nightly.  VITAMIN D2 50,000 unit capsule TAKE 1 CAPSULE TWICE WEEKLY 52 Cap 0  
 oxybutynin (DITROPAN) 5 mg tablet Take 5 mg by mouth nightly.  metoprolol tartrate 75 mg tab Take 75 mg by mouth every twelve (12) hours. 60 Tab 0  
 Omeprazole delayed release (PRILOSEC D/R) 20 mg tablet Take 20 mg by mouth daily.  cholecalciferol (VITAMIN D3) 1,000 unit tablet Take 1,000 Units by mouth daily.  furosemide (LASIX) 20 mg tablet Take 20 mg by mouth daily. Indications: Edema  melatonin tab tablet Take 5 mg by mouth nightly. Indications: sleep  insulin lispro (HUMALOG) 100 unit/mL injection by SubCUTAneous route four (4) times daily. BS-with sliding scale 80-12-=2 units 121-160=4 
161-200=6 
201-250=8 
251- and over 10 units 
with each meal  Indications: type 2 diabetes mellitus, sliding scale Past History Past Medical History: 
Past Medical History:  
Diagnosis Date  Arthritis  Atrial fibrillation ([address]  Dementia  Depression  Diabetes ([address] 1980s  GERD (gastroesophageal reflux disease)  Hypercholesterolemia  Hypertension 2000  Ill-defined condition 2015  
 has passed out-has been tested cannot determine cause  Primary osteoarthritis of right knee 6/28/2018  Ulcer   
 at the anastomotic bite of gastric bypass  Varicose veins  Wide-complex tachycardia ([address] 5/2/2015 Past Surgical History: 
Past Surgical History:  
Procedure Laterality Date  ABDOMEN SURGERY PROC UNLISTED  1998  
 umb hernia Rupture  BIOPSY LIVER  10/05/04  
 EGD  12/29/09  
 with biopsy  ENDOSCOPY, COLON, DIAGNOSTIC    
  GASTROSTOMY TUBE  10/05/04  HX CATARACT REMOVAL    
 bilateral  
 HX CHOLECYSTECTOMY  10/05/04  HX GI  10/05/04  
 vertical banded gastroplasty/gastric bypass  HX MOHS PROCEDURES    
 bilateral  
 HX PACEMAKER   "[employer]"-Reveal Ling Cardiac Monitor Family History: 
Family History Problem Relation Age of Onset  Heart Attack Father  Diabetes Father  Hypertension Father  Cancer Mother   
     lung  Diabetes Mother  Other Brother   
     obese  Diabetes Brother  Diabetes Brother  Other Maternal Grandmother   
     obese  Diabetes Sister Social History: 
Social History Tobacco Use  Smoking status: Former Smoker Last attempt to quit: 4/3/1967 Years since quittin.2  Smokeless tobacco: Never Used Substance Use Topics  Alcohol use: Yes Alcohol/week: 4.0 standard drinks Types: 3 Glasses of wine, 1 Shots of liquor per week Comment: monthly  Drug use: No  
 
 
Allergies: Allergies Allergen Reactions  Morphine Other (comments) Hallucinate; \"ran all night long\" Review of Systems Review of Systems Constitutional: Negative for fever. Respiratory: Negative for shortness of breath. Cardiovascular: Negative for chest pain. Musculoskeletal: Positive for back pain and neck pain. Skin: Positive for wound. All other systems reviewed and are negative. Physical Exam  
 
Vitals:  
 20 1128 BP: 164/85 Pulse: 70 Resp: 17 Temp: 99.3 °F (37.4 °C) SpO2: 98% Weight: 87.5 kg (193 lb) Height: 5' 8\" (1.727 m) Physical Exam 
 
Nursing notes and vital signs reviewed Constitutional: Non toxic appearing, elderly, mild distress Head: Normocephalic, Atraumatic Eyes: Pupils are equal, round, and reactive to light, EOMI Neck: Tender over posterior neck, worse over the right paraspinal muscles, no bony point tenderness to palpation Cardiovascular: Regular rate and rhythm, no murmurs, rubs, or gallops Chest: Normal work of breathing and chest excursion bilaterally Lungs: Clear to ausculation bilaterally Abdomen: Soft, non tender, non distended, normoactive bowel sounds Back: No evidence of trauma or deformity, diffusely tender over midline and right paraspinal thoracic and lumbar muscles Extremities: No evidence of trauma or deformity, mild bilateral LE edema Skin: Warm and dry, normal cap refill Neuro: Alert and appropriate, CN intact, normal speech, strength and sensation symmetric bilaterally, normal coordination Psychiatric: Normal mood and affect Diagnostic Study Results Labs - Recent Results (from the past 12 hour(s)) TYPE & SCREEN Collection Time: 06/23/20 11:45 AM  
Result Value Ref Range Crossmatch Expiration 06/26/2020 ABO/Rh(D) O NEGATIVE Antibody screen NEG   
EKG, 12 LEAD, INITIAL Collection Time: 06/23/20 11:53 AM  
Result Value Ref Range Ventricular Rate 64 BPM  
 Atrial Rate 64 BPM  
 P-R Interval 166 ms  
 QRS Duration 92 ms Q-T Interval 414 ms QTC Calculation (Bezet) 427 ms Calculated P Axis 37 degrees Calculated R Axis -28 degrees Calculated T Axis 21 degrees Diagnosis Sinus rhythm with marked sinus arrhythmia Minimal voltage criteria for LVH, may be normal variant Septal infarct , age undetermined Abnormal ECG When compared with ECG of 09-JUL-2019 14:25, 
Septal infarct is now present CBC WITH AUTOMATED DIFF Collection Time: 06/23/20 12:03 PM  
Result Value Ref Range WBC 6.9 4.6 - 13.2 K/uL  
 RBC 3.83 (L) 4.70 - 5.50 M/uL HGB 9.8 (L) 13.0 - 16.0 g/dL HCT 32.7 (L) 36.0 - 48.0 % MCV 85.4 74.0 - 97.0 FL  
 MCH 25.6 24.0 - 34.0 PG  
 MCHC 30.0 (L) 31.0 - 37.0 g/dL  
 RDW 15.7 (H) 11.6 - 14.5 % PLATELET 791 383 - 806 K/uL MPV 9.9 9.2 - 11.8 FL  
 NEUTROPHILS 66 40 - 73 % LYMPHOCYTES 21 21 - 52 % MONOCYTES 9 3 - 10 % EOSINOPHILS 4 0 - 5 % BASOPHILS 0 0 - 2 %  
 ABS. NEUTROPHILS 4.5 1.8 - 8.0 K/UL  
 ABS. LYMPHOCYTES 1.5 0.9 - 3.6 K/UL  
 ABS. MONOCYTES 0.6 0.05 - 1.2 K/UL  
 ABS. EOSINOPHILS 0.3 0.0 - 0.4 K/UL  
 ABS. BASOPHILS 0.0 0.0 - 0.1 K/UL  
 DF AUTOMATED PROTHROMBIN TIME + INR Collection Time: 06/23/20 12:03 PM  
Result Value Ref Range Prothrombin time 14.0 11.5 - 15.2 sec INR 1.1 0.8 - 1.2 PTT Collection Time: 06/23/20 12:03 PM  
Result Value Ref Range aPTT 27.7 23.0 - 36.4 SEC URINALYSIS W/ RFLX MICROSCOPIC Collection Time: 06/23/20 12:43 PM  
Result Value Ref Range Color YELLOW Appearance CLEAR Specific gravity 1.010 1.005 - 1.030    
 pH (UA) 5.0 5.0 - 8.0 Protein Negative NEG mg/dL Glucose Negative NEG mg/dL Ketone Negative NEG mg/dL Bilirubin Negative NEG Blood Negative NEG Urobilinogen 0.2 0.2 - 1.0 EU/dL Nitrites Negative NEG Leukocyte Esterase Negative NEG    
CARDIAC PANEL,(CK, CKMB & TROPONIN) Collection Time: 06/23/20 12:43 PM  
Result Value Ref Range CK - MB 2.3 <3.6 ng/ml CK-MB Index 1.9 0.0 - 4.0 %  39 - 308 U/L Troponin-I, QT 0.02 0.0 - 9.032 NG/ML  
METABOLIC PANEL, COMPREHENSIVE Collection Time: 06/23/20 12:43 PM  
Result Value Ref Range Sodium 141 136 - 145 mmol/L Potassium 4.0 3.5 - 5.5 mmol/L Chloride 108 100 - 111 mmol/L  
 CO2 29 21 - 32 mmol/L Anion gap 4 3.0 - 18 mmol/L Glucose 102 (H) 74 - 99 mg/dL BUN 15 7.0 - 18 MG/DL Creatinine 1.13 0.6 - 1.3 MG/DL  
 BUN/Creatinine ratio 13 12 - 20 GFR est AA >60 >60 ml/min/1.73m2 GFR est non-AA >60 >60 ml/min/1.73m2 Calcium 7.9 (L) 8.5 - 10.1 MG/DL Bilirubin, total 0.3 0.2 - 1.0 MG/DL  
 ALT (SGPT) 16 16 - 61 U/L  
 AST (SGOT) 19 10 - 38 U/L Alk. phosphatase 137 (H) 45 - 117 U/L Protein, total 6.2 (L) 6.4 - 8.2 g/dL Albumin 2.8 (L) 3.4 - 5.0 g/dL Globulin 3.4 2.0 - 4.0 g/dL A-G Ratio 0.8 0.8 - 1.7 Radiologic Studies -  
CTA CHEST W OR W WO CONT Final Result IMPRESSION:  
  
1. No evidence of pulmonary embolism. 2.  Cardiomegaly, with CT findings suggestive of early interstitial edema and  
tiny right-sided effusion. Differential considerations would include CHF/fluid  
overload. 3. Bilateral anterior nondisplaced rib fractures, as above. CT SPINE CERV WO CONT Final Result IMPRESSION:  
  
1. Stable exam. No cervical spine fracture or subluxation. 2. Moderate multilevel degenerative changes greatest at C5-C6 including possible  
partially calcified left subarticular disc protrusion. Please note this cervical spine CT was performed with patient supine. This  
supine study does not evaluate for ligamentous injury or exclude instability. If the patient has persistent symptoms or if otherwise clinically indicated,  
erect cervical spine plain films are recommended. CT HEAD WO CONT Final Result IMPRESSION:  
  
1. Stable exam. No CT findings of an acute intracranial abnormality. Please note  
that noncontrast head CT may be normal in early acute infarct. CT Results  (Last 48 hours) 06/23/20 1344  CTA [facility] CONT Final result Impression:  IMPRESSION:  
   
1. No evidence of pulmonary embolism. 2.  Cardiomegaly, with CT findings suggestive of early interstitial edema and  
tiny right-sided effusion. Differential considerations would include CHF/fluid  
overload. 3. Bilateral anterior nondisplaced rib fractures, as above. Narrative:  EXAM: CTA Chest  
   
INDICATION: Trauma, flipped riding lawnmower. > Additional: Right back pain COMPARISON: 11/27/2018. TECHNIQUE: Axial CT imaging from the thoracic inlet through the diaphragm with  
intravenous contrast. Coronal and sagittal MIP reformats were generated. One or more dose reduction techniques were used on this CT: automated exposure control, adjustment of the mAs and/or kVp according to patient size, and  
iterative reconstruction techniques. The specific techniques used on this CT  
exam have been documented in the patient's electronic medical record. Digital  
Imaging and Communications in Medicine (DICOM) format image data are available  
to nonaffiliated external healthcare facilities or entities on a secure, media  
free, reciprocally searchable basis with patient authorization for at least a  
12-month period after this study. _______________ FINDINGS:  
   
EXAM QUALITY: Non-diagnostic/Adequate/Excellent. PULMONARY ARTERIES: No evidence of pulmonary embolism. MEDIASTINUM: Stable heart size with mild coronary artery atherosclerosis. No  
pericardial effusion. Normal caliber thoracic aorta with mild atherosclerotic  
changes. LUNGS, PLEURA: No pulmonary consolidation. No pneumothorax. Mild septal  
thickening with Small low attenuating right-sided pleural effusion. Left upper  
lobe calcified granuloma. Stable pulmonary nodules, to include:  
  > Stable right middle lobe somewhat wedge-shaped 6 mm pulmonary nodule (image 56). > Stable right lower lobe peripheral 6 mm pulmonary nodule (image 62). > Stable left lower lobe 6 mm Noncalcified pulmonary nodule (image 60). AIRWAY: Normal.  
   
LYMPH NODES: No enlarged nodes. Coarse calcified left hilar lymph nodes UPPER ABDOMEN: Surgical changes of prior gastric bypass and cholecystectomy. Larry Ra OTHER: Nondisplaced right anterior third, fourth and fifth rib fractures. > Nondisplaced left anterior lateral third, fourth, fifth and sixth rib  
fractures. No identified left seventh rib fracture. Vertebral body heights are  
preserved.  
   
_______________  
   
  
 06/23/20 1343  CT SPINE CERV WO CONT Final result Impression:  IMPRESSION:  
   
1. Stable exam. No cervical spine fracture or subluxation. 2. Moderate multilevel degenerative changes greatest at C5-C6 including possible  
partially calcified left subarticular disc protrusion. Please note this cervical spine CT was performed with patient supine. This  
supine study does not evaluate for ligamentous injury or exclude instability. If the patient has persistent symptoms or if otherwise clinically indicated,  
erect cervical spine plain films are recommended. Narrative:  EXAM: CT CERVICAL SPINE HISTORY: trauma, flipped on riding lawnmower, right-sided neck and shoulder pain COMPARISON: 8/18/2019, 7/9/2019 TECHNIQUE: Noncontrast axial CT scan of the cervical spine, utilizing standard  
departmental technique. Axial and coronal reformations were also provided for  
improved anatomic evaluation. One or more dose reduction techniques were used on this CT: automated exposure  
control, adjustment of the mAs and/or kVp according to patient size, and  
iterative reconstruction techniques. The specific techniques used on this CT  
exam have been documented in the patient's electronic medical record. Digital  
Imaging and Communications in Medicine (DICOM) format image data are available  
to nonaffiliated external healthcare facilities or entities on a secure, media  
free, reciprocally searchable basis with patient authorization for at least a  
12-month period after this study. ______________________ FINDINGS:  
   
VERTEBRAE AND DISCS: Normal cervical vertebral alignment is seen. No fracture or  
subluxation is seen. Multilevel disc height loss with endplate osteophyte  
formation is present. Endplate Schmorl's nodes are suggested vertically C3-4 and C5-6 levels. Mild facet arthropathy is present, greatest towards the left at  
C2-C3. Degenerative changes also seen along the anterior atlantoaxial  
articulation. SPINAL CANAL AND FORAMINA: No definite high-grade central stenosis is seen. Multilevel disc bulges are suggested. Multilevel mild to moderate foraminal  
narrowing is present. Partially calcified left C5-6 disc may be present in the  
left subarticular region, narrowing the left axillary recess and continuing to  
moderate to severe left foraminal stenosis. PREVERTEBRAL SOFT TISSUES: Unremarkable VISIBLE INTRACRANIAL CONTENTS: Unremarkable. LUNG APICES: Clear. OTHER: None.  
   
_______________  
   
  
 06/23/20 1342  CT HEAD WO CONT Final result Impression:  IMPRESSION:  
   
1. Stable exam. No CT findings of an acute intracranial abnormality. Please note  
that noncontrast head CT may be normal in early acute infarct. Narrative:  EXAM: CT Head INDICATION: Trauma, flipped on riding lawnmower. >Additional:None COMPARISON: 8/18/2019. TECHNIQUE: Axial CT imaging of the head was performed without intravenous  
contrast.  
One or more dose reduction techniques were used on this CT: automated exposure  
control, adjustment of the mAs and/or kVp according to patient size, and  
iterative reconstruction techniques. The specific techniques used on this CT  
exam have been documented in the patient's electronic medical record. Digital  
Imaging and Communications in Medicine (DICOM) format image data are available  
to nonaffiliated external healthcare facilities or entities on a secure, media  
free, reciprocally searchable basis with patient authorization for at least a  
12-month period after this study. _______________ FINDINGS:  
   
BRAIN:   
  > Brain volume: Age appropriate.   
  > White matter: A mild amount of hypodense white matter lesions are seen  
within the periventricular and subcortical white matter which are nonspecific,  
but likely represent chronic small vessel changes. > Infarcts, encephalomalacia: None.   
  > Parenchymal mass: None.   
  > Parenchymal hemorrhage: None. > Midline shift: None.   
  > Miscellaneous: None. EXTRA-AXIAL SPACES: Unremarkable. No fluid collections. CALVARIUM: Intact. SINUSES, MASTOIDS: Clear. OTHER EXTRACRANIAL: Unremarkable.  
   
_______________ CXR Results  (Last 48 hours) None Medications given in the ED- Medications diph,Pertuss(AC),Tet Vac-PF (BOOSTRIX) suspension 0.5 mL (0.5 mL IntraMUSCular Given 6/23/20 1241) iopamidoL (ISOVUE-370) 76 % injection 100 mL (100 mL IntraVENous Given 6/23/20 1324) Medical Decision Making I am the first provider for this patient. I reviewed the vital signs, available nursing notes, past medical history, past surgical history, family history and social history. Vital Signs-Reviewed the patient's vital signs. Pulse Oximetry Analysis -98 % on room air, not hypoxic Cardiac Monitor: 
Rate: 59 bpm 
Rhythm: Sinus bradycardia EKG interpretation: (Preliminary) EKG read by Dr. Stu Camacho at 11:50 AM 
Sinus rhythm at a rate of 64 bpm, SD interval of 166 ms, QRS duration of 92 ms Records Reviewed: Nursing Notes, Old Medical Records and Previous electrocardiograms Provider Notes (Medical Decision Making): Parish Britton is a 82 y.o. male presenting with back pain after rolling his right on lawnmower 3 days ago. Due to mechanism of injury advanced imaging was obtained which demonstrated for rib fractures. Patient is not hypoxic and has no respiratory distress. Will provide with incentive spirometer. Spoke with patient's primary care doctor regarding pain management and assurance of early follow-up. Patient understands the importance of this. Provided with strict return precautions. Patient understands and agrees with this plan. Procedures: 
Procedures ED Course:  
CONSULT NOTE:  
2:41 PM 
Dr. Stu Camacho spoke with Dr. Saúl Santos Specialty: Primary Care Physician Discussed pt's hx, disposition, and available diagnostic and imaging results over the telephone. Reviewed care plans. Will follow up patient closely, possibly Tramadol pain control. 2:48 PM 
Updated patient on all results and plan. All questions answered. Diagnosis and Disposition Critical Care: None DISCHARGE NOTE: 
 
Nuno Rodriguez Jr.'s  results have been reviewed with him. He has been counseled regarding his diagnosis, treatment, and plan. He verbally conveys understanding and agreement of the signs, symptoms, diagnosis, treatment and prognosis and additionally agrees to follow up as discussed. He also agrees with the care-plan and conveys that all of his questions have been answered. I have also provided discharge instructions for him that include: educational information regarding their diagnosis and treatment, and list of reasons why they would want to return to the ED prior to their follow-up appointment, should his condition change. He has been provided with education for proper emergency department utilization. CLINICAL IMPRESSION: 
 
1. Accident caused by powered , initial encounter 2. Multiple rib fractures involving four or more ribs PLAN: 
1. D/C Home 2. Current Discharge Medication List  
  
START taking these medications Details  
traMADoL (Ultram) 50 mg tablet Take 1 Tab by mouth every six (6) hours as needed for Pain for up to 3 days. Max Daily Amount: 200 mg. Qty: 12 Tab, Refills: 0 Associated Diagnoses: Multiple rib fractures involving four or more ribs  
  
lidocaine (Lidoderm) 5 % Apply patch to the affected area for 12 hours a day and remove for 12 hours a day. Qty: 15 Each, Refills: 0  
  
  
 
3. Follow-up Information Follow up With Specialties Details Why Contact Info Merline Petite, MD Family Practice Schedule an appointment as soon as possible for a visit  [address] [facility] 
[phone] [facility] EMERGENCY DE[facility] Emergency Medicine  If symptoms worsen [address] [place] [facility] [id number] 
[phone]  
  
 
_______________________________ Please note that this dictation was completed with [employer], the computer voice recognition software. Quite often unanticipated grammatical, syntax, homophones, and other interpretive errors are inadvertently transcribed by the computer software. Please disregard these errors. Please excuse any errors that have escaped final proofreading.

## 2020-06-23 NOTE — CALL BACK NOTE
I have called to 's office to schedule appointment for patient. Per  's nurse schedules all of his appointments and she would be giving patient a call today. I requested to speak to nurse and informed  it was an ED follow up. She stated that due to them being short staffed they are extremely busy and she would probably not be able to get in contact with his nurse at the moment. I have explained to patient that he will be receiving a call from his doctors office today to schedule an ED follow up. I have also provided patient with my phone number incase he does not hear from them he can let me know and I can try to schedule the appointment again.

## 2020-06-23 NOTE — ED TRIAGE NOTES
Patient with \"excrutiating pain down my lower RIGHT back\" that radiates to RIGHT shoulder and neck since Saturday after being on a ride-on  and it tilted over while trying to reverse  Denies hitting head

## 2020-06-23 NOTE — DISCHARGE INSTRUCTIONS
Patient Education        Broken Rib: Care Instructions  Your Care Instructions     A broken rib is a crack or break in one of the bones of the rib cage. Breathing can be very painful because the muscles used for breathing pull on the rib. In most cases, a broken rib will heal on its own. You can take pain medicine while the rib mends. Pain relief allows you to take deep breaths. In the past, doctors recommended taping or wrapping broken ribs. This is no longer done because taping makes it hard for you to take deep breaths. Taking deep breaths may help prevent pneumonia or a partial collapse of a lung. Your rib will heal in about 6 weeks. You heal best when you take good care of yourself. Eat a variety of healthy foods, and don't smoke. Follow-up care is a key part of your treatment and safety. Be sure to make and go to all appointments, and call your doctor if you are having problems. It's also a good idea to know your test results and keep a list of the medicines you take. How can you care for yourself at home? · Be safe with medicines. Read and follow all instructions on the label. ? If the doctor gave you a prescription medicine for pain, take it as prescribed. ? If you are not taking a prescription pain medicine, ask your doctor if you can take an over-the-counter medicine. · Even if it hurts, try to cough or take the deepest breath you can at least once every hour. This will get air deeply into your lungs. This may reduce your chance of getting pneumonia or a partial collapse of a lung. Hold a pillow against your chest to make this less painful. · Put ice or a cold pack on the area for 10 to 20 minutes at a time. Put a thin cloth between the ice and your skin. When should you call for help? [id number] anytime you think you may need emergency care. For example, call if:  · You have severe trouble breathing.   Call your doctor now or seek immediate medical care if:  · You have some trouble breathing. · You have a fever. · You have a new or worse cough. Watch closely for changes in your health, and be sure to contact your doctor if:  · You have pain even after taking your medicine. · You do not get better as expected. Where can you learn more? Go to http://jared-bill.info/  Enter M135 in the search box to learn more about \"Broken Rib: Care Instructions. \"  Current as of: March 2, 2020               Content Version: 12.5  © 6455-0496 [employer]. Care instructions adapted under license by [employer] (which disclaims liability or warranty for this information). If you have questions about a medical condition or this instruction, always ask your healthcare professional. [address] any warranty or liability for your use of this information.

## 2020-06-24 NOTE — CALL BACK NOTE
I have called patient this morning to see if he had received a call from 's office. Patient stated that he did not. I have called to their office they stated that they did not speak with him. I requested to speak with 's nurse to make an appointment but she did not answer the phone. His nurse did give permission to the  to make him an appointment. His appointment is Tuesday June 30 @ 1:45pm. I have called patient back to let him know about his appointment.

## 2020-09-07 NOTE — ED TRIAGE NOTES
Pt arrives ambulatory to ED with c\o falling from standing onto brick wall, pt sts he lost his balance while throwing a bone for his dog, pt with skin tear to LEFT elbow and redness to left bicep

## 2020-09-07 NOTE — ED PROVIDER NOTES
EMERGENCY DEPARTMENT HISTORY AND PHYSICAL EXAM 
 
Date: 9/7/2020 Patient Name: Carmen Navarro. History of Presenting Illness Chief Complaint Patient presents with  Rib Pain  Elbow Pain History Provided By: Patient Chief Complaint: Carmita Adis HPI(Context):  
10:19 AM 
Carmen Navarro. is a 82 y.o. male with PMHX of Afib on xarelto, OA, DMII, GERD who presents to the emergency department C/O fall. Associated sxs include left shoulder pain, left elbow pain, bilateral hand pain, and left rib pain. Pt reports yesterday at 1500 he was attempting to throw a bone for his dog when he tripped and lost his balance. Pt fell onto left side. No head trauma or LOC. Pt denies neck pain, back pain, headache, dizziness, lightheadedness, chest pain, SOB, nausea, vomiting, and any other sxs or complaints. Pt is on xarelto for AFib. Pt had mechanical fall in 06/2020 and was seen with dx of multiple bilateral rib fx. PCP: Jose Horowitz MD 
 
Current Facility-Administered Medications Medication Dose Route Frequency Provider Last Rate Last Dose  acetaminophen (TYLENOL) tablet 975 mg  975 mg Oral NOW Janine Gaytan PA-C Current Outpatient Medications Medication Sig Dispense Refill  
 HYDROcodone-acetaminophen (NORCO) 5-325 mg per tablet Take 1 Tab by mouth every four (4) hours as needed for Pain for up to 5 days. Max Daily Amount: 6 Tabs. 12 Tab 0  
 lidocaine (Lidoderm) 5 % Apply patch to the affected area for 12 hours a day and remove for 12 hours a day. 15 Each 0  
 Comp. Stocking,Thigh,Long,X-Lrg misc 1 Units by Does Not Apply route daily. 1 Units 0  
 buPROPion SR (WELLBUTRIN SR) 150 mg SR tablet Take 150 mg by mouth daily.  dilTIAZem CD (CARDIZEM CD) 240 mg ER capsule Take 240 mg by mouth daily (after lunch).  multivitamin, tx-iron-ca-min (THERA-M W/ IRON) 9 mg iron-400 mcg tab tablet Take 1 Tab by mouth daily.  Formulary substitution for DAILY MULTIVITAMIN centrum silver  insulin detemir U-100 (LEVEMIR U-100 INSULIN) 100 unit/mL injection 15 Units by SubCUTAneous route nightly.  rivaroxaban (XARELTO) 20 mg tab tablet Take 20 mg by mouth daily (with lunch).  fluticasone (FLONASE) 50 mcg/actuation nasal spray 2 Sprays by Both Nostrils route daily as needed for Rhinitis.  cycloSPORINE (RESTASIS) 0.05 % dpet Administer 1 Drop to left eye as needed.  donepezil (ARICEPT) 5 mg tablet Take 5 mg by mouth nightly.  VITAMIN D2 50,000 unit capsule TAKE 1 CAPSULE TWICE WEEKLY 52 Cap 0  
 oxybutynin (DITROPAN) 5 mg tablet Take 5 mg by mouth nightly.  metoprolol tartrate 75 mg tab Take 75 mg by mouth every twelve (12) hours. 60 Tab 0  
 Omeprazole delayed release (PRILOSEC D/R) 20 mg tablet Take 20 mg by mouth daily.  cholecalciferol (VITAMIN D3) 1,000 unit tablet Take 1,000 Units by mouth daily.  furosemide (LASIX) 20 mg tablet Take 20 mg by mouth daily. Indications: Edema  melatonin tab tablet Take 5 mg by mouth nightly. Indications: sleep  insulin lispro (HUMALOG) 100 unit/mL injection by SubCUTAneous route four (4) times daily. BS-with sliding scale 80-12-=2 units 121-160=4 
161-200=6 
201-250=8 
251- and over 10 units 
with each meal  Indications: type 2 diabetes mellitus, sliding scale Past History Past Medical History: 
Past Medical History:  
Diagnosis Date  Arthritis  Atrial fibrillation ([address]  Dementia  Depression  Diabetes ([address] 1980s  GERD (gastroesophageal reflux disease)  Hypercholesterolemia  Hypertension 2000  Ill-defined condition 2015  
 has passed out-has been tested cannot determine cause  Primary osteoarthritis of right knee 6/28/2018  Ulcer   
 at the anastomotic bite of gastric bypass  Varicose veins  Wide-complex tachycardia ([address] 5/2/2015 Past Surgical History: 
Past Surgical History:  
Procedure Laterality Date  ABDOMEN SURGERY PROC UNLISTED    
 umb hernia Rupture  BIOPSY LIVER  10/05/04  
 EGD  09  
 with biopsy  ENDOSCOPY, COLON, DIAGNOSTIC    
 GASTROSTOMY TUBE  10/05/04  HX CATARACT REMOVAL    
 bilateral  
 HX CHOLECYSTECTOMY  10/05/04  HX GI  10/05/04  
 vertical banded gastroplasty/gastric bypass  HX MOHS PROCEDURES    
 bilateral  
 HX PACEMAKER  2015 [employer] Score-Reveal Ling Cardiac Monitor Family History: 
Family History Problem Relation Age of Onset  Heart Attack Father  Diabetes Father  Hypertension Father  Cancer Mother   
     lung  Diabetes Mother  Other Brother   
     obese  Diabetes Brother  Diabetes Brother  Other Maternal Grandmother   
     obese  Diabetes Sister Social History: 
Social History Tobacco Use  Smoking status: Former Smoker Last attempt to quit: 4/3/1967 Years since quittin.4  Smokeless tobacco: Never Used Substance Use Topics  Alcohol use: Yes Alcohol/week: 4.0 standard drinks Types: 3 Glasses of wine, 1 Shots of liquor per week Comment: monthly  Drug use: No  
 
 
Allergies: Allergies Allergen Reactions  Morphine Other (comments) Hallucinate; \"ran all night long\" Review of Systems Review of Systems Constitutional: Negative for activity change. Eyes: Negative for visual disturbance. Respiratory: Negative for cough and shortness of breath. Gastrointestinal: Negative for abdominal pain, nausea and vomiting. Musculoskeletal: Positive for arthralgias. Negative for joint swelling and neck pain. Skin:  
     Abrasion to left elbow Neurological: Negative for dizziness, syncope, weakness, light-headedness, numbness and headaches. Hematological: Bruises/bleeds easily. All other systems reviewed and are negative. Physical Exam  
 
Vitals:  
 20 1008 BP: 157/74 Pulse: 66 Resp: 14 Temp: 97.9 °F (36.6 °C) SpO2: 100% Physical Exam 
Vitals signs and nursing note reviewed. Constitutional:   
   General: He is not in acute distress. Appearance: He is well-developed. He is not diaphoretic. Comments:  geriatric male in NAD. Alert. Ambulatory. Appears comfortable. HENT:  
   Head: Normocephalic and atraumatic. No raccoon eyes, Sanchez's sign, abrasion, contusion, right periorbital erythema or left periorbital erythema. Right Ear: External ear normal. No hemotympanum. Left Ear: External ear normal. No hemotympanum. Nose: Nose normal.  
Eyes:  
   Pupils: Pupils are equal, round, and reactive to light. Neck: Musculoskeletal: Normal range of motion. Normal range of motion. No injury, pain with movement, spinous process tenderness or muscular tenderness. Cardiovascular:  
   Rate and Rhythm: Normal rate. Rhythm irregularly irregular. Heart sounds: Normal heart sounds. No murmur. No friction rub. No gallop. Pulmonary:  
   Effort: Pulmonary effort is normal. No tachypnea, accessory muscle usage or respiratory distress. Breath sounds: Normal breath sounds. No decreased breath sounds, wheezing, rhonchi or rales. Chest:  
   Chest wall: Tenderness (TTP to left anterior ribs) present. Abdominal:  
   General: Bowel sounds are normal. There is no distension. Palpations: Abdomen is soft. Tenderness: There is no abdominal tenderness. There is no guarding or rebound. Musculoskeletal:  
   Left shoulder: He exhibits tenderness. He exhibits normal range of motion, no swelling and no deformity. Left elbow: He exhibits decreased range of motion. He exhibits no swelling. Tenderness found. Right hip: He exhibits normal range of motion and no tenderness. Left hip: He exhibits normal range of motion and no tenderness. Cervical back: He exhibits normal range of motion, no tenderness, no bony tenderness and no deformity. Thoracic back: He exhibits normal range of motion, no tenderness, no bony tenderness and no deformity. Lumbar back: He exhibits normal range of motion, no tenderness, no bony tenderness and no deformity. Right upper arm: He exhibits no tenderness, no bony tenderness and no swelling. Left upper arm: He exhibits no tenderness, no bony tenderness and no swelling. Left forearm: He exhibits no tenderness, no bony tenderness and no swelling. Right hand: He exhibits tenderness (base of left thumb). He exhibits normal range of motion, normal capillary refill, no deformity and no swelling. Left hand: He exhibits tenderness (base of left thumb). He exhibits normal range of motion, no deformity, no laceration and no swelling. Skin: 
   General: Skin is warm and dry. Neurological:  
   Mental Status: He is alert and oriented to person, place, and time. Psychiatric:     
   Judgment: Judgment normal.  
 
 
 
 
 
Diagnostic Study Results Labs - No results found for this or any previous visit (from the past 12 hour(s)). XR ELBOW LT MIN 3 V Final Result IMPRESSION:  
1. No acute fracture or dislocation. 2. Mild soft tissue swelling over the dorsal elbow, no radiopaque foreign body. XR RIBS LT W PA CXR MIN 3 V Final Result IMPRESSION:  
  
Acute mildly displaced fractures through the left lateral seventh, eighth and  
ninth ribs. XR SHOULDER LT AP/LAT MIN 2 V Final Result IMPRESSION:  
1. Mildly displaced right scapular fracture. 2. Likely nondisplaced left lateral second rib fracture. Additional left rib  
fractures better visualized on dedicated radiographs. CRITICAL RESULT:  
Dr. Germán Daley MD, PhD communicated the above findings to [place], [place]  
via telephone at  9/7/2020 12:31 PM.  Results were understood and acknowledged. XR HAND LT MIN 3 V Final Result IMPRESSION:  
 1. Several small densities project over the first digit soft tissues, correlate  
for foreign bodies. 2. No acute fracture or dislocation. 3. Degenerative changes, as detailed. XR HAND RT MIN 3 V Final Result IMPRESSION:  
1. No acute fracture or dislocation. 2. Moderate degenerative changes and additional findings as detailed. CT Results  (Last 48 hours) None CXR Results  (Last 48 hours) None Medications given in the ED- Medications  
acetaminophen (TYLENOL) tablet 975 mg (has no administration in time range) Medical Decision Making I am the first provider for this patient. I reviewed the vital signs, available nursing notes, past medical history, past surgical history, family history and social history. Vital Signs-Reviewed the patient's vital signs. Pulse Oximetry Analysis - 100% on RA. NORMAL Records Reviewed: Nursing Notes, Old Medical Records and Previous Radiology Studies Provider Notes (Medical Decision Making): mechanical one day ago. No head trauma. Benign head and abdomen. No spinal complaints or midline spinal tenderness. Procedures: 
Procedures ED Course:  
10:19 AM Initial assessment performed. The patients presenting problems have been discussed, and they are in agreement with the care plan formulated and outlined with them. I have encouraged them to ask questions as they arise throughout their visit. Diagnosis and Disposition Imaging reveals multiple left sided rib fx with no PTX. No SOB complaints. Lungs CTAB with no hypoxia. Also left scapula fx. Discussed with attending. Will place in sling with AC precautions. Pt has incentive spirometer at home from previous rib fx. Pain control. Miralax to prevent constipation. FU with PCP tomorrow or Wed. Pt is reading the newspaper on reassessment and appears very comfortable.  Reasons to RTED discussed with pt. All questions answered. Pt feels comfortable going home at this time. Pt expressed understanding and he agrees with plan. 1. Multiple fractures of ribs, left side, initial encounter for closed fracture 2. Closed fracture of left scapula, unspecified part of scapula, initial encounter 3. Fall from slip, trip, or stumble, initial encounter PLAN: 
1. D/C Home 2. Discharge Medication List as of 9/7/2020 12:46 PM  
  
START taking these medications Details HYDROcodone-acetaminophen (NORCO) 5-325 mg per tablet Take 1 Tab by mouth every four (4) hours as needed for Pain for up to 5 days. Max Daily Amount: 6 Tabs., Normal, Disp-12 Tab,R-0 CONTINUE these medications which have NOT CHANGED Details  
lidocaine (Lidoderm) 5 % Apply patch to the affected area for 12 hours a day and remove for 12 hours a day., Normal, Disp-15 Each, R-0 Comp. Stocking,Thigh,Long,X-Lrg misc 1 Units by Does Not Apply route daily. , Normal, Disp-1 Units, R-0  
  
buPROPion SR (WELLBUTRIN SR) 150 mg SR tablet Take 150 mg by mouth daily. , Historical Med  
  
dilTIAZem CD (CARDIZEM CD) 240 mg ER capsule Take 240 mg by mouth daily (after lunch). , Historical Med  
  
multivitamin, tx-iron-ca-min (THERA-M W/ IRON) 9 mg iron-400 mcg tab tablet Take 1 Tab by mouth daily. Formulary substitution for DAILY MULTIVITAMIN centrum silver, Historical Med  
  
insulin detemir U-100 (LEVEMIR U-100 INSULIN) 100 unit/mL injection 15 Units by SubCUTAneous route nightly., Historical Med  
  
rivaroxaban (XARELTO) 20 mg tab tablet Take 20 mg by mouth daily (with lunch). , Historical Med  
  
fluticasone (FLONASE) 50 mcg/actuation nasal spray 2 Sprays by Both Nostrils route daily as needed for Rhinitis., Historical Med  
  
cycloSPORINE (RESTASIS) 0.05 % dpet Administer 1 Drop to left eye as needed., Historical Med  
  
donepezil (ARICEPT) 5 mg tablet Take 5 mg by mouth nightly., Historical Med  
  
 VITAMIN D2 50,000 unit capsule TAKE 1 CAPSULE TWICE WEEKLY, Normal, Disp-52 Cap, R-0  
  
oxybutynin (DITROPAN) 5 mg tablet Take 5 mg by mouth nightly., Historical Med  
  
metoprolol tartrate 75 mg tab Take 75 mg by mouth every twelve (12) hours. , Print, Disp-60 Tab, R-0 Omeprazole delayed release (PRILOSEC D/R) 20 mg tablet Take 20 mg by mouth daily. , Historical Med  
  
cholecalciferol (VITAMIN D3) 1,000 unit tablet Take 1,000 Units by mouth daily. , Historical Med  
  
furosemide (LASIX) 20 mg tablet Take 20 mg by mouth daily. Indications: Edema, Historical Med  
  
melatonin tab tablet Take 5 mg by mouth nightly. Indications: sleep, Historical Med  
  
insulin lispro (HUMALOG) 100 unit/mL injection by SubCUTAneous route four (4) times daily. BS-with sliding scale 80-12-=2 units 121-160=4 
161-200=6 
201-250=8 
251- and over 10 units 
with each meal  Indications: type 2 diabetes mellitus, sliding scale, Historical Med 3. Follow-up Information Follow up With Specialties Details Why Contact Info Lo Castillo MD Family Medicine  Follow up with family doctor tomorrow [address] [facility] 
[phone] [facility] EMERGENCY DEPT Emergency Medicine  As needed, If symptoms worsen [address] [address] [phone]  
  
 
_______________________________ Attestations: This note is prepared by Owen Carpenter PA-C. 
_______________________________ Please note that this dictation was completed with [employer], the computer voice recognition software. Quite often unanticipated grammatical, syntax, homophones, and other interpretive errors are inadvertently transcribed by the computer software. Please disregard these errors. Please excuse any errors that have escaped final proofreading.

## 2020-09-11 NOTE — ED PROVIDER NOTES
EMERGENCY DEPARTMENT HISTORY AND PHYSICAL EXAM 
 
Date: 9/11/2020 Patient Name: Delfina Alford. History of Presenting Illness Chief Complaint Patient presents with  Shoulder Pain History Provided By: Patient Additional History (Context): Delfina Hammonds is a 82 y.o. male with PMHX A. fib on Xarelto, diabetes presents to the emergency department C/O persistent left-sided rib pain after falling 5 days ago. Patient states that he fell from standing position when he tripped and lost his balance. [place] onto his left side. Patient denies any head injury or loss of consciousness. Pt denies pain, back pain, chest pain or shortness of breath, and any other sxs or complaints. Patient was seen here after the accident and had x-rays done which showed the displaced fractures 7-9 fx and L scapular fx. states that he turned onto his left side last night and felt a \"pop and crack\". That he then developed worsening left-sided chest pain. Denies any shortness of breath. PCP: Seda Nuñez MD 
 
Current Outpatient Medications Medication Sig Dispense Refill  
 HYDROcodone-acetaminophen (NORCO) 5-325 mg per tablet Take 1 Tab by mouth every four (4) hours as needed for Pain for up to 5 days. Max Daily Amount: 6 Tabs. 12 Tab 0  
 lidocaine (Lidoderm) 5 % Apply patch to the affected area for 12 hours a day and remove for 12 hours a day. 15 Each 0  
 Comp. Stocking,Thigh,Long,X-Lrg misc 1 Units by Does Not Apply route daily. 1 Units 0  
 buPROPion SR (WELLBUTRIN SR) 150 mg SR tablet Take 150 mg by mouth daily.  dilTIAZem CD (CARDIZEM CD) 240 mg ER capsule Take 240 mg by mouth daily (after lunch).  multivitamin, tx-iron-ca-min (THERA-M W/ IRON) 9 mg iron-400 mcg tab tablet Take 1 Tab by mouth daily. Formulary substitution for DAILY MULTIVITAMIN centrum silver  insulin detemir U-100 (LEVEMIR U-100 INSULIN) 100 unit/mL injection 15 Units by SubCUTAneous route nightly.  rivaroxaban (XARELTO) 20 mg tab tablet Take 20 mg by mouth daily (with lunch).  fluticasone (FLONASE) 50 mcg/actuation nasal spray 2 Sprays by Both Nostrils route daily as needed for Rhinitis.  cycloSPORINE (RESTASIS) 0.05 % dpet Administer 1 Drop to left eye as needed.  donepezil (ARICEPT) 5 mg tablet Take 5 mg by mouth nightly.  VITAMIN D2 50,000 unit capsule TAKE 1 CAPSULE TWICE WEEKLY 52 Cap 0  
 oxybutynin (DITROPAN) 5 mg tablet Take 5 mg by mouth nightly.  metoprolol tartrate 75 mg tab Take 75 mg by mouth every twelve (12) hours. 60 Tab 0  
 Omeprazole delayed release (PRILOSEC D/R) 20 mg tablet Take 20 mg by mouth daily.  cholecalciferol (VITAMIN D3) 1,000 unit tablet Take 1,000 Units by mouth daily.  furosemide (LASIX) 20 mg tablet Take 20 mg by mouth daily. Indications: Edema  melatonin tab tablet Take 5 mg by mouth nightly. Indications: sleep  insulin lispro (HUMALOG) 100 unit/mL injection by SubCUTAneous route four (4) times daily. BS-with sliding scale 80-12-=2 units 121-160=4 
161-200=6 
201-250=8 
251- and over 10 units 
with each meal  Indications: type 2 diabetes mellitus, sliding scale Past History Past Medical History: 
Past Medical History:  
Diagnosis Date  Arthritis  Atrial fibrillation ([address]  Dementia ([address]  Depression  Diabetes ([address] 1980s  GERD (gastroesophageal reflux disease)  Hypercholesterolemia  Hypertension 2000  Ill-defined condition 2015  
 has passed out-has been tested cannot determine cause  Primary osteoarthritis of right knee 6/28/2018  Ulcer   
 at the anastomotic bite of gastric bypass  Varicose veins  Wide-complex tachycardia ([address] 5/2/2015 Past Surgical History: 
Past Surgical History:  
Procedure Laterality Date  ABDOMEN SURGERY PROC UNLISTED  1998  
 umb hernia Rupture  BIOPSY LIVER  10/05/04  
 EGD  12/29/09  
 with biopsy  ENDOSCOPY, COLON, DIAGNOSTIC    
 GASTROSTOMY TUBE  10/05/04  HX CATARACT REMOVAL    
 bilateral  
 HX CHOLECYSTECTOMY  10/05/04  HX GI  10/05/04  
 vertical banded gastroplasty/gastric bypass  HX MOHS PROCEDURES    
 bilateral  
 HX PACEMAKER   [facility] Cardiac Monitor Family History: 
Family History Problem Relation Age of Onset  Heart Attack Father  Diabetes Father  Hypertension Father  Cancer Mother   
     lung  Diabetes Mother  Other Brother   
     obese  Diabetes Brother  Diabetes Brother  Other Maternal Grandmother   
     obese  Diabetes Sister Social History: 
Social History Tobacco Use  Smoking status: Former Smoker Last attempt to quit: 4/3/1967 Years since quittin.4  Smokeless tobacco: Never Used Substance Use Topics  Alcohol use: Yes Alcohol/week: 4.0 standard drinks Types: 3 Glasses of wine, 1 Shots of liquor per week Comment: monthly  Drug use: No  
 
 
Allergies: Allergies Allergen Reactions  Morphine Other (comments) Hallucinate; \"ran all night long\" Review of Systems Review of Systems Constitutional: Negative for chills and fever. HENT: Negative for congestion, ear pain, sinus pain and sore throat. Eyes: Negative for pain and visual disturbance. Respiratory: Negative for cough and shortness of breath. Cardiovascular: Positive for chest pain. Negative for leg swelling. Gastrointestinal: Negative for abdominal pain, constipation, diarrhea, nausea and vomiting. Genitourinary: Negative for dysuria and hematuria. Musculoskeletal: Negative for back pain and neck pain. Skin: Negative for pallor and rash. Neurological: Negative for dizziness, tremors, weakness, light-headedness and headaches. All other systems reviewed and are negative. Physical Exam  
 
Vitals:  
 20 1515 20 1815 20 1830 BP: (!) 167/100 (!) 171/83 (!) 157/78 Pulse: 93 Resp: 20 Temp: 98.8 °F (37.1 °C) SpO2: 100% 100% 100% Weight: 88 kg (194 lb) Height: 5' 7\" (1.702 m) Physical Exam 
 
Nursing note and vitals reviewed Constitutional: Elderly  male, no acute distress Head: Normocephalic, Atraumatic Eyes: Pupils are equal, round, and reactive to light, EOMI Neck: Supple, non-tender Cardiovascular: Regular rate and rhythm, no murmurs, rubs, or gallops, + 2 radial pulses bilaterally Chest: Tenderness of the left upper chest with no obvious crepitus, ecchymoses or erythema. Normal work of breathing and chest excursion bilaterally Lungs: Clear to ausculation bilaterally, no wheezes, no rhonchi Abdomen: Soft, non tender, non distended, normoactive bowel sounds Back: Tenderness over the left scapula with no overlying ecchymosis or erythema. No evidence of trauma or deformity Extremities: Ecchymoses over the left humerus. No evidence of trauma or deformity, no LE edema. No streaking erythema, vesicular lesions, ulcerations or bulla Skin: Warm and dry, normal cap refill Neuro: Alert and appropriate, CN intact, normal speech, moving all 4 extremities freely and symmetrically Psychiatric: Normal mood and affect Diagnostic Study Results Labs - Recent Results (from the past 12 hour(s)) EKG, 12 LEAD, INITIAL Collection Time: 09/11/20  3:39 PM  
Result Value Ref Range Ventricular Rate 83 BPM  
 Atrial Rate 83 BPM  
 P-R Interval 156 ms QRS Duration 92 ms Q-T Interval 392 ms QTC Calculation (Bezet) 460 ms Calculated P Axis 39 degrees Calculated R Axis -32 degrees Calculated T Axis 34 degrees Diagnosis Normal sinus rhythm Left axis deviation Abnormal ECG When compared with ECG of 23-JUN-2020 11:53, 
Criteria for Septal infarct are no longer present Confirmed by Rosa Whitlock MD, -- ([id number]) on 9/11/2020 4:55:11 PM 
  
CBC WITH AUTOMATED DIFF  
 Collection Time: 09/11/20  3:45 PM  
Result Value Ref Range WBC 5.4 4.6 - 13.2 K/uL  
 RBC 3.92 (L) 4.70 - 5.50 M/uL  
 HGB 10.4 (L) 13.0 - 16.0 g/dL HCT 32.8 (L) 36.0 - 48.0 % MCV 83.7 74.0 - 97.0 FL  
 MCH 26.5 24.0 - 34.0 PG  
 MCHC 31.7 31.0 - 37.0 g/dL  
 RDW 15.1 (H) 11.6 - 14.5 % PLATELET 631 431 - 658 K/uL MPV 9.2 9.2 - 11.8 FL  
 NEUTROPHILS 64 40 - 73 % LYMPHOCYTES 21 21 - 52 % MONOCYTES 12 (H) 3 - 10 % EOSINOPHILS 3 0 - 5 % BASOPHILS 0 0 - 2 %  
 ABS. NEUTROPHILS 3.5 1.8 - 8.0 K/UL  
 ABS. LYMPHOCYTES 1.1 0.9 - 3.6 K/UL  
 ABS. MONOCYTES 0.6 0.05 - 1.2 K/UL  
 ABS. EOSINOPHILS 0.2 0.0 - 0.4 K/UL  
 ABS. BASOPHILS 0.0 0.0 - 0.1 K/UL  
 DF AUTOMATED METABOLIC PANEL, COMPREHENSIVE Collection Time: 09/11/20  3:45 PM  
Result Value Ref Range Sodium 139 136 - 145 mmol/L Potassium 4.1 3.5 - 5.5 mmol/L Chloride 103 100 - 111 mmol/L  
 CO2 32 21 - 32 mmol/L Anion gap 4 3.0 - 18 mmol/L Glucose 249 (H) 74 - 99 mg/dL BUN 23 (H) 7.0 - 18 MG/DL Creatinine 1.21 0.6 - 1.3 MG/DL  
 BUN/Creatinine ratio 19 12 - 20 GFR est AA >60 >60 ml/min/1.73m2 GFR est non-AA 57 (L) >60 ml/min/1.73m2 Calcium 8.4 (L) 8.5 - 10.1 MG/DL Bilirubin, total 0.3 0.2 - 1.0 MG/DL  
 ALT (SGPT) 21 16 - 61 U/L  
 AST (SGOT) 16 10 - 38 U/L Alk. phosphatase 154 (H) 45 - 117 U/L Protein, total 6.5 6.4 - 8.2 g/dL Albumin 3.0 (L) 3.4 - 5.0 g/dL Globulin 3.5 2.0 - 4.0 g/dL A-G Ratio 0.9 0.8 - 1.7 PROTHROMBIN TIME + INR Collection Time: 09/11/20  3:45 PM  
Result Value Ref Range Prothrombin time 16.5 (H) 11.5 - 15.2 sec INR 1.4 (H) 0.8 - 1.2 PTT Collection Time: 09/11/20  3:45 PM  
Result Value Ref Range aPTT 32.6 23.0 - 36.4 SEC Radiologic Studies -  
CT CHEST ABD PELV W CONT Final Result IMPRESSION:  
  
1. No acute posttraumatic findings in the chest, abdomen or pelvis. 2. The left scapula is incompletely imaged, although the visualized component  
reveals no evidence of fracture and the finding on recent radiographs may have  
been artifactual. The nondisplaced left-sided rib fractures seen on recent  
radiographs are not visualized, potentially secondary to motion artifact of the  
ribs and diffuse osseous the mineralization. _______________ CT SPINE CERV WO CONT Final Result IMPRESSION:  
  
No evidence of acute fracture or traumatic malalignment of the cervical spine.   
  
_______________ CT Results  (Last 48 hours) 09/11/20 1800  CT CHEST ABD PELV W CONT Final result Impression:  IMPRESSION:  
   
1. No acute posttraumatic findings in the chest, abdomen or pelvis. 2. The left scapula is incompletely imaged, although the visualized component  
reveals no evidence of fracture and the finding on recent radiographs may have  
been artifactual. The nondisplaced left-sided rib fractures seen on recent  
radiographs are not visualized, potentially secondary to motion artifact of the  
ribs and diffuse osseous the mineralization. _______________ Narrative:  EXAM: CT CHEST ABD PELV W CONT  
   
CLINICAL INDICATION/HISTORY: fall 4 days ago, multiple rib fx, scapular fx  
-Additional: None. COMPARISON: Left shoulder, left elbow, bilateral hand, and left rib radiographs  
of 09/07/2020. Chest CT 06/23/2020. TECHNIQUE:   
Enhanced CT examination of the chest, abdomen and pelvis was performed following  
the uneventful administration of intravenous contrast, without oral contrast.  
Evaluation of the bowel is limited in the absence of oral contrast. Sagittal and  
coronal series were reformatted from the axial source images. One or more dose reduction techniques were used on this CT: automated exposure  
control, adjustment of the mAs and/One or more dose reduction techniques were used on this CT: automated exposure control, adjustment of the mAs and/or kVp  
according to patient size, and iterative reconstruction techniques. The specific  
techniques used on this CT exam have been documented in the patient's electronic  
medical record. Digital Imaging and Communications in Medicine (DICOM) format  
image data are available to nonaffiliated external healthcare facilities or  
entities on a secure, media free, reciprocally searchable basis with patient authorization for at least a 12-month period after this study. _______________ FINDINGS:  
   
CHEST:  
   
LUNGS and PLEURA: There are mild bibasilar hypoventilatory changes. There is no  
focal airspace consolidation, pneumothorax, or pleural effusion. Stable 0.6 cm  
left lower lobe nodule on image 64, stable 0.6 cm lateral right lower lobe  
nodule on image 82, and stable triangular 0.6 cm right middle lobe nodule on  
image 71. No new or enlarging suspicious pulmonary nodules or masses, noting  
additional benign calcified granulomas. CENTRAL AIRWAYS: Patent. CHEST VESSELS: Aortic atherosclerotic changes without aneurysm. Coronary artery  
atherosclerotic calcification. HEART AND PERICARDIUM: Heart size is normal. No pericardial effusion. MEDIASTINUM AND YURI: Within normal limits. CHEST WALL AND LOWER NECK: Within normal limits. AXILLA: Within normal limits. _______________ ABDOMEN/PELVIS:  
   
LIVER: Normal in size and contour. GALLBLADDER and BILE DUCTS: The gallbladder is surgically absent. No  
intrahepatic or extrahepatic biliary ductal dilatation. PANCREAS: Within normal limits. SPLEEN: Scattered punctate calcification prior granulomatous disease. Small  
splenule inferior to the spleen, unchanged. ADRENALS: Within normal limits. KIDNEYS, URETERS, URINARY BLADDER: Symmetric renal enhancement without hydronephrosis or calculi. Normal ureters and bladder. REPRODUCTIVE ORGANS: Within normal limits. STOMACH and BOWEL: Evaluation of the bowel is limited in the absence of oral  
contrast. 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  
is normal without evidence of obstruction or focal inflammatory process. The  
terminal ileum and appendix are normal.  
   
ABDOMINOPELVIC LYMPH NODES: No lymphadenopathy. PERITONEUM/RETROPERITONEUM: No free fluid. No extraluminal free air or drainable  
collection. ABDOMINOPELVIC VESSELS: Aortoiliac atherosclerotic changes without aneurysm. BODY WALL: Within normal limits. _______________ BONES: Degenerative changes with no acute or suspicious osseous findings. The  
left scapula is incompletely imaged, although the visualized component reveals  
no evidence of fracture and the finding on recent radiographs may have been  
artifactual. The nondisplaced left-sided rib fractures seen on recent  
radiographs are not visualized, potentially secondary to motion artifact of the  
ribs and diffuse osseous the mineralization. _______________  
   
  
 09/11/20 1800  CT SPINE CERV WO CONT Final result Impression:  IMPRESSION:  
   
No evidence of acute fracture or traumatic malalignment of the cervical spine.   
   
_______________ Narrative:  EXAM: CT SPINE CERV WO CONT. CLINICAL INDICATION/HISTORY: fall 4 days ago, multiple rib fx, scapular fx.  
-Additional: None. COMPARISON: Cervical spine CT of 06/23/2020.  
_______________ TECHNIQUE: Unenhanced CT examination of the cervical spine performed without  
intravenous contrast.   
   
One or more dose reduction techniques were used on this CT: automated exposure  
control, adjustment of the mAs and/or kVp according to patient size, and  
iterative reconstruction techniques.  The specific techniques used on this CT  
 exam have been documented in the patient's electronic medical record. . Digital  
Imaging and Communications in Medicine (DICOM) format image data are available  
to nonaffiliated external healthcare facilities or entities on a secure, media  
free, reciprocally searchable basis with patient authorization for at least a  
12-month period after this study. _______________ FINDINGS:  
   
Preservation of the normal cervical lordosis with redemonstrated diffuse osseous  
demineralization and age-appropriate multilevel degenerative changes, with no  
evidence of acute fracture or traumatic malalignment. There is no prevertebral  
soft tissue edema. Evaluation of the intraspinal soft tissues is highly limited  
on this non-myelographic CT examination. The visualized lung apices are clear.  
   
_______________ CXR Results  (Last 48 hours) None Medical Decision Making I am the first provider for this patient. I reviewed the vital signs, available nursing notes, past medical history, past surgical history, family history and social history. Vital Signs-Reviewed the patient's vital signs. Pulse Oximetry Analysis -100 % on room air Cardiac Monitor: 
Rate: 93 bpm 
Rhythm: Regular EKG interpretation: (Preliminary) 3:29 PM  
Normal sinus rhythm at 83 beats minute. FL interval 156 ms. QRS 92 ms. QTc 460 ms. No acute ST elevation Records Reviewed: Nursing Notes and Old Medical Records Provider Notes:  
82 y.o. male presenting with worsening left-sided chest pain after sustaining fall 5 days ago which time he sustained multiple rib fractures and a right scapular fracture. On exam patient is saturating 100% on room air. He does not appear toxic or acutely ill. He does have ecchymosis over left humerus however no obvious deformity.   He has some tenderness over the left upper chest with no crepitus, no overlying erythema or ecchymosis. He also has some left sided scapular tenderness. Liver lungs are CTA. With his multiple rib fractures and scapular injury, we will scan his chest, abdomen pelvis to evaluate. Procedures: 
Procedures ED Course:  
3:30 PM 
 Initial assessment performed. The patients presenting problems have been discussed, and they are in agreement with the care plan formulated and outlined with them. I have encouraged them to ask questions as they arise throughout their visit. 
 
7:01 PM 
Hemoglobin stable at 10.4. CT scan showing no acute posttraumatic findings in the chest abdomen pelvis. The left scapula is incompletely imaged although the visualized component reveals no evidence of fracture and the findings on recent radiographs may have been artifactual.  On reassessment, patient reports feeling more comfortable. States that he states he still has [place] from when he was prescribed 4 days ago. Diagnosis and Disposition DISCHARGE NOTE: 
7:01 PM 
 
Travmartin Torres Jr.'s  results have been reviewed with him. He has been counseled regarding his diagnosis, treatment, and plan. He verbally conveys understanding and agreement of the signs, symptoms, diagnosis, treatment and prognosis and additionally agrees to follow up as discussed. He also agrees with the care-plan and conveys that all of his questions have been answered. I have also provided discharge instructions for him that include: educational information regarding their diagnosis and treatment, and list of reasons why they would want to return to the ED prior to their follow-up appointment, should his condition change. He has been provided with education for proper emergency department utilization. CLINICAL IMPRESSION: 
 
1. Closed fracture of multiple ribs of left side, initial encounter 2. Fall, initial encounter PLAN: 
1. D/C Home 2. Current Discharge Medication List  
  
 
3. Follow-up Information Follow up With Specialties Details Why Contact Info Lo Castillo MD Family Medicine Schedule an appointment as soon as possible for a visit in 2 days  [address] 
[phone] [facility] EMERGENCY DEPT Emergency Medicine  As needed if symptoms worsen [address] [place] [id number] [phone]  
  
 
____________________________________ Please note that this dictation was completed with [employer], the computer voice recognition software. Quite often unanticipated grammatical, syntax, homophones, and other interpretive errors are inadvertently transcribed by the computer software. Please disregard these errors. Please excuse any errors that have escaped final proofreading.

## 2020-09-11 NOTE — ED TRIAGE NOTES
Patient with complaints of continued left shoulder pain and left rib pain. Patient was seen here and evaluated after a fall for these symptoms.

## 2020-09-11 NOTE — DISCHARGE INSTRUCTIONS
You were seen and evaluated in the Emergency Department. Please understand that your work up is not all encompassing and you should follow up with your primary care physician for further management and continuity of care. Please return to Emergency Department or seek medical attention immediately if you have acute worsening in your symptoms or develop chest pain, shortness of breath, repeated vomiting, fever, altered level of consciousness, coughing up blood, or start sweating and feel clammy. If you were prescribed any medicine for home, please take as prescribed by your health-care provider. If you were given any follow-up appointments or numbers to call, please do so as instructed. Avoid any tobacco products or excessive alcohol. Patient Education        Preventing Falls: Care Instructions  Your Care Instructions    Getting around your home safely can be a challenge if you have injuries or health problems that make it easy for you to fall. Loose rugs and furniture in walkways are among the dangers for many older people who have problems walking or who have poor eyesight. People who have conditions such as arthritis, osteoporosis, or dementia also have to be careful not to fall. You can make your home safer with a few simple measures. Follow-up care is a key part of your treatment and safety. Be sure to make and go to all appointments, and call your doctor if you are having problems. It's also a good idea to know your test results and keep a list of the medicines you take. How can you care for yourself at home? Taking care of yourself  · You may get dizzy if you do not drink enough water. To prevent dehydration, drink plenty of fluids, enough so that your urine is light yellow or clear like water. Choose water and other caffeine-free clear liquids.  If you have kidney, heart, or liver disease and have to limit fluids, talk with your doctor before you increase the amount of fluids you drink.  · Exercise regularly to improve your strength, muscle tone, and balance. Walk if you can. Swimming may be a good choice if you cannot walk easily. · Have your vision and hearing checked each year or any time you notice a change. If you have trouble seeing and hearing, you might not be able to avoid objects and could lose your balance. · Know the side effects of the medicines you take. Ask your doctor or pharmacist whether the medicines you take can affect your balance. Sleeping pills or sedatives can affect your balance. · Limit the amount of alcohol you drink. Alcohol can impair your balance and other senses. · Ask your doctor whether calluses or corns on your feet need to be removed. If you wear loose-fitting shoes because of calluses or corns, you can lose your balance and fall. · Talk to your doctor if you have numbness in your feet. Preventing falls at home  · Remove raised doorway thresholds, throw rugs, and clutter. Repair loose carpet or raised areas in the floor. · Move furniture and electrical cords to keep them out of walking paths. · Use nonskid floor wax, and wipe up spills right away, especially on ceramic tile floors. · If you use a walker or cane, put rubber tips on it. If you use crutches, clean the bottoms of them regularly with an abrasive pad, such as steel wool. · Keep your house well lit, especially [place], and outside walkways. Use night-lights in areas such as hallways and bathrooms. Add extra light switches or use remote switches (such as switches that go on or off when you clap your hands) to make it easier to turn lights on if you have to get up during the night. · Install sturdy handrails on stairways. · Move items in your cabinets so that the things you use a lot are on the lower shelves (about waist level). · Keep a cordless phone and a flashlight with new batteries by your bed.  If possible, put a phone in each of the main rooms of your house, or carry a cell phone in case you fall and cannot reach a phone. Or, you can wear a device around your neck or wrist. You push a button that sends a signal for help. · Wear low-heeled shoes that fit well and give your feet good support. Use footwear with nonskid soles. Check the heels and soles of your shoes for wear. Repair or replace worn heels or soles. · Do not wear socks without shoes on wood floors. · Walk on the grass when the sidewalks are slippery. If you live in an area that gets snow and ice in the winter, sprinkle salt on slippery steps and sidewalks. Preventing falls in the bath  · Install grab bars and nonskid mats inside and outside your shower or tub and near the toilet and sinks. · Use shower chairs and bath benches. · Use a hand-held shower head that will allow you to sit while showering. · Get into a tub or shower by putting the weaker leg in first. Get out of a tub or shower with your strong side first.  · Repair loose toilet seats and consider installing a raised toilet seat to make getting on and off the toilet easier. · Keep your bathroom door unlocked while you are in the shower. Where can you learn more? Go to http://jared-bill.info/. Enter [id number] [id number] in the search box to learn more about \"Preventing Falls: Care Instructions. \"  Current as of: March 16, 2018  Content Version: 11.8  © 6837-2724 [employer]. Care instructions adapted under license by [employer] (which disclaims liability or warranty for this information). If you have questions about a medical condition or this instruction, always ask your healthcare professional. [place] [address] any warranty or liability for your use of this information. Patient Education        Broken Rib: Care Instructions  Your Care Instructions     A broken rib is a crack or break in one of the bones of the rib cage.  Breathing can be very painful because the muscles used for breathing pull on the rib. In most cases, a broken rib will heal on its own. You can take pain medicine while the rib mends. Pain relief allows you to take deep breaths. In the past, doctors recommended taping or wrapping broken ribs. This is no longer done because taping makes it hard for you to take deep breaths. Taking deep breaths may help prevent pneumonia or a partial collapse of a lung. Your rib will heal in about 6 weeks. You heal best when you take good care of yourself. Eat a variety of healthy foods, and don't smoke. Follow-up care is a key part of your treatment and safety. Be sure to make and go to all appointments, and call your doctor if you are having problems. It's also a good idea to know your test results and keep a list of the medicines you take. How can you care for yourself at home? · Be safe with medicines. Read and follow all instructions on the label. ? If the doctor gave you a prescription medicine for pain, take it as prescribed. ? If you are not taking a prescription pain medicine, ask your doctor if you can take an over-the-counter medicine. · Even if it hurts, try to cough or take the deepest breath you can at least once every hour. This will get air deeply into your lungs. This may reduce your chance of getting pneumonia or a partial collapse of a lung. Hold a pillow against your chest to make this less painful. · Put ice or a cold pack on the area for 10 to 20 minutes at a time. Put a thin cloth between the ice and your skin. When should you call for help? Call 911 anytime you think you may need emergency care. For example, call if:    · You have severe trouble breathing. Call your doctor now or seek immediate medical care if:    · You have some trouble breathing.     · You have a fever.     · You have a new or worse cough.    Watch closely for changes in your health, and be sure to contact your doctor if:    · You have pain even after taking your medicine.     · You do not get better as expected. Where can you learn more? Go to http://jared-bill.info/  Enter M135 in the search box to learn more about \"Broken Rib: Care Instructions. \"  Current as of: March 2, 2020               Content Version: 12.6  © 3415-4088 [employer]. Care instructions adapted under license by [employer] (which disclaims liability or warranty for this information). If you have questions about a medical condition or this instruction, always ask your healthcare professional. [address] any warranty or liability for your use of this information.
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
## 2020-10-24 PROBLEM — F03.90 DEMENTIA WITHOUT BEHAVIORAL DISTURBANCE (HCC): Status: ACTIVE | Noted: 2020-01-01

## 2020-10-24 PROBLEM — S72.001A CLOSED RIGHT HIP FRACTURE, INITIAL ENCOUNTER (HCC): Status: ACTIVE | Noted: 2020-01-01

## 2020-10-24 PROBLEM — Z20.822 PERSON UNDER INVESTIGATION FOR COVID-19: Status: ACTIVE | Noted: 2020-01-01

## 2020-10-24 PROBLEM — R09.02 HYPOXIA: Status: ACTIVE | Noted: 2020-01-01

## 2020-10-24 PROBLEM — Z79.01 ANTICOAGULANT LONG-TERM USE: Status: ACTIVE | Noted: 2020-01-01

## 2020-10-24 NOTE — PROGRESS NOTES
0735:Received verbal bedside report from off going nurse ORA Zambrano Patient care received. Patient alert and oriented to self. Patient resting in bed denies pain. Patient stable. Call light with in reach bed in lowest position. 1150: Informed Kj Gupta that patient refused his vitamin c,vitamnin d, and zinc.She also gave verbal orders to put in a diabetic diet for patient.

## 2020-10-24 NOTE — PROGRESS NOTES
TRANSFER - IN REPORT: 
 
Verbal report received from [facility] (name) on Merline Powers.  being received from ED(unit) for routine progression of care Report consisted of patients Situation, Background, Assessment and  
Recommendations(SBAR). Information from the following report(s) SBAR, ED Summary, Intake/Output, Med Rec Status, Alarm Parameters  and Quality Measures was reviewed with the receiving nurse. Opportunity for questions and clarification was provided. Assessment completed upon patients arrival to unit and care assumed.

## 2020-10-24 NOTE — CONSULTS
Consult Note Patient: Branden Hi Sex: male          DOA: 10/23/2020 YOB: 1935      Age:  82 y.o.        LOS:  LOS: 0 days HPI:  
 
Branden Hi is a 82 y.o. male who has been seen for right hip fracture Past Medical History:  
Diagnosis Date  Arthritis  Atrial fibrillation ([address]  Dementia ([address]  Depression  Diabetes [facility] [facility]   GERD (gastroesophageal reflux disease)  Hypercholesterolemia  Hypertension   Ill-defined condition   
 has passed out-has been tested cannot determine cause  Primary osteoarthritis of right knee 2018  Ulcer   
 at the anastomotic bite of gastric bypass  Varicose veins  Wide-complex tachycardia ([facility] [address] 2015 Past Surgical History:  
Procedure Laterality Date  ABDOMEN SURGERY PROC UNLISTED    
 umb hernia Rupture  BIOPSY LIVER  10/05/04  
 EGD  09  
 with biopsy  ENDOSCOPY, COLON, DIAGNOSTIC    
 GASTROSTOMY TUBE  10/05/04  HX CATARACT REMOVAL    
 bilateral  
 HX CHOLECYSTECTOMY  10/05/04  HX GI  10/05/04  
 vertical banded gastroplasty/gastric bypass  HX MOHS PROCEDURES    
 bilateral  
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   
     lung  Diabetes Mother  Other Brother   
     obese  Diabetes Brother  Diabetes Brother  Other Maternal Grandmother   
     obese  Diabetes Sister Social History Socioeconomic History  Marital status:  Spouse name: Not on file  Number of children: Not on file  Years of education: Not on file  Highest education level: Not on file Tobacco Use  Smoking status: Former Smoker Last attempt to quit: 4/3/1967 Years since quittin.5  Smokeless tobacco: Never Used Substance and Sexual Activity  Alcohol use: Yes Alcohol/week: 4.0 standard drinks Types: 3 Glasses of wine, 1 Shots of liquor per week Comment: monthly  Drug use: No  
 Sexual activity: Never Prior to Admission medications Medication Sig Start Date End Date Taking? Authorizing Provider ARIPiprazole (Abilify) 2 mg tablet Take 2 mg by mouth daily. Yes Provider, Historical  
buPROPion SR (WELLBUTRIN SR) 150 mg SR tablet Take 150 mg by mouth daily. Yes Provider, Historical  
dilTIAZem CD (CARDIZEM CD) 240 mg ER capsule Take 240 mg by mouth daily (after lunch). Yes Provider, Historical  
rivaroxaban (XARELTO) 20 mg tab tablet Take 20 mg by mouth daily (with lunch). Yes Provider, Historical  
fluticasone (FLONASE) 50 mcg/actuation nasal spray 2 Sprays by Both Nostrils route daily as needed for Rhinitis. Yes Provider, Historical  
cycloSPORINE (RESTASIS) 0.05 % dpet Administer 1 Drop to left eye as needed. Yes Provider, Historical  
donepezil (ARICEPT) 5 mg tablet Take 5 mg by mouth nightly. Yes Provider, Historical  
metoprolol tartrate 75 mg tab Take 75 mg by mouth every twelve (12) hours. 12/29/16  Yes Caro Garcia MD  
Omeprazole delayed release (PRILOSEC D/R) 20 mg tablet Take 20 mg by mouth daily. Yes Provider, Historical  
cholecalciferol (VITAMIN D3) 1,000 unit tablet Take 1,000 Units by mouth daily. Yes Provider, Historical  
furosemide (LASIX) 20 mg tablet Take 20 mg by mouth daily. Indications: Edema   Yes Provider, Historical  
lidocaine (Lidoderm) 5 % Apply patch to the affected area for 12 hours a day and remove for 12 hours a day. 6/23/20   Ritchie Cartagena MD  
Comp. Stocking,Thigh,Long,X-Lrg misc 1 Units by Does Not Apply route daily. 1/25/19   Teri Alatorre MD  
multivitamin, tx-iron-ca-min (THERA-M W/ IRON) 9 mg iron-400 mcg tab tablet Take 1 Tab by mouth daily.  Formulary substitution for DAILY MULTIVITAMIN centrum silver    Provider, Historical  
insulin detemir U-100 (LEVEMIR U-100 INSULIN) 100 unit/mL injection 15 Units by SubCUTAneous route nightly. Provider, Historical  
VITAMIN D2 50,000 unit capsule TAKE 1 CAPSULE TWICE WEEKLY 11/20/17   Peace Lundberg MD  
oxybutynin (DITROPAN) 5 mg tablet Take 5 mg by mouth nightly. Other, MD Lobo  
melatonin tab tablet Take 5 mg by mouth nightly. Indications: sleep    Provider, Historical  
insulin lispro (HUMALOG) 100 unit/mL injection by SubCUTAneous route four (4) times daily. BS-with sliding scale 80-12-=2 units 121-160=4 
161-200=6 
201-250=8 
251- and over 10 units 
with each meal  Indications: type 2 diabetes mellitus, sliding scale    Provider, Historical  
 
 
Allergies Allergen Reactions  Morphine Other (comments) Hallucinate; \"ran all night long\" Review of Systems A comprehensive review of systems was negative except for that written in the History of Present Illness. Physical Exam:  
  
Visit Vitals BP (!) 159/85 (BP 1 Location: Left arm, BP Patient Position: At rest) Pulse 80 Temp 97.5 °F (36.4 °C) Resp 18 Ht 5' 7\" (1.702 m) Wt 80.7 kg (178 lb) SpO2 95% BMI 27.88 kg/m² Physical Exam: 
Physical exam was negative except for: Musculoskeletal: positive for bone pain Labs Reviewed: All lab results for the last 24 hours reviewed. Assessment/Plan Principal Problem: 
  Person under investigation for COVID-19 (10/24/2020) Active Problems: 
  Pneumonia (11/16/2016) Fall (10/24/2020) Closed right hip fracture, initial encounter ([facility] [address] (10/24/2020) Dementia without behavioral disturbance ([facility] [address] (10/24/2020) Anticoagulant long-term use (10/24/2020) Hypoxia (10/24/2020) Right hip fracture will require ORIF but Covid status unclear and has been on Xaralto. Will delay surgery until these issues are resolved.

## 2020-10-24 NOTE — PROGRESS NOTES
1917: Report received from Jacque Purcell RN. Assumed care of pt at this time. 1956: Head to toe assessment completed. Patient is alert to self. Upon entering, patient had taken off oxygen. Sating 87 on room air, placed back on 2L of NC -- returned to 95 percent. Patient has Diaz catheter that is patent and draining. Bed alarm is on.  
 
2219: Urine sample sent down to lab. Collected via diaz. 0220: Labs drawn. One gold, two blue, one purple tube drawn and sent to lab. Patient in bed, resting quietly. 8907: Patient ripped off leads and gown. Patient took off oxygen. Placed back on oxygen -- 95%. Patient is alert and pleasantly confused. Reoriented. Respiratory nasal swab completed and sent to lab. Shift summary: Patient had uneventful shift. Remained in bed. Diaz draining without issues.

## 2020-10-24 NOTE — H&P
History & Physical 
 
Patient: Sharyle Long. MRN: [id number]  CSN: [id number] YOB: 1935  Age: 82 y.o. Sex: male DOA: 10/23/2020 Chief Complaint:  
Chief Complaint Patient presents with  
 Hip Pain HPI:  
 
Sharyle Long. is a 82 y.o.  male who with history of dementia, atrial for rib on Xarelto last dose 6 PM who wast at [facility] for rehab after sustaining a fall and distal femur fracture 9/26 He presents to our emergency room after sustaining a mechanical fall at around 12:00 today x-rays done at the facility did show a right hip fracture. he was given Norco with some improvement of his pain however he was noted to be hypoxic in the emergency room with sats in the 89% x-ray in the ER showed bilateral infiltrates patient denies cough. He did have Covid testing October 6 which was negative However x-ray and CT is consistent with groundglass opacities and possible viral pneumonia Patient is pleasantly confused and combative with no known exposures Past Medical History:  
Diagnosis Date  Arthritis  Atrial fibrillation ([address]  Dementia ([address]  Depression  Diabetes ([address] 1980s  GERD (gastroesophageal reflux disease)  Hypercholesterolemia  Hypertension 2000  Ill-defined condition 2015  
 has passed out-has been tested cannot determine cause  Primary osteoarthritis of right knee 6/28/2018  Ulcer   
 at the anastomotic bite of gastric bypass  Varicose veins  Wide-complex tachycardia ([address] 5/2/2015 Past Surgical History:  
Procedure Laterality Date  ABDOMEN SURGERY PROC UNLISTED  1998  
 umb hernia Rupture  BIOPSY LIVER  10/05/04  
 EGD  12/29/09  
 with biopsy  ENDOSCOPY, COLON, DIAGNOSTIC    
 GASTROSTOMY TUBE  10/05/04  HX CATARACT REMOVAL    
 bilateral  
 HX CHOLECYSTECTOMY  10/05/04  HX GI  10/05/04  
 vertical banded gastroplasty/gastric bypass No Grade MOHS PROCEDURES  [id number] bilateral  
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   
     lung  Diabetes Mother  Other Brother   
     obese  Diabetes Brother  Diabetes Brother  Other Maternal Grandmother   
     obese  Diabetes Sister Social History Socioeconomic History  Marital status:  Spouse name: Not on file  Number of children: Not on file  Years of education: Not on file  Highest education level: Not on file Tobacco Use  Smoking status: Former Smoker Last attempt to quit: 4/3/1967 Years since quittin.5  Smokeless tobacco: Never Used Substance and Sexual Activity  Alcohol use: Yes Alcohol/week: 4.0 standard drinks Types: 3 Glasses of wine, 1 Shots of liquor per week Comment: monthly  Drug use: No  
 Sexual activity: Never Prior to Admission medications Medication Sig Start Date End Date Taking? Authorizing Provider  
lidocaine (Lidoderm) 5 % Apply patch to the affected area for 12 hours a day and remove for 12 hours a day. 20   Delta Cartagena MD  
Comp. Stocking,Thigh,Long,X-Lrg misc 1 Units by Does Not Apply route daily. 19   Klarissa Tsang MD  
buPROPion SR [facility] SR) 150 mg SR tablet Take 150 mg by mouth daily. Provider, Historical  
dilTIAZem CD (CARDIZEM CD) 240 mg ER capsule Take 240 mg by mouth daily (after lunch). Provider, Historical  
multivitamin, tx-iron-ca-min (THERA-M W/ IRON) 9 mg iron-400 mcg tab tablet Take 1 Tab by mouth daily. Formulary substitution for DAILY MULTIVITAMIN centrum silver    Provider, Historical  
insulin detemir U-100 (LEVEMIR U-100 INSULIN) 100 unit/mL injection 15 Units by SubCUTAneous route nightly.     Provider, Historical  
rivaroxaban (XARELTO) 20 mg tab tablet Take 20 mg by mouth daily (with lunch). Provider, Historical  
fluticasone (FLONASE) 50 mcg/actuation nasal spray 2 Sprays by Both Nostrils route daily as needed for Rhinitis. Provider, Historical  
cycloSPORINE (RESTASIS) 0.05 % dpet Administer 1 Drop to left eye as needed. Provider, Historical  
donepezil (ARICEPT) 5 mg tablet Take 5 mg by mouth nightly. Provider, Historical  
VITAMIN D2 50,000 unit capsule TAKE 1 CAPSULE TWICE WEEKLY 17   Lawyer Yumiko MD  
oxybutynin (DITROPAN) 5 mg tablet Take 5 mg by mouth nightly. Other, MD Lobo  
metoprolol tartrate 75 mg tab Take 75 mg by mouth every twelve (12) hours. 16   Cedric Soliman MD  
Omeprazole delayed release (PRILOSEC D/R) 20 mg tablet Take 20 mg by mouth daily. Provider, Historical  
cholecalciferol (VITAMIN D3) 1,000 unit tablet Take 1,000 Units by mouth daily. Provider, Historical  
furosemide (LASIX) 20 mg tablet Take 20 mg by mouth daily. Indications: Edema    Provider, Historical  
melatonin tab tablet Take 5 mg by mouth nightly. Indications: sleep    Provider, Historical  
insulin lispro (HUMALOG) 100 unit/mL injection by SubCUTAneous route four (4) times daily. BS-with sliding scale 80-12-=2 units 121-160=4 
161-200=6 
201-250=8 
251- and over 10 units 
with each meal  Indications: type 2 diabetes mellitus, sliding scale    Provider, Historical  
 
 
Allergies Allergen Reactions  Morphine Other (comments) Hallucinate; \"ran all night long\" Review of Systems Limited review of systems due to confusion Physical Exam:  
 
Physical Exam: 
Visit Vitals /68 Pulse 76 Temp 98.1 °F (36.7 °C) Resp 13 SpO2 100% O2 Flow Rate (L/min): 2 l/min O2 Device: Nasal cannula Temp (24hrs), Av.1 °F (36.7 °C), Min:98.1 °F (36.7 °C), Max:98.1 °F (36.7 °C) No intake/output data recorded. No intake/output data recorded. General:  Alert, not cooperative, no distress, appears stated age. On 2 L of oxygen Head: Normocephalic, without obvious abnormality, atraumatic. Eyes:  Conjunctivae/corneas clear. PERRL, EOMs intact. Nose: Nares normal. No drainage or sinus tenderness. Neck: Supple, symmetrical, trachea midline, no adenopathy, thyroid: no enlargement, no carotid bruit and no JVD. Lungs:   Clear to auscultation bilaterally. Fuhs wheezing Heart:  Regular rate and rhythm, S1, S2 normal.  Irregular Abdomen: Soft, non-tender. Bowel sounds normal.   
Extremities: Extremities normal, atraumatic, no cyanosis or edema. Right knee incision clean dry and intact mild tenderness To Palpation external rotation of right hip Pulses: 2+ and symmetric all extremities. Skin:  No rashes or lesions Neurologic:  Alert oriented to self and place threatens to hit and punch at you positive focal motor right leg moving sensory deficit. Labs Reviewed: 
Recent Results (from the past 24 hour(s)) EKG, 12 LEAD, INITIAL Collection Time: 10/23/20 11:28 PM  
Result Value Ref Range Ventricular Rate 77 BPM  
 Atrial Rate 77 BPM  
 P-R Interval 168 ms QRS Duration 94 ms Q-T Interval 406 ms QTC Calculation (Bezet) 459 ms Calculated P Axis 44 degrees Calculated R Axis -34 degrees Calculated T Axis 10 degrees Diagnosis Poor data quality, interpretation may be adversely affected Sinus rhythm with premature supraventricular complexes Left axis deviation Minimal voltage criteria for LVH, may be normal variant Poor R Wave Progression Abnormal ECG Confirmed by Karla Moreno MD, [address] [place] [address] ([facility]) on 10/24/2020 1:42:11 AM 
  
CBC WITH AUTOMATED DIFF Collection Time: 10/23/20 11:35 PM  
Result Value Ref Range WBC 7.0 4.6 - 13.2 K/uL  
 RBC 3.19 (L) 4.70 - 5.50 M/uL HGB 8.9 (L) 13.0 - 16.0 g/dL HCT 28.3 (L) 36.0 - 48.0 % MCV 88.7 74.0 - 97.0 FL  
 MCH 27.9 24.0 - 34.0 PG  
 MCHC 31.4 31.0 - 37.0 g/dL  
 RDW 15.8 (H) 11.6 - 14.5 % PLATELET 921 280 - 814 K/uL MPV 9.4 9.2 - 11.8 FL  
 NEUTROPHILS 73 40 - 73 % LYMPHOCYTES 13 (L) 21 - 52 % MONOCYTES 10 3 - 10 % EOSINOPHILS 4 0 - 5 % BASOPHILS 0 0 - 2 %  
 ABS. NEUTROPHILS 5.1 1.8 - 8.0 K/UL  
 ABS. LYMPHOCYTES 0.9 0.9 - 3.6 K/UL  
 ABS. MONOCYTES 0.7 0.05 - 1.2 K/UL  
 ABS. EOSINOPHILS 0.3 0.0 - 0.4 K/UL  
 ABS. BASOPHILS 0.0 0.0 - 0.1 K/UL  
 DF AUTOMATED    
TYPE & SCREEN Collection Time: 10/23/20 11:35 PM  
Result Value Ref Range Crossmatch Expiration 10/26/2020 ABO/Rh(D) O NEGATIVE Antibody screen NEG PROTHROMBIN TIME + INR Collection Time: 10/23/20 11:35 PM  
Result Value Ref Range Prothrombin time 29.8 (H) 11.5 - 15.2 sec INR 2.9 (H) 0.8 - 1.2 PTT Collection Time: 10/23/20 11:35 PM  
Result Value Ref Range aPTT 66.1 (H) 23.0 - 36.4 SEC METABOLIC PANEL, COMPREHENSIVE Collection Time: 10/23/20 11:35 PM  
Result Value Ref Range Sodium 136 136 - 145 mmol/L Potassium 3.8 3.5 - 5.5 mmol/L Chloride 101 100 - 111 mmol/L  
 CO2 30 21 - 32 mmol/L Anion gap 5 3.0 - 18 mmol/L Glucose 175 (H) 74 - 99 mg/dL BUN 21 (H) 7.0 - 18 MG/DL Creatinine 1.09 0.6 - 1.3 MG/DL  
 BUN/Creatinine ratio 19 12 - 20 GFR est AA >60 >60 ml/min/1.73m2 GFR est non-AA >60 >60 ml/min/1.73m2 Calcium 7.7 (L) 8.5 - 10.1 MG/DL Bilirubin, total 0.3 0.2 - 1.0 MG/DL  
 ALT (SGPT) 16 16 - 61 U/L  
 AST (SGOT) 23 10 - 38 U/L Alk. phosphatase 159 (H) 45 - 117 U/L Protein, total 5.4 (L) 6.4 - 8.2 g/dL Albumin 1.8 (L) 3.4 - 5.0 g/dL Globulin 3.6 2.0 - 4.0 g/dL A-G Ratio 0.5 (L) 0.8 - 1.7 CARDIAC PANEL,(CK, CKMB & TROPONIN) Collection Time: 10/23/20 11:35 PM  
Result Value Ref Range CK - MB 1.8 <3.6 ng/ml CK-MB Index 1.7 0.0 - 4.0 %  39 - 308 U/L Troponin-I, QT 0.02 0.0 - 0.045 NG/ML All lab results for the last 24 hours reviewed. Recent Results (from the past 12 hour(s)) EKG, 12 LEAD, INITIAL  Collection Time: 10/23/20 11:28 PM  
 Result Value Ref Range Ventricular Rate 77 BPM  
 Atrial Rate 77 BPM  
 P-R Interval 168 ms QRS Duration 94 ms Q-T Interval 406 ms QTC Calculation (Bezet) 459 ms Calculated P Axis 44 degrees Calculated R Axis -34 degrees Calculated T Axis 10 degrees Diagnosis Poor data quality, interpretation may be adversely affected Sinus rhythm with premature supraventricular complexes Left axis deviation Minimal voltage criteria for LVH, may be normal variant Poor R Wave Progression Abnormal ECG Confirmed by Maritza Calles MD, [address] [place] [address] ([facility]) on 10/24/2020 1:42:11 AM 
  
CBC WITH AUTOMATED DIFF Collection Time: 10/23/20 11:35 PM  
Result Value Ref Range WBC 7.0 4.6 - 13.2 K/uL  
 RBC 3.19 (L) 4.70 - 5.50 M/uL HGB 8.9 (L) 13.0 - 16.0 g/dL HCT 28.3 (L) 36.0 - 48.0 % MCV 88.7 74.0 - 97.0 FL  
 MCH 27.9 24.0 - 34.0 PG  
 MCHC 31.4 31.0 - 37.0 g/dL  
 RDW 15.8 (H) 11.6 - 14.5 % PLATELET 983 021 - 000 K/uL MPV 9.4 9.2 - 11.8 FL  
 NEUTROPHILS 73 40 - 73 % LYMPHOCYTES 13 (L) 21 - 52 % MONOCYTES 10 3 - 10 % EOSINOPHILS 4 0 - 5 % BASOPHILS 0 0 - 2 %  
 ABS. NEUTROPHILS 5.1 1.8 - 8.0 K/UL  
 ABS. LYMPHOCYTES 0.9 0.9 - 3.6 K/UL  
 ABS. MONOCYTES 0.7 0.05 - 1.2 K/UL  
 ABS. EOSINOPHILS 0.3 0.0 - 0.4 K/UL  
 ABS. BASOPHILS 0.0 0.0 - 0.1 K/UL  
 DF AUTOMATED    
TYPE & SCREEN Collection Time: 10/23/20 11:35 PM  
Result Value Ref Range Crossmatch Expiration 10/26/2020 ABO/Rh(D) O NEGATIVE Antibody screen NEG PROTHROMBIN TIME + INR Collection Time: 10/23/20 11:35 PM  
Result Value Ref Range Prothrombin time 29.8 (H) 11.5 - 15.2 sec INR 2.9 (H) 0.8 - 1.2 PTT Collection Time: 10/23/20 11:35 PM  
Result Value Ref Range aPTT 66.1 (H) 23.0 - 36.4 SEC METABOLIC PANEL, COMPREHENSIVE Collection Time: 10/23/20 11:35 PM  
Result Value Ref Range Sodium 136 136 - 145 mmol/L Potassium 3.8 3.5 - 5.5 mmol/L  Chloride 101 100 - 111 mmol/L  
 CO2 30 21 - 32 mmol/L Anion gap 5 3.0 - 18 mmol/L Glucose 175 (H) 74 - 99 mg/dL BUN 21 (H) 7.0 - 18 MG/DL Creatinine 1.09 0.6 - 1.3 MG/DL  
 BUN/Creatinine ratio 19 12 - 20 GFR est AA >60 >60 ml/min/1.73m2 GFR est non-AA >60 >60 ml/min/1.73m2 Calcium 7.7 (L) 8.5 - 10.1 MG/DL Bilirubin, total 0.3 0.2 - 1.0 MG/DL  
 ALT (SGPT) 16 16 - 61 U/L  
 AST (SGOT) 23 10 - 38 U/L Alk. phosphatase 159 (H) 45 - 117 U/L Protein, total 5.4 (L) 6.4 - 8.2 g/dL Albumin 1.8 (L) 3.4 - 5.0 g/dL Globulin 3.6 2.0 - 4.0 g/dL A-G Ratio 0.5 (L) 0.8 - 1.7 CARDIAC PANEL,(CK, CKMB & TROPONIN) Collection Time: 10/23/20 11:35 PM  
Result Value Ref Range CK - MB 1.8 <3.6 ng/ml CK-MB Index 1.7 0.0 - 4.0 %  39 - 308 U/L Troponin-I, QT 0.02 0.0 - 0.045 NG/ML  
 and EKG Procedures/imaging: see electronic medical records for all procedures/Xrays and details which were not copied into this note but were reviewed prior to creation of Plan Assessment/Plan Active Problems: 
  Fall (10/24/2020) we will check CT of the head especially since patient is on Xarelto This is patient's second fall in a month recent distal femur fracture Closed right hip fracture, initial encounter ([facility] (10/24/2020) Ortho is consulted will need surgery Dementia without behavioral disturbance ([facility] [facility] (10/24/2020) Resume antipsychotics and antiagitation medicines Anticoagulant long-term use (10/24/2020) Due to frequent fall and now need for surgery will hold Xarelto his last dose was 6 PM on Friday No heparin will be given currently in atrial fib Hypoxia (10/24/2020) Likely from pneumonia could be Covid will transfer to Covid cohort placed on cocktail of vitamins also treat for empiric bacterial infection with Zosyn and vancomycin for hospital-acquired pneumonia 
 
? hospital-acquired pneumonia As patient was in a rehab facility and [facility] within the last 3 weeks will cover with vancomycin and Zosyn and rule out Covid Diabetes patient  
will be kept n.p.o. for possible surgery and placed on a sliding scale insulin Protonix for GI prevention Atrial fibrillation Rate control with Coreg DVT/GI Prophylaxis: SCD's Restart Xarelto once cleared by surgery Wife emergency contact was contacted  and a voice message was left to return call Della Albarado MD 
10/24/2020 1:02 AM

## 2020-10-24 NOTE — ED PROVIDER NOTES
EMERGENCY DEPARTMENT HISTORY AND PHYSICAL EXAM 
 
Date: 10/23/2020 Patient Name: Mushtaq Hernandez. History of Presenting Illness Chief Complaint Patient presents with  
 Hip Pain History Provided By: Patient, EMS and Nursing Home/SNF/Rehab Center Mushtaq Torres is a 82 y.o. male who presents to the emergency department C/O fall and right hip fracture. Patient does have noted replacement of his right knee in the past.  Patient arrives by EMS from [facility] AT [facility]. States the patient had a mechanical fall earlier today at approximately 11:30 AM.  Patient does have history of dementia as well as atrial fib on Xarelto. They obtained an x-ray at the facility that showed a fracture of the right hip. States that the patient was given 5 mg of Norco at approximately 4 PM today. Patient states he still does have pain in his right leg with movement. PCP: Giulia Hanson MD 
 
Current Outpatient Medications Medication Sig Dispense Refill  lidocaine (Lidoderm) 5 % Apply patch to the affected area for 12 hours a day and remove for 12 hours a day. 15 Each 0  
 Comp. Stocking,Thigh,Long,X-Lrg misc 1 Units by Does Not Apply route daily. 1 Units 0  
 buPROPion SR (WELLBUTRIN SR) 150 mg SR tablet Take 150 mg by mouth daily.  dilTIAZem CD (CARDIZEM CD) 240 mg ER capsule Take 240 mg by mouth daily (after lunch).  multivitamin, tx-iron-ca-min (THERA-M W/ IRON) 9 mg iron-400 mcg tab tablet Take 1 Tab by mouth daily. Formulary substitution for DAILY MULTIVITAMIN centrum silver  insulin detemir U-100 (LEVEMIR U-100 INSULIN) 100 unit/mL injection 15 Units by SubCUTAneous route nightly.  rivaroxaban (XARELTO) 20 mg tab tablet Take 20 mg by mouth daily (with lunch).  fluticasone (FLONASE) 50 mcg/actuation nasal spray 2 Sprays by Both Nostrils route daily as needed for Rhinitis.  cycloSPORINE (RESTASIS) 0.05 % dpet Administer 1 Drop to left eye as needed.  donepezil (ARICEPT) 5 mg tablet Take 5 mg by mouth nightly.  VITAMIN D2 50,000 unit capsule TAKE 1 CAPSULE TWICE WEEKLY 52 Cap 0  
 oxybutynin (DITROPAN) 5 mg tablet Take 5 mg by mouth nightly.  metoprolol tartrate 75 mg tab Take 75 mg by mouth every twelve (12) hours. 60 Tab 0  
 Omeprazole delayed release (PRILOSEC D/R) 20 mg tablet Take 20 mg by mouth daily.  cholecalciferol (VITAMIN D3) 1,000 unit tablet Take 1,000 Units by mouth daily.  furosemide (LASIX) 20 mg tablet Take 20 mg by mouth daily. Indications: Edema  melatonin tab tablet Take 5 mg by mouth nightly. Indications: sleep  insulin lispro (HUMALOG) 100 unit/mL injection by SubCUTAneous route four (4) times daily. BS-with sliding scale 80-12-=2 units 121-160=4 
161-200=6 
201-250=8 
251- and over 10 units 
with each meal  Indications: type 2 diabetes mellitus, sliding scale Past History Past Medical History: 
Past Medical History:  
Diagnosis Date  Arthritis  Atrial fibrillation ([address]  Dementia ([address]  Depression  Diabetes ([address] 1980s  GERD (gastroesophageal reflux disease)  Hypercholesterolemia  Hypertension 2000  Ill-defined condition 2015  
 has passed out-has been tested cannot determine cause  Primary osteoarthritis of right knee 6/28/2018  Ulcer   
 at the anastomotic bite of gastric bypass  Varicose veins  Wide-complex tachycardia ([address] 5/2/2015 Past Surgical History: 
Past Surgical History:  
Procedure Laterality Date  ABDOMEN SURGERY PROC UNLISTED  1998  
 umb hernia Rupture  BIOPSY LIVER  10/05/04  
 EGD  12/29/09  
 with biopsy  ENDOSCOPY, COLON, DIAGNOSTIC    
 GASTROSTOMY TUBE  10/05/04  HX CATARACT REMOVAL    
 bilateral  
 HX CHOLECYSTECTOMY  10/05/04  HX GI  10/05/04  
 vertical banded gastroplasty/gastric bypass  HX MOHS PROCEDURES  1991/1995  
 bilateral  
 HX PACEMAKER  2015 [facility] Cardiac Monitor Family History: 
Family History Problem Relation Age of Onset  Heart Attack Father  Diabetes Father  Hypertension Father  Cancer Mother   
     lung  Diabetes Mother  Other Brother   
     obese  Diabetes Brother  Diabetes Brother  Other Maternal Grandmother   
     obese  Diabetes Sister Social History: 
Social History Tobacco Use  Smoking status: Former Smoker Last attempt to quit: 4/3/1967 Years since quittin.5  Smokeless tobacco: Never Used Substance Use Topics  Alcohol use: Yes Alcohol/week: 4.0 standard drinks Types: 3 Glasses of wine, 1 Shots of liquor per week Comment: monthly  Drug use: No  
 
 
Allergies: Allergies Allergen Reactions  Morphine Other (comments) Hallucinate; \"ran all night long\" Review of Systems Review of Systems Constitutional: Negative for fever. Respiratory: Negative for shortness of breath. Cardiovascular: Negative for chest pain. Gastrointestinal: Negative for abdominal distention. Musculoskeletal: Positive for arthralgias, gait problem and myalgias. Neurological: Negative for dizziness, seizures and syncope. All other systems reviewed and are negative. All other systems reviewed and are negative. Physical Exam  
 
Vitals:  
 10/23/20 2309 10/23/20 2330 10/24/20 0000 BP: 127/70 127/64 138/68 Pulse: 75 75 76 Resp:  20 13 Temp: 98.1 °F (36.7 °C) SpO2: (!) 87% 98% 100% Physical Exam 
 
Nursing notes and vital signs reviewed Airway: intact, speaking normally Breathing: No apparent distress, no cyanosis Circulation: Peripheral pulses equal 
 
Constitutional: Non toxic appearing, chronically ill-appearing, no acute distress, appearing stated age HEENT: 
Head: Normocephalic, Atraumatic Eyes: PERRL, EOMI, No conjunctival injection Ears: external ears normal 
Nose: No rhinorrhea, external nose normal 
Throat: mucous membranes moist 
 Neck: symmetric, trachea midline, no obvious swelling, no JVD Cardiovascular: Regular rate and rhythm, no murmurs Lungs: Clear to ausculation bilaterally, No stridor, Normal work of breathing and chest excursion bilaterally Abdomen: Soft, non tender, non distended, normoactive bowel sounds, No rigidity, no peritoneal signs Musculoskeletal: There is noticeable shortening and external rotation of the right leg, there is pain with any hip movement on the right side. Distal pulses are intact, no lower extremity edema Skin: Warm, dry, No obvious rashes Neuro: somnolent but arouses to verbal and oriented x 3, CN 2-12 intact, normal speech, strength and sensation full and symmetric bilaterally Psychiatric: Normal mood and affect Diagnostic Study Results Labs - Recent Results (from the past 72 hour(s)) EKG, 12 LEAD, INITIAL Collection Time: 10/23/20 11:28 PM  
Result Value Ref Range Ventricular Rate 77 BPM  
 Atrial Rate 77 BPM  
 P-R Interval 168 ms QRS Duration 94 ms Q-T Interval 406 ms QTC Calculation (Bezet) 459 ms Calculated P Axis 44 degrees Calculated R Axis -34 degrees Calculated T Axis 10 degrees Diagnosis Sinus rhythm with premature supraventricular complexes Left axis deviation Minimal voltage criteria for LVH, may be normal variant Abnormal ECG When compared with ECG of 11-SEP-2020 15:39, 
premature supraventricular complexes are now present CBC WITH AUTOMATED DIFF Collection Time: 10/23/20 11:35 PM  
Result Value Ref Range WBC 7.0 4.6 - 13.2 K/uL  
 RBC 3.19 (L) 4.70 - 5.50 M/uL HGB 8.9 (L) 13.0 - 16.0 g/dL HCT 28.3 (L) 36.0 - 48.0 % MCV 88.7 74.0 - 97.0 FL  
 MCH 27.9 24.0 - 34.0 PG  
 MCHC 31.4 31.0 - 37.0 g/dL  
 RDW 15.8 (H) 11.6 - 14.5 % PLATELET 760 749 - 882 K/uL MPV 9.4 9.2 - 11.8 FL  
 NEUTROPHILS 73 40 - 73 % LYMPHOCYTES 13 (L) 21 - 52 % MONOCYTES 10 3 - 10 % EOSINOPHILS 4 0 - 5 % BASOPHILS 0 0 - 2 % ABS. NEUTROPHILS 5.1 1.8 - 8.0 K/UL  
 ABS. LYMPHOCYTES 0.9 0.9 - 3.6 K/UL  
 ABS. MONOCYTES 0.7 0.05 - 1.2 K/UL  
 ABS. EOSINOPHILS 0.3 0.0 - 0.4 K/UL  
 ABS. BASOPHILS 0.0 0.0 - 0.1 K/UL  
 DF AUTOMATED    
TYPE & SCREEN Collection Time: 10/23/20 11:35 PM  
Result Value Ref Range Crossmatch Expiration 10/26/2020 ABO/Rh(D) O NEGATIVE Antibody screen NEG PROTHROMBIN TIME + INR Collection Time: 10/23/20 11:35 PM  
Result Value Ref Range Prothrombin time 29.8 (H) 11.5 - 15.2 sec INR 2.9 (H) 0.8 - 1.2 PTT Collection Time: 10/23/20 11:35 PM  
Result Value Ref Range aPTT 66.1 (H) 23.0 - 36.4 SEC METABOLIC PANEL, COMPREHENSIVE Collection Time: 10/23/20 11:35 PM  
Result Value Ref Range Sodium 136 136 - 145 mmol/L Potassium 3.8 3.5 - 5.5 mmol/L Chloride 101 100 - 111 mmol/L  
 CO2 30 21 - 32 mmol/L Anion gap 5 3.0 - 18 mmol/L Glucose 175 (H) 74 - 99 mg/dL BUN 21 (H) 7.0 - 18 MG/DL Creatinine 1.09 0.6 - 1.3 MG/DL  
 BUN/Creatinine ratio 19 12 - 20 GFR est AA >60 >60 ml/min/1.73m2 GFR est non-AA >60 >60 ml/min/1.73m2 Calcium 7.7 (L) 8.5 - 10.1 MG/DL Bilirubin, total 0.3 0.2 - 1.0 MG/DL  
 ALT (SGPT) 16 16 - 61 U/L  
 AST (SGOT) 23 10 - 38 U/L Alk. phosphatase 159 (H) 45 - 117 U/L Protein, total 5.4 (L) 6.4 - 8.2 g/dL Albumin 1.8 (L) 3.4 - 5.0 g/dL Globulin 3.6 2.0 - 4.0 g/dL A-G Ratio 0.5 (L) 0.8 - 1.7 CARDIAC PANEL,(CK, CKMB & TROPONIN) Collection Time: 10/23/20 11:35 PM  
Result Value Ref Range CK - MB 1.8 <3.6 ng/ml CK-MB Index 1.7 0.0 - 4.0 %  39 - 308 U/L Troponin-I, QT 0.02 0.0 - 0.045 NG/ML Radiologic Studies -  
XR PELV 1 OR 2 V    (Results Pending) XR CHEST PORT    (Results Pending) CTA CHEST W OR W WO CONT    (Results Pending) XR FEMUR RT 2 VS    (Results Pending) CT Results  (Last 48 hours) None CXR Results  (Last 48 hours) None Medications given in the ED- 
 Medications - No data to display Medical Decision Making I reviewed the vital signs, available nursing notes, past medical history, past surgical history, family history and social history. Vital Signs interpretation- I have reviewed the patient's vital signs. Pulse Oximetry interpretation - 98% on 2L NC Cardiac Monitor interpretation: 
Rate: 77 bpm 
Rhythm: sinus EKG interpretation: (Preliminary) EKG interpretation by Dr. Jean Marie Kumar Rate 77 sinus rhythm with left axis deviation and premature supraventricular complexes Records Reviewed: Nursing Notes and Old Medical Records Procedures: 
Procedures ED Course & MDM:  
Risk stratification: Patient was initially found to have a slightly low oxygenation likely secondary to the medications he was given for his pain as this was noted by the nursing staff prior to the patient's arrival.  He was placed on 2 L nasal cannula with improvement. Will obtain x-ray and blood work. Data review: X-ray shows right sided inter trochanteric fracture Chest x-ray shows possible development of an infiltrate on the right lung field which does look different from prior chest x-ray back in January 2019. Will consider CT imaging for further evaluation Laboratory findings unremarkable although his INR is noted to be elevated. Will likely need to hold anticoagulation for the patient to receive potential surgical intervention to which he is agreeable to 
 
Problem follow up: 
CONSULT NOTE:  
Dr. Jean Marie Kumar spoke with Dr. Rich Erwin Specialty: ortho Discussed pt's hx, disposition, and available diagnostic and imaging results over the telephone. Reviewed care plans. Agrees with inpatient eval and admit to hospitalist services. CONSULT NOTE:  
Dr. Jean Marie Kumar spoke with Dr. Demetra Levine Specialty: hospitalist 
Discussed pt's hx, disposition, and available diagnostic and imaging results over the telephone. Reviewed care plans. She does agree with CT imaging of the chest as well as further imaging of the right femur considering his recent right knee surgery. Diagnosis and Disposition Core Measures: 
For Hospitalized Patients: 
 
1. Hospitalization Decision Time: The decision to hospitalize the patient was made by Katarina Dorado DO at 12:22 AM on 10/23/2020 2. Aspirin: Aspirin was not given because the patient did not present with a stroke at the time of their Emergency Department evaluation 12:22 AM  Patient is being admitted to the hospital by Dr. Cleve Avendano. The results of their tests and reasons for their admission have been discussed with them and/or available family. They convey agreement and understanding for the need to be admitted and for their admission diagnosis. CONDITIONS ON ADMISSION: 
Sepsis is not present at the time of admission. Deep Vein Thrombosis is not present at the time of admission. Thrombosis is not present at the time of admission. Urinary Tract Infection is not present at the time of admission. Pneumonia is not present at the time of admission. MRSA is not present at the time of admission. Wound infection is not present at the time of admission. Pressure Ulcer is not present at the time of admission. CLINICAL IMPRESSION: 
 
1. Closed right hip fracture, initial encounter ([address] 2. Fall, initial encounter 3. Dementia without behavioral disturbance, unspecified dementia type ([address] 4. Anticoagulant long-term use Please note that this dictation was completed with [employer], the computer voice recognition software. Quite often unanticipated grammatical, syntax, homophones, and other interpretive errors are inadvertently transcribed by the computer software. Please disregard these errors. Please excuse any errors that have escaped final proofreading.

## 2020-10-24 NOTE — PROGRESS NOTES
TRANSFER - OUT REPORT: 
 
Verbal report given to Layla CHOI(name) on Mushtaq Hernandez.  being transferred to [facility](unit) for routine progression of care Report consisted of patients Situation, Background, Assessment and  
Recommendations(SBAR). Information from the following report(s) SBAR, Kardex, ED Summary, Intake/Output, MAR, Recent Results, Med Rec Status, Alarm Parameters  and Quality Measures was reviewed with the receiving nurse. Lines:  
Peripheral IV 10/23/20 Right Wrist (Active) Site Assessment Clean, dry, & intact 10/23/20 2341 Phlebitis Assessment 0 10/23/20 2341 Infiltration Assessment 0 10/23/20 2341 Dressing Status Clean, dry, & intact 10/23/20 2341 Dressing Type Transparent 10/23/20 2341 Opportunity for questions and clarification was provided. Patient transported with: 
 O2 @ 2 liters Registered Nurse

## 2020-10-24 NOTE — PROGRESS NOTES
Chart reviewed as CM on call. Pt admitted to tele by hospitalist for covid r/o and hip fx. Noted pt was at [facility] AT [facility], will place referral for continuity of care. Will need PT/OT eval orders and recommendations once medically appropriate. Pt covid pending. CM will follow for transition of care needs and will be available via hospital  on weekends. Reason for Admission:   Per H&P pt is Sheela Arellano. is a 82 y.o.  male who with history of dementia, atrial for rib on Xarelto last dose 6 PM who wast at [facility] [place] for rehab after sustaining a fall and distal femur fracture 9/26 He presents to our emergency room after sustaining a mechanical fall at around 12:00 today x-rays done at the facility did show a right hip fracture. he was given Norco with some improvement of his pain however he was noted to be hypoxic in the emergency room with sats in the 89% x-ray in the ER showed bilateral infiltrates patient denies cough. He did have Covid testing October 6 which was negative However x-ray and CT is consistent with groundglass opacities and possible viral pneumonia Patient is pleasantly confused and combative with no known exposures\" RUR Score: 32% PCP: First and Last name: listed as MD Angus Galindo Name of Practice: 
 Are you a current patient: Yes/No: 
 Approximate date of last visit:  
 Can you do a virtual visit with your PCP:  
          
Resources/supports as identified by patient/family:    
             
Top Challenges facing patient (as identified by patient/family and CM): Finances/Medication cost?       
           
Transportation? Support system or lack thereof? Living arrangements? Self-care/ADLs/Cognition? Current Advanced Directive/Advance Care Plan: On file Plan for utilizing home health:  TBD Transition of Care Plan:   TBD

## 2020-10-24 NOTE — PROGRESS NOTES
Pharmacy Dosing Services: Vancomycin Consult for Vancomycin Dosing by Pharmacy by Dr. Trupti Mendez Consult provided for this 82y.o. year old male , for indication of HAP x 7 days. Day of Therapy 1 Ht Readings from Last 1 Encounters:  
10/24/20 170.2 cm (67\") Wt Readings from Last 1 Encounters:  
10/24/20 80.7 kg (178 lb) Other Current Antibiotics Zosyn 3.375g IV Significant Cultures pending Serum Creatinine Lab Results Component Value Date/Time Creatinine 1.09 10/23/2020 11:35 PM  
  
Creatinine Clearance Estimated Creatinine Clearance: 52.2 mL/min (based on SCr of 1.09 mg/dL). BUN Lab Results Component Value Date/Time BUN 21 (H) 10/23/2020 11:35 PM  
  
WBC Lab Results Component Value Date/Time WBC 9.5 10/24/2020 05:25 AM  
  
H/H Lab Results Component Value Date/Time HGB 8.7 (L) 10/24/2020 05:25 AM  
  
Platelets Lab Results Component Value Date/Time PLATELET 490 44/52/8667 05:25 AM  
  
Temp 98.4 °F (36.9 °C) Start Vancomycin therapy, with loading dose of 2000mg IV once Follow with maintenance dose of 1250mg IV q24h Dose calculated to approximate a therapeutic trough of 15-20mcg/mL. Pharmacy to follow daily and will make changes to dose and/or frequency based on clinical status. Pharmacist Romy Rebolledo, [address]

## 2020-10-24 NOTE — ED TRIAGE NOTES
Patient brought to ED from [facility] by [facility] transport. Patient sent to ED for further evaluation of right hip fracture. Spoke with Nazia Segovia LPN at facility who reports patient fell earlier today at approx 36, xray determined fracture and she was instructed to send patient to ER. Pt was given 5mg Norco at 1555. Pt arrives to ED, reports pain with movement, right leg appears shortened and rotated.

## 2020-10-24 NOTE — PROGRESS NOTES
36:  Assumed care for patient, received bedside report from [facility], RN. Patient is alert to self with confusion. Receiving O2 at 2L via NC with saturation 99%. Bedside skin assessment performed, excoriation to scrotum, right knee incision, and sacral scar noted. Patient has no complaints of pain or discomfort at the time. Aguilar in place, draining clear, yellow urine. Patient will be on bedrest due to fall. Patient presents little agitation with offgoing nurse, but presents no agitation with this nurse. Whiteboard updated, bed at the lowest position with call bell within reach. Bedside and Verbal shift change report given to Christene Councilman, RN (oncoming nurse) by Alfreda Montgomery RN 
 (offgoing nurse). Report included the following information SBAR, Kardex, ED Summary, Procedure Summary, Intake/Output, MAR, Recent Results, Med Rec Status, Cardiac Rhythm SR and Alarm Parameters .

## 2020-10-24 NOTE — PROGRESS NOTES
Problem: Falls - Risk of 
Goal: *Absence of Falls Description: Document Daniel Harmon Fall Risk and appropriate interventions in the flowsheet. Outcome: Progressing Towards Goal 
Note: Fall Risk Interventions: 
Mobility Interventions: Communicate number of staff needed for ambulation/transfer, OT consult for ADLs, Patient to call before getting OOB, PT Consult for mobility concerns, PT Consult for assist device competence Mentation Interventions: Bed/chair exit alarm, More frequent rounding Medication Interventions: Patient to call before getting OOB, Teach patient to arise slowly Elimination Interventions: Call light in reach, Patient to call for help with toileting needs History of Falls Interventions: Consult care management for discharge planning, Investigate reason for fall, Room close to nurse's station Problem: Patient Education: Go to Patient Education Activity Goal: Patient/Family Education Outcome: Progressing Towards Goal 
  
Problem: Pressure Injury - Risk of 
Goal: *Prevention of pressure injury Description: Document Patrick Scale and appropriate interventions in the flowsheet. Outcome: Progressing Towards Goal 
Note: Pressure Injury Interventions: 
Sensory Interventions: Assess changes in LOC, Keep linens dry and wrinkle-free, Minimize linen layers, Pressure redistribution bed/mattress (bed type) Moisture Interventions: Absorbent underpads, Limit adult briefs, Maintain skin hydration (lotion/cream), Minimize layers Activity Interventions: Pressure redistribution bed/mattress(bed type), PT/OT evaluation Mobility Interventions: Pressure redistribution bed/mattress (bed type), PT/OT evaluation Nutrition Interventions: Document food/fluid/supplement intake Friction and Shear Interventions: Apply protective barrier, creams and emollients, Minimize layers Problem: Patient Education: Go to Patient Education Activity Goal: Patient/Family Education Outcome: Progressing Towards Goal 
  
Problem: Pain Goal: *Control of Pain Outcome: Progressing Towards Goal 
Goal: *PALLIATIVE CARE:  Alleviation of Pain Outcome: Progressing Towards Goal 
  
Problem: Patient Education: Go to Patient Education Activity Goal: Patient/Family Education Outcome: Progressing Towards Goal

## 2020-10-24 NOTE — PROGRESS NOTES
Pt has not voided, bladder scan was checked with result of >300ml. Aguilar catheter was placed as ordered by Dr. Henny Suarez, pt tolerated well and was educated.

## 2020-10-25 NOTE — PROGRESS NOTES
Problem: Falls - Risk of 
Goal: *Absence of Falls Description: Document Delphine Sharonda Fall Risk and appropriate interventions in the flowsheet. Outcome: Progressing Towards Goal 
Note: Fall Risk Interventions: 
Mobility Interventions: Patient to call before getting OOB, PT Consult for mobility concerns, PT Consult for assist device competence, Utilize walker, cane, or other assistive device Mentation Interventions: Bed/chair exit alarm Medication Interventions: Patient to call before getting OOB, Teach patient to arise slowly Elimination Interventions: Bed/chair exit alarm, Call light in reach, Patient to call for help with toileting needs History of Falls Interventions: Bed/chair exit alarm, Door open when patient unattended Problem: Patient Education: Go to Patient Education Activity Goal: Patient/Family Education Outcome: Progressing Towards Goal 
  
Problem: Pressure Injury - Risk of 
Goal: *Prevention of pressure injury Description: Document Patrick Scale and appropriate interventions in the flowsheet. Outcome: Progressing Towards Goal 
Note: Pressure Injury Interventions: 
Sensory Interventions: Check visual cues for pain Moisture Interventions: Absorbent underpads Activity Interventions: Increase time out of bed, Pressure redistribution bed/mattress(bed type) Mobility Interventions: HOB 30 degrees or less, Pressure redistribution bed/mattress (bed type) Nutrition Interventions: Document food/fluid/supplement intake Friction and Shear Interventions: Apply protective barrier, creams and emollients, HOB 30 degrees or less, Minimize layers Problem: Patient Education: Go to Patient Education Activity Goal: Patient/Family Education Outcome: Progressing Towards Goal 
  
Problem: Pain Goal: *Control of Pain Outcome: Progressing Towards Goal 
Goal: *PALLIATIVE CARE:  Alleviation of Pain Outcome: Progressing Towards Goal 
  
 Problem: Patient Education: Go to Patient Education Activity Goal: Patient/Family Education Outcome: Progressing Towards Goal

## 2020-10-25 NOTE — PROGRESS NOTES
0705:Received verbal bedside report from off going nurse CHADD Callaway R.N. Patient care received. Patient alert and oriented to self. Patient resting in bed denies pain. Patient stable. Call light with in reach bed in lowest position. 1011: Informed Cris Grajeda that patient was very lethargic and not safely able to take oral medication. Will continue to monitor patient. 1145:Pt alert and able to take oral medicine.

## 2020-10-25 NOTE — PROGRESS NOTES
1926 Assumed patient care at this time. Report received from Cary Scott RN.  
2436 Shift assessment completed and documented in flow sheets at this time. 2134 Patient's blood sugar 396. Patient changed to very insulin resistant sliding scale. 15 units of Humalog administered. 2218 Paged Dr. Silvia Rodriguez regarding COVID 19 results and patient's blood sugar of 396.  
2222 Spoke to Dr. Silvia Rodriguez at this time. Will retest for COVID due to pending surgery. 2253 Blood sugar rechecked, now 329.  
2300 Telephone with read back for 10 units IV regular insulin one time. 2312 10 units insulin regular administered via IV one time. 0312 [id number] Patient given a bath with bath pack. Shampoo cap used. Gown and linen changed. 2345 COVID swab performed. 2346 Blood sugar rechecked, now 338. Bedside and verbal shift change report given to Tom Hood RN (oncoming nurse) by Chicho Deshpande RN (offgoing nurse). Report included the following information: SBAR, Kardex, MAR, and recent results.

## 2020-10-25 NOTE — PROGRESS NOTES
Progress Note Patient: Buckner Leventhal. Sex: male          DOA: 10/23/2020 YOB: 1935      Age:  82 y.o.        LOS:  LOS: 1 day Covid still pending Two days off blood thinner Vital Signs stable,  
NV intact. tentatively planning ORIF Tuesday if Covid neg, and medically stable.

## 2020-10-25 NOTE — ROUTINE PROCESS
Bedside and Verbal shift change report given to KATT Zelaya R.N. (oncoming nurse) by RONNIE Fernandes R.N. (offgoing nurse). Report included the following information SBAR, Kardex, Intake/Output, MAR, Accordion, Recent Results and Med Rec Status.

## 2020-10-25 NOTE — PROGRESS NOTES
Hospitalist Progress Note Patient: Branden Hi MRN: [id number]  CSN: [id number] YOB: 1935  Age: 82 y.o. Sex: male DOA: 10/23/2020 LOS:  LOS: 1 day Assessment/Plan Principal Problem: 
  Person under investigation for COVID-19 (10/24/2020) Active Problems: 
  Pneumonia (11/16/2016) Fall (10/24/2020) Closed right hip fracture, initial encounter ([facility] (10/24/2020) Dementia without behavioral disturbance ([facility] (10/24/2020) Anticoagulant long-term use (10/24/2020) Hypoxia (10/24/2020) Fall (10/24/2020) Fall: The risks for fall secondary to dementia. Reviewed CT of the head: Fracture no acute intracranial abnormality. This is patient's second fall in a month recent distal femur fracture 
  
Closed right hip fracture, initial encounter: Ortho is consulted. Surgery held due to pending status of Covid 
  
Dementia without behavioral disturbance ([facility] : Resume regimen Hypoxia: Resolved. Possible hospital-acquired pneumonia As patient was in a rehab facility and [facility] within the last 3 weeks will cover with vancomycin and Zosyn and rule out Covid 
  
Diabetes: On SSI Protonix for GI prevention Atrial fibrillation: Rate control with Coreg 
  
DVT/GI Prophylaxis: SCD's Dispo: TBD 
 
CC:   Closed right hip fracture 2nd to fall. A FIb, DM, Dementia Subjective: Pt was seen and examined with the nurse in the morning round. Doing better. No SOB/Cough Alert and awake. Review of systems General: No fevers or chills. Cardiovascular: No chest pain or pressure. No palpitations. Pulmonary: No cough, SOB Gastrointestinal: No nausea, vomiting. Objective:  
  
Visit Vitals BP (!) 148/57 (BP 1 Location: Left arm, BP Patient Position: At rest) Pulse 76 Temp 97.6 °F (36.4 °C) Resp 16 Ht 5' 7\" (1.702 m) Wt 81.9 kg (180 lb 8 oz) SpO2 98% BMI 28.27 kg/m² Physical Exam: Gen: NAD, non-toxic. Heent:  MMM, NC, AT. Cor: s1s2 RRR. No MRG. PMI mid 5th intercostal space. Resp:  CTA b/l. No w/r/r. Nml effort and diaphragmatic excursion. Abd:  NT ND.  BS positive. No rebound or guarding. No masses. Ext: TTP to R. Hip Intake and Output: 
Current Shift:  10/25 0701 - 10/25 1900 In: -  
Out: 311 [VUSLT:178] Last three shifts:  10/23 1901 - 10/25 0700 In: -  
Out: 4133 [UYYNM:1716] Labs: Results:  
   
Chemistry Recent Labs 10/25/20 
7890 10/24/20 
4792 10/23/20 
2335 * 126* 175*  136 136  
K 3.6 3.7 3.8  101 101 CO2 27 31 30 BUN 15 19* 21* CREA 0.98 1.02 1.09  
CA 7.7* 7.8* 7.7* AGAP 9 4 5 BUCR 15 19 19 * 158* 159* TP 5.2* 5.3* 5.4* ALB 1.7* 1.8* 1.8*  
GLOB 3.5 3.5 3.6 AGRAT 0.5* 0.5* 0.5* CBC w/Diff Recent Labs 10/25/20 
3209 10/24/20 
2937 10/23/20 
2335 WBC 8.0 9.5 7.0  
RBC 3.05* 3.12* 3.19* HGB 8.6* 8.7* 8.9* HCT 27.1* 27.8* 28.3*  
 316 277 GRANS 74* 82* 73 LYMPH 14* 9* 13* EOS 3 2 4 Cardiac Enzymes Recent Labs 10/24/20 
1944 10/24/20 
1315 CPK 71 83 CKND1 1.7 1.8 Coagulation Recent Labs 10/25/20 
0220 10/24/20 
5286 PTP 17.9* 21.3* INR 1.5* 1.9* APTT 44.2* 47.6* Lipid Panel Lab Results Component Value Date/Time Cholesterol, total 107 01/24/2015 09:15 AM  
 HDL Cholesterol 25 (L) 01/24/2015 09:15 AM  
 LDL, calculated 44 01/24/2015 09:15 AM  
 VLDL, calculated 38 01/24/2015 09:15 AM  
 Triglyceride 190 (H) 01/24/2015 09:15 AM  
 CHOL/HDL Ratio 4.3 01/24/2015 09:15 AM  
  
BNP No results for input(s): BNPP in the last 72 hours. Liver Enzymes Recent Labs 10/25/20 
0220 TP 5.2* ALB 1.7* * Thyroid Studies Lab Results Component Value Date/Time  TSH 2.61 10/25/2020 02:20 AM  
    
Procedures/imaging: see electronic medical records for all procedures/Xrays and details which were not copied into this note but were reviewed prior to creation of Plan Medications Reviewed Ragini Moore MD

## 2020-10-25 NOTE — PROGRESS NOTES
Problem: Falls - Risk of 
Goal: *Absence of Falls Description: Document Singh Ricks Fall Risk and appropriate interventions in the flowsheet. Outcome: Progressing Towards Goal 
Note: Fall Risk Interventions: 
Mobility Interventions: Patient to call before getting OOB, PT Consult for mobility concerns, PT Consult for assist device competence, Utilize walker, cane, or other assistive device Mentation Interventions: Bed/chair exit alarm Medication Interventions: Patient to call before getting OOB, Teach patient to arise slowly Elimination Interventions: Bed/chair exit alarm, Call light in reach, Patient to call for help with toileting needs History of Falls Interventions: Bed/chair exit alarm, Door open when patient unattended

## 2020-10-25 NOTE — ROUTINE PROCESS
Bedside and Verbal shift change report given to JAKE Howard R.N. (oncoming nurse) by RONNIE Fernandes R.N. (offgoing nurse). Report included the following information SBAR, Kardex, Intake/Output, MAR, Accordion, Recent Results and Med Rec Status.

## 2020-10-26 NOTE — PROGRESS NOTES
attempted phone call with patient but there was no answer. Chaplains remain available to provide pastoral support to patient and family as needed and requested. Rev. Trupti Burkett [phone]

## 2020-10-26 NOTE — PROGRESS NOTES
0730: Bedside and Verbal shift change report given to [id number] (oncoming nurse) by Carolina Moody RN (offgoing nurse). Report included the following information SBAR and Kardex. 1230: pt request to see MD Ann Winters continue to monitor pt MD visit pending Gab Peguero RN 
 
1840: MD Claudio visit noted to discuss tx plan with pt, continue to monitor pt for any change Gab Peguero RN 
 
32 [phone] 84: MD Maryanne Pérez speaks with this nurse in reference to pt surgery and finding a someone to consent for the pt to have surgery, MD made aware this nurse will make an effort to find out whom can consent for pt, MD gives this nurse phone number [phone] to update and inform of further information Gab Peguero RN 
 
1696: this nurse makes effort to call pt spouse for consent for pt to have surgery tomorrow afternoon per MD Maryanne Pérez, no answer, voice mail left for pt to return call to [phone] to provide and receive information Gab Peguero RN 
 
5836: report given to JAKE Howard RN

## 2020-10-26 NOTE — PROGRESS NOTES
Problem: Falls - Risk of 
Goal: *Absence of Falls Description: Document Eduardo Click Fall Risk and appropriate interventions in the flowsheet. Outcome: Progressing Towards Goal 
Note: Fall Risk Interventions: 
Mobility Interventions: Bed/chair exit alarm, Communicate number of staff needed for ambulation/transfer, Mechanical lift, OT consult for ADLs, Patient to call before getting OOB, PT Consult for mobility concerns, PT Consult for assist device competence, Strengthening exercises (ROM-active/passive), Utilize walker, cane, or other assistive device Mentation Interventions: Adequate sleep, hydration, pain control, Bed/chair exit alarm, Door open when patient unattended, Evaluate medications/consider consulting pharmacy, Increase mobility, More frequent rounding, Reorient patient, Toileting rounds, Update white board Medication Interventions: Bed/chair exit alarm, Evaluate medications/consider consulting pharmacy, Patient to call before getting OOB, Teach patient to arise slowly Elimination Interventions: Bed/chair exit alarm, Call light in reach, Elevated toilet seat, Patient to call for help with toileting needs, Stay With Me (per policy), Toilet paper/wipes in reach, Toileting schedule/hourly rounds History of Falls Interventions: Bed/chair exit alarm, Consult care management for discharge planning, Evaluate medications/consider consulting pharmacy, Investigate reason for fall, Assess for delayed presentation/identification of injury for 48 hrs (comment for end date), Vital signs minimum Q4HRs X 24 hrs (comment for end date) Problem: Patient Education: Go to Patient Education Activity Goal: Patient/Family Education Outcome: Progressing Towards Goal 
  
Problem: Pressure Injury - Risk of 
Goal: *Prevention of pressure injury Description: Document Patrick Scale and appropriate interventions in the flowsheet. Outcome: Progressing Towards Goal 
Note: Pressure Injury Interventions: Sensory Interventions: Assess changes in LOC, Assess need for specialty bed, Avoid rigorous massage over bony prominences, Chair cushion, Check visual cues for pain, Discuss PT/OT consult with provider, Float heels, Keep linens dry and wrinkle-free, Maintain/enhance activity level, Minimize linen layers, Monitor skin under medical devices, Pad between skin to skin, Pressure redistribution bed/mattress (bed type), Turn and reposition approx. every two hours (pillows and wedges if needed) Moisture Interventions: Absorbent underpads, Assess need for specialty bed, Check for incontinence Q2 hours and as needed, Internal/External urinary devices, Limit adult briefs, Maintain skin hydration (lotion/cream), Minimize layers, Offer toileting Q_hr Activity Interventions: Assess need for specialty bed, Chair cushion, Increase time out of bed, Pressure redistribution bed/mattress(bed type), PT/OT evaluation Mobility Interventions: Assess need for specialty bed, Chair cushion, Float heels, Pressure redistribution bed/mattress (bed type), PT/OT evaluation, Turn and reposition approx. every two hours(pillow and wedges) Nutrition Interventions: Document food/fluid/supplement intake, Discuss nutritional consult with provider, Offer support with meals,snacks and hydration Friction and Shear Interventions: Feet elevated on foot rest, Foam dressings/transparent film/skin sealants, Lift team/patient mobility team, Lift sheet, Minimize layers, Transferring/repositioning devices Problem: Patient Education: Go to Patient Education Activity Goal: Patient/Family Education Outcome: Progressing Towards Goal 
  
Problem: Pain Goal: *Control of Pain Outcome: Progressing Towards Goal 
Goal: *PALLIATIVE CARE:  Alleviation of Pain Outcome: Progressing Towards Goal 
  
Problem: Patient Education: Go to Patient Education Activity Goal: Patient/Family Education Outcome: Progressing Towards Goal 
  
 Problem: Diabetes Self-Management Goal: *Disease process and treatment process Description: Define diabetes and identify own type of diabetes; list 3 options for treating diabetes. Outcome: Progressing Towards Goal 
Goal: *Incorporating nutritional management into lifestyle Description: Describe effect of type, amount and timing of food on blood glucose; list 3 methods for planning meals. Outcome: Progressing Towards Goal 
Goal: *Incorporating physical activity into lifestyle Description: State effect of exercise on blood glucose levels. Outcome: Progressing Towards Goal 
Goal: *Developing strategies to promote health/change behavior Description: Define the ABC's of diabetes; identify appropriate screenings, schedule and personal plan for screenings. Outcome: Progressing Towards Goal 
Goal: *Using medications safely Description: State effect of diabetes medications on diabetes; name diabetes medication taking, action and side effects. Outcome: Progressing Towards Goal 
Goal: *Monitoring blood glucose, interpreting and using results Description: Identify recommended blood glucose targets  and personal targets. Outcome: Progressing Towards Goal 
Goal: *Prevention, detection, treatment of acute complications Description: List symptoms of hyper- and hypoglycemia; describe how to treat low blood sugar and actions for lowering  high blood glucose level. Outcome: Progressing Towards Goal 
Goal: *Prevention, detection and treatment of chronic complications Description: Define the natural course of diabetes and describe the relationship of blood glucose levels to long term complications of diabetes. Outcome: Progressing Towards Goal 
Goal: *Developing strategies to address psychosocial issues Description: Describe feelings about living with diabetes; identify support needed and support network Outcome: Progressing Towards Goal 
Goal: *Insulin pump training Outcome: Progressing Towards Goal 
 Goal: *Sick day guidelines Outcome: Progressing Towards Goal 
Goal: *Patient Specific Goal (EDIT GOAL, INSERT TEXT) Outcome: Progressing Towards Goal 
  
Problem: Patient Education: Go to Patient Education Activity Goal: Patient/Family Education Outcome: Progressing Towards Goal

## 2020-10-26 NOTE — PROGRESS NOTES
Progress Note Patient: Mansi Baig. Sex: male          DOA: 10/23/2020 YOB: 1935      Age:  82 y.o.        LOS:  LOS: 2 days Covid test negative,  
 
Plan ORIF Right  Hip tomorrow if medically stable.

## 2020-10-26 NOTE — PROGRESS NOTES
Chart reviewed covid test result neg on 10-24-20,per ortho progress note planning possible ortho procedure to right hip if updated covid result negative, cm will cont to review and remain available.

## 2020-10-26 NOTE — DIABETES MGMT
GLYCEMIC CONTROL PROGRESS NOTE: 
 
- discussed in rounds, known h/o T2DM, HbA1C within recommended range for age + comorbids on basal.bolus home regimen - Decadron 6 mg daily, steroid associated hyperglycemia - BG out of target range non-ICU: < 180 mg/dL - TDD = 35 (10 Regular + 25 units - Humalog Very Insulin Resistant Corrective Coverage) 
- recommend initiate basal/mealtime regimen *Lantus 10 units daily *Humalog 4 units qac Recent Glucose Results:  
Lab Results Component Value Date/Time  (H) 10/26/2020 03:30 AM  
 GLUCPOC 298 (H) 10/26/2020 06:36 AM  
 GLUCPOC 338 (H) 10/25/2020 11:46 PM  
 GLUCPOC 329 (H) 10/25/2020 10:53 PM  
 
 
Jennifer Lares MS, RN, CDE Glycemic Control Team 
[phone] Pager [phone] (M-TH 8:00-4:30P) *After Hours pager [phone]

## 2020-10-27 NOTE — PROGRESS NOTES
1920 Assumed patient care at this time. Report received from Jason Farr RN.  
2192 Shift assessment completed and documented in flow sheets at this time. 2200 Spoke with patient's step daughter, Prashanth Jim, at this time regarding patient's surgery tomorrow and visiting hours. Step daughter and wife anxious to see patient before and after surgery. 0100 Blood drawn and sent to lab. 
0120 CHG wipes applied. Linen and gown changed. 0300 Paged Dr. Jermaine Loya at this time regarding patient becoming increasingly agitated. Patient repeatedly undressing himself, pulling on IV line and diaz. [address] to Dr. Jermaine Loya at this time regarding agitation. Order received for haldol 5 mg IV one time. Bedside and verbal shift change report given to Larry Fisher RN (oncoming nurse) by Artur Rollins RN (offgoing nurse). Report included the following information: SBAR, Kardex, MAR, and recent results.

## 2020-10-27 NOTE — PROGRESS NOTES
Hospitalist Progress Note Patient: Samara Villagomez. MRN: [id number]  CSN: [id number] YOB: 1935  Age: 82 y.o. Sex: male DOA: 10/23/2020 LOS:  LOS: 2 days Assessment/Plan Patient Active Problem List  
Diagnosis Code  Hypertension I10  
 Diabetes mellitus ([facility] E11.9  Hyperlipemia E78.5  Depression F32.9  Varicose veins I83.90  Arthritis M19.90  
 Encephalopathy G93.40  Encephalopathy acute G93.40  Syncope R55  Dizziness R42  Nausea R11.0  Tinnitus H93.19  
 Atrial fibrillation (HCC) I48.91  
 Wide-complex tachycardia (HCC) I47.2  Syncope and collapse R55  Cardiac device in situ Z95.9  Atrial flutter (HCC) I48.92  
 Pneumonia J18.9  Acute on chronic diastolic congestive heart failure (HCC) I50.33  
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  
 Migraine with vertigo G43. [address] [facility]  Fall W19. Jefm Rim  Closed right hip fracture, initial encounter ([facility] S72.001A  Dementia without behavioral disturbance (HCC) F03.90  Anticoagulant long-term use Z79.01  
 Hypoxia R09.02  
 Person under investigation for COVID-19 Z20.828  
  
 
 
 
82 y.o.  male who with history of dementia, atrial for rib on Xarelto, admitted  after sustaining a fall and distal femur fracture. Reviewed CT of the head:  no acute intracranial abnormality.  
  
Closed right hip fracture, : Ortho is consulted. surgery per ortho 
  
Dementia without behavioral disturbance: Resume regimen 
  
Possible hospital-acquired pneumonia As patient was in a rehab facility and [facility] within the last 3 weeks will cover with vancomycin and Zosyn and rule out Covid 
  
Diabetes: On SSI 
  
Protonix for GI prevention 
  
Atrial fibrillation: Rate control with Coreg 
  
DVT/GI Prophylaxis: SCD's Disposition : TBD Review of systems General: No fevers or chills. Cardiovascular: No chest pain or pressure. No palpitations. Pulmonary: No shortness of breath. Gastrointestinal: No nausea, vomiting. Physical Exam: 
General: Awake, cooperative, no acute distress   
HEENT: NC, Atraumatic. PERRLA, anicteric sclerae. Lungs: CTA Bilaterally. No Wheezing/Rhonchi/Rales. Heart:  S1 S2,  No murmur, No Rubs, No Gallops Abdomen: Soft, Non distended, Non tender.  +Bowel sounds, Extremities: No c/c/e Psych:   Not anxious or agitated. Neurologic:  No acute neurological deficit. Vital signs/Intake and Output: 
Visit Vitals /66 (BP 1 Location: Left arm, BP Patient Position: Sitting) Pulse 87 Temp 98 °F (36.7 °C) Resp 16 Ht 5' 7\" (1.702 m) Wt 82.4 kg (181 lb 10.5 oz) SpO2 93% BMI 28.45 kg/m² Current Shift:  No intake/output data recorded. Last three shifts:  10/25 0701 - 10/26 1900 In: 2242 [P.O.:1662; I.V.:550] Out: 3729 [Urine:2275] Labs: Results:  
   
Chemistry Recent Labs 10/26/20 
0330 10/25/20 
0220 10/24/20 
9374 * 176* 126* * 137 136  
K 4.2 3.6 3.7  101 101 CO2 25 27 31 BUN 21* 15 19* CREA 1.15 0.98 1.02  
CA 7.7* 7.7* 7.8* AGAP 9 9 4 BUCR 18 15 19 * 148* 158* TP 5.1* 5.2* 5.3* ALB 1.6* 1.7* 1.8*  
GLOB 3.5 3.5 3.5 AGRAT 0.5* 0.5* 0.5* CBC w/Diff Recent Labs 10/26/20 
0330 10/25/20 
0220 10/24/20 
4055 WBC 8.5 8.0 9.5  
RBC 2.75* 3.05* 3.12* HGB 7.8* 8.6* 8.7* HCT 24.1* 27.1* 27.8*  
 253 316 GRANS 89* 74* 82* LYMPH 6* 14* 9* EOS 0 3 2 Cardiac Enzymes Recent Labs 10/24/20 
1944 10/24/20 
1315 CPK 71 83 CKND1 1.7 1.8 Coagulation Recent Labs 10/26/20 
0500 10/26/20 0330  
PTP 17.7* Not performed INR 1.5* Not performed APTT 45.2* Not performed Lipid Panel Lab Results Component Value Date/Time Cholesterol, total 107 01/24/2015 09:15 AM  
 HDL Cholesterol 25 (L) 01/24/2015 09:15 AM  
 LDL, calculated 44 01/24/2015 09:15 AM  
 VLDL, calculated 38 01/24/2015 09:15 AM  
 Triglyceride 190 (H) 01/24/2015 09:15 AM  
 CHOL/HDL Ratio 4.3 01/24/2015 09:15 AM  
  
BNP No results for input(s): BNPP in the last 72 hours. Liver Enzymes Recent Labs 10/26/20 
0330 TP 5.1* ALB 1.6* * Thyroid Studies Lab Results Component Value Date/Time TSH 2.61 10/25/2020 02:20 AM  
    
Procedures/imaging: see electronic medical records for all procedures/Xrays and details which were not copied into this note but were reviewed prior to creation of Plan

## 2020-10-27 NOTE — PROGRESS NOTES
Chart reviewed. Pt admitted to tele by hospitalist. Miguelito Villanueva following. Pt covid pending. Pt noted to be pending surgical intervention, will need PT/OT recommendations after surgery to assist with dc planning. CM attempted to call pt in room to discuss dc plan, pt unable to hear on phone. CM will place local SNF referrals for continuity of care. Per chart review, it appears pt recently dc to [place]. CM will cont to follow for transition of care needs [phone]

## 2020-10-27 NOTE — PROGRESS NOTES
0725-Assumed care of patient at this time. 0951-Preop called and said anaesthesia wants to enquire about cardiac clearance for patient prior to surgery. Dr. Hellen Gosselin is reviewing chart. 0830 - Patient in bed at this time. A/O x 1. IV to right wrist  intact and patent. SCD to LLE. Patient denies numbness/tingling. pedal pulses palpable. Lungs clear. . Bowel sounds active to all quadrants. Patient able to get to 1500 on the incentive spirometer. Pain 0/10. Patient on tele box 23, NSR in the 70s. Right leg externally rotated and appears shorter than the left leg; patient has abrasions to anterior right thigh. 1007-Per Dr. Hellen Gosselin, patient needs STAT labs to be sure he is safe for surgery. Updated preop holding. 1020-Per OR, do not start the ancef ordered for patient as surgery is on hold at the moment pending clearance from Dr. Hellen Gosselin. 1030-this nurse tried blood draw 3 times without success, Chuy Gant RN to attempt blood draw. 1120-updated Dr. Hellen Gosselin that labs were back, H/H improved to 9.0/28.4. She will update her note. 1122-OR called and this nures updated them that Dr. Hellen Gosselin would be clearing patient for surgery. Per OR RN, Dr. Sallie Murguia will call Dr. Hellen Gosselin as they want cardiac to see patient. 1217-Per Dr. Hellen Gosselin. Patient to be seen by Dr. Blossom Dwyer today for cardiac clearance for surgery tomorrow. Patient can get his diabetic diet and he will be NPO at midnight. Patient can be transferred to  on remote telemetry as he has had 2 previous negative Covid tests. Dr. Hellen Gosselin will put order in this afternoon. 1225-reviewed Order Review and placed patient on previously ordered diabetic diet and also placed NPO at midnight order per telephone with readback from Dr. Hellen Gosselin. 1700-Performed bed bath ad changed patients linens after patient had large bowel movement in bed. 1950 - Bedside and Verbal shift change report given to Yoselyn Loza RN by Claire Snow RN. Report included the following information SBAR, Kardex, OR Summary, Intake/Output and MAR.

## 2020-10-27 NOTE — PROGRESS NOTES
Progress Note Patient: Toby Laws Sex: male          DOA: 10/23/2020 YOB: 1935      Age:  82 y.o.        LOS:  LOS: 3 days Surgery had been planned for today but specific medical clearance delayed. Anesthesia concerned about cardiac status. Currently awaiting cardiology clearance. We will cancel surgery for today and reschedule when cardiology has cleared pt.

## 2020-10-27 NOTE — PROGRESS NOTES
Pharmacy Clinical Services: Vancomycin SCr = 1.13 CrCl ~ 50.9 ml/min Afebrile WBC = 12.1 ( Pt on steroids ==> decadron ) Vancomycin trough = 13.5 ==> was drawn ~ 2 hours late ==> goal 15-20 Due to late trough draw and with continued dosing would anticipate trough to reach goal of 15 Will continue vancomycin at current dose of 1250 mg IV q24h Kenney Allen [facility] [phone]

## 2020-10-27 NOTE — DIABETES MGMT
GLYCEMIC CONTROL PROGRESS NOTE: 
 
- known h/o T2DM, HbA1C within recommended range for age + comorbids on basal.bolus home regimen - Decadron 6 mg daily, steroid associated hyperglycemia - BG out of target range non-ICU: < 180 mg/dL, trending up - TDD = 44 (10 Lantus + 34 units - Humalog Very Insulin Resistant Corrective Coverage) 
- recommend increase basal insulin *Lantus 12 units daily Recent Glucose Results:  
Lab Results Component Value Date/Time GLUCPOC 223 (H) 10/27/2020 06:47 AM  
 GLUCPOC 232 (H) 10/26/2020 09:09 PM  
 GLUCPOC 218 (H) 10/26/2020 04:20 PM  
 
 
Daxa Smith MS, RN, CDE Glycemic Control Team 
[phone] Pager [phone] (M-TH 8:00-4:30P) *After Hours pager [phone]

## 2020-10-27 NOTE — CONSULTS
[facility] Consult Note Patient: Artem Sommer. MRN: [id number]  SSN: [id number] YOB: 1935  Age: 82 y.o. Sex: male Date of Consultation: 10/27/2020 Referring Physician: Edmar Thorne MD 
Reason for Consultation: Pre op risk stratification Chief complain: Hip pain HPI:   62-year-old gentleman came to emergency room after a fall on 10/24/2020. Patient was at [facility] for rehab after sustaining fall and distal femur fracture in September 2020. Cardiology consult called for preop risk stratification for right hip surgery. Patient denies any chest pain or shortness of breath. He denies any dizziness, palpitation, presyncope. He has ambulation is limited by leg pain. He denies any smoking or alcohol abuse. Past Medical History:  
Diagnosis Date  Arthritis  Atrial fibrillation ([address]  Dementia ([address]  Depression  Diabetes ([address] 1980s  GERD (gastroesophageal reflux disease)  Hypercholesterolemia  Hypertension 2000  Ill-defined condition 2015  
 has passed out-has been tested cannot determine cause  Primary osteoarthritis of right knee 6/28/2018  Ulcer   
 at the anastomotic bite of gastric bypass  Varicose veins  Wide-complex tachycardia ([address] 5/2/2015 Past Surgical History:  
Procedure Laterality Date  ABDOMEN SURGERY PROC UNLISTED  1998  
 umb hernia Rupture  BIOPSY LIVER  10/05/04  
 EGD  12/29/09  
 with biopsy  ENDOSCOPY, COLON, DIAGNOSTIC    
 GASTROSTOMY TUBE  10/05/04  HX CATARACT REMOVAL    
 bilateral  
 HX CHOLECYSTECTOMY  10/05/04  HX GI  10/05/04  
 vertical banded gastroplasty/gastric bypass  HX MOHS PROCEDURES  1991/1995  
 bilateral  
 HX PACEMAKER  2015 [email or web address] Cardiac Monitor Current Facility-Administered Medications Medication Dose Route Frequency  insulin glargine (LANTUS) injection 12 Units  12 Units SubCUTAneous QHS  insulin lispro (HUMALOG) injection 4 Units  4 Units SubCUTAneous TIDAC  ceFAZolin (ANCEF) 2 g/20 mL in sterile water IV syringe  2 g IntraVENous ONCE  
 insulin lispro (HUMALOG) injection   SubCUTAneous AC&HS  
 glucose chewable tablet 16 g  4 Tab Oral PRN  
 glucagon (GLUCAGEN) injection 1 mg  1 mg IntraMUSCular PRN  
 dextrose 10% infusion 125-250 mL  125-250 mL IntraVENous PRN  pantoprazole (PROTONIX) 40 mg in 0.9% sodium chloride 10 mL injection  40 mg IntraVENous DAILY  sodium chloride (NS) flush 5-40 mL  5-40 mL IntraVENous Q8H  
 sodium chloride (NS) flush 5-40 mL  5-40 mL IntraVENous PRN  polyethylene glycol (MIRALAX) packet 17 g  17 g Oral DAILY PRN  promethazine (PHENERGAN) tablet 12.5 mg  12.5 mg Oral Q6H PRN Or  
 ondansetron (ZOFRAN) injection 4 mg  4 mg IntraVENous Q6H PRN  piperacillin-tazobactam (ZOSYN) 3.375 g in 0.9% sodium chloride (MBP/ADV) 100 mL MBP  3.375 g IntraVENous Q6H  
 acetaminophen (TYLENOL) tablet 650 mg  650 mg Oral Q6H PRN Or  
 acetaminophen (TYLENOL) suppository 650 mg  650 mg Rectal Q6H PRN  
 dexAMETHasone (DECADRON) tablet 6 mg  6 mg Oral DAILY  melatonin tablet 5 mg  5 mg Oral QHS  zinc sulfate (ZINCATE) 220 (50) mg capsule 1 Cap  1 Cap Oral DAILY  ascorbic acid (vitamin C) (VITAMIN C) tablet 250 mg  250 mg Oral DAILY  cholecalciferol (VITAMIN D3) (1000 Units /25 mcg) tablet 1 Tab  1,000 Units Oral DAILY  ARIPiprazole (ABILIFY) tablet 2 mg  2 mg Oral DAILY  buPROPion SR (WELLBUTRIN SR) tablet 150 mg  150 mg Oral DAILY  cycloSPORINE (RESTASIS) 0.05 % ophthalmic emulsion 1 Drop  1 Drop Left Eye Q12H  
 dilTIAZem ER (CARDIZEM CD) capsule 240 mg  240 mg Oral PCL  
 fluticasone propionate (FLONASE) 50 mcg/actuation nasal spray 2 Spray  2 Spray Both Nostrils DAILY PRN  
 furosemide (LASIX) tablet 20 mg  20 mg Oral DAILY  vancomycin (VANCOCIN) 1,250 mg in 0.9% sodium chloride 250 mL (VIAL-MATE)  1,250 mg IntraVENous Q24H  Vancomycin-Pharmacy to dose  1 Each Other Rx Dosing/Monitoring Allergies and Intolerances: Allergies Allergen Reactions  Morphine Other (comments) Hallucinate; \"ran all night long\" Family History:  
Family History Problem Relation Age of Onset  Heart Attack Father  Diabetes Father  Hypertension Father  Cancer Mother   
     lung  Diabetes Mother  Other Brother   
     obese  Diabetes Brother  Diabetes Brother  Other Maternal Grandmother   
     obese  Diabetes Sister Social History: He  reports that he quit smoking about 53 years ago. He has never used smokeless tobacco.  He  reports current alcohol use of about 4.0 standard drinks of alcohol per week. Review of Systems:  
 
Gen: No fever, chills, malaise, weight loss/gain. Heent: No headache, rhinorrhea, epistaxis, ear pain, hearing loss, sinus pain, neck pain/stiffness, sore throat. Heart: No chest pain, palpitations, shortness of breath on exertion, pnd, or orthopnea. Resp: No cough, hemoptysis, wheezing and dyspnea GI: No nausea, vomiting, diarrhea, constipation, melena or hematochezia. : No urinary obstruction, dysuria or hematuria. Derm: No rash, new skin lesion or pruritis. Musc/skeletal: Positive bone or joint complains. Vasc: No edema, cyanosis or claudication. Endo: No heat/cold intolerance, no polyuria,polydipsia or polyphagia. Neuro: No unilateral weakness, numbness, tingling. No seizures. Heme: No easy bruising or bleeding. Physical:  
No data found. Exam:  
General Appearance: Comfortable, not using accessory muscles of respiration. HEENT: BRIAN. HEAD: Atraumatic NECK: No JVD, no thyroidomeglay. CAROTIDS: No bruit LUNGS: Clear bilaterally. HEART: S1+S2 audible, no murmur, no pericardial rub. ABD: Non-tender, BS Audible EXT: No edema, and no cyanosis. VASCULAR EXAM: Pulses are intact. PSYCHIATRIC EXAM: Mood is appropriate. MUSCULOSKELETAL: Right lower extremity deformity. NEUROLOGICAL: Alert, follows verbal commands Review of Data:  
LABS:  
Lab Results Component Value Date/Time WBC 12.1 10/27/2020 10:25 AM  
 HGB 9.0 (L) 10/27/2020 10:25 AM  
 HCT 28.4 (L) 10/27/2020 10:25 AM  
 PLATELET 810 67/21/3368 10:25 AM  
 
Lab Results Component Value Date/Time Sodium 141 10/27/2020 10:25 AM  
 Potassium 3.6 10/27/2020 10:25 AM  
 Chloride 105 10/27/2020 10:25 AM  
 CO2 30 10/27/2020 10:25 AM  
 Glucose 183 (H) 10/27/2020 10:25 AM  
 BUN 22 (H) 10/27/2020 10:25 AM  
 Creatinine 1.13 10/27/2020 10:25 AM  
 
Lab Results Component Value Date/Time Cholesterol, total 107 01/24/2015 09:15 AM  
 HDL Cholesterol 25 (L) 01/24/2015 09:15 AM  
 LDL, calculated 44 01/24/2015 09:15 AM  
 Triglyceride 190 (H) 01/24/2015 09:15 AM  
 
No components found for: GPT Lab Results Component Value Date/Time Hemoglobin A1c 7.1 (H) 10/23/2020 11:35 PM  
 
 
 
Cardiology Procedures:  
Results for orders placed or performed during the hospital encounter of 10/23/20 EKG, 12 LEAD, INITIAL Result Value Ref Range Ventricular Rate 77 BPM  
 Atrial Rate 77 BPM  
 P-R Interval 168 ms QRS Duration 94 ms Q-T Interval 406 ms QTC Calculation (Bezet) 459 ms Calculated P Axis 44 degrees Calculated R Axis -34 degrees Calculated T Axis 10 degrees Diagnosis Poor data quality, interpretation may be adversely affected Sinus rhythm with premature supraventricular complexes Left axis deviation Minimal voltage criteria for LVH, may be normal variant Poor R Wave Progression Abnormal ECG Confirmed by Chana Mcdonnell MD, Vernal Bachelor ([id number]) on 10/24/2020 1:42:11 AM 
  
Results for orders placed or performed in visit on 06/04/15 AMB POC EKG ROUTINE W/ 12 LEADS, INTER & REP Impression Sinus rhythm. LAD. No acute changes. Impression / Plan:   
Patient Active Problem List  
Diagnosis Code  Hypertension I10  
  Diabetes mellitus ([facility] E11.9  Hyperlipemia E78.5  Depression F32.9  Varicose veins I83.90  Arthritis M19.90        Atrial fibrillation (HCC) I48.91  
 Wide-complex tachycardia (HCC) I47.2  Syncope and collapse R55  Cardiac device in situ Z95.9  Atrial flutter (HCC) I48.92  
 Pneumonia J18.9   CHF (congestive heart failure), NYHA class II (HCC) I50.9   Elevated BP [id number]  Primary osteoarthritis of right knee M17.11  Type II or unspecified type diabetes mellitus with renal manifestations, uncontrolled(250.42) (HCC) E11.29, E11.65  Diastolic CHF, chronic (HCC) I50.32  
 Migraine with vertigo G43. [address]  Fall W19. [place]  Closed right hip fracture, initial encounter ([facility] S72.001A  Dementia without behavioral disturbance (HCC) F03.90  Anticoagulant long-term use Z79.01  
 Hypoxia R09.02  
 Person under investigation for COVID-19 Z20.828 [employer] loop recorder placement was done in July 2015. Echocardiogram was done in 09/2020 reported Imaging quality is acceptable. The study is technically difficult due to  
poor cardiac windows · Low normal LV systolic function, ejection fraction 50 - 55%. Normal  
chamber size. There is mild, concentric left ventricular hypertrophy · Left atrium is severely dilated · The mitral valve is normal in structure. There is mild regurgitation · The aortic valve is trileaflet, mildly sclerotic. There is no  
significant aortic stenosis or insufficiency Plan: 
 
  Patient is at moderate risk for periprocedural cardiovascular     event   for right hip surgery. Continue periprocedural diltiazem. Okay to hold full-dose of anticoagulation for now. Restart anticoagulation post surgery once okay with surgeon. Thank you for allowing me to participate in this patient's treatment plan. Signed By: Hebert Lam MD   
 October 27, 2020

## 2020-10-27 NOTE — PROGRESS NOTES
Hospitalist Progress Note Patient: Samara Villagomez. MRN: [id number]  CSN: [id number] YOB: 1935  Age: 82 y.o. Sex: male DOA: 10/23/2020 LOS:  LOS: 3 days Chief Complaint: 
 
 
 
 
Assessment/Plan  
82 y.o.  male who with history of dementia, atrial for rib on Xarelto, admitted  after sustaining a fall and distal femur fracture. 
  
Reviewed CT of the head:  no acute intracranial abnormality.  
  
Closed right hip fracture, : Ortho is consulted.  surgery planned for today, but holding medical clearence because Hgb 7.8 yesterday and no labs for this AM, will check stat labs and if Hgb stable, will clear for surgery >Hgb is 9.0 today. Medically clear to proceed with ORIF of right hip today. >Surgery will be cancelled today as anesthesiology team wants cardiac clearance and cardiology consult. D/w Dr. Norm Desouza and placed consult.  
  
Dementia without behavioral disturbance: continue regimen 
  
Possible hospital-acquired pneumonia SARS-COV-2 negative on 10/24/20 SARS-COV-2 pending from 10/25/20 COVID test from 10/06/20 apparently negative As patient was in a rehab facility and [facility] within the last 3 weeks will cover with vancomycin and Zosyn and rule out Covid 
  
Diabetes: On SSI 
  
Protonix for GI prevention 
  
Atrial fibrillation: Rate control with Coreg 
  
DVT/GI Prophylaxis: SCD's 
  
Disposition : TBD Patient Active Problem List  
Diagnosis Code  Hypertension I10  
 Diabetes mellitus ([facility] E11.9  Hyperlipemia E78.5  Depression F32.9  Varicose veins I83.90  Arthritis M19.90  
 Encephalopathy G93.40  Encephalopathy acute G93.40  Syncope R55  Dizziness R42  Nausea R11.0  Tinnitus H93.19  
 Atrial fibrillation (HCC) I48.91  
 Wide-complex tachycardia (HCC) I47.2  Syncope and collapse R55  Cardiac device in situ Z95.9  Atrial flutter (HCC) I48.92  
 Pneumonia J18.9  Acute on chronic diastolic congestive heart failure (HCC) I50.33  
 CHF (congestive heart failure), NYHA class II ([facility]) I50.9  Combined systolic and diastolic congestive heart failure, NYHA class 3 (HCC) I50.40  Elevated BP [id number]  Primary osteoarthritis of right knee M17.11  Type II or unspecified type diabetes mellitus with renal manifestations, uncontrolled(250.42) (HCC) E11.29, E11.65  Diastolic CHF, chronic ([facility]) I50.32  
 Migraine with vertigo G43. [address]  Fall W19. Usha Captain  Closed right hip fracture, initial encounter ([facility] S72.001A  Dementia without behavioral disturbance ([facility]) F03.90  Anticoagulant long-term use Z79.01  
 Hypoxia R09.02  
 Person under investigation for COVID-19 Z20.828 Subjective: 
 
Pt's only complaint is that he is cold. In fact, very cold in pt's room. Review of systems: 
 
Constitutional: denies fevers, chills, myalgias Respiratory: denies SOB, cough Cardiovascular: denies chest pain, palpitations Gastrointestinal: denies nausea, vomiting, diarrhea Vital signs/Intake and Output: 
Visit Vitals /70 (BP 1 Location: Left arm, BP Patient Position: At rest) Pulse 75 Temp 98.2 °F (36.8 °C) Resp 20 Ht 5' 7\" (1.702 m) Wt 82.4 kg (181 lb 10.5 oz) SpO2 94% BMI 28.45 kg/m² Current Shift:  No intake/output data recorded. Last three shifts:  10/25 1901 - 10/27 0700 In: 2342 [P.O.:1762; I.V.:550] Out: 1475 [DHAKV:2407] Exam: 
 
General: Well developed, alert, NAD, OX3 Head/Neck: NCAT, supple, No masses, No lymphadenopathy CVS:Regular rate and rhythm, no M/R/G, S1/S2 heard, no thrill Lungs:Clear to auscultation bilaterally, no wheezes, rhonchi, or rales Abdomen: Soft, Nontender, No distention, Normal Bowel sounds, No hepatomegaly Extremities: right leg deformity, No C/C/E, pulses palpable 2+ Skin:normal texture and turgor, no rashes, no lesions Neuro:grossly normal , follows commands Psych:appropriate Labs: Results:  
   
Chemistry Recent Labs 10/26/20 
0330 10/25/20 
0220 * 176* * 137  
K 4.2 3.6  101 CO2 25 27 BUN 21* 15  
CREA 1.15 0.98  
CA 7.7* 7.7* AGAP 9 9 BUCR 18 15 * 148* TP 5.1* 5.2* ALB 1.6* 1.7*  
GLOB 3.5 3.5 AGRAT 0.5* 0.5* CBC w/Diff Recent Labs 10/26/20 
0330 10/25/20 
0220 WBC 8.5 8.0  
RBC 2.75* 3.05* HGB 7.8* 8.6* HCT 24.1* 27.1*  
 253 GRANS 89* 74* LYMPH 6* 14* EOS 0 3 Cardiac Enzymes Recent Labs 10/24/20 
1944 10/24/20 
1315 CPK 71 83 CKND1 1.7 1.8 Coagulation Recent Labs 10/27/20 
0100 10/26/20 
0500 PTP 16.8* 17.7* INR 1.4* 1.5* APTT 38.9* 45.2* Lipid Panel Lab Results Component Value Date/Time Cholesterol, total 107 01/24/2015 09:15 AM  
 HDL Cholesterol 25 (L) 01/24/2015 09:15 AM  
 LDL, calculated 44 01/24/2015 09:15 AM  
 VLDL, calculated 38 01/24/2015 09:15 AM  
 Triglyceride 190 (H) 01/24/2015 09:15 AM  
 CHOL/HDL Ratio 4.3 01/24/2015 09:15 AM  
  
BNP No results for input(s): BNPP in the last 72 hours. Liver Enzymes Recent Labs 10/26/20 
0330 TP 5.1* ALB 1.6* * Thyroid Studies Lab Results Component Value Date/Time TSH 2.61 10/25/2020 02:20 AM  
    
Procedures/imaging: see electronic medical records for all procedures/Xrays and details which were not copied into this note but were reviewed prior to creation of Plan Jerica Hou MD

## 2020-10-27 NOTE — PROGRESS NOTES
1435 - Bedside shift report received from MANULE Medina RN. Assumed care of patient. Patient noted resting in bed at this time. Call light in reach. [id number] - Patient alert and oriented to self. No s/s of any respiratory distress. Medications administered. Patient's HOB elevated 60 degrees. Patient eating at this time. Call light in reach. [address] [facility] Christopher Plascencia RN assumed care of patient.

## 2020-10-27 NOTE — NURSE NAVIGATOR
Dr. Margarita Sutton, anesthesia talked with Dr. Tamika Mina concerning need for cardiac clearance. I have spoken with Dr. Carol Alfaro and  he will see patient for cardiac clearance. Informed that Dr. Delmar Donald was cancelling case for today but to please let him know when patient is cleared to proceed. OR , 3rd floor nursing ( [place]) and OR team notified.

## 2020-10-28 NOTE — ROUTINE PROCESS
TRANSFER - IN REPORT: 
 
Verbal report received from CORNEL Abrams RN(name[facility]  being received from [address] [place] [address]([facility]) for routine progression of care Report consisted of patients Situation, Background, Assessment and  
Recommendations(SBAR). Information from the following report(s) SBAR, Kardex, Intake/Output and MAR was reviewed with the receiving nurse. Opportunity for questions and clarification was provided. Assessment completed upon patients arrival to unit and care assumed.

## 2020-10-28 NOTE — ANESTHESIA PREPROCEDURE EVALUATION
Relevant Problems No relevant active problems Anesthetic History No history of anesthetic complications Review of Systems / Medical History Patient summary reviewed, nursing notes reviewed and pertinent labs reviewed Pulmonary Within defined limits Neuro/Psych Psychiatric history Cardiovascular Hypertension Dysrhythmias Exercise tolerance: >4 METS 
  
GI/Hepatic/Renal 
  
GERD Endo/Other Diabetes: type 2 Arthritis Other Findings Physical Exam 
 
Airway Mallampati: II 
TM Distance: 4 - 6 cm Neck ROM: normal range of motion Mouth opening: Normal 
 
 Cardiovascular Regular rate and rhythm,  S1 and S2 normal,  no murmur, click, rub, or gallop Dental 
No notable dental hx Pulmonary Breath sounds clear to auscultation Abdominal 
GI exam deferred Other Findings Anesthetic Plan ASA: 4 Anesthesia type: general 
 
 
 
 
Induction: Intravenous Anesthetic plan and risks discussed with: Patient

## 2020-10-28 NOTE — DIABETES MGMT
GLYCEMIC CONTROL PROGRESS NOTE: 
 
- discussed in rounds, known h/o T2DM, HbA1C within recommended range for age + comorbids on basal.bolus home regimen - Decadron 6 mg daily last dose yesterday - BG out of target range non-ICU: < 180 mg/dL, trending up - TDD = 59 (12 Lantus + 8 (4) MTI + 39 units - Humalog Very Insulin Resistant Corrective Coverage) 
- NPO for surgery today, recommend monitor BG trends after surgery and make adjustments as needed Recent Glucose Results:  
Lab Results Component Value Date/Time  (H) 10/28/2020 12:28 AM  
  (H) 10/27/2020 10:25 AM  
 GLUCPOC 264 (H) 10/28/2020 06:27 AM  
 GLUCPOC 384 (H) 10/27/2020 09:26 PM  
 GLUCPOC 351 (H) 10/27/2020 05:44 PM  
 
 
Azar Dominique MS, RN, CDE Glycemic Control Team 
[phone] Pager [phone] (M-TH 8:00-4:30P) *After Hours pager [phone]

## 2020-10-28 NOTE — PROGRESS NOTES
1047-Bedside and Verbal shift change report given to Ian Sandoval (oncoming nurse) by Michael Hawkins (offgoing nurse). Report included the following information SBAR, Kardex, Intake/Output and MAR. Patient to go for surgery later today, time unknown. 1250-Patient arrived on unit. Patient in bed resting quietly, no complaints at this time. Patient oriented to phone and call light system. 1450-Called report to Evangelina Harris RN Report included the following information SBAR, Kardex, Intake/Output and MAR.  
 
1650-Patient taken to OR 1800 dose of Zosyn sent with Techs. Bedside and Verbal shift change report given to [address] [place] [address]  (oncoming nurse) by Ian Sandoval (offgoing nurse). Report included the following information SBAR, Kardex, Intake/Output and MAR.

## 2020-10-28 NOTE — OP NOTES
Patient: Jamar Do. MRN: [id number]  SSN: [id number] YOB: 1935  Age: 82 y.o. Sex: male Date of Procedure: 10/28/2020 Preoperative Diagnosis: RIGHT HIP FRACTURE Postoperative Diagnosis: RIGHT HIP FRACTURE Procedure: Procedure(s): RIGHT HIP PINNING (DHS) WITH C-ARM Surgeon(s) and Role: Rosalinda Harding MD - Primary Assistant: Geneva Keane PA-C 
OR Assitance: Physician Assistant: SASHA Sandhu Surg Asst-1: Cathy Daly Anesthesia: General  
Estimated Blood Loss: 100cc Fluids: 1000cc Specimens: * No specimens in log * Findings: intertrochanteric hip fracture Complications: none Implants: * No implants in log * INDICATIONS: The patient is a 82 y.o. male who fell sustaining a right intertrochanteric hip fracture. Comes now for operative intervention. DESCRIPTION OF PROCEDURE: After correct identification, the patient was  
taken to the operating room, placed supine on the [place] table. General  
endotracheal tube anesthesia induced. Two grams of Ancef were given. Right hip 
 fracture was then reduced under intraoperative fluoroscopy. The right hip was then prepped and draped in the usual sterile fashion. Using the  
Incision was made over the lateral hip and taken down through the fascia emelyn tendon and posterior to the quadriceps. This allowed excellent visualization of the lateral femur. Using the [employer] DHS kit, the K-wire was then placed into the central position of the femoral head from the lateral femur as seen on intraoperative X-ray in the AP and lateral views. This was then overdrilled with the trochanteric drill  
and the 35 degree 4-hole plate with the lag screw were place over the K-wire under intraoperative fluoroscopy. 4.5mm screws were then placed through the plate, starting with the 3.2mm drill followed by self-tapping screws.   
 Intraoperative x-ray revealed excellent alingment of the hardware and anatomy. The wound was then irrigated. Fascia closed with 0 Vicryl suture, the subcutaneous tissue approximated with 2-0 Vicryl suture and the skin  
approximated with staples. Sterile dressing was applied. The patient  
tolerated the procedure well, taken to the recovery room in good Condition. Assistant surgeon was needed throughout the procedure for managing bleeding, improving visualization and closure of the surgical wounds.

## 2020-10-28 NOTE — PERIOP NOTES
TRANSFER - IN REPORT: 
 
Verbal report received from Kenzie, RN(name) on [facility].  being received from [address] [place] [address](unit) for ordered procedure Report consisted of patients Situation, Background, Assessment and  
Recommendations(SBAR). Information from the following report(s) SBAR, Kardex and MAR was reviewed with the receiving nurse. Opportunity for questions and clarification was provided. Assessment completed upon patients arrival to unit and care assumed.

## 2020-10-28 NOTE — PROGRESS NOTES
Problem: Falls - Risk of 
Goal: *Absence of Falls Description: Document Dima Gonzalez Fall Risk and appropriate interventions in the flowsheet. Outcome: Progressing Towards Goal 
Note: Fall Risk Interventions: 
Mobility Interventions: Communicate number of staff needed for ambulation/transfer Mentation Interventions: Adequate sleep, hydration, pain control Medication Interventions: Evaluate medications/consider consulting pharmacy Elimination Interventions: Call light in reach History of Falls Interventions: Bed/chair exit alarm, Investigate reason for fall

## 2020-10-28 NOTE — PROGRESS NOTES
2129- Assessment complete. Pt is alert to self. Pt denies SOB/Chest pains, numbness/tingling. Lungs are clear and diminished, bowel sounds active. LBM was today. Pt denies pain at this time. Pt has external rotation to RLE. Bed in lowest position, wheels locked, call bell within reach. 2327-Paged Dr. Laith Atkinson for pt becoming combative. 2337- Dr. Laith Atkinson returned call will place order. Nelson Atkinson regarding pt being negative for Covid. Jet Epp- Dr. Laith Atkinson returned call, pt to be moved once awakened. Dr. Laith Atkinson will place transfer orders. Will let oncoming nurse know to transfer pt once awake. 0700- Pt had an uneventful night and slept. Scheduled medications given at appropriate times. Pt to be transferred to  this morning pt may need remote Tele due to NSR w/ PVCs. Last administration of Xarelto was given on 10/23/2020

## 2020-10-28 NOTE — PERIOP NOTES
Called hospitalist to place updated H&P. Consulted surgeon not willing to place update. Dr. Angelic May was patient's hospitalist today. Currently not on duty. Last H&P was 10/27/2020

## 2020-10-28 NOTE — PROGRESS NOTES
[address] care of pt at this time. Assessment complete. Pt alert and oriented x 4 answers all questions appropriately. Shows no sign of distress. Fall risk arm band in place. Denies SOB and chest pain. Pt lungs clear on 1L bilaterally. Cap refill  less than 3 seconds. Pt denies numbness and tingling to all extremities. Stated pain 0/10. Pt has 20 G IV to R forearm. Pt has mepilex dressing to sacrum and right heel. Scab to right knee from fall  Right hip slightly swollen pt declines having any pain to right hip. PT NPO understands that he will have surgery today this evening. No noted bruising to right hip. SCDS in place Last [facility] 10/27 Call light and possessions within reach. Bed locked and in low position. Will continue to monitor. [address] Spoke with Dr Argenis Suresh who wants pt to remain NPO surgery this evening. Pt needs consent signed nurse will call wife. [address] Report given to Sang 6 L. as pt has had (2 negative Covid test) plan for surgery this evening. [address] Attempted to call wife for surgery consent. Nurse left message awaiting return call

## 2020-10-28 NOTE — ROUTINE PROCESS
TRANSFER - OUT REPORT: 
 
Verbal report given to Jose Lamb RN(name) on Buckner Leventhal.  being transferred to Pre-Op/OR(unit) for ordered procedure Report consisted of patients Situation, Background, Assessment and  
Recommendations(SBAR). Information from the following report(s) SBAR, Kardex, Intake/Output and MAR was reviewed with the receiving nurse. Lines:  
Peripheral IV 10/24/20 Right Forearm (Active) Site Assessment Clean, dry, & intact 10/28/20 2496 Phlebitis Assessment 0 10/28/20 3854 Infiltration Assessment 0 10/28/20 6075 Dressing Status Clean, dry, & intact 10/28/20 6240 Dressing Type Tape;Transparent 10/28/20 0234 Hub Color/Line Status Pink; Infusing 10/28/20 8854 Action Taken Open ports on tubing capped 10/28/20 5440 Alcohol Cap Used Yes 10/28/20 9750 Opportunity for questions and clarification was provided. Patient transported with: 
 [facility]

## 2020-10-28 NOTE — PROGRESS NOTES
1047 
Bedside and Verbal shift change report given to [address] [place] [address] Road (oncoming nurse) by Shayna Hernández RN (offgoing nurse). Report included the following information SBAR, Kardex, MAR and Recent Results.

## 2020-10-28 NOTE — ROUTINE PROCESS
Verbal shift change report given to CORNEL Abrams (oncoming nurse) by Leonid Birmingham (offgoing nurse). Report included the following information SBAR, Kardex, MAR and Recent Results.

## 2020-10-29 NOTE — PROGRESS NOTES
Physician Progress Note Charles Olivarez 
CSN #:                  [id number] :                       1935 ADMIT DATE:       10/23/2020 11:05 PM 
[address] [place] DATE: 
RESPONDING 
PROVIDER #:        Rico Matute MD 
 
 
 
 
QUERY TEXT: 
 
Dear hospitalist, Pt admitted with right hip fracture. Pt noted Falls and also noted to have Primary osteoarthritis of right knee 2018. If possible, please document in progress notes and discharge summary if you are evaluating and/or treating any of the following: The medical record reflects the following: 
 
Risk Factors: Advanced age, PMH primary osteoarthritis, DM, fall Clinical Indicators: 
> pelvic- 10/24 X-ray-  displaced comminuted right intertrochanteric fracture. > PMH 20CT re-demonstrated diffuse osseous demineralization and age-appropriate multilevel degenerative changes 
> 10/24/20 CT right femur- Displaced comminuted right intertrochanteric fracture and mildly 
displaced comminuted distal femoral fracture. Treatment: Receiving, X-ray, CT, cholecalciferol, fall precautions, surgical consult Thank you, Delaney Lebron RN, BSN, [address] [place] [address] 
[phone] Options provided: 
-- Traumatic right hip fracture -- Pathological right hip fracture fracture 
-- Osteoporotic right hip fracture 
-- Osteoporotic right hip fracture following fall which would not usually break a normal, healthy bone 
-- Other - I will add my own diagnosis -- Disagree - Not applicable / Not valid -- Disagree - Clinically unable to determine / Unknown 
-- Refer to Clinical Documentation Reviewer PROVIDER RESPONSE TEXT: 
 
This patient has a traumatic fracture of right hip.  
 
Query created by: Naomi Hernandez on 10/26/2020 11:06 AM 
 
 
Electronically signed by:  Rico Matute MD 10/28/2020 10:17 PM

## 2020-10-29 NOTE — PROGRESS NOTES
1930: Report received from JIMBO Navarrete. Assumed care of pt at this time. Patient is currently in PACU.  
 
2019: Received client from PACU in satisfactory condition. Client is A/O X 3. Client is calm and cooperative. No numbness or tingling to lower extremities. Skin is warm , dry and skin color is appropriate to race. Bibasilar breath sounds diminished. Bowel sounds active . Abdomen is soft and non-tender. Client has not voided post-operatively. However has Aguilar in place and is patent and draining. No bladder distention evident. No complaints of bladder discomfort. Client has a ABD pad dressing to the right hip. Dressing is CDI. SCDS applied bilaterally. Client has 20 gauge IV present in right wrist and running LR/125 ml. Client's pain is 0/10 on 0-10 scale. Client oriented to call bell use as well as bed use. Client oriented to phone and how to order meals. Call bell within reach. Bed in low position. Three side rails up. 2116: Tylenol given PRN. Cold pack placed on hip.  
 
0004: Patient in bed, resting quietly. On 1L of nasal cannula. 0561: Removed Aguilar. Patient given urinal.  
 
Shift summary: Patient had uneventful shift. Patient did not complain of pain. Ice pack to hip. SCDS in place. No complaints of SOB, chest pain or distress. Patient remained alert and oriented x3.

## 2020-10-29 NOTE — DIABETES MGMT
GLYCEMIC CONTROL PROGRESS NOTE: 
 
- discussed in rounds, known h/o T2DM, HbA1C within recommended range for age + comorbids on basal/bolus home regimen - BG out of target range non-ICU: < 180 mg/dL, trending up - TDD = 31 (12 Lantus + 19 units - Humalog Very Insulin Resistant Corrective Coverage) - FBG in target range, recommend monitor PPG and make adjustments as needed 
- - Humalog Normal Insulin Sensitivity Corrective Coverage, ordered per protocol Recent Glucose Results:  
Lab Results Component Value Date/Time  (H) 10/29/2020 03:50 AM  
 GLUCPOC 146 (H) 10/29/2020 08:08 AM  
 GLUCPOC 196 (H) 10/28/2020 09:55 PM  
 GLUCPOC 173 (H) 10/28/2020 07:30 PM  
 
 
Víctor Burr MS, RN, CDE Glycemic Control Team 
[phone] Pager [phone] (M-TH 8:00-4:30P) *After Hours pager [phone]

## 2020-10-29 NOTE — PROGRESS NOTES
Problem: Mobility Impaired (Adult and Pediatric) Goal: *Acute Goals and Plan of Care (Insert Text) Description: Physical Therapy Goals Initiated 10/29/2020 and to be accomplished within 7 day(s) 1. Patient will move from supine <> sit with min assist in prep for out of bed activity and change of position. 2.  Patient will perform sit<> stand with min assist/RW NWB R LE in prep for transfers/ambulation. 3.  Patient will transfer from bed <> chair with min assist/RW NWB R LE for time up in chair for completion of ADL activity. 4.  Patient will ambulate 10 feet with min/mod assist/RW NWB R LE for improved functional mobility at discharge. Outcome: Progressing Towards Goal 
[]  Patient has met MD mobilization critieria for d/c home  
[]  Recommend HH with 24 hour adult care [x]  Benefit from additional acute PT session to address:  to optimize functional mobility and activity tolerance PHYSICAL THERAPY EVALUATION Patient: Te Elliott. (82 y.o. male) Date: 10/29/2020 Primary Diagnosis: Closed right [facility] Db Leos Procedure(s) (LRB): 
RIGHT HIP PINNING (DHS) WITH C-ARM (Right) 1 Day Post-Op Precautions:Fall, NWB(WB status changed per Erlene Oppenheim, [place] for Dr. Harish Hardy) NWB R LE 
PLOF: amb with walker as able. Chart review reveals pt discharged from [facility] to rehab (at [facility]) following ORIF of R distal periprosthetic femoral fracture 9/28/2020. Also notes that pt to be NWB R LE x 6 wks post op. ASSESSMENT : 
Based on the objective data described below, the patient is an 83 yo M seen on medical unit s/p surgery as above. Pt presents with decreased ROM/motor performance, decreased independence in overall functional mobility with regard to bed mobility, transfers, gait, balance and with decreased activity tolerance. Pt found supine in bed with O2 at 2Lnc, IV, and SCD's in place.  Pt asleep, easily awakened, though slightly confused on initial awakening. Pt with dressing lateral R thigh and healing incisions around distal R thigh/knee (s/p R Retrograde nail fixation at [facility] on 9/28/2020). Movements rigid; requires A for ROM R LE and reports h/o RC injury R shoulder. Requires mod/max assist of 2 for rolling S<>S for linen adjustment. Max A of 2 to shift up in bed. Pt c/o min pain at rest 0-10 and moderate pain post session 4/10 (note pt received pain meds during session also). Highly recommend SNF for f/u rehab at discharge. Patient will benefit from skilled intervention to address the above impairments. Patient's rehabilitation potential is considered to be Fair Factors which may influence rehabilitation potential include:  
[]         None noted 
[x]         Mental ability/status [x]         Medical condition 
[]         Home/family situation and support systems 
[x]         Safety awareness [x]         Pain tolerance/management 
[]         Other: PLAN : 
Recommendations and Planned Interventions:  
            [x]           Bed Mobility Training             []    Neuromuscular Re-Education 
[x]           Transfer Training                   []    Orthotic/Prosthetic Training 
[x]           Gait Training                          []    Modalities [x]           Therapeutic Exercises           []    Edema Management/Control 
[x]           Therapeutic Activities            []    Family Training/Education 
[x]           Patient Education 
[]           Other (comment): Frequency/Duration: Patient will be followed by physical therapy 1-2 times per day to address goals. Discharge Recommendations: Carlos Rodriguez Further Equipment Recommendations for Discharge: N/A  
 
SUBJECTIVE:  
Patient stated Sleepy. I was stupid; they told me not to walk on. OBJECTIVE DATA SUMMARY:  
 
Past Medical History:  
Diagnosis Date  Arthritis  Atrial fibrillation ([facility]  Dementia ([facility]  Depression  Diabetes ([facility] 1980s  GERD (gastroesophageal reflux disease)  Hypercholesterolemia  Hypertension 2000  Ill-defined condition 2015  
 has passed out-has been tested cannot determine cause  Primary osteoarthritis of right knee 6/28/2018  Ulcer   
 at the anastomotic bite of gastric bypass  Varicose veins  Wide-complex tachycardia ([facility] 5/2/2015 Past Surgical History:  
Procedure Laterality Date  ABDOMEN SURGERY PROC UNLISTED  1998  
 umb hernia Rupture  BIOPSY LIVER  10/05/04  
 EGD  12/29/09  
 with biopsy  ENDOSCOPY, COLON, DIAGNOSTIC    
 GASTROSTOMY TUBE  10/05/04  HX CATARACT REMOVAL    
 bilateral  
 HX CHOLECYSTECTOMY  10/05/04  HX GI  10/05/04  
 vertical banded gastroplasty/gastric bypass  HX MOHS PROCEDURES  1991/1995  
 bilateral  
 HX PACEMAKER  2015 [facility] Cardiac Monitor Barriers to Learning/Limitations: yes;  physical and altered mental status (i.e.Sedation, Confusion) Compensate with: Verbal Cues and Other slight confusion upon initial waking Home Situation: 
Home Situation Home Environment: Private residence # Steps to Enter: 3 Rails to Enter: Yes Hand Rails : Bilateral(far apart) One/Two Story Residence: One story Living Alone: No 
Support Systems: Spouse/Significant Other/Partner(however spouse is in rehab at [facility] for last 4-5wks) Patient Expects to be Discharged to[de-identified] Skilled nursing facility Current DME Used/Available at Home: Walker, rolling, Cane, quad, [place], Shower chair Tub or Shower Type: Tub/Shower combination Critical Behavior: 
Neurologic State: Alert Orientation Level: Oriented X4 Cognition: Follows commands Safety/Judgement: Decreased awareness of need for safety;Decreased insight into deficits Psychosocial 
Patient Behaviors: Calm; Cooperative Purposeful Interaction: Yes Pt Identified Daily Priority: Clinical issues (comment) Caritas Process: Nurture loving kindness Caring Interventions: Reassure Reassure: Therapeutic listening Therapeutic Modalities: Intentional therapeutic touch Skin Condition/Temp: Dry;Warm 
Skin Integrity: Incision (comment)(post opdrsg lateral R thigh; healing incisions distal femor) Skin Integumentary Skin Color: Appropriate for ethnicity Skin Condition/Temp: Dry;Warm 
Skin Integrity: Incision (comment)(post opdrsg lateral R thigh; healing incisions distal femor) Turgor: Epidermis thin w/ loss of subcut tissue Hair Growth: Present Strength:   
Strength: Generally decreased, functional 
Tone & Sensation:  
Sensation: Intact Range Of Motion: 
AROM: Generally decreased, functional 
PROM: Generally decreased, functional 
Functional Mobility: 
Bed Mobility: 
Rolling: Moderate assistance;Maximum assistance x 2 Balance:  
Sitting: Intact(long sitting in bed) Pain: 
Pain level pre-treatment: 2/10 Pain level post-treatment: 4/10 Pain Intervention(s) : Medication (see MAR); Rest, Ice, Repositioning Response to intervention: Nurse notified, See doc flow Activity Tolerance:  
Fair Please refer to the flowsheet for vital signs taken during this treatment. After treatment:  
[]         Patient left in no apparent distress sitting up in chair 
[x]         Patient left in no apparent distress in bed 
[x]         Call bell left within reach [x]         Nursing notified 
[]         Caregiver present [x]         Bed alarm activated 
[x]         SCDs applied COMMUNICATION/EDUCATION:  
[x]         Role of Physical Therapy in the acute care setting. [x]         Fall prevention education was provided and the patient/caregiver indicated understanding. [x]         Patient/family have participated as able in goal setting and plan of care. [x]         Patient/family agree to work toward stated goals and plan of care. []         Patient understands intent and goals of therapy, but is neutral about his/her participation. []         Patient is unable to participate in goal setting/plan of care: ongoing with therapy staff. 
[]         Other: Thank you for this referral. 
Abdon Wang, PT Time Calculation: 36 mins Eval Complexity: History: HIGH Complexity :3+ comorbidities / personal factors will impact the outcome/ POC Exam:MEDIUM Complexity : 3 Standardized tests and measures addressing body structure, function, activity limitation and / or participation in recreation  Presentation: MEDIUM Complexity : Evolving with changing characteristics  Clinical Decision Making:High Complexity    Overall Complexity:HIGH

## 2020-10-29 NOTE — PROGRESS NOTES
10/29/2020 PT note: consult received and chart reviewed. Evaluation attempted. Pt currently eaqting lunch meal.  Will f/u at later time as pt schedule allows for PT evaluation.  Thank you for this referral.  
Roxy Nicholas, PT

## 2020-10-29 NOTE — PROGRESS NOTES
Problem: Falls - Risk of 
Goal: *Absence of Falls Description: Document Jadyn Snowvandana Fall Risk and appropriate interventions in the flowsheet. Outcome: Progressing Towards Goal 
Note: Fall Risk Interventions: 
Mobility Interventions: Communicate number of staff needed for ambulation/transfer Mentation Interventions: Adequate sleep, hydration, pain control Medication Interventions: Teach patient to arise slowly Elimination Interventions: Call light in reach History of Falls Interventions: Investigate reason for fall

## 2020-10-29 NOTE — PROGRESS NOTES
Problem: Falls - Risk of 
Goal: *Absence of Falls Description: Document Eduardo Click Fall Risk and appropriate interventions in the flowsheet. Outcome: Progressing Towards Goal 
Note: Fall Risk Interventions: 
Mobility Interventions: Bed/chair exit alarm, Patient to call before getting OOB, PT Consult for mobility concerns Mentation Interventions: Bed/chair exit alarm, Adequate sleep, hydration, pain control Medication Interventions: Patient to call before getting OOB, Teach patient to arise slowly Elimination Interventions: Patient to call for help with toileting needs, Call light in reach, Bed/chair exit alarm History of Falls Interventions: Consult care management for discharge planning, Bed/chair exit alarm, Door open when patient unattended, Investigate reason for fall, Room close to nurse's station

## 2020-10-29 NOTE — PROGRESS NOTES
Hospitalist Progress Note Patient: Herber Rudolph. MRN: [id number]  CSN: [id number] YOB: 1935  Age: 82 y.o. Sex: male DOA: 10/23/2020 LOS:  LOS: 4 days Assessment/Plan Patient Active Problem List  
Diagnosis Code  Hypertension I10  
 Diabetes mellitus ([facility] E11.9  Hyperlipemia E78.5  Depression F32.9  Varicose veins I83.90  Arthritis M19.90  
 Encephalopathy G93.40  Encephalopathy acute G93.40  Syncope R55  Dizziness R42  Nausea R11.0  Tinnitus H93.19  
 Atrial fibrillation (HCC) I48.91  
 Wide-complex tachycardia (HCC) I47.2  Syncope and collapse R55  Cardiac device in situ Z95.9  Atrial flutter (HCC) I48.92  
 Pneumonia J18.9  Acute on chronic diastolic congestive heart failure (HCC) I50.33  
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  
 Migraine with vertigo G43. [facility]  Fall W19. [place]  Closed right hip fracture, initial encounter ([facility] S72.001A  Dementia without behavioral disturbance (HCC) F03.90  Anticoagulant long-term use Z79.01  
 Hypoxia R09.02  
 Person under investigation for COVID-19 Z20.828  
  
 
 
 
82 y.o.  male who with history of dementia, atrial for rib on Xarelto, admitted  after sustaining a fall and distal femur fracture. Reviewed CT of the head:  no acute intracranial abnormality.  
  
Closed right hip fracture, : Ortho is consulted. for surgery today Seen by cardiology, moderate risk for periprocedural cardiovascular event 
  
Dementia without behavioral disturbance: Resume regimen 
  
Possible hospital-acquired pneumonia 
on vancomycin and Zosyn  
  
 Diabetes: On SSI 
  
Protonix for GI prevention 
  
Atrial fibrillation: Rate control with Coreg 
  
DVT/GI Prophylaxis: SCD's Disposition : TBD Review of systems General: No fevers or chills. Cardiovascular: No chest pain or pressure. No palpitations. Pulmonary: No shortness of breath. Gastrointestinal: No nausea, vomiting. Physical Exam: 
General: Awake, cooperative, no acute distress   
HEENT: NC, Atraumatic. PERRLA, anicteric sclerae. Lungs: CTA Bilaterally. No Wheezing/Rhonchi/Rales. Heart:  S1 S2,  No murmur, No Rubs, No Gallops Abdomen: Soft, Non distended, Non tender.  +Bowel sounds, Extremities: Left hip pain with movement. Psych:   Not anxious or agitated. Neurologic:  No acute neurological deficit. Vital signs/Intake and Output: 
Visit Vitals BP (!) 140/73 Pulse 86 Temp 97.7 °F (36.5 °C) Resp 19 Ht 5' 7\" (1.702 m) Wt 87.5 kg (193 lb) SpO2 100% BMI 30.23 kg/m² Current Shift:  No intake/output data recorded. Last three shifts:  10/27 0701 - 10/28 1900 In: 1519 [P.O.:25; I.V.:3236] Out: 2325 [Urine:2325] Labs: Results:  
   
Chemistry Recent Labs 10/28/20 
0028 10/27/20 
1025 10/26/20 
0330 * 183* 239*  141 135* K 3.3* 3.6 4.2  105 101 CO2 27 30 25 BUN 26* 22* 21* CREA 1.26 1.13 1.15  
CA 7.4* 8.2* 7.7* AGAP 9 6 9 BUCR 21* 19 18 *  --  134* TP 5.0*  --  5.1* ALB 1.6*  --  1.6*  
GLOB 3.4  --  3.5 AGRAT 0.5*  --  0.5* CBC w/Diff Recent Labs 10/27/20 
1025 10/26/20 
0330 WBC 12.1 8.5  
RBC 3.22* 2.75* HGB 9.0* 7.8* HCT 28.4* 24.1*  
 373 GRANS  --  89* LYMPH  --  6*  
EOS  --  0 Cardiac Enzymes No results for input(s): CPK, CKND1, ABBY in the last 72 hours. No lab exists for component: Stacie Barr Coagulation Recent Labs 10/28/20 
0028 10/27/20 
0100 PTP 16.7* 16.8* INR 1.4* 1.4* APTT 32.2 38.9* Lipid Panel Lab Results Component Value Date/Time Cholesterol, total 107 01/24/2015 09:15 AM  
 HDL Cholesterol 25 (L) 01/24/2015 09:15 AM  
 LDL, calculated 44 01/24/2015 09:15 AM  
 VLDL, calculated 38 01/24/2015 09:15 AM  
 Triglyceride 190 (H) 01/24/2015 09:15 AM  
 CHOL/HDL Ratio 4.3 01/24/2015 09:15 AM  
  
BNP No results for input(s): BNPP in the last 72 hours. Liver Enzymes Recent Labs 10/28/20 
0028 TP 5.0* ALB 1.6* * Thyroid Studies Lab Results Component Value Date/Time TSH 2.61 10/25/2020 02:20 AM  
    
Procedures/imaging: see electronic medical records for all procedures/Xrays and details which were not copied into this note but were reviewed prior to creation of Plan

## 2020-10-29 NOTE — PROGRESS NOTES
Comprehensive Nutrition Assessment Type and Reason for Visit: Initial(LOS) Nutrition Recommendations/Plan: continue current POC Nutrition Assessment:  Pt admit s/p fall resulting in R hip pinning w/C-ARM surgery. Pt is LOS day 5. PMH: dementia, arthritis, a fib, depression, DM, GERD, high cholseterol, HTN, ulcer at anastomotic bite of gastric bypass. tachycardia. Estimated Daily Nutrient Needs: 
Energy (kcal): 1969; Weight Used for Energy Requirements: Current Protein (g): 95g; Weight Used for Protein Requirements: Current Fluid (ml/day): 1969; Weight Used for Fluid Requirements: Current Nutrition Related Findings:  K-3.3, QCO-[id number], meds: vitamin C, oscal D, vitamin D3, ferrous sulfate, lasix, lantus, lispro, meltaonin, essence colace, zinc. BM 10/27. admit wt 80kg, ? accuracy with I/O about even. Noted minimal wt hx, 1 wt noted of 214lbs x 1 month ago. unable to speak with patient, but further wt hx shows usual body weight of 190. Wounds:   
Skin tears Current Nutrition Therapies: DIET DIABETIC CONSISTENT CARB Regular Anthropometric Measures: 
Height:  5' 7\" (170.2 cm) Current Body Wt:  86.2 kg (190 lb) Admission Body Wt:  176 lb 5.9 oz   
Usual Body Wt:  86.2 kg (190 lb) Ideal Body Wt:  148 lbs:  128.4 % Adjusted Body Weight:   ; Weight Adjustment for: No adjustment Adjusted BMI:      
BMI Category: Overweight (BMI 25.0-29. 9) Nutrition Diagnosis:  
Increased nutrient needs related to catabolic illness, acute injury/trauma as evidenced by (recent surgery) Nutrition Interventions:  
Food and/or Nutrient Delivery: Continue current diet Nutrition Education and Counseling: No recommendations at this time Coordination of Nutrition Care: No recommendation at this time Goals: 
PO intakes >75% throughout the next 5-7 days Nutrition Monitoring and Evaluation:  
Behavioral-Environmental Outcomes: None identified Food/Nutrient Intake Outcomes: Food and nutrient intake Physical Signs/Symptoms Outcomes: Biochemical data, Nutrition focused physical findings, Weight Discharge Planning:   
Continue current diet Electronically signed by Dawn Ferrara on 10/29/2020 at 9:00 AM

## 2020-10-29 NOTE — ANESTHESIA POSTPROCEDURE EVALUATION
Post-Anesthesia Evaluation and Assessment Cardiovascular Function/Vital Signs Visit Vitals BP (!) 140/69 Pulse 83 Temp 36.5 °C (97.7 °F) Resp 19 Ht 5' 7\" (1.702 m) Wt 87.5 kg (193 lb) SpO2 100% BMI 30.23 kg/m² Patient is status post Procedure(s): RIGHT HIP PINNING (DHS) WITH C-ARM. Nausea/Vomiting: Controlled. Postoperative hydration reviewed and adequate. Pain: 
Pain Scale 1: Numeric (0 - 10) (10/28/20 2006) Pain Intensity 1: 0 (10/28/20 2006) Managed. Neurological Status:  
Neuro (WDL): Exceptions to WDL (10/28/20 2006) At baseline. Mental Status and Level of Consciousness: Arousable. Pulmonary Status:  
O2 Device: Nasal cannula (10/28/20 1946) Adequate oxygenation and airway patent. Complications related to anesthesia: None Post-anesthesia assessment completed. No concerns. Patient has met all discharge requirements. Signed By: Adriana Saleem CRNA October 28, 2020

## 2020-10-29 NOTE — PROGRESS NOTES
D/C Plan: [facility] 10/30/2020 CM and provider met with pt at bedside to discuss transition of care. Pt has indicated he would like to go to [facility]. CM contacted [facility] and pt has been declined. Noted pt has been accepted by [facility]. CM provided pt with an update. Pt is agreeable to going to [facility]. Pt has requested CM contact his wife to notify her of plan transition to this facility tomorrow. CM attempted to contact pt's wife, Georgia ([phone]), with no success. CM contacted pt's daughter, Holly Canas ([phone]), and she has indicated she will be coming to see the pt this afternoon. CM inquired about pt's wife and pt's daughter has indicated she is staying with her while she recuperates. CM notified pt's daughter that pt's transition of care plan is on pt's communication board. Anticipate pt will transition to [facility] tomorrow. Noted pt had a LTSS/UAI screening completed at [facility]. CM will request a copy of this screening to assist with transition of care. 1545: 
CM spoke with pt's wife, Georgia, and provided an update. Pt/family are aware of plan transition to [facility] tomorrow between 1:00pm and 2:00pm.  CM to continue to follow and assist. 
 
Transition of Care Plan:  
 
Communication to Patient/Family: Met with patient and family, and they are agreeable to the transition plan. The Plan for Transition of Care is related to the following treatment goals: SNF The Patient and/or patient representative was provided with a choice of provider and agrees  
with the discharge plan. Yes [x] No [] Freedom of choice list was provided with basic dialogue that supports the patient's individualized plan of care/goals and shares the quality data associated with the providers. Yes [x] No [] SNF/Rehab Transition: 
Patient has been accepted to [facility] at [address] [facility], [address] [facility] for SNF and meets criteria for admission. Patient will transported by medical transport and expected to leave tomorrow (10/30/2020). Communication to SNF/Rehab: 
Bedside RN,  has been notified to update the transition plan to the facility and call report [phone]. Discharge information has been updated on the AVS and communicated through [facility] or [facility]. Discharge instructions to be fax'd to facility at [phone] Please include all hard scripts for controlled substances, med rec and dc summary, and AVS in packet. Please medicate for pain prior to dc if possible and needed to help offset delay when patient first arrives to facility. Reviewed and confirmed with facility, [facility] can manage the patient care needs for the following:  
 
Alessandra Diehl with (X) only those applicable: 
Medication: 
[]Medications are available at the facility []IV Antibiotics []Controlled Substance  hard copies available sent. []Weekly Labs Equipment: 
[]CPAP/BiPAP []Wound Vacuum []Aguilar or Urinary Device []PICC/Central Line []Nebulizer []Ventilator Treatment: 
[]Isolation (for MRSA, VRE, etc.) []Surgical Drain Management []Tracheostomy Care 
[]Dressing Changes []Dialysis with transportation []PEG Care []Oxygen []Daily Weights for Heart Failure Dietary: 
[]Any diet limitations []Tube Feedings []Total Parenteral Management (TPN) Financial Resources: 
[]Medicaid Application Completed []UAI Completed and copy given to pt/family. [x]A screening has previously been completed. []Level II Completed 
 
[] Private pay individual who will not become  
financially eligible for Medicaid within 6 months from admission to a [address] [place] [address]. [] Individual refused to have screening conducted. []Medicaid Application Completed []The screening denied because it was determined individual did not need/did not qualify for nursing facility level of care.  
[] Out of state residents seeking direct admission to a [address] facility. [] Individuals who are inpatients of an out of state hospital, or in state or out of state veterans/ hospital and seek direct admission to a [address] facility [] Individuals who are pateints or residents of a state owned/operated facility that is licensed by Department of [employer] ([employer]) and seek direct admission to [address] facility [] A screening not required for enrollment in [address] [address] [address] services as set out in 12 VAC 30- [] [facility]) staff shall perform screenings of the [facility] clients. Advanced Care Plan: 
[]Surrogate Decision Maker of Care 
[]POA []Communicated Code Status and copy sent. Other:  
   
 
Care Management Interventions Mode of Transport at Discharge: S Transition of Care Consult (CM Consult): Discharge Planning, SNF Health Maintenance Reviewed: Yes Physical Therapy Consult: Yes Current Support Network: Lives with Spouse, Family Lives Vina Confirm Follow Up Transport: Family The Plan for Transition of Care is Related to the Following Treatment Goals : [facility] The Patient and/or Patient Representative was Provided with a Choice of Provider and Agrees with the Discharge Plan?: Yes Name of the Patient Representative Who was Provided with a Choice of Provider and Agrees with the Discharge Plan: pt 
Freedom of Choice List was Provided with Basic Dialogue that Supports the Patient's Individualized Plan of Care/Goals, Treatment Preferences and Shares the Quality Data Associated with the Providers?: Yes Discharge Location Discharge Placement: Skilled nursing facility([facility])

## 2020-10-29 NOTE — PROGRESS NOTES
3327 
Bedside and verbal shift change report given to Jose Bowen RN (on coming nurse) by KATT Meehan RN (off going nurse). Report included the following information SBAR, Kardex, OR Summary, Intake/Output and MAR. 
 
1924 Bedside and verbal shift change report given by JIMBO Bolaños (off going nurse) to Precilla Osler, RN(on coming nurse). Report included the following information SBAR, Kardex, OR Summary, Intake/Output and MAR.

## 2020-10-29 NOTE — PROGRESS NOTES
Occupational Therapy Patient reports his spouse is at [facility] for [address] and would like to be there with her.  
 
Genie Villarreal, OTR/L, CSRS, CDCS, Roamz

## 2020-10-29 NOTE — PROGRESS NOTES
Hospitalist Progress Note Patient: Kayode Ji MRN: [id number]  CSN: [id number] YOB: 1935  Age: 82 y.o. Sex: male DOA: 10/23/2020 LOS:  LOS: 5 days Assessment/Plan Patient Active Problem List  
Diagnosis Code  Hypertension I10  
 Diabetes mellitus ([facility] E11.9  Hyperlipemia E78.5  Depression F32.9  Varicose veins I83.90  Arthritis M19.90  Tinnitus H93.19  
 Paroxysmal atrial fibrillation (HCC) I48.0  Cardiac device in situ Z95.9  Pneumonia J18.9  Primary osteoarthritis of right knee M17.11  Type II or unspecified type diabetes mellitus with renal manifestations, uncontrolled(250.42) (HCC) E11.29, E11.65  Diastolic CHF, chronic ([facility]) I50.32  
 Migraine with vertigo G43. [facility]  Fall W19. Usha Captain  Closed right hip fracture, initial encounter ([facility] S72.001A  Dementia without behavioral disturbance ([facility]) F03.90  Anticoagulant long-term use Z79.01  
 Hypoxia R09.02  
  
 
 
 
82 y.o.  male who with history of dementia, atrial for rib on Xarelto, admitted  after sustaining a fall and distal femur fracture. 
 
 
  
Closed right hip fracture - S/p right hip pinning 
  
Dementia without behavioral disturbance - 
Continue with home meds. 
  
Possible hospital-acquired pneumonia - 
on vancomycin and Zosyn Paroxysmal Atrial fibrillation - Sinus rhytym, continue with Cardizem Echo 09/2020 with EF of 50-55% On xarelto PTA, restart once ok from ortho. 
 
  
Diabetes - On lantus, premeal, SSI. CT of the head with no acute intracranial abnormality.  
  
Protonix for GI prevention COVID 19 negative 
  
DVT/GI Prophylaxis: lovenox Disposition : awaiting rehab Review of systems General: No fevers or chills. Cardiovascular: No chest pain or pressure. No palpitations. Pulmonary: No shortness of breath. Gastrointestinal: No nausea, vomiting.   
 
 
Physical Exam: 
General: Awake, cooperative, no acute distress   
 HEENT: NC, Atraumatic. PERRLA, anicteric sclerae. Lungs: CTA Bilaterally. No Wheezing/Rhonchi/Rales. Heart:  S1 S2,  No murmur, No Rubs, No Gallops Abdomen: Soft, Non distended, Non tender.  +Bowel sounds, Extremities: S/p left hip surgery. Psych:   Not anxious or agitated. Neurologic:  No acute neurological deficit. Vital signs/Intake and Output: 
Visit Vitals BP (!) 140/61 (BP 1 Location: Left leg, BP Patient Position: Sitting) Pulse 69 Temp 97.4 °F (36.3 °C) Resp 14 Ht 5' 7\" (1.702 m) Wt 86.4 kg (190 lb 6.4 oz) SpO2 92% BMI 29.82 kg/m² Current Shift:  10/29 0701 - 10/29 1900 In: 8132 [P.O.:230; I.V.:1327] Out: 75 [Urine:75] Last three shifts:  10/27 1901 - 10/29 0700 In: 9740 [P.O.:25; I.V.:3236] Out: 1725 [NINOC:2340] Labs: Results:  
   
Chemistry Recent Labs 10/29/20 
0350 10/28/20 
0028 10/27/20 
1025 * 215* 183*  142 141  
K 3.3* 3.3* 3.6  106 105 CO2 31 27 30 BUN 22* 26* 22* CREA 1.03 1.26 1.13  
CA 7.7* 7.4* 8.2* AGAP 5 9 6 BUCR 21* 21* 19  
AP  --  129*  --   
TP  --  5.0*  --   
ALB  --  1.6*  --   
GLOB  --  3.4  --   
AGRAT  --  0.5*  --   
  
CBC w/Diff Recent Labs 10/29/20 
0350 10/27/20 
1025 WBC 8.8 12.1 RBC 2.91* 3.22* HGB 8.2* 9.0*  
HCT 26.3* 28.4*  
 387 Cardiac Enzymes No results for input(s): CPK, CKND1, ABBY in the last 72 hours. No lab exists for component: Cleve Bah Coagulation Recent Labs 10/28/20 
0028 10/27/20 
0100 PTP 16.7* 16.8* INR 1.4* 1.4* APTT 32.2 38.9* Lipid Panel Lab Results Component Value Date/Time Cholesterol, total 107 01/24/2015 09:15 AM  
 HDL Cholesterol 25 (L) 01/24/2015 09:15 AM  
 LDL, calculated 44 01/24/2015 09:15 AM  
 VLDL, calculated 38 01/24/2015 09:15 AM  
 Triglyceride 190 (H) 01/24/2015 09:15 AM  
 CHOL/HDL Ratio 4.3 01/24/2015 09:15 AM  
  
BNP No results for input(s): BNPP in the last 72 hours. Liver Enzymes Recent Labs 10/28/20 
0028 TP 5.0* ALB 1.6* * Thyroid Studies Lab Results Component Value Date/Time TSH 2.61 10/25/2020 02:20 AM  
    
Procedures/imaging: see electronic medical records for all procedures/Xrays and details which were not copied into this note but were reviewed prior to creation of Plan

## 2020-10-29 NOTE — ROUTINE PROCESS
Bedside shift change report given to Bk Parks RN (oncoming nurse) by Simi FLORENTINO RN (offgoing nurse). Report included the following information SBAR, Kardex and MAR.

## 2020-10-29 NOTE — PROGRESS NOTES
Problem: Self Care Deficits Care Plan (Adult) Goal: *Acute Goals and Plan of Care (Insert Text) Description: Initial Occupational Therapy Goals (10/29/2020) Within 7 day(s): 1. Patient will perform grooming seated EOB with setup for increased independence in ADLs. 2. Patient will perform UB dressing with setup seated EOB for increased independence with ADLs. 3. Patient will perform LB dressing with moderate Assist & A/E PRN for increased independence with ADLs. 4. Patient will perform all aspects of toileting with moderate assist for increased independence with ADLs. 5. Patient will perform LB ADLs utilizing body mechanics & adaptive strategies with 1 verbal cue for increased safety in ADLs. 6. Patient will perform bed mobility with minimal assist to EOB in preparation for ADLs. Outcome: Progressing Towards Goal 
  
OCCUPATIONAL THERAPY EVALUATION Patient: Salome Beaulieu. (82 y.o. male) Date: 10/29/2020 Primary Diagnosis: Closed right hip fracture, initial encounter ([facility] Edra Civil Procedure(s) (LRB): 
RIGHT HIP PINNING (DHS) WITH C-ARM (Right) 1 Day Post-Op Precautions:  Fall, NWB(WB status changed per Leah Madrid for Dr. Cha Carter), Pacemaker PLOF: Patient was independent prior to fall in 9/26 sustaining distal femur fx at [facility] s/p \"retrograde nail fixation\" NWB x 6 weeks; pt was at [facility] and sustained fall having introchanteric fx, now s/p pinning. ASSESSMENT AND RECOMMENDATIONS: 
Based on the objective data described below, the patient presents with RLE decreased ROM and strength affecting LE ADLs. Patient setup in supported sitting for grooming and self-feeding task. Pt initially w/ coughing when drinking coffee, but later able to feed self meal w/o s/s of coughing. Pt greatly limited in LE ADLs and memory recall. Pt was able to report spouse was at [facility] for Rehab and would like to be there with her.  Patient will benefit from skilled Occupational Therapy intervention to maximize independence. Education: Reviewed home safety, body mechanics, importance of moving every hour to prevent joint stiffness, role of ice for edema/pain control, Rolling Walker management/safety, and adaptive dressing techniques with patient verbalizing  understanding at this time Patient will benefit from skilled intervention to address the above impairments. Patient's rehabilitation potential is considered to be Good Factors which may influence rehabilitation potential include:  
[]             None noted [x]             Mental ability/status [x]             Medical condition []             Home/family situation and support systems []             Safety awareness []             Pain tolerance/management 
[]             Other: PLAN : 
Recommendations and Planned Interventions:  
[x]               Self Care Training                  [x]      Therapeutic Activities [x]               Functional Mobility Training   [x]      Cognitive Retraining 
[x]               Therapeutic Exercises           [x]      Endurance Activities [x]               Balance Training                    [x]      Neuromuscular Re-Education []               Visual/Perceptual Training     [x]      Home Safety Training 
[x]               Patient Education                   [x]      Family Training/Education []               Other (comment): Frequency/Duration: Patient will be followed by Occupational Therapy 1-2 times per day/2-4 days per week to address goals. Discharge Recommendations: Rehab Further Equipment Recommendations for Discharge: To Be Determined (TBD) at next level of care SUBJECTIVE:  
Patient stated I did something stupid. .. they told me not to get up and I got up and put weight through my leg.  OBJECTIVE DATA SUMMARY:  
 
Past Medical History:  
Diagnosis Date Arthritis Atrial fibrillation ([facility] Dementia ([facility] Depression Diabetes ([facility] 1980s GERD (gastroesophageal reflux disease) Hypercholesterolemia Hypertension 2000 Ill-defined condition 2015  
 has passed out-has been tested cannot determine cause Primary osteoarthritis of right knee 6/28/2018 Ulcer   
 at the anastomotic bite of gastric bypass Varicose veins Wide-complex tachycardia ([address] 5/2/2015 Past Surgical History:  
Procedure Laterality Date ABDOMEN SURGERY PROC UNLISTED  1998  
 umb hernia Rupture BIOPSY LIVER  10/05/04 EGD  12/29/09  
 with biopsy ENDOSCOPY, COLON, DIAGNOSTIC    
 GASTROSTOMY TUBE  10/05/04 HX CATARACT REMOVAL    
 bilateral  
 HX CHOLECYSTECTOMY  10/05/04 HX GI  10/05/04  
 vertical banded gastroplasty/gastric bypass HX MOHS PROCEDURES  1991/1995  
 bilateral  
 HX PACEMAKER  2015 [facility] Cardiac Monitor Barriers to Learning/Limitations: yes;  cognitive Compensate with: visual, verbal, tactile, kinesthetic cues/model Home Situation/Prior Level of Function: Was at [facility] for Rehab, but would like to go to [facility] where wife is for Rehab Home Situation Home Environment: Private residence # Steps to Enter: 3 Rails to Enter: Yes Hand Rails : Bilateral(far apart) One/Two Story Residence: One story Living Alone: No 
Support Systems: Spouse/Significant Other/Partner(however spouse is in rehab at [facility] for last 4-5wks) Patient Expects to be Discharged to[de-identified] Skilled nursing facility Current DME Used/Available at Home: Walker, rolling, Cane, quad, [place], Shower chair Tub or Shower Type: Tub/Shower combination 
[x]  Right hand dominant   []  Left hand dominant Cognitive/Behavioral Status: 
Neurologic State: Alert Orientation Level: Oriented X4 Cognition: Follows commands Safety/Judgement: Decreased awareness of need for safety;Decreased insight into deficits Skin: R hip incision w/ dressing Edema: applied ice Vision/Perceptual: 
 Appears [facility] Coordination: SAIMA 
 Fine Motor Skills-Upper: Left Intact; Right Intact Gross Motor Skills-Upper: Left Intact; Right Intact Balance: 
Sitting: Intact(long sitting in bed) Strength: BUE Strength: Generally decreased, functional 
 
Tone & Sensation:BUE Sensation: Intact Range of Motion: BUE 
AROM: Generally decreased, functional 
PROM: Generally decreased, functional 
 
Functional Mobility and Transfers for ADLs: 
Bed Mobility: 
Rolling: Moderate assistance;Maximum assistance ADL Assessment: 
Feeding: Setup Oral Facial Hygiene/Grooming: Minimum assistance Bathing: Maximum assistance Upper Body Dressing: Moderate assistance Lower Body Dressing: Maximum assistance Toileting: Maximum assistance ADL Intervention: 
Feeding Feeding Assistance: Set-up Container Management: Contact guard assistance;Minimum assistance Cutting Food: Moderate assistance Utensil Management: Modified independent Food to Mouth: Modified independent Drink to Mouth: Modified independent Grooming Washing Face: Set-up(seated) Washing Hands: Set-up(hand wipes) Lower Body Dressing Assistance Socks: Total assistance (dependent) Cognitive Retraining Problem Solving: Inductive reason; Identifying the task; Identifying the problem;General alternative solution;Deductive reason; Awareness of environment Executive Functions: Executing cognitive plans;Managing time Organizing/Sequencing: Breaking task down;Prioritizing Attention to Task: Distractibility Safety/Judgement: Decreased awareness of need for safety;Decreased insight into deficits Pain: 
Pain level pre-treatment: 3/10 Pain level post-treatment: 3/10 Pain Intervention(s): Medication administer by Nursing (see MAR); Rest, Ice, Repositioning Response to intervention: Nurse notified, see doc flow sheet Please refer to the flowsheet for vital signs taken during this treatment. After treatment:  
[]  Patient left in no apparent distress sitting up in chair []  Patient sitting on EOB [x]  Patient left in no apparent distress in bed 
[x]  Call bell left within reach [x]  Nursing notified/Eunsil 
[]  Caregiver present [x]  Ice applied 
[]  SCD's on while back in bed 
[] Bed alarm activated COMMUNICATION/EDUCATION:  
Communication/Collaboration: 
[x]       Role of Occupational Therapy in the acute care setting. [x]      Home safety education was provided and the patient/caregiver indicated understanding. [x]      Patient/family have participated as able in goal setting and plan of care. [x]      Patient/family agree to work toward stated goals and plan of care. []      Patient understands intent and goals of therapy, but is neutral about his/her participation. []      Patient is unable to participate in plan of care at this time. Thank you for this referral. 
Genie Rodríguez, OTR/L, CSRS, CDCS, CFPS Time Calculation: 38 mins Eval Complexity: History: HIGH Complexity : Extensive review of history including physical, cognitive and psychosocial history ; Examination: HIGH Complexity : 5 or more performance deficits relating to physical, cognitive , or psychosocial skils that result in activity limitations and / or participation restrictions; Decision Making:HIGH Complexity : Patient presents with comorbidities that affect occupational performance. Signifigant modification of tasks or assistance (eg, physical or verbal) with assessment (s) is necessary to enable patient to complete evaluation

## 2020-10-29 NOTE — PROGRESS NOTES
Progress Note POD # Patient: Tres Smith. Sex: male          DOA: 10/23/2020 YOB: 1935      Surgery: Procedure(s): RIGHT HIP PINNING ([facility]) WITH C-ARM         
 LOS: 5 days Subjective: No new complaints Objective:  
  
Visit Vitals BP (!) 131/57 Pulse 70 Temp 98.2 °F (36.8 °C) Resp 12 Ht 5' 7\" (1.702 m) Wt 86.4 kg (190 lb 6.4 oz) SpO2 96% BMI 29.82 kg/m² Physical Exam: 
Neurological: motor strength: 5/5 in lower extremities bilaterally 
                        sensation: intact to light touch Patient mobility Intake and Output: 
Current Shift:  No intake/output data recorded. Last three shifts:  10/27 1901 - 10/29 0700 In: 3975 [P.O.:25; I.V.:3236] Out: 1725 [[id number]] Lab/Data Reviewed: 
 
Lab/Data Reviewed: 
Lab Results Component Value Date/Time WBC 8.8 10/29/2020 03:50 AM  
 HGB 8.2 (L) 10/29/2020 03:50 AM  
 HCT 26.3 (L) 10/29/2020 03:50 AM  
 PLATELET 291 74/10/9519 03:50 AM  
 MCV 90.4 10/29/2020 03:50 AM  
 
Lab Results Component Value Date/Time  
 aPTT 32.2 10/28/2020 12:28 AM  
 
Lab Results Component Value Date/Time INR 1.4 (H) 10/28/2020 12:28 AM  
 INR 1.4 (H) 10/27/2020 01:00 AM  
 INR 1.5 (H) 10/26/2020 05:00 AM  
 Prothrombin time 16.7 (H) 10/28/2020 12:28 AM  
 Prothrombin time 16.8 (H) 10/27/2020 01:00 AM  
 Prothrombin time 17.7 (H) 10/26/2020 05:00 AM  
  
 
 
 
Assessment/Plan Principal Problem: 
  Person under investigation for COVID-19 (10/24/2020) Active Problems: 
  Pneumonia (11/16/2016) Fall (10/24/2020) Closed right hip fracture, initial encounter ([facility] [address] (10/24/2020) Dementia without behavioral disturbance ([facility] [address] (10/24/2020) Anticoagulant long-term use (10/24/2020) Hypoxia (10/24/2020) 1. Stable 2. OOB with PT, TDWB right LE 
3. D/C Planning 4. D/C PCA 5. D/C emily 6. Follow-up in 10 days at [facility] with Dr. Herman Kathleen.

## 2020-10-29 NOTE — PROGRESS NOTES
IV to PO Conversion Recommendation Patient: Herber Rudolph. MRN#: [id number] Admission Date: 603876 IV TO PO Medication(s) Pantoprazole Oral Meds  Yes Diet:    
Active Orders Diet DIET DIABETIC CONSISTENT CARB Regular TEMP 98 °F (36.7 °C) WBC Lab Results Component Value Date/Time WBC 8.8 10/29/2020 03:50 AM  
   
 
 
Pharmacy Dosing Services: 
Summary:  
Does the patient meet all criteria for IV to PO switch as listed below? Yes If no, list:   
Is the patient excluded from IV to PO automatic switch based on criteria listed below? No  
If yes, list:   
 
Criteria for IV to PO switch - Antibiotics Received IV therapy for at least 24 hours 2. Functioning GI tract Taking scheduled oral medications Tolerating diet more advanced than clear liquids 3. No signs/symptoms of shock No vasopressor support Criteria for IV to PO switch  Nonantibiotics Functioning GI tract Taking scheduled oral medications Toleratind duet more advanced than clear liquids 2. No signs/symptoms of shock No vasopressor support 3. No seizures for >72 hours (antiepileptic medications only) Exclusion Criteria Patient is being treated for an infection that requires IV therapy such as: endocarditis, osteomyelitis, meningitis, pancreatitis (antibiotics only) NG tube with continous suction Nausea and/or vomiting or severe diarrhea within past 24 hours Malabsorption syndromes, partial or total gastrectomy, ileus, gastric outlet or bowel obstruction Active GI bleeding Significant painful oral ulceration Unable to swallow On total parenteral nutrition with a NPO order NPO Febrile in last 24 hours (antibiotics only) Clinically deteriorating or unstable (antibiotics only) This IV to PO conversion is based on the [facility] P&T approved automatic conversion policy for eligible patients. Please call with questions. Laura Smith, [address] Pharmacist 
[phone]

## 2020-10-30 NOTE — ROUTINE PROCESS
1528 
Bedside shift change report given to [id number] [place] [address] (oncoming nurse) by Pérez Peterson RN (offgoing nurse). Report included the following information SBAR, Kardex, Intake/Output and MAR.

## 2020-10-30 NOTE — ROUTINE PROCESS
Bedside and Verbal shift change report given to Pierre RN (oncoming nurse) by Rolf Small RN (offgoing nurse). Report included the following information SBAR, Kardex, Intake/Output and MAR.

## 2020-10-30 NOTE — PROGRESS NOTES
6401 
 Pt is awake, alert, oriented x4. Pt being cleaned by CNA after a loose BM. Pt has bulky ABD dressing to left hip. 
8653 Pt is awake, alert, oriented x4. Denies pain. Lungs areclear. Abdomen soft with active BS 
0912 ADB dressing was removed. Incision intact with staples. No bleeding or drainage noted. Optifoam dressing applied. [address] [facility] Pt eating breakfast.. [facility] Pt's HRr  Went up to 417 11 148, now ranges from 110s-130s. Pt eating breakfast.  Denies chest pain. DR Tejas Starr is aware. Changed time for Cardizem to be given this morning. Pt for H and H, Port CXray and D/C IV  LR. 
1115 IDR done. Pt's D/C order was cancelled by Dr Mark Muhammad. [address] Pt resting in bed. Denies pain. 1525 
 pt is awake, alert, oriented x4. Pt was incontinent of large amt of urine. Pt was kept clean and dry by CNA. PT in to work with pt. Pt sat at edge of bed. Did some exercises.

## 2020-10-30 NOTE — ROUTINE PROCESS
1528 
 Bedside and Verbal shift change report given to Darcy Espinoza RN (oncoming nurse) by Carol Owusu RN (offgoing nurse). Report included the following information SBAR, Kardex and MAR.

## 2020-10-30 NOTE — PROGRESS NOTES
Cardiology Progress Note Patient: Hannah Verma. Sex: male          DOA: 10/23/2020 YOB: 1935      Age:  82 y.o.        LOS:  LOS: 6 days Patient seen and examined, chart reviewed. Assessment/Plan Patient Active Problem List  
Diagnosis Code  Hypertension I10  
 Diabetes mellitus ([facility] E11.9  Hyperlipemia E78.5  Depression F32.9  Varicose veins I83.90  Arthritis M19.90  Tinnitus H93.19  
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  
 Migraine with vertigo G43. [facility]  Fall W19. Yaneth Svetlana  Closed right hip fracture, initial encounter ([facility] S72.001A  Dementia without behavioral disturbance ([facility]) F03.90  Anticoagulant long-term use Z79.01  
 Hypoxia R09.02 S/p right hip pinning Hypokalemia Medtronic loop recorder placement was done in July 2015. 
  
Echocardiogram was done in 09/2020 reported 
  
Imaging quality is acceptable. The study is technically difficult due to poor cardiac windows · Low normal LV systolic function, ejection fraction 50 - 55%. Normal chamber size. There is mild, concentric left ventricular hypertrophy · Left atrium is severely dilated · The mitral valve is normal in structure. There is mild regurgitation · The aortic valve is trileaflet, mildly sclerotic. There is no  
significant aortic stenosis or insufficiency 
  
Plan: 
  
Continue Cardizem Continue Xarelto Cardiology sign off. Subjective:  
 cc: 
Denies any chest pain or shortness of breath REVIEW OF SYSTEMS:  
 
General: No fevers or chills. Cardiovascular: No chest pain,No palpitations, No orthopnea, No PND, No leg swelling, No claudication Pulmonary: No dyspnea Gastrointestinal: No nausea, vomiting, bleeding Neurology: No Dizziness Objective:  
  
Visit Vitals BP (!) 140/67 (BP 1 Location: Left arm) Pulse 93 Temp 98 °F (36.7 °C) Resp 16 Ht 5' 7\" (1.702 m) Wt 86.4 kg (190 lb 6.4 oz) SpO2 93% BMI 29.82 kg/m² Body mass index is 29.82 kg/m². Physical Exam: 
General Appearance: Comfortable, not using accessory muscles of respiration. HEENT: BRIAN. HEAD: Atraumatic NECK: No JVD, no thyroidomeglay. CAROTIDS: No bruit LUNGS: Clear bilaterally. HEART: S1+S2 audible, no murmur, no pericardial rub. ABD: Non-tender, BS Audible NEUROLOGICAL: Alert, follows verbal commands Medication: 
Current Facility-Administered Medications Medication Dose Route Frequency  rivaroxaban (XARELTO) tablet 20 mg  20 mg Oral DAILY  dilTIAZem ER (CARDIZEM CD) capsule 240 mg  240 mg Oral PCL  insulin lispro (HUMALOG) injection   SubCUTAneous AC&HS  Vancomycin trough due 10/30/20 @1330  1 Each Other ONCE  pantoprazole (PROTONIX) tablet 40 mg  40 mg Oral ACB  sodium chloride (NS) flush 5-40 mL  5-40 mL IntraVENous Q8H  
 sodium chloride (NS) flush 5-40 mL  5-40 mL IntraVENous PRN  
 acetaminophen (TYLENOL) tablet 650 mg  650 mg Oral Q4H PRN  
 naloxone (NARCAN) injection 0.4 mg  0.4 mg IntraVENous PRN  
 ferrous sulfate tablet 325 mg  1 Tab Oral BID WITH MEALS  calcium-vitamin D (OS-BOB +D3) 500 mg-200 unit per tablet 1 Tab  1 Tab Oral TID WITH MEALS  
 bisacodyL (DULCOLAX) suppository 10 mg  10 mg Rectal DAILY PRN  
 senna-docusate (PERICOLACE) 8.6-50 mg per tablet 1 Tab  1 Tab Oral BID  insulin glargine (LANTUS) injection 12 Units  12 Units SubCUTAneous QHS  haloperidol lactate (HALDOL) injection 5 mg  5 mg IntraVENous Q6H PRN  
 insulin lispro (HUMALOG) injection 4 Units  4 Units SubCUTAneous TIDAC  glucose chewable tablet 16 g  4 Tab Oral PRN  
  glucagon (GLUCAGEN) injection 1 mg  1 mg IntraMUSCular PRN  
 dextrose 10% infusion 125-250 mL  125-250 mL IntraVENous PRN  
 sodium chloride (NS) flush 5-40 mL  5-40 mL IntraVENous Q8H  
 sodium chloride (NS) flush 5-40 mL  5-40 mL IntraVENous PRN  polyethylene glycol (MIRALAX) packet 17 g  17 g Oral DAILY PRN  promethazine (PHENERGAN) tablet 12.5 mg  12.5 mg Oral Q6H PRN Or  
 ondansetron (ZOFRAN) injection 4 mg  4 mg IntraVENous Q6H PRN  piperacillin-tazobactam (ZOSYN) 3.375 g in 0.9% sodium chloride (MBP/ADV) 100 mL MBP  3.375 g IntraVENous Q6H  
 melatonin tablet 5 mg  5 mg Oral QHS  zinc sulfate (ZINCATE) 220 (50) mg capsule 1 Cap  1 Cap Oral DAILY  ascorbic acid (vitamin C) (VITAMIN C) tablet 250 mg  250 mg Oral DAILY  cholecalciferol (VITAMIN D3) (1000 Units /25 mcg) tablet 1 Tab  1,000 Units Oral DAILY  ARIPiprazole (ABILIFY) tablet 2 mg  2 mg Oral DAILY  buPROPion SR (WELLBUTRIN SR) tablet 150 mg  150 mg Oral DAILY  cycloSPORINE (RESTASIS) 0.05 % ophthalmic emulsion 1 Drop  1 Drop Left Eye Q12H  
 fluticasone propionate (FLONASE) 50 mcg/actuation nasal spray 2 Spray  2 Spray Both Nostrils DAILY PRN  
 furosemide (LASIX) tablet 20 mg  20 mg Oral DAILY  vancomycin (VANCOCIN) 1,250 mg in 0.9% sodium chloride 250 mL (VIAL-MATE)  1,250 mg IntraVENous Q24H  Vancomycin-Pharmacy to dose  1 Each Other Rx Dosing/Monitoring Lab/Data Reviewed: 
 
  
Recent Labs 10/30/20 
0240 10/29/20 
0350 WBC  --  8.8 HGB 7.7* 8.2* HCT 24.5* 26.3*  
PLT  --  385 Recent Labs 10/30/20 
0240 10/29/20 
0350 10/28/20 
0028  144 142  
K 3.2* 3.3* 3.3*  
 108 106 CO2 30 31 27 * 148* 215* BUN 22* 22* 26* CREA 1.01 1.03 1.26  
CA 7.6* 7.7* 7.4* Signed By: Maci Howell MD   
 October 30, 2020

## 2020-10-30 NOTE — ROUTINE PROCESS
1915 
Bedside and verbal shift change report given to Ania UNGER RN by Manish Mireles RN. Report included SBAR, kardex, MAR, and recent results.

## 2020-10-30 NOTE — DISCHARGE SUMMARY
[facility] [place] DISCHARGE SUMMARY Name:  Humberto Mayberry 
MR#:   [id number] :  1935 ACCOUNT #:  [de-identified] ADMIT DATE:  10/23/2020 DISCHARGE DATE:  10/30/2020 MEDICINES ON DISCHARGE:  Include, 
1. Abilify 2 mg daily. 2.  Augmentin 500 twice daily for 5 days. 3.  Cardizem  mg daily. 4.  Vitamin D 1000 units daily. 5.  Aricept 5 mg at night. 6.  Ferrous sulfate 325 mg twice daily with meals. 7.  Flonase 2 sprays both nostrils daily as needed. 8.  Lasix 20 mg daily as needed for swelling. 9.  Humalog sliding scale. 10.  Levemir 15 units at night. 11.  Lidoderm patches are for 12 hours a day and then removed for 12 hours a day. 12.  Melatonin 5 mg at night as needed. 13.  Metoprolol 75 mg twice daily. 14.  Multivitamin once a day. 15.  Prilosec 20 mg daily. 16.  Oxybutynin 5 mg at night. 17.  Restasis 1 drop to left eye as needed. 18.  Xarelto 20 mg daily. 19.  Randi-Colace 1 tablet twice daily. 20.  Tramadol 50 mg q.6 h. as needed for severe pain. 21.  Vitamin D 50,000 units weekly. 22.  Wellbutrin  mg daily. DISCHARGE DIAGNOSES:  Include the following, 
1. Right distal femur fracture status post pinning. 2.  Dementia. 3.  Possible pneumonia. 4.  Paroxysmal atrial fibrillation. 5.  Diabetes mellitus, insulin dependent, type 2. 
6.  Debility and weakness. HOSPITAL CONSULTANTS: 
1.  Dr. Anay Casey, Cardiology. 2.  Dr. Reginaldo Snider, Orthopedic Surgery. PROCEDURE PERFORMED:  Right hip pinning with C-arm. HOSPITAL SUMMARY:  This is an 82-year-old gentleman who came in on the aforementioned date. He was admitted by the hospitalist service. He has a history of dementia, chronic atrial fibrillation. He came from [facility] after sustaining a fall and a distal femur fracture on . X-ray showed a right fracture. He was given Norco with some improvement in his pain but then he became hypoxic.   His x-ray showed bilateral infiltrates. He had no complaint of cough at that time. He had a COVID-19 test on 10/06, which was negative. The patient was placed under COVID-19 precautions for further testing. On admission, he was seen in consultation by Orthopedic Surgery. He was placed on vancomycin and Zosyn empirically for possible hospital-acquired or facility-acquired pneumonia, and continued on his other home medications. His blood sugars were monitored closely. By the 27th, he was stable for surgery. Surgery was canceled that day because Anesthesiology wanted to have Cardiology see the patient for preoperative evaluation. Thus, Cardiology was consulted. Anticoagulation was held. Recommended to resume postoperatively. He was continued on his diltiazem, and he went for surgery on the 28th. He tolerated this well. His COVID-19 test came back negative. He was moved out of isolation. He actually had two negative tests. He had a Legionella test that was negative. Influenza testing that was negative. His most recent metabolic panel shows potassium at 3.2, which is being repleted this morning, GFR is greater than 60. His last LFT panel was unremarkable on the 28th. His H and H yesterday were 8.2 and 26.3, platelets were 618, white count was 8.8. He had a respiratory panel on admission that was negative. His last chest x-ray on the 24th showed upper and lower lung field infiltrates. They had done a CT angiogram of the chest on the 24th that showed no evidence for pulmonary embolism but diffuse bilateral ground-glass infiltrate suspicious for atypical pneumonia. EKG on admission showed sinus rhythm with PVCs. I am going to repeat the x-ray this morning. He has not had one since the 24th. He does not appear to have any respiratory complaints this morning. Lungs are fairly clear bilaterally. I do not hear rhonchi or wheezing.   He is talking easily, pleasant, cooperative. There has been no evidence for fevers. He has been on IV antibiotic since admission. His blood pressure today 140/66, pulse 71, temperature 97.8, respiratory rate 15. He is saturating 90%-96% on 1 liter nasal cannula. Lungs, as noted above. Cardiac exam, regular rate and rhythm. Abdomen is soft and nontender with normal bowel sounds. Mentation is appropriate. He is oriented to person and place currently. Right hip is dressed. Distal pulses are palpable. No peripheral edema noted. Labs are as noted. I am going to order a stat H and H for now with the chest x-ray as well as just to make sure as we anticipate he may go to rehab later today and with that, he is currently receiving his majority of his home medications plus the basal insulin. His Xarelto has been resumed for his AFib, and he has been receiving a stool softener and pain medication also. He has an intolerance to morphine, it causes hallucinations. So with that, anticipating that he could leave later this afternoon. I am going to repeat his chest x-ray and get an H and H, follow his progress this morning and anticipate possible discharge over to the nursing facility for continued physical therapy and rehabilitation later this afternoon. His current diet is diabetic consistent, carb regular. He should continue with PT and OT, wean from the nasal cannula oxygen as tolerated and fall precautions with SCDs in place as well as the Xarelto which will help with DVT prophylaxis as well. Forty minutes discharge time. Sana Murphy MD 
 
 
[id number] 
D:  10/30/2020 10:11 
T:  10/30/2020 11:46 JOB #:  [id number]

## 2020-10-30 NOTE — PROGRESS NOTES
2210-alert and oriented x 4 at this time. Patient is joking with staff. Urine 100 ml in urinal, but incontinent and bed is soaked with small BM. Patient changed, Blood sugar checked and scheduled medications given. Lungs CTA and diminished on 1LNC. BS active x 4 quads. IV access to right forearm with LR infusing. Denies pain at this time, but does c/o pain when repositioning. 0141-Tylenol given d/t anticipated pain from changing brief. 
 
0221-Incontinent of large, loose, dark brown stool. Shift summary-incontinent of bowel and bladder during this shift, one large, loose stool. Remained A&Ox 4. Given Tylenol for pain x one. Continues on IV ATB.

## 2020-10-30 NOTE — PROGRESS NOTES
Problem: Mobility Impaired (Adult and Pediatric) Goal: *Acute Goals and Plan of Care (Insert Text) Description: Physical Therapy Goals Initiated 10/29/2020 and to be accomplished within 7 day(s) 1. Patient will move from supine <> sit with min assist in prep for out of bed activity and change of position. 2.  Patient will perform sit<> stand with min assist/RW NWB R LE in prep for transfers/ambulation. 3.  Patient will transfer from bed <> chair with min assist/RW NWB R LE for time up in chair for completion of ADL activity. 4.  Patient will ambulate 10 feet with min/mod assist/RW NWB R LE for improved functional mobility at discharge. 10/30/2020 1326 by Claribel Bravo PTA Outcome: Not Progressing Towards Goal 
 Pt refused PT due to: 
[]  Nausea/vomiting 
[x]  Eating 
[]  Pain 
[]  Pt lethargic 
[]  Off Unit Will f/u later, as pt's schedule allows. Thank you.  
Sally Mistry, PTA

## 2020-10-30 NOTE — PROGRESS NOTES
Cardiology Progress Note Patient: Maida Dennis. Sex: male          DOA: 10/23/2020 YOB: 1935      Age:  82 y.o.        LOS:  LOS: 5 days Patient seen and examined, chart reviewed. Assessment/Plan Patient Active Problem List  
Diagnosis Code  Hypertension I10  
 Diabetes mellitus ([facility] E11.9  Hyperlipemia E78.5  Depression F32.9  Varicose veins I83.90  Arthritis M19.90  Tinnitus H93.19  
 Paroxysmal atrial fibrillation (HCC) I48.0  Cardiac device in situ Z95.9  Pneumonia J18.9  Primary osteoarthritis of right knee M17.11  Type II or unspecified type diabetes mellitus with renal manifestations, uncontrolled(250.42) (HCC) E11.29, E11.65  Diastolic CHF, chronic ([facility]) I50.32  
 Migraine with vertigo G43. [facility]  Fall W19. [facility]  Closed right hip fracture, initial encounter ([facility] S72.001A  Dementia without behavioral disturbance ([facility]) F03.90  Anticoagulant long-term use Z79.01  
 Hypoxia R09.02 S/p right hip pinning Hypokalemia Medtronic loop recorder placement was done in July 2015. 
  
Echocardiogram was done in 09/2020 reported 
  
Imaging quality is acceptable. The study is technically difficult due to poor cardiac windows · Low normal LV systolic function, ejection fraction 50 - 55%. Normal chamber size. There is mild, concentric left ventricular hypertrophy · Left atrium is severely dilated · The mitral valve is normal in structure. There is mild regurgitation · The aortic valve is trileaflet, mildly sclerotic. There is no  
significant aortic stenosis or insufficiency 
  
Plan: 
  
Continue Cardizem Restart Xarelto once okay with surgeon. Subjective:  
 cc: 
I am ok , my leg hurts REVIEW OF SYSTEMS:  
 
General: No fevers or chills. Cardiovascular: No chest pain,No palpitations, No orthopnea, No PND, No leg swelling, No claudication Pulmonary: No dyspnea Gastrointestinal: No nausea, vomiting, bleeding Neurology: No Dizziness Objective:  
  
Visit Vitals BP (!) 140/61 (BP 1 Location: Left leg, BP Patient Position: Sitting) Pulse 69 Temp 97.4 °F (36.3 °C) Resp 14 Ht 5' 7\" (1.702 m) Wt 86.4 kg (190 lb 6.4 oz) SpO2 92% BMI 29.82 kg/m² Body mass index is 29.82 kg/m². Physical Exam: 
General Appearance: Comfortable, not using accessory muscles of respiration. HEENT: BRIAN. HEAD: Atraumatic NECK: No JVD, no thyroidomeglay. CAROTIDS: No bruit LUNGS: Clear bilaterally. HEART: S1+S2 audible, no murmur, no pericardial rub. ABD: Non-tender, BS Audible NEUROLOGICAL: Alert, follows verbal commands Medication: 
Current Facility-Administered Medications Medication Dose Route Frequency  insulin lispro (HUMALOG) injection   SubCUTAneous AC&HS  [START ON 10/30/2020] Vancomycin trough due 10/30/20 @1330  1 Each Other ONCE  
 [START ON 10/30/2020] pantoprazole (PROTONIX) tablet 40 mg  40 mg Oral ACB  lactated Ringers infusion  125 mL/hr IntraVENous CONTINUOUS  
 lactated Ringers infusion  125 mL/hr IntraVENous CONTINUOUS  
 sodium chloride (NS) flush 5-40 mL  5-40 mL IntraVENous Q8H  
 sodium chloride (NS) flush 5-40 mL  5-40 mL IntraVENous PRN  
 acetaminophen (TYLENOL) tablet 650 mg  650 mg Oral Q4H PRN  
 naloxone (NARCAN) injection 0.4 mg  0.4 mg IntraVENous PRN  
 ferrous sulfate tablet 325 mg  1 Tab Oral BID WITH MEALS  calcium-vitamin D (OS-BOB +D3) 500 mg-200 unit per tablet 1 Tab  1 Tab Oral TID WITH MEALS  
 bisacodyL (DULCOLAX) suppository 10 mg  10 mg Rectal DAILY PRN  
 senna-docusate (PERICOLACE) 8.6-50 mg per tablet 1 Tab  1 Tab Oral BID  enoxaparin (LOVENOX) injection 40 mg  40 mg SubCUTAneous DAILY  insulin glargine (LANTUS) injection 12 Units  12 Units SubCUTAneous QHS  haloperidol lactate (HALDOL) injection 5 mg  5 mg IntraVENous Q6H PRN  
  insulin lispro (HUMALOG) injection 4 Units  4 Units SubCUTAneous TIDAC  glucose chewable tablet 16 g  4 Tab Oral PRN  
 glucagon (GLUCAGEN) injection 1 mg  1 mg IntraMUSCular PRN  
 dextrose 10% infusion 125-250 mL  125-250 mL IntraVENous PRN  
 sodium chloride (NS) flush 5-40 mL  5-40 mL IntraVENous Q8H  
 sodium chloride (NS) flush 5-40 mL  5-40 mL IntraVENous PRN  polyethylene glycol (MIRALAX) packet 17 g  17 g Oral DAILY PRN  promethazine (PHENERGAN) tablet 12.5 mg  12.5 mg Oral Q6H PRN Or  
 ondansetron (ZOFRAN) injection 4 mg  4 mg IntraVENous Q6H PRN  piperacillin-tazobactam (ZOSYN) 3.375 g in 0.9% sodium chloride (MBP/ADV) 100 mL MBP  3.375 g IntraVENous Q6H  
 melatonin tablet 5 mg  5 mg Oral QHS  zinc sulfate (ZINCATE) 220 (50) mg capsule 1 Cap  1 Cap Oral DAILY  ascorbic acid (vitamin C) (VITAMIN C) tablet 250 mg  250 mg Oral DAILY  cholecalciferol (VITAMIN D3) (1000 Units /25 mcg) tablet 1 Tab  1,000 Units Oral DAILY  ARIPiprazole (ABILIFY) tablet 2 mg  2 mg Oral DAILY  buPROPion SR (WELLBUTRIN SR) tablet 150 mg  150 mg Oral DAILY  cycloSPORINE (RESTASIS) 0.05 % ophthalmic emulsion 1 Drop  1 Drop Left Eye Q12H  
 dilTIAZem ER (CARDIZEM CD) capsule 240 mg  240 mg Oral PCL  
 fluticasone propionate (FLONASE) 50 mcg/actuation nasal spray 2 Spray  2 Spray Both Nostrils DAILY PRN  
 furosemide (LASIX) tablet 20 mg  20 mg Oral DAILY  vancomycin (VANCOCIN) 1,250 mg in 0.9% sodium chloride 250 mL (VIAL-MATE)  1,250 mg IntraVENous Q24H  Vancomycin-Pharmacy to dose  1 Each Other Rx Dosing/Monitoring Lab/Data Reviewed: 
 
  
Recent Labs 10/29/20 
0350 10/27/20 
1025 WBC 8.8 12.1 HGB 8.2* 9.0*  
HCT 26.3* 28.4*  
 387 Recent Labs 10/29/20 
0350 10/28/20 
0028 10/27/20 
1025  142 141  
K 3.3* 3.3* 3.6  106 105 CO2 31 27 30 * 215* 183* BUN 22* 26* 22* CREA 1.03 1.26 1.13  
CA 7.7* 7.4* 8.2*  
 
 
 Signed By: Sherice Khan MD   
 October 29, 2020

## 2020-10-30 NOTE — PROGRESS NOTES
Problem: Mobility Impaired (Adult and Pediatric) Goal: *Acute Goals and Plan of Care (Insert Text) Description: Physical Therapy Goals Initiated 10/29/2020 and to be accomplished within 7 day(s) 1. Patient will move from supine <> sit with min assist in prep for out of bed activity and change of position. 2.  Patient will perform sit<> stand with min assist/RW NWB R LE in prep for transfers/ambulation. 3.  Patient will transfer from bed <> chair with min assist/RW NWB R LE for time up in chair for completion of ADL activity. 4.  Patient will ambulate 10 feet with min/mod assist/RW NWB R LE for improved functional mobility at discharge. 10/30/2020 1654 by Zaira Zheng PTA Outcome: Progressing Towards Goal 
 PHYSICAL THERAPY TREATMENT Patient: Sharyle Long. (82 y.o. male) Date: 10/30/2020 Diagnosis: Closed right hip fracture, initial encounter ([facility] Lisa Matthews Closed right hip fracture, initial encounter ([facility] Procedure(s) (LRB): 
RIGHT HIP PINNING ([facility]) WITH C-ARM (Right) 2 Days Post-Op Precautions: Fall, NWB(WB status changed per Karyle Hensen, [place] for Dr. Nhi Cam) Chart, physical therapy assessment, plan of care and goals were reviewed. ASSESSMENT: 
Pt is found with significant bowel and bladder incontinence. Session initiated with assisting CNA with bed mobility, for essence-care and linen change. More assist is needed for bed mobility. Max total assist needed for transition to EOB. Exercises  poorly tolerated, with AAROM for all performed. Placement is needed. Progression toward goals: 
[x]      Improving appropriately and progressing toward goals 
[]      Improving slowly and progressing toward goals 
[]      Not making progress toward goals and plan of care will be adjusted PLAN: 
Patient continues to benefit from skilled intervention to address the above impairments. Continue treatment per established plan of care.  
Discharge Recommendations:  SNF 
 Further Equipment Recommendations for Discharge:  mechanical lift and wheelchair SUBJECTIVE:  
Patient stated I'm ready this time. But all this stuff is wet.  OBJECTIVE DATA SUMMARY:  
Critical Behavior: 
Neurologic State: Alert Orientation Level: Oriented X4 Cognition: Follows commands Safety/Judgement: Decreased awareness of need for safety, Decreased insight into deficits Functional Mobility Training: 
Bed Mobility: 
Rolling: Moderate assistance;Maximum assistance Supine to Sit: Maximum assistance; Total assistance Sit to Supine: Maximum assistance; Total assistance Balance: 
Sitting: Impaired; With support Sitting - Static: Fair (occasional); Poor (constant support); Prop sitting Sitting - Dynamic: Poor (constant support) Ambulation/Gait Training: 
Unsafe for standing attempt Right Side Weight Bearing: Non-weight bearing Therapeutic Exercises:  
 
 
EXERCISE Sets Reps Active Active Assist  
Passive Self ROM Comments Ankle Pumps    [] [] [] [] Quad Sets/Glut Sets   [] [] [] [] Hamstring Sets   [] [] [] [] Short Arc Quads   [] [] [] [] Heel Slides   [] [] [] [] Straight Leg Raises   [] [] [] [] Hip Abd/Add 4 5 [x] [] [] [] Long Arc Quads 4 5 [] [x] [] [] Seated Marching   [] [] [] []   
Standing Marching   [] [] [] []   
   [] [] [] []   
 
 
Pain: 
Pain Scale 1: Numeric (0 - 10) Pain Intensity 1: 0 Pain out:  
Activity Tolerance:  
Fair- 
Please refer to the flowsheet for vital signs taken during this treatment. After treatment:  
[] Patient left in no apparent distress sitting up in chair 
[x] Patient left in no apparent distress in bed 
[x] Call bell left within reach [x] Nursing notified 
[] Caregiver present 
[] Bed alarm activated Sally Mistry PTA Time Calculation: 57 mins

## 2020-10-30 NOTE — PROGRESS NOTES
Hospitalist Progress Note Patient: Hananh Verma. MRN: [id number]  CSN: [id number] YOB: 1935  Age: 82 y.o. Sex: male DOA: 10/23/2020 LOS:  LOS: 6 days Chief Complaint: 
 
fracture Assessment/Plan  
 
82 y. o. male who with history of dementia, atrial for rib on Xarelto, admitted  after sustaining a fall and distal femur fracture. 
  
Closed right hip fracture -  
S/p right hip pinning 
  
Dementia without behavioral disturbance - 
Continue with home meds. 
  
Possible hospital-acquired pneumonia - Repeat CXR this am shows infiltrates still, he has no cough or fever, finish zosyn tomorrow, one dose lasix for possible edema Chronic diastolic CHF 
  
Paroxysmal Atrial fibrillation -  
Sinus rhytym, continue with Cardizem Echo 09/2020 with EF of 50-55% 
xarelto resumed Anemia-repeat H&H ordered, await result Hypokalemia-repletion ordered 
 
hypoalbuminemia 
  
Diabetes - Ins dep type 2 On lantus, premeal, SSI. 
  
CT of the head with no acute intracranial abnormality.  
  
COVID 19 negative 
  
xarelto will cover DVT prophylaxis Disposition : 
Patient Active Problem List  
Diagnosis Code  Hypertension I10  
 Diabetes mellitus ([facility] E11.9  Hyperlipemia E78.5  Depression F32.9  Varicose veins I83.90  Arthritis M19.90  Tinnitus H93.19  
 Paroxysmal atrial fibrillation (HCC) I48.0  Cardiac device in situ Z95.9  Pneumonia J18.9  Primary osteoarthritis of right knee M17.11  Type II or unspecified type diabetes mellitus with renal manifestations, uncontrolled(250.42) (HCC) E11.29, E11.65  Diastolic CHF, chronic (HCC) I50.32  
 Migraine with vertigo G43. [facility]  Fall W19. Yaneth Svetlana  Closed right hip fracture, initial encounter ([facility] S72.001A  Dementia without behavioral disturbance (HCC) F03.90  Anticoagulant long-term use Z79.01  
 Hypoxia R09.02 Subjective: 
 
I am doing ok, how are you? Denies uncontrolled pain, SOB, CP, nausea Review of systems: 
 
Constitutional: denies fevers Respiratory: denies SOB, cough Cardiovascular: denies chest pain Gastrointestinal: denies nausea, vomiting, diarrhea Vital signs/Intake and Output: 
Visit Vitals BP (!) 140/66 (BP 1 Location: Left arm, BP Patient Position: At rest) Pulse 71 Temp 97.8 °F (36.6 °C) Resp 15 Ht 5' 7\" (1.702 m) Wt 86.4 kg (190 lb 6.4 oz) SpO2 90% BMI 29.82 kg/m² Current Shift:  No intake/output data recorded. Last three shifts:  10/28 1901 - 10/30 0700 In: 9149 [P.O.:230; I.V.:1327] Out: 775 [Urine:775] Exam: 
 
General: elderly WM, NAD, pleasant CVS:Regular rate and rhythm, no M/R/G, S1/S2 heard, no thrill Lungs:dec BS bases, no wheezes or rales Abdomen: Soft, Nontender, No distention, Normal Bowel sounds, No hepatomegaly Extremities: right hip dressed, no edema, no bruising Neuro:grossly normal , follows commands Psych:appropriate Labs: Results:  
   
Chemistry Recent Labs 10/30/20 
0240 10/29/20 
0350 10/28/20 
0028 * 148* 215*  144 142  
K 3.2* 3.3* 3.3*  
 108 106 CO2 30 31 27 BUN 22* 22* 26* CREA 1.01 1.03 1.26  
CA 7.6* 7.7* 7.4* AGAP 7 5 9 BUCR 22* 21* 21* AP  --   --  129* TP  --   --  5.0* ALB  --   --  1.6*  
GLOB  --   --  3.4 AGRAT  --   --  0.5* CBC w/Diff Recent Labs 10/29/20 
0350 10/27/20 
1025 WBC 8.8 12.1 RBC 2.91* 3.22* HGB 8.2* 9.0*  
HCT 26.3* 28.4*  
 387 Cardiac Enzymes No results for input(s): CPK, CKND1, ABBY in the last 72 hours. No lab exists for component: Verlie Dopp Coagulation Recent Labs 10/28/20 
0028 PTP 16.7* INR 1.4* APTT 32.2 Lipid Panel Lab Results Component Value Date/Time  Cholesterol, total 107 01/24/2015 09:15 AM  
 HDL Cholesterol 25 (L) 01/24/2015 09:15 AM  
 LDL, calculated 44 01/24/2015 09:15 AM  
 VLDL, calculated 38 01/24/2015 09:15 AM  
 Triglyceride 190 (H) 01/24/2015 09:15 AM  
 CHOL/HDL Ratio 4.3 01/24/2015 09:15 AM  
  
BNP No results for input(s): BNPP in the last 72 hours. Liver Enzymes Recent Labs 10/28/20 
0028 TP 5.0* ALB 1.6* * Thyroid Studies Lab Results Component Value Date/Time TSH 2.61 10/25/2020 02:20 AM  
    
Procedures/imaging: see electronic medical records for all procedures/Xrays and details which were not copied into this note but were reviewed prior to creation of Plan Dang Escalante MD

## 2020-10-30 NOTE — PROGRESS NOTES
[facility] care of patient. A&Ox4. No c/o pain. Shift Summary [address] Night nurse aware of need to continue monitoring potassium levels. Patient to be transferred to [facility] tomorrow.

## 2020-10-30 NOTE — PROGRESS NOTES
D/C Plan: [facility] 10/31/2020 vs 11/1/2020 
  
Noted pt is not medically stable to transition from an acute care setting at this time. CM met with pt and provided an update. Anticipate pt will transition to [facility] Saturday vs Sunday. Pt will have to arrive before 2:00pm either day. CM has requested transport be arranged Saturday for 12:30/1:00pm to assist with transition of care.   
  
Transition of Care Plan:  
  
Communication to Patient/Family: Met with patient and family, and they are agreeable to the transition plan. The Plan for Transition of Care is related to the following treatment goals: SNF 
  
The Patient and/or patient representative was provided with a choice of provider and agrees  
with the discharge plan.  Yes [x]? No []? 
  
Freedom of choice list was provided with basic dialogue that supports the patient's individualized plan of care/goals and shares the quality data associated with the providers.   
 Yes [x]? No []? 
  
SNF/Rehab Transition: 
Patient has been accepted to [facility] at [address] [place] [address] for SNF and meets criteria for admission. Patient will transported by medical transport and expected to leave tomorrow (10/31/2020 vs 11/1/2020).   
Communication to SNF/Rehab: 
Bedside RN,  has been notified to update the transition plan to the facility and call report [phone].  
  
Discharge information has been updated on the AVS and communicated through [facility] or [facility]. Discharge instructions to be fax'd to facility at [phone]  
  
Please include all hard scripts for controlled substances, med rec and dc summary, and AVS in packet. Please medicate for pain prior to dc if possible and needed to help offset delay when patient first arrives to facility. 
  
Reviewed and confirmed with facility, [facility] can manage the patient care needs for the following:  
  
[place] with (X) only those applicable: 
Medication: []?Medications are available at the facility 
[]? IV Antibiotics []? Controlled Substance  hard copies available sent. 
[]? Weekly Labs    
Equipment: 
[]?CPAP/BiPAP 
[]? Wound Vacuum []? Aguilar or Urinary Device []? PICC/Central Line []? Nebulizer []? Ventilator    
Treatment: 
[]? Isolation (for MRSA, VRE, etc.) 
[]? Surgical Drain Management []? Tracheostomy Care 
[]? Dressing Changes []? Dialysis with transportation 
[]? PEG Care 
[]? Oxygen []? Daily Weights for Heart Failure    
Dietary: 
[]? Any diet limitations []? Tube Feedings []? Total Parenteral Management (TPN)    
Financial Resources: 
[]? Medicaid Application Completed 
  
[]? [employer] Completed and copy given to pt/family. 
  
[x]? A screening has previously been completed. 
  
[]? Level II Completed 
  
[]? Private pay individual who will not become  
financially eligible for Medicaid within 6 months from admission to a [address] [place] [address] facility.  
  
[]? Individual refused to have screening conducted.  
  
[]? Medicaid Application Completed 
  
[]? The screening denied because it was determined individual did not need/did not qualify for nursing facility level of care. []? Out of state residents seeking direct admission to a [address] facility. 
[]? Individuals who are inpatients of an out of state hospital, or in state or out of state veterans/ hospital and seek direct admission to a [address] facility 
[]? Individuals who are pateints or residents of a state owned/operated facility that is licensed by Department of Behavioral Services (DBHDS) and seek direct admission to [address] facility 
[]? A screening not required for enrollment in [facility] services as set out in [address]- []? [address] [facility]) staff shall perform screenings of the [facility] clients.     
Advanced Care Plan: 
[]? Surrogate Decision Maker of Care 
[]? POA 
[]? Communicated Code Status and copy sent.    
Other:  
     
 
Care Management Interventions Mode of Transport at Discharge: [facility] Transition of Care Consult (CM Consult): Discharge Planning, SNF Health Maintenance Reviewed: Yes Physical Therapy Consult: Yes Current Support Network: Lives with Spouse, Family Lives [place] Confirm Follow Up Transport: Family The Plan for Transition of Care is Related to the Following Treatment Goals : [address] The Patient and/or Patient Representative was Provided with a Choice of Provider and Agrees with the Discharge Plan?: Yes Name of the Patient Representative Who was Provided with a Choice of Provider and Agrees with the Discharge Plan: pt 
Freedom of Choice List was Provided with Basic Dialogue that Supports the Patient's Individualized Plan of Care/Goals, Treatment Preferences and Shares the Quality Data Associated with the Providers?: Yes Discharge Location Discharge Placement: Skilled nursing facility([facility]

## 2020-10-30 NOTE — PROGRESS NOTES
Progress Note Patient: Izabel Arceo. Sex: male          DOA: 10/23/2020 YOB: 1935      Surgery: Procedure(s): RIGHT HIP PINNING ([facility]) WITH C-ARM         
 LOS: 6 days Subjective: No new complaints. Confused. Objective:  
  
Visit Vitals BP (!) 154/75 (BP 1 Location: Left arm, BP Patient Position: At rest) Pulse 100 Temp 98.3 °F (36.8 °C) Resp 14 Ht 5' 7\" (1.702 m) Wt 86.4 kg (190 lb 6.4 oz) SpO2 92% BMI 29.82 kg/m² Physical Exam: 
Neurological: Dressing C/D/I. Loose BM. Patient mobility Bed Mobility Training Rolling: Moderate assistance, Maximum assistance Weight Bearing Status Right Side Weight Bearing: Non-weight bearing Intake and Output: 
Current Shift:  No intake/output data recorded. Last three shifts:  10/28 1901 - 10/30 0700 In: 8473 [P.O.:230; I.V.:1327] Out: 775 [Urine:775] Lab/Data Reviewed: 
Lab Results Component Value Date/Time WBC 8.8 10/29/2020 03:50 AM  
 HGB 8.2 (L) 10/29/2020 03:50 AM  
 HCT 26.3 (L) 10/29/2020 03:50 AM  
 PLATELET 210 41/46/3682 03:50 AM  
 MCV 90.4 10/29/2020 03:50 AM  
 
Lab Results Component Value Date/Time  
 aPTT 32.2 10/28/2020 12:28 AM  
 
Lab Results Component Value Date/Time INR 1.4 (H) 10/28/2020 12:28 AM  
 INR 1.4 (H) 10/27/2020 01:00 AM  
 INR 1.5 (H) 10/26/2020 05:00 AM  
 Prothrombin time 16.7 (H) 10/28/2020 12:28 AM  
 Prothrombin time 16.8 (H) 10/27/2020 01:00 AM  
 Prothrombin time 17.7 (H) 10/26/2020 05:00 AM  
  
 
 
Assessment/Plan Principal Problem: 
  Closed right hip fracture, [facility] (10/24/2020) Active Problems: 
  Pneumonia (11/16/2016) Fall (10/24/2020) Dementia without behavioral disturbance ([facility] (10/24/2020) Anticoagulant long-term use (10/24/2020) Hypoxia (10/24/2020) 1. Stable 2. OOB with PT- NWB right LE 
3. D/C Planning- needs SNF. 4. Change dressing daily. . 
5. Ok to transfer to SNF from Ortho standpoint. 6. Follow-up in 10 days at [facility] with Dr. Gilberto Haynes. 7. Restart Xarelto today. 8. Hold Colace- having loose BM's.

## 2020-10-30 NOTE — ROUTINE PROCESS
Bedside and Verbal shift change report given to DEMOND Fair RN (oncoming nurse) by Erick Cockayne RN (offgoing nurse). Report included the following information SBAR, Kardex, Intake/Output and MAR.

## 2020-10-31 NOTE — PROGRESS NOTES
Problem: Mobility Impaired (Adult and Pediatric) Goal: *Acute Goals and Plan of Care (Insert Text) Description: Physical Therapy Goals Initiated 10/29/2020 and to be accomplished within 7 day(s) 1. Patient will move from supine <> sit with min assist in prep for out of bed activity and change of position. 2.  Patient will perform sit<> stand with min assist/RW NWB R LE in prep for transfers/ambulation. 3.  Patient will transfer from bed <> chair with min assist/RW NWB R LE for time up in chair for completion of ADL activity. 4.  Patient will ambulate 10 feet with min/mod assist/RW NWB R LE for improved functional mobility at discharge. Outcome: Progressing Towards Goal 
 []  Patient has met MD mobilization beka for d/c home  
[]  Recommend HH with 24 hour adult care [x]  Benefit from additional acute PT session to address:  OOB activity PHYSICAL THERAPY TREATMENT Patient: Kike Magaña (82 y.o. male) Date: 10/31/2020 Diagnosis: Closed right [facility] fracture, [facility] India Highman Closed right hip fracture, initial encounter [facility] Procedure(s) (LRB): 
RIGHT HIP PINNING (DHS) WITH C-ARM (Right) 3 Days Post-Op Precautions: Fall, NWB 
PLOF: ambulatory without AD 
 
ASSESSMENT: 
Pt refusing OOB activity at this time, agreed to LE ROM strengthening exercises, A/AAROM. Progression toward goals:  
[]      Improving appropriately and progressing toward goals [x]      Improving slowly and progressing toward goals 
[]      Not making progress toward goals and plan of care will be adjusted PLAN: 
Patient continues to benefit from skilled intervention to address the above impairments. Continue treatment per established plan of care. Discharge Recommendations:  Carlos Rodriguez Further Equipment Recommendations for Discharge:  TBD SUBJECTIVE:  
Patient stated It feels a little funny.  OBJECTIVE DATA SUMMARY:  
Critical Behavior: Neurologic State: Alert, Appropriate for age Orientation Level: Oriented to situation, Oriented to place, Oriented to person Cognition: Decreased attention/concentration Safety/Judgement: Decreased awareness of environment, Decreased awareness of need for safety Functional Mobility Training: 
 
Supine to Sit: Maximum assistance Sit to Supine: Maximum assistance Therapeutic Exercises:  
 
 
 
EXERCISE Sets Reps Active Active Assist  
Passive Self ROM Comments Ankle Pumps  10  [x] [] [] [] Quad Sets/Glut Sets  10  [x] [] [] [] Hold for 5 secs Hamstring Sets   [] [] [] [] Short Arc Quads   [] [] [] [] Heel Slides  10 [] [x] [] [] Straight Leg Raises  10 [x] [] [] [] L only Hip Add  10 [] [x] [] [] Hold for 5 secs, w/ pillow squeeze Long Arc Quads   [] [] [] [] Seated Marching   [] [] [] []   
Standing Marching   [] [] [] []   
   [] [] [] []   
 
 
Pain: 
Pain level pre-treatment: 5/10 Pain level post-treatment: 8/10 Pain Intervention(s): Medication (see MAR); Rest, Ice, Repositioning Response to intervention: Nurse notified, See doc flow Activity Tolerance:  
Fair Please refer to the flowsheet for vital signs taken during this treatment. After treatment:  
[] Patient left in no apparent distress sitting up in chair 
[x] Patient left in no apparent distress in bed 
[x] Call bell left within reach [x] Nursing notified 
[] Caregiver present 
[] Bed alarm activated 
[x] SCDs applied COMMUNICATION/EDUCATION:  
[x]         Role of Physical Therapy in the acute care setting. [x]         Fall prevention education was provided and the patient/caregiver indicated understanding. [x]         Patient/family have participated as able in working toward goals and plan of care. [x]         Patient/family agree to work toward stated goals and plan of care. []         Patient understands intent and goals of therapy, but is neutral about his/her participation. []         Patient is unable to participate in stated goals/plan of care: ongoing with therapy staff. 
[]         Other: 
 
   
Luli Shah, JAEL Time Calculation: 27 mins

## 2020-10-31 NOTE — ROUTINE PROCESS
Bedside and Verbal shift change report given to DEMOND Rangel RN (oncoming nurse) by KASEY Gregorio RN (offgoing nurse). Report included the following information SBAR, Kardex, Intake/Output and MAR.

## 2020-10-31 NOTE — PROGRESS NOTES
Hospitalist Progress Note Patient: Samara Villagomez. MRN: [id number]  CSN: [id number] YOB: 1935  Age: 82 y.o. Sex: male DOA: 10/23/2020 LOS:  LOS: 7 days Chief Complaint: 
 
fracture Assessment/Plan  
 
82 y. o. male who with history of dementia, atrial for rib on Xarelto, admitted  after sustaining a fall and distal femur fracture. 
  
Closed right hip fracture -  
S/p right hip pinning 
  
Dementia without behavioral disturbance - 
Continue with home meds. 
  
Possible hospital-acquired pneumonia - Repeat CXR this am shows infiltrates still, he has no cough or fever, finish zosyn tomorrow, one dose lasix for possible edema Chronic diastolic CHF 
  
Paroxysmal Atrial fibrillation -  
Converted to Afib with RVR overnight >rate controlled now  
continue with Cardizem Echo 09/2020 with EF of 50-55% 
xarelto resumed Anemia-repeat H&H ordered, await result Hypokalemia-repletion ordered K is 3.1 this AM  
 
hypoalbuminemia 
  
Diabetes - Ins dep type 2 On lantus, premeal, SSI. 
  
CT of the head with no acute intracranial abnormality.  
  
COVID 19 negative 
  
xarelto will cover DVT prophylaxis Disposition : [facility] today? Patient Active Problem List  
Diagnosis Code  Hypertension I10  
 Diabetes mellitus ([facility] E11.9  Hyperlipemia E78.5  Depression F32.9  Varicose veins I83.90  Arthritis M19.90  Tinnitus H93.19  
 Paroxysmal atrial fibrillation (HCC) I48.0  Cardiac device in situ Z95.9  Pneumonia J18.9  Primary osteoarthritis of right knee M17.11  Type II or unspecified type diabetes mellitus with renal manifestations, uncontrolled(250.42) (HCC) E11.29, E11.65  Diastolic CHF, chronic (HCC) I50.32  
 Migraine with vertigo G43. 80  Fall W19. Jefm Rim  [facility] S72.001A  Dementia without behavioral disturbance (HCC) F03.90  Anticoagulant long-term use Z79.01  
  Hypoxia R09.02 Subjective: 
 
Denies any complaints. Eating breakfast.  
Has no idea that he was in afib overnight. Says he is happy to be taken care of by smart people. Review of systems: 
 
Constitutional: denies fevers Respiratory: denies SOB, cough Cardiovascular: denies chest pain Gastrointestinal: denies nausea, vomiting, diarrhea Vital signs/Intake and Output: 
Visit Vitals BP (!) 139/52 (BP 1 Location: Left arm, BP Patient Position: At rest) Pulse 65 Temp 98.6 °F (37 °C) Resp 16 Ht 5' 7\" (1.702 m) Wt 86.4 kg (190 lb 6.4 oz) SpO2 92% BMI 29.82 kg/m² Current Shift:  No intake/output data recorded. Last three shifts:  10/29 1901 - 10/31 0700 In: 36 [P.O.:220; I.V.:600] Out: - Exam: 
 
General: elderly WM, NAD, pleasant CVS:Irregular rate and rhythm, no M/R/G, S1/S2 heard, no thrill Lungs:dec BS bases, no wheezes or rales Abdomen: Soft, Nontender, No distention, Normal Bowel sounds, No hepatomegaly Extremities: right hip dressed, no edema, no bruising Neuro:grossly normal , follows commands Psych:appropriate Labs: Results:  
   
Chemistry Recent Labs 10/31/20 
0550 10/30/20 
2240 10/30/20 
0240 * 136* 101*  141 143  
K 3.1* 3.3* 3.2*  
 102 106 CO2 31 35* 30 BUN 16 16 22* CREA 1.06 1.05 1.01  
CA 7.8* 7.7* 7.6* AGAP 5 4 7 BUCR 15 15 22* CBC w/Diff Recent Labs 10/31/20 
3858 10/30/20 
2240 10/30/20 
0240 10/29/20 
0350 WBC 11.9 13.0  --  8.8  
RBC 3.02* 3.14*  --  2.91* HGB 8.5* 8.8* 7.7* 8.2* HCT 27.1* 28.2* 24.5* 26.3*  
 428*  --  385 GRANS  --  78*  --   --   
LYMPH  --  12*  --   --   
EOS  --  3  --   --   
  
Cardiac Enzymes No results for input(s): CPK, CKND1, ABBY in the last 72 hours. No lab exists for component: [place] Coagulation No results for input(s): PTP, INR, APTT, INREXT, INREXT in the last 72 hours. Lipid Panel Lab Results Component Value Date/Time Cholesterol, total 107 01/24/2015 09:15 AM  
 HDL Cholesterol 25 (L) 01/24/2015 09:15 AM  
 LDL, calculated 44 01/24/2015 09:15 AM  
 VLDL, calculated 38 01/24/2015 09:15 AM  
 Triglyceride 190 (H) 01/24/2015 09:15 AM  
 CHOL/HDL Ratio 4.3 01/24/2015 09:15 AM  
  
BNP No results for input(s): BNPP in the last 72 hours. Liver Enzymes No results for input(s): TP, ALB, TBIL, AP in the last 72 hours. No lab exists for component: SGOT, GPT, DBIL Thyroid Studies Lab Results Component Value Date/Time TSH 2.61 10/25/2020 02:20 AM  
    
Procedures/imaging: see electronic medical records for all procedures/Xrays and details which were not copied into this note but were reviewed prior to creation of Plan Racheal Fowler MD

## 2020-10-31 NOTE — PROGRESS NOTES
Pharmacy Note:  Vancomycin Pt completed a total of 7 days of Vancomycin therapy (10/24/20 - 10/30/20) Last dose of Vancomycin administered  10/30/20 at 15:30. Consult discontinued. If additional Vancomycin therapy is needed, please place new consult.

## 2020-10-31 NOTE — PROGRESS NOTES
OCCUPATIONAL THERAPY TREATMENT Patient: Te Elliott. (82 y.o. male) Date: 10/31/2020 Diagnosis: Closed right hip fracture, initial encounter ([facility] Db Leos Closed right hip fracture, initial encounter ([facility] Procedure(s) (LRB): 
RIGHT HIP PINNING (DHS) WITH C-ARM (Right) 3 Days Post-Op Precautions: Fall, NWB Chart, occupational therapy assessment, plan of care, and goals were reviewed. ASSESSMENT: 
Pt presents supine bed level, agreeable to therapy. Pt reports he has not sat up in 2 days. Pt educated on NWB status, pt able to repeat back and is able to repeat back at end of session meaning of this on RLE. Pt requires max A supine to sit EOB with pillow wedge under foot to prevent WB. Pt needs supported sitting provided by therapist 50% of time especially with attempt to put pressure through LLE. Pt given manual and verbal cues to sit upright off EOB, pt able to perform during reaching activities cross body and maintain sitting balance. 1 LOB noted where pt fell backwards, therapist correction with min A. Pt performed sitting leg \"sweeping activity\" to attempt to gain more weight bearing and strength through LLE. Pt needed max verbal/visual cues to perform task properly and to use LLE while maintaining sitting balanced. Once max cues given pt was able to perform with min correction for 8 minute session. Pt reporting fatigue. Max/total A to return supine in bed and perform bed mobility. All needs left in reach, call bell in reach. Progression toward goals: 
[]          Improving appropriately and progressing toward goals [x]          Improving slowly and progressing toward goals 
[]          Not making progress toward goals and plan of care will be adjusted PLAN:  
Patient continues to benefit from skilled intervention to address the above impairments. Continue treatment per established plan of care. Discharge Recommendations:  Rehab Further Equipment Recommendations for Discharge:  N/A  
 
SUBJECTIVE:  
Patient stated I cant put pressure through my right leg.  OBJECTIVE DATA SUMMARY:  
Cognitive/Behavioral Status: 
Neurologic State: Alert, Appropriate for age Orientation Level: Oriented to situation, Oriented to place, Oriented to person Cognition: Decreased attention/concentration Safety/Judgement: Decreased awareness of environment, Decreased awareness of need for safety Functional Mobility and Transfers for ADLs: 
 Bed Mobility: 
  
Supine to Sit: Maximum assistance Sit to Supine: Maximum assistance Balance: 
Occasional: LOB while seated perform LLE active motion and UB lateral reach ADL Intervention: Lower Body Dressing Assistance Dressing Assistance: Maximum assistance Socks: Maximum assistance Leg Crossed Method Used: No 
Position Performed: Seated edge of bed Cognitive Retraining Orientation Retraining: Reorienting Problem Solving: General alternative solution Executive Functions: Executing cognitive plans;Regulating behavior Organizing/Sequencing: Breaking task down; Two step sequence Attention to Task: Single task Following Commands: Follows one step commands/directions Safety/Judgement: Decreased awareness of environment;Decreased awareness of need for safety Cues: Verbal cues provided;Visual cues provided; Tactile cues provided UE Therapeutic Exercises: LLE \"sweeping\" exercise to attempt increased strength/pressure through LLE while maintaining sitting balance and reaching unilaterally, pt able to perform with max A in beginning but achieved min A assist with activity near end of 8 minute period Pain: 
Pain level pre-treatment: 3/10 Pain level post-treatment: 5/10 Pain Intervention(s): Medication administered by RN (see MAR); Rest, Ice, Repositioning Response to intervention: Nurse notified, See doc flow sheet Activity Tolerance: Fair, pt able to sit EOB for 18 minutes, perform therapeutic activity for about 10. Please refer to the flowsheet for vital signs taken during this treatment. After treatment:  
[]  Patient left in no apparent distress sitting up in chair 
[x]  Patient left in no apparent distress in bed 
[x]  Call bell left within reach [x]  Nursing notified 
[]  Caregiver present 
[]  Bed alarm activated COMMUNICATION/EDUCATION:  
[x] Role of Occupational Therapy in the acute care setting 
[x] Home safety education was provided and the patient/caregiver indicated understanding. [x] Patient/family have participated as able in working towards goals and plan of care. [x] Patient/family agree to work toward stated goals and plan of care. [] Patient understands intent and goals of therapy, but is neutral about his/her participation. [] Patient is unable to participate in goal setting and plan of care. Thank you for this referral. 
Colleen MONTGOMERY, OTR/L Time Calculation: 29 mins

## 2020-10-31 NOTE — PROGRESS NOTES
2005-paged hospitalist d/t report from [facility] that patient had converted to A-Fib with PVCs, heart rate ranging from 115-140s. New order for Carizem 240 mg x one now and stat CBC, CMP, and Magnesium. 2032-Alert and oriented x 4. Monitor tech says heart rhythm continues to be A-fib, but rate is steady at 120. Lungs CTA, diminished on 1LNC. Pain rated 0/10. Patient says he feels fine. Shift summary-patient in bed throughout the noc. Denied pain. SCDs in place. Continues on 1LNC. ATB IV given with no adverse effects. Continues to be A-Fib on tele box 45.

## 2020-10-31 NOTE — PROGRESS NOTES
DC Plan update:  Discussed case w/ Dr. Roderick Christensen; patient will not be discharging today; [facility] cancelled, [facility] admissions coordinator updated. Will re-evaluate for discharge potential tomorrow.

## 2020-10-31 NOTE — ROUTINE PROCESS
Bedside and Verbal shift change report given to EMILE Vernon RN (oncoming nurse) by Mere Cosme RN (offgoing nurse). Report included the following information SBAR, Kardex, Intake/Output and MAR.

## 2020-11-01 NOTE — PROGRESS NOTES
DC Plan: [facility] Discussed case with Dr. Anabel Hartley and patient is stable for transfer to [facility]; contacted admissions nurse Rebecca Shea and contacted [facility] admissions unit to make them aware of transfer; transportation has been set up via [address] and pickup will be 1300. Nursing to call report to [phone] prior to patient transfer.

## 2020-11-01 NOTE — PROGRESS NOTES
Hospitalist Progress Note Patient: Hannah Verma. MRN: [id number]  CSN: [id number] YOB: 1935  Age: 82 y.o. Sex: male DOA: 10/23/2020 LOS:  LOS: 8 days Chief Complaint: 
 
fracture Assessment/Plan  
 
82 y. o. male who with history of dementia, atrial for rib on Xarelto, admitted  after sustaining a fall and distal femur fracture. 
  
Closed right hip fracture -  
S/p right hip pinning 
  
Dementia without behavioral disturbance - 
Continue with home meds. 
  
Possible hospital-acquired pneumonia - Repeat CXR this am shows infiltrates still, he has no cough or fever, finish zosyn tomorrow, one dose lasix for possible edema Completed 7 days of vancomycin therapy Chronic diastolic CHF 
  
Paroxysmal Atrial fibrillation -  
Converted to Afib with RVR overnight >still in Afib, rate controlled 
continue with Cardizem Echo 09/2020 with EF of 50-55% 
xarelto resumed Anemia-repeat H&H ordered, await result Hypokalemia-repletion ordered K is 3.3 this AM  
 
hypoalbuminemia 
  
Diabetes - Ins dep type 2 On lantus, premeal, SSI. 
  
CT of the head with no acute intracranial abnormality.  
  
COVID 19 negative 
 
xarelto will cover DVT prophylaxis Can transfer to [facility] today. Dr. Smita Jenkins completed DC Summary on 10/30/20. Only new occurrence is that pt has transitioned back into atrial fibrillation but rate is controlled. Disposition : [facility] today. Patient Active Problem List  
Diagnosis Code  Hypertension I10  
 Diabetes mellitus ([facility] E11.9  Hyperlipemia E78.5  Depression F32.9  Varicose veins I83.90  Arthritis M19.90  Tinnitus H93.19  
 Paroxysmal atrial fibrillation (HCC) I48.0  Cardiac device in situ Z95.9  Pneumonia J18.9  Primary osteoarthritis of right knee M17.11  Type II or unspecified type diabetes mellitus with renal manifestations, uncontrolled(250.42) (HCC) E11.29, E11.65  
  Diastolic CHF, chronic (HCC) I50.32  
 Migraine with vertigo G43. [address]  Fall W19. [place]  Closed right hip fracture, initial encounter ([facility] S72.001A  Dementia without behavioral disturbance (HCC) F03.90  Anticoagulant long-term use Z79.01  
 Hypoxia R09.02 Subjective: 
 
Denies any complaints. Talking on the phone, refused to end or pause conversation to participate fully in assessment Review of systems: 
 
Constitutional: denies fevers Respiratory: denies SOB, cough Cardiovascular: denies chest pain Gastrointestinal: denies nausea, vomiting, diarrhea Vital signs/Intake and Output: 
Visit Vitals BP (!) 147/74 (BP 1 Location: Left arm, BP Patient Position: At rest) Pulse 94 Temp 98.9 °F (37.2 °C) Resp 16 Ht 5' 7\" (1.702 m) Wt 87 kg (191 lb 12.8 oz) SpO2 91% BMI 30.04 kg/m² Current Shift:  No intake/output data recorded. Last three shifts:  10/30 1901 - 11/01 0700 In: 840 [P.O.:440; I.V.:400] Out: - Exam: 
 
General: elderly WM, NAD, pleasant CVS:Irregular rate and rhythm, no M/R/G, S1/S2 heard, no thrill Lungs:dec BS bases, no wheezes or rales Abdomen: Soft, Nontender, No distention, Normal Bowel sounds, No hepatomegaly Extremities: right hip dressed, no edema, no bruising Neuro:grossly normal , follows commands Psych:appropriate Labs: Results:  
   
Chemistry Recent Labs 11/01/20 
0445 10/31/20 
0550 10/30/20 
2240 * 116* 136*  139 141  
K 3.3* 3.1* 3.3*  
 103 102 CO2 30 31 35* BUN 13 16 16 CREA 1.02 1.06 1.05  
CA 7.9* 7.8* 7.7* AGAP 6 5 4 BUCR 13 15 15 CBC w/Diff Recent Labs 11/01/20 
0445 10/31/20 
0550 10/30/20 
2240 WBC 10.9 11.9 13.0  
RBC 2.78* 3.02* 3.14* HGB 7.7* 8.5* 8.8* HCT 24.7* 27.1* 28.2*  
 401 428* GRANS  --   --  78* LYMPH  --   --  12* EOS  --   --  3 Cardiac Enzymes No results for input(s): CPK, CKND1, ABBY in the last 72 hours. No lab exists for component: [place] Coagulation No results for input(s): PTP, INR, APTT, INREXT, INREXT in the last 72 hours. Lipid Panel Lab Results Component Value Date/Time Cholesterol, total 107 01/24/2015 09:15 AM  
 HDL Cholesterol 25 (L) 01/24/2015 09:15 AM  
 LDL, calculated 44 01/24/2015 09:15 AM  
 VLDL, calculated 38 01/24/2015 09:15 AM  
 Triglyceride 190 (H) 01/24/2015 09:15 AM  
 CHOL/HDL Ratio 4.3 01/24/2015 09:15 AM  
  
BNP No results for input(s): BNPP in the last 72 hours. Liver Enzymes No results for input(s): TP, ALB, TBIL, AP in the last 72 hours. No lab exists for component: SGOT, GPT, DBIL Thyroid Studies Lab Results Component Value Date/Time TSH 2.61 10/25/2020 02:20 AM  
    
Procedures/imaging: see electronic medical records for all procedures/Xrays and details which were not copied into this note but were reviewed prior to creation of Plan Hank Santos MD

## 2020-11-01 NOTE — PROGRESS NOTES
2322 CNA checked patients Vital signs, elevated temperature of 100.6 and BP of 144/63. Room was too warm and heat was turned high. Removed excess blankets, opened the door and lower down the heat. 0010 Rechecked Temp 98.9 and /77 Patient incontinent with urine, incontinent care provided. Call bell within reach. Bed alarm activated at all times. 0143 Verbal shift change report given to Salome Allen (oncoming nurse) by Lew Barakat RN 
 (offgoing nurse). Report included the following information SBAR, Kardex, ED Summary, OR Summary, Procedure Summary, Intake/Output, MAR and Recent Results.

## 2020-11-01 NOTE — PROGRESS NOTES
0771 Received bedside shift report from [facility], [address] [facility]. Report included the following information SBAR, Kardex, Intake/Output, MAR and Recent Results. 0854 Pt glasses seem to be misplaced. He said he came in with them. Reporting to security. [address] informed me that they have not found after checking with [address] and main lost and found area. [address] [facility],[address] report to nurse at [facility] Saúl Tristan, RN. Report included the following information SBAR, Kardex, Intake/Output, MAR and Recent Results. Also informed that he wears glasses and they seem to be misplaced. 1418 Removed arm bands and shredded. Removed IV. Clean, dry and intact.

## 2020-11-01 NOTE — PROGRESS NOTES
Problem: Mobility Impaired (Adult and Pediatric) Goal: *Acute Goals and Plan of Care (Insert Text) Description: Physical Therapy Goals Initiated 10/29/2020 and to be accomplished within 7 day(s) 1. Patient will move from supine <> sit with min assist in prep for out of bed activity and change of position. 2.  Patient will perform sit<> stand with min assist/RW NWB R LE in prep for transfers/ambulation. 3.  Patient will transfer from bed <> chair with min assist/RW NWB R LE for time up in chair for completion of ADL activity. 4.  Patient will ambulate 10 feet with min/mod assist/RW NWB R LE for improved functional mobility at discharge. Note: PHYSICAL THERAPY TREATMENT Patient: Kaci Zimmerman. (82 y.o. male) Date: 11/1/2020 Diagnosis: Closed right hip fracture, initial encounter ([facility] Vickie Lindsey Closed right hip fracture, initial encounter ([facility] [facility] Procedure(s) (LRB): 
RIGHT HIP PINNING (DHS) WITH C-ARM (Right) 4 Days Post-Op Precautions: Fall, NWB 
 
ASSESSMENT: 
Pt supine in bed on PT entry, denies pain at rest. Pt declining sitting EOB today, reports he is leaving soon. Supine therapeutic exercises performed for LE strength and ROM as listed below. Pt denied pain t/o session. Pt continues to benefit from skilled therapy in order to incr functional mobility and decr need for assist. Recommend SNF on discharge. Progression toward goals:  
[]      Improving appropriately and progressing toward goals [x]      Improving slowly and progressing toward goals 
[]      Not making progress toward goals and plan of care will be adjusted PLAN: 
Patient continues to benefit from skilled intervention to address the above impairments. Continue treatment per established plan of care. Discharge Recommendations:  Carlos Rodriguez Further Equipment Recommendations for Discharge:  N/A  
 
SUBJECTIVE:  
Patient stated I feel good right now.  OBJECTIVE DATA SUMMARY:  
 Critical Behavior: 
Neurologic State: Appropriate for age, Alert Orientation Level: Oriented X4 Cognition: Appropriate decision making, Follows commands Safety/Judgement: Decreased awareness of environment, Decreased awareness of need for safety Functional Mobility Training: 
Ambulation/Gait Training: 
Right Side Weight Bearing: Non-weight bearing Therapeutic Exercises:  
 
 
EXERCISE Sets Reps Active Active Assist  
Passive Self ROM Comments Ankle Pumps 2 10  [x] [] [] [] Quad Sets/Glut Sets 1 10  [x] [] [] [] Hold for 5 secs Heel Slides 1 10 [x] [x] [] [] AAROM on R Straight Leg Raises   [] [] [] [] Hip Add 1 10 [x] [] [] [] Hold for 5 secs, w/ pillow squeeze Pain: 
Pain level pre-treatment: 0/10 Pain level post-treatment: 0/10 Pain Intervention(s): Medication (see MAR); Rest, Ice, Repositioning Response to intervention: Nurse notified, See doc flow Activity Tolerance:  
Pt tolerated supine therapeutic exercise with no pain, declined to perform bed mobility/sit EOB. Please refer to the flowsheet for vital signs taken during this treatment. After treatment:  
[] Patient left in no apparent distress sitting up in chair 
[x] Patient left in no apparent distress in bed 
[x] Call bell left within reach [x] Nursing notified 
[] Caregiver present 
[] Bed alarm activated 
[x] SCDs applied COMMUNICATION/EDUCATION:  
[x]         Role of Physical Therapy in the acute care setting. [x]         Fall prevention education was provided and the patient/caregiver indicated understanding. [x]         Patient/family have participated as able in working toward goals and plan of care. [x]         Patient/family agree to work toward stated goals and plan of care. []         Patient understands intent and goals of therapy, but is neutral about his/her participation. []         Patient is unable to participate in stated goals/plan of care: ongoing with therapy staff. 
[]         Other: Violeta Wright Time Calculation: 31 mins

## 2020-11-03 PROBLEM — A41.9 SEPSIS (HCC): Status: ACTIVE | Noted: 2020-01-01

## 2020-11-03 PROBLEM — R53.81 DEBILITY: Status: ACTIVE | Noted: 2020-01-01

## 2020-11-03 PROBLEM — J96.01 ACUTE HYPOXEMIC RESPIRATORY FAILURE (HCC): Status: ACTIVE | Noted: 2020-01-01

## 2020-11-03 PROBLEM — T17.908A ASPIRATION INTO AIRWAY: Status: ACTIVE | Noted: 2020-01-01

## 2020-11-03 PROBLEM — Z79.01 ANTICOAGULATED: Status: ACTIVE | Noted: 2020-01-01

## 2020-11-03 PROBLEM — R77.8 ELEVATED TROPONIN: Status: ACTIVE | Noted: 2020-01-01

## 2020-11-03 NOTE — PROGRESS NOTES
Patient Name: Xiomara Schmidt. Age: 82 y.o. Sex:  male YOB: 1935 Medical Record Number: [id number] Antimicrobial  Vancomycin Indication HAP Dose / Interval  
 
 
 
 
Current Antimicrobial Therapy (168h ago, onward) Ordered     Start Stop 11/03/20 0259  vancomycin (VANCOCIN) 2000 mg in  ml infusion  2,000 mg,   IntraVENous,   ONCE    
 11/03/20 0300 11/03/20 1459  
 11/03/20 0234  piperacillin-tazobactam (ZOSYN) 4.5 g in 0.9% sodium chloride (MBP/ADV) 100 mL MBP  4.5 g,   IntraVENous,   EVERY 6 HOURS    
 11/03/20 0235 -- Last Level (if applicable) No results found for: DOSE, TMG, DTG Vancomycin Lab Results Component Value Date/Time Vancomycin,trough 14.8 10/30/2020 01:26 PM  
  
Gentamicin No results found for: GENP, GENT, GENR] Tobramycin No results found for: TOBP, TOBT, TOBR Amikacin No results found for: Armen East, AMIKT, Rubia Niles Cultures (7 most recent) Culture result:  
Date Value Ref Range Status 10/24/2020 NO GROWTH 6 DAYS   Final  
06/11/2018    Final  
 MRSA target DNA is not detected (presumptive not colonized with MRSA) 04/06/2018 (A)   Final  
 MRSA target DNA is detected (presumptive positive for MRSA colonization). 12/27/2016 NO GROWTH 6 DAYS   Final  
11/16/2016 NO GROWTH 6 DAYS   Final  
  
Renal Overview (72 hr) Recent Labs 11/01/20 
0445 10/31/20 
0550 BUN 13 16 CREA 1.02 1.06 CrCl (Current): Estimated Creatinine Clearance: 60.2 mL/min (based on SCr of 1.02 mg/dL). Lactic Acid (Sepsis Criteria: >2.0 mmol/L) Lab Results Component Value Date/Time Lactic acid 1.2 12/27/2016 06:50 PM  
 Lactic acid 1.4 11/16/2016 06:55 PM  
 Lactic acid 2.1 (H) 09/19/2014 11:40 PM  
  
Procalcitonin (0.10-0.49 NG/ML) Lab Results Component Value Date/Time Procalcitonin <0.05 10/24/2020 05:25 AM  
  
Hepatic Overview (72 hr) No results for input(s): ALT, AP in the last 72 hours. No lab exists for component: SGOT Temp (24-hr Max) Temp (24hrs), Av.3 °F (38.5 °C), Min:101.3 °F (38.5 °C), Max:101.3 °F (38.5 °C) Hematology Recent Labs 20 
0235 20 
0445 10/31/20 
0550 WBC 20.0* 10.9 11.9 HGB 9.3* 7.7* 8.5* HCT 28.9* 24.7* 27.1*  
* 419 401 GRANS 83*  --   --   
ANEU 16.7*  --   --   
 
  
DOT  1 Notes   Estimated Pharmacokinetic Parameters Clearance method: Ashly (CLvanco = 0.689*CrCl+3.66)*0.06 [empiric; no drug levels] Vd method: Newman Vd method Vd: 62 L (0.7 L/kg) Philip: 0.0402 hr-1 (T1/2 = 17.2 hrs) CLvanco: 2.49 L/hr Dosing Recommendations Vancomycin dose: 1500 mg IV Q24hrs (infused over 1.5 hrs) Estimated AUC/IVON: 600 mcg*hr/mL (assumed IVON 1 mcg/mL) Estimated peak: 37.9 mcg/mL Estimated trough: 15.3 mcg/mL Therapeutic target: AUC/IVON 400 to 600 mcg*hr/mL A/P: 
1. Recommend vancomycin 1500 mg IV Q24hrs (17.1 mg/kg) 2. [Discuss when next vancomycin level(s) should be obtained based on clinical factors and/or institution policy] 3. Monitor renal function (urine output, BUN/SCr). Dose adjustments may be necessary with a significant change in renal function. 4. Vancomycin loading dose of 2000 mg to facilitate rapid attainment of target serum vancomycin levels. Nathalie Adams [facility] Clinical Pharmacist 
[phone]

## 2020-11-03 NOTE — H&P
History & Physical 
 
Patient: Izabel Arceo. MRN: [id number]  CSN: [id number] YOB: 1935  Age: 82 y.o. Sex: male DOA: 11/3/2020 Primary Care Provider:  Yulissa Berkowitz MD 
 
 
Assessment/Plan Patient Active Problem List  
Diagnosis Code  Hypertension I10  
 Diabetes mellitus ([facility] [address] E11.9  Hyperlipemia E78.5  Depression F32.9  Varicose veins I83.90  Arthritis M19.90  Tinnitus H93.19  
 Paroxysmal atrial fibrillation (HCC) I48.0  Cardiac device in situ Z95.9  Pneumonia J18.9  Primary osteoarthritis of right knee M17.11  Type II or unspecified type diabetes mellitus with renal manifestations, uncontrolled(250.42) (HCC) E11.29, E11.65  Diastolic CHF, chronic ([facility]) I50.32  
 Migraine with vertigo G43. [facility]  Fall W19. Abimael Jacobsonto  Closed right hip fracture, initial encounter ([facility] [address] S72.001A  Dementia without behavioral disturbance ([facility]) F03.90  Anticoagulant long-term use Z79.01  
 Hypoxia R09.02  
 Anticoagulated Z79.01  
 Debility R53.81  Sepsis ([facility] [address] A41.9  
 
83 y/o male with recent hospital admission for hip fracture and pneumonia who is readmitted from [facility] with hypoxia, pneumonia, and sepsis. Neuro-dementia with debility and recent admission for hip fracture. He is A&Ox1 at baseline. Consider CT head today if he does not become more alert. Pulm-pneumonia with hypoxia. He is on HFNC. Will treat with zosyn and vancomycin due to recent hospitalization. I suspect he may be chronically aspirating so I placed a speech consult for him for swallow evaluation. Do not suspect PE given fever and the face he is already anticoagulated. CV-HTN with paroxysmal atrial fibrillation. Will hold oral medications until speech eval. Mildly elevated troponin likely due to demand ischemia. Will trend troponins. GI-suspect aspiration Renal-no issues Heme-anticoagulated on xarelto.  Will make NPO for now and can consider heparin drip later if not able to pass a swallow evaluation. ID-sepsis due to pneumonia. Albumin infusions preferentially given atrial fibrillation. COVID testing pending as well due to fever. Endo-DM type 2. Sliding scale insulin ordered. F/E/N-albumin infusions/replete prn/NPO until speech evaluation Full code as obtained by EMS from the NH prior to transfer. Palliative care consult placed to discuss code status and goals of care with his wife. Voicemail left with his wife to call the ICU back. Prophylaxis: SCDs, protonix IV, heparin SQ Estimated length of stay : 4 nights Presley Bland MD 
Nocturnist 
 
Critical Care Time 
[id number] 45 minutes of critical care time spent in the direct evaluation and treatment of this high risk patient. The reason for providing this level of medical care for this critically ill patient was due a critical illness that impaired one or more vital organ systems such that there was a high probability of imminent or life threatening deterioration in the patients condition. This care involved high complexity decision making to assess, manipulate, and support vital system functions, to treat this degreee vital organ system failure and to prevent further life threatening deterioration of the patients condition. 
---------------------------------------------------------------------------------------------------------------------------------------------------------------------------------------------------------------- 
CC: altered mental status, hypoxemia HPI:  
 
Khadijah Silver is a 82 y.o. male who was recently discharged from the hospital with a hip fracture and pneumonia. He was transferred to [facility] and is alert and oriented x1 at baseline. Per EMS, he was found in his bed with an O2 sat in the 60s. He was transported on a nonrebreather mask and placed on high flow oxygen in the ED.  He returned to his baseline on high flow oxygen. History is unobtainable from the patient due to dementia. Past Medical History:  
Diagnosis Date  Anticoagulant long-term use  Arthritis  Atrial fibrillation ([address]  Atrial fibrillation ([address]  Dementia ([address]  Depression  Depression  Diabetes ([address] 1980s  Fracture, femur ([address]  GERD (gastroesophageal reflux disease)  Heart failure ([address]  Hypercholesterolemia  Hyperlipemia  Hypertension 2000  Hypoxia  Ill-defined condition 2015  
 has passed out-has been tested cannot determine cause  Osteoarthritis   
 of knees  Pneumonia  Primary osteoarthritis of right knee 6/28/2018  Ulcer   
 at the anastomotic bite of gastric bypass  Varicose veins  Weakness  Wide-complex tachycardia ([address] 5/2/2015 Past Surgical History:  
Procedure Laterality Date  ABDOMEN SURGERY PROC UNLISTED  1998  
 umb hernia Rupture  BIOPSY LIVER  10/05/04  
 EGD  12/29/09  
 with biopsy  ENDOSCOPY, COLON, DIAGNOSTIC    
 GASTROSTOMY TUBE  10/05/04  HX CATARACT REMOVAL    
 bilateral  
 HX CHOLECYSTECTOMY  10/05/04  HX GI  10/05/04  
 vertical banded gastroplasty/gastric bypass  HX KNEE REPLACEMENT    
 HX MOHS PROCEDURES  1991/1995  
 bilateral  
 HX PACEMAKER  2015 [employer]-Reveal [employer] Cardiac Monitor Family History Problem Relation Age of Onset  Heart Attack Father  Diabetes Father  Hypertension Father  Cancer Mother   
     lung  Diabetes Mother  Other Brother   
     obese  Diabetes Brother  Diabetes Brother  Other Maternal Grandmother   
     obese  Diabetes Sister Social History Socioeconomic History  Marital status:  Spouse name: Not on file  Number of children: Not on file  Years of education: Not on file  Highest education level: Not on file Tobacco Use  Smoking status: Former Smoker Last attempt to quit: 4/3/1967 Years since quittin.6  Smokeless tobacco: Never Used Substance and Sexual Activity  Alcohol use: Yes Alcohol/week: 4.0 standard drinks Types: 3 Glasses of wine, 1 Shots of liquor per week Comment: monthly  Drug use: No  
 Sexual activity: Never Prior to Admission medications Medication Sig Start Date End Date Taking? Authorizing Provider  
tuberculin (AplisoL) 5 tub. unit /0.1 mL injection 5 Units by IntraDERMal route Once. Yes Other, MD Lobo  
BISACODYL RE Insert 10 mg into rectum. Yes Other, MD Lobo  
ferrous sulfate 325 mg (65 mg iron) tablet Take 1 Tab by mouth two (2) times daily (with meals). 10/30/20  Yes Diamond Servin MD  
senna-docusate (PERICOLACE) 8.6-50 mg per tablet Take 1 Tab by mouth two (2) times a day. 10/30/20  Yes Diamond Servin MD  
traMADoL (Ultram) 50 mg tablet Take 1 Tab by mouth every six (6) hours as needed for Pain for up to 3 days. Max Daily Amount: 200 mg. 10/30/20 11/3/20 Yes Diamond Servin MD  
ARIPiprazole (Abilify) 2 mg tablet Take 2 mg by mouth daily. Yes Provider, Historical  
lidocaine (Lidoderm) 5 % Apply patch to the affected area for 12 hours a day and remove for 12 hours a day. 20  Yes Manuel Cartagena MD  
buPROPion SR [facility] SR) 150 mg SR tablet Take 150 mg by mouth daily. Yes Provider, Historical  
multivitamin, tx-iron-ca-min (THERA-M W/ IRON) 9 mg iron-400 mcg tab tablet Take 1 Tab by mouth daily. Formulary substitution for DAILY MULTIVITAMIN centrum silver   Yes Provider, Historical  
insulin detemir U-100 (LEVEMIR U-100 INSULIN) 100 unit/mL injection 15 Units by SubCUTAneous route nightly. Yes Provider, Historical  
fluticasone (FLONASE) 50 mcg/actuation nasal spray 2 Sprays by Both Nostrils route daily as needed for Rhinitis. Yes Provider, Historical  
cycloSPORINE (RESTASIS) 0.05 % dpet Administer 1 Drop to left eye as needed.    Yes Provider, Historical  
 donepezil (ARICEPT) 5 mg tablet Take 5 mg by mouth nightly. Yes Provider, Historical  
oxybutynin (DITROPAN) 5 mg tablet Take 5 mg by mouth nightly. Yes Other, MD Lobo  
metoprolol tartrate 75 mg tab Take 75 mg by mouth every twelve (12) hours. 12/29/16  Yes Susan Daniel MD  
cholecalciferol (VITAMIN D3) 1,000 unit tablet Take 1,000 Units by mouth daily. Yes Provider, Historical  
furosemide (LASIX) 20 mg tablet Take 20 mg by mouth daily. Indications: Edema   Yes Provider, Historical  
insulin lispro (HUMALOG) 100 unit/mL injection by SubCUTAneous route four (4) times daily. BS-with sliding scale 80-12-=2 units 121-160=4 
161-200=6 
201-250=8 
251- and over 10 units 
with each meal  Indications: type 2 diabetes mellitus, sliding scale   Yes Provider, Historical  
amoxicillin-clavulanate (Augmentin) 500-125 mg per tablet Take 1 Tab by mouth two (2) times a day. 10/30/20   Rosalina Hua MD  
dilTIAZem CD (CARDIZEM CD) 240 mg ER capsule Take 240 mg by mouth daily (after lunch). Provider, Historical  
rivaroxaban (XARELTO) 20 mg tab tablet Take 20 mg by mouth daily (with lunch). Provider, Historical  
VITAMIN D2 50,000 unit capsule TAKE 1 CAPSULE TWICE WEEKLY 11/20/17   Chichi Alamo MD  
Omeprazole delayed release (PRILOSEC D/R) 20 mg tablet Take 20 mg by mouth daily. Provider, Historical  
melatonin tab tablet Take 5 mg by mouth nightly. Indications: sleep    Provider, Historical  
 
 
Allergies Allergen Reactions  Morphine Other (comments) Hallucinate; \"ran all night long\" Review of Systems Unable to obtain due to dementia Physical Exam:  
 
Physical Exam: 
Visit Vitals /60 (BP 1 Location: Left arm, BP Patient Position: At rest) Pulse 92 Temp 98 °F (36.7 °C) Resp 28 Ht 6' (1.829 m) Wt 87.9 kg (193 lb 11.2 oz) SpO2 100% BMI 26.27 kg/m² O2 Flow Rate (L/min): 40 l/min O2 Device: Hi flow nasal cannula Temp (24hrs), Av.3 °F (37.9 °C), Min:98 °F (36.7 °C), Max:101.7 °F (38.7 °C) 
   190 -  0700 In: 100 [I.V.:100] Out: -    No intake/output data recorded. General:  Dozing off, can state name and that he is not in pain. Head:  Normocephalic, without obvious abnormality, atraumatic. Eyes:  Conjunctivae/corneas clear, sclera anicteric. Neck: Supple, symmetrical, trachea midline. Lungs:   Coarse breath sounds bilaterally Heart:  Regular rate and rhythm, S1, S2 normal, no murmur, click, rub or gallop. Abdomen: Soft, non-tender. Bowel sounds normal. No masses,  No organomegaly. Extremities: Extremities normal, atraumatic, no cyanosis or edema. Capillary refill normal.  
Pulses: 2+ and symmetric all extremities. Skin: Skin color pink, turgor normal. No rashes or lesions Neurologic: Unable to assess due to somnolence. Labs Reviewed: All lab results for the last 24 hours reviewed. Recent Results (from the past 24 hour(s)) EKG, 12 LEAD, INITIAL Collection Time: 20  2:28 AM  
Result Value Ref Range Ventricular Rate 109 BPM  
 Atrial Rate 107 BPM  
 QRS Duration 90 ms Q-T Interval 350 ms QTC Calculation (Bezet) 471 ms Calculated R Axis -28 degrees Calculated T Axis 64 degrees Diagnosis Atrial fibrillation with rapid ventricular response Anteroseptal infarct , age undetermined Abnormal ECG When compared with ECG of 23-OCT-2020 23:28, Atrial fibrillation has replaced Sinus rhythm Anteroseptal infarct is now present ST now depressed in Lateral leads LACTIC ACID Collection Time: 20  2:35 AM  
Result Value Ref Range Lactic acid 1.4 0.4 - 2.0 MMOL/L  
METABOLIC PANEL, COMPREHENSIVE Collection Time: 20  2:35 AM  
Result Value Ref Range Sodium 136 136 - 145 mmol/L Potassium 3.8 3.5 - 5.5 mmol/L Chloride 101 100 - 111 mmol/L  
 CO2 29 21 - 32 mmol/L  Anion gap 6 3.0 - 18 mmol/L  
 Glucose 168 (H) 74 - 99 mg/dL BUN 13 7.0 - 18 MG/DL Creatinine 0.99 0.6 - 1.3 MG/DL  
 BUN/Creatinine ratio 13 12 - 20 GFR est AA >60 >60 ml/min/1.73m2 GFR est non-AA >60 >60 ml/min/1.73m2 Calcium 7.9 (L) 8.5 - 10.1 MG/DL Bilirubin, total 0.4 0.2 - 1.0 MG/DL  
 ALT (SGPT) 13 (L) 16 - 61 U/L  
 AST (SGOT) 18 10 - 38 U/L Alk. phosphatase 154 (H) 45 - 117 U/L Protein, total 5.5 (L) 6.4 - 8.2 g/dL Albumin 1.7 (L) 3.4 - 5.0 g/dL Globulin 3.8 2.0 - 4.0 g/dL A-G Ratio 0.4 (L) 0.8 - 1.7    
CBC WITH AUTOMATED DIFF Collection Time: 11/03/20  2:35 AM  
Result Value Ref Range WBC 20.0 (H) 4.6 - 13.2 K/uL  
 RBC 3.26 (L) 4.70 - 5.50 M/uL HGB 9.3 (L) 13.0 - 16.0 g/dL HCT 28.9 (L) 36.0 - 48.0 % MCV 88.7 74.0 - 97.0 FL  
 MCH 28.5 24.0 - 34.0 PG  
 MCHC 32.2 31.0 - 37.0 g/dL  
 RDW 16.7 (H) 11.6 - 14.5 % PLATELET 093 (H) 522 - 420 K/uL MPV 8.7 (L) 9.2 - 11.8 FL  
 NEUTROPHILS 83 (H) 40 - 73 % LYMPHOCYTES 8 (L) 21 - 52 % MONOCYTES 8 3 - 10 % EOSINOPHILS 1 0 - 5 % BASOPHILS 0 0 - 2 %  
 ABS. NEUTROPHILS 16.7 (H) 1.8 - 8.0 K/UL  
 ABS. LYMPHOCYTES 1.5 0.9 - 3.6 K/UL  
 ABS. MONOCYTES 1.6 (H) 0.05 - 1.2 K/UL  
 ABS. EOSINOPHILS 0.2 0.0 - 0.4 K/UL  
 ABS. BASOPHILS 0.0 0.0 - 0.1 K/UL  
 DF AUTOMATED    
TROPONIN I Collection Time: 11/03/20  2:35 AM  
Result Value Ref Range Troponin-I, QT 0.10 (H) 0.0 - 0.045 NG/ML  
POC G3 Collection Time: 11/03/20  2:45 AM  
Result Value Ref Range Device: Non rebreather Flow rate (POC) 15 L/M  
 FIO2 (POC) 1.0 %  
 pH (POC) 7.51 (H) 7.35 - 7.45    
 pCO2 (POC) 35.2 35.0 - 45.0 MMHG  
 pO2 (POC) 97 80 - 100 MMHG  
 HCO3 (POC) 27.8 (H) 22 - 26 MMOL/L  
 sO2 (POC) 98 (H) 92 - 97 % Base excess (POC) 5 mmol/L Allens test (POC) N/A Total resp. rate 33 Site RIGHT RADIAL Patient temp. 101.3 Specimen type (POC) ARTERIAL Performed by Cielo Hilario INFLUENZA A & B AG (RAPID TEST) Collection Time: 11/03/20  2:50 AM  
Result Value Ref Range Influenza A Antigen Negative NEG Influenza B Antigen Negative NEG    
URINALYSIS W/ RFLX MICROSCOPIC Collection Time: 11/03/20  3:28 AM  
Result Value Ref Range Color YELLOW Appearance CLOUDY Specific gravity 1.016 1.005 - 1.030    
 pH (UA) 8.0 5.0 - 8.0 Protein 100 (A) NEG mg/dL Glucose 250 (A) NEG mg/dL Ketone 15 (A) NEG mg/dL Bilirubin Negative NEG Blood Negative NEG Urobilinogen 1.0 0.2 - 1.0 EU/dL Nitrites Negative NEG Leukocyte Esterase Negative NEG    
URINE MICROSCOPIC ONLY Collection Time: 11/03/20  3:28 AM  
Result Value Ref Range WBC Negative 0 - 5 /hpf  
 RBC Negative 0 - 5 /hpf Epithelial cells Negative 0 - 5 /lpf Bacteria 1+ (A) NEG /hpf Amorphous Crystals FEW (A) NEG Results XR CHEST PORT (Accession [id number]) (Order [id number]) Allergies     
Not Specified: Morphine Exam Information Status  Exam Begun   Exam Ended Final [99]  11/03/2020  02:41  11/03/2020  03:02 Result Information Status: Final result (Exam End: 11/3/2020 03:02)  Provider Status: Open Study Result EXAM: Portable frontal view of the chest. 
  
CLINICAL INDICATION/HISTORY: Shortness of breath, hypoxia 
  
COMPARISON: 10/30/2020 
  
_______________ 
  
FINDINGS:  
  
Interval worsening of severe patchy airspace filling process throughout both 
lungs with mild blunting costophrenic angles and hazy perihilar density. Stable 
borderline enlarged cardiac silhouette. 
  
_______________ 
  
IMPRESSION IMPRESSION: 
  
1. Interval worsening of bilateral lung consolidations suggesting pneumonia 
and/or pulmonary edema. Small bilateral pleural effusions.

## 2020-11-03 NOTE — CONSULTS
Palliative Medicine Consult Patient Name: Te Elliott. YOB: 1935 Date of Initial Consult: 11/3/2020 Reason for Consult: Goals of care discussions Requesting Provider: Marce Pereira MD 
Primary Care Physician: Fran Sawant MD 
  
 SUMMARY:  
Te Dumont is a 82 y.o. with a past history of DM2, HTN, PAF, diastolic CHF, dementia, and closed right hip fracture, who was admitted on 11/3/2020 from SNF with a diagnosis of  Acute hypoxemic respiratory failure, recurrent pneumonia, and suspect aspiration. Current medical issues leading to Palliative Medicine involvement include: support and care decisions. PALLIATIVE DIAGNOSES:  
1. Goals of care discussions 2. Acute hypoxic respiratory failure 3. Dementia 4. Debility PLAN:  
1. Goals of care discussions: In efforts to conserve PPE and limit disease exposure, assessed patient through glass window where he appeared to be sleeping on HFNC. NAD noted. Of note, he was weaned down from HFNC to 5 lpm via NC per chart review today. AMD on file naming patient's wife Jerri Anderson as MPOA. Called patient's wife; unfortunately wife did not answer our call but I was able to leave a VM. Awaiting return phone call to discuss goals of care. At this time, continue full code with full interventions. 2. Acute hypoxic respiratory failure: Recurrent pneumonia. Awaiting SLP consult for highly suspected aspiration. Was on HFNC during assessment but has since been weaned down to 5 lpm via NC. COVID 19 PUI but result now negative. May potentially need a discussion regarding the benefits and burdens of PEG tube versus comfort feeds. Awaiting SLP recs. 3. Dementia: Oriented to self at baseline. Did not disturb patient as he was resting comfortably. 4. Debility: Requires assist with all ADLs at baseline. 5. Initial consult note routed to primary continuity provider 6. Communicated plan of care with: Palliative IDT GOALS OF CARE / TREATMENT PREFERENCES:  
[====Goals of Care====] GOALS OF CARE: Full code with full interventions Patient/Health Care Proxy Stated Goals: Prolong life TREATMENT PREFERENCES:  
Code Status: Full Code Advance Care Planning: 
Advance Care Planning 10/24/2020 Patient's Healthcare Decision Maker is: -  
Primary Decision Maker Name -  
Primary Decision Maker Phone Number -  
Primary Decision Maker Relationship to Patient - Secondary Decision Maker Name - Secondary Decision Maker Phone Number - Secondary Decision Maker Relationship to Patient -  
Confirm Advance Directive Yes, on file Patient Would Like to Complete Advance Directive - Does the patient have other document types - Medical Interventions: Full interventions The palliative care team has discussed with patient / health care proxy about goals of care / treatment preferences for patient. 
[====Goals of Care====] HISTORY:  
 
History obtained from: patient's chart CHIEF COMPLAINT: n/a 
 
HPI/SUBJECTIVE: The patient is:  
[] Verbal and participatory [x] Non-participatory due to:  
Resting. Oriented to self at baseline Clinical Pain Assessment (nonverbal scale for severity on nonverbal patients): Adult Nonverbal Pain Scale Face: No particular expression or smile Activity (Movement): Lying quietly, normal position Guarding: Lying quietly, no positioning of hands over areas of body Physiology (Vital Signs): Stable vital signs Respiratory: Baseline RR/SpO2 compliant with ventilator Total Score: 0 
 
 
 FUNCTIONAL ASSESSMENT:  
 
Palliative Performance Scale (PPS): PPS: 40 PSYCHOSOCIAL/SPIRITUAL SCREENING:  
 
Advance Care Planning: 
Advance Care Planning 10/24/2020 Patient's Healthcare Decision Maker is: -  
Primary Decision Maker Name -  
Primary Decision Maker Phone Number -  
Primary Decision Maker Relationship to Patient - Secondary Decision Maker Name -  
 Secondary Decision Maker Phone Number - Secondary Decision Maker Relationship to Patient -  
Confirm Advance Directive Yes, on file Patient Would Like to Complete Advance Directive - Does the patient have other document types - Any spiritual / Uatsdin concerns: unable to assess 
[] Yes /  [] No 
 
Caregiver Burnout: 
[] Yes /  [] No /  [x] No Caregiver Present Anticipatory grief assessment:  Unable to assess 
[] Normal  / [] Maladaptive REVIEW OF SYSTEMS:  
 
Positive and pertinent negative findings in ROS are noted above in HPI. The following systems were [] reviewed / [x] unable to be reviewed as noted in HPI Other findings are noted below. Systems: constitutional, ears/nose/mouth/throat, respiratory, gastrointestinal, genitourinary, musculoskeletal, integumentary, neurologic, psychiatric, endocrine. Positive findings noted below. Modified ESAS Completed by: provider PHYSICAL EXAM:  
 
From RN flowsheet: 
Wt Readings from Last 3 Encounters:  
11/03/20 87.5 kg (193 lb) 11/01/20 87 kg (191 lb 12.8 oz) 09/11/20 88 kg (194 lb) Blood pressure 120/64, pulse 95, temperature 97.9 °F (36.6 °C), resp. rate 25, height 6' (1.829 m), weight 87.5 kg (193 lb), SpO2 93 %. Pain Scale 1: Numeric (0 - 10) Pain Intensity 1: 0 Constitutional: resting in bed with eyes closed, NAD Cardiovascular: regular rhythm Respiratory: breathing not labored, symmetric Neurologic: asleep HISTORY:  
 
Principal Problem: Hypoxia (10/24/2020) Active Problems: Hypertension () Diabetes mellitus ([address] (10/6/2011) Paroxysmal atrial fibrillation ([facility] (2/27/2015) Pneumonia (11/16/2016) Dementia without behavioral disturbance ([address] (10/24/2020) Anticoagulated (11/3/2020) Debility (11/3/2020) Sepsis ([address] (11/3/2020) Elevated troponin (11/3/2020) Acute hypoxemic respiratory failure ([address] (11/3/2020) Aspiration into airway (11/3/2020) Past Medical History:  
Diagnosis Date  Anticoagulant long-term use  Arthritis  Atrial fibrillation ([address]  Atrial fibrillation ([address]  Dementia ([address]  Depression  Depression  Diabetes ([address] 1980s  Fracture, femur ([address]  GERD (gastroesophageal reflux disease)  Heart failure ([address]  Hypercholesterolemia  Hyperlipemia  Hypertension 2000  Hypoxia  Ill-defined condition   
 has passed out-has been tested cannot determine cause  Osteoarthritis   
 of knees  Pneumonia  Primary osteoarthritis of right knee 2018  Ulcer   
 at the anastomotic bite of gastric bypass  Varicose veins  Weakness  Wide-complex tachycardia ([address] 2015 Past Surgical History:  
Procedure Laterality Date  ABDOMEN SURGERY PROC UNLISTED    
 umb hernia Rupture  BIOPSY LIVER  10/05/04  
 EGD  09  
 with biopsy  ENDOSCOPY, COLON, DIAGNOSTIC    
 GASTROSTOMY TUBE  10/05/04  HX CATARACT REMOVAL    
 bilateral  
 HX CHOLECYSTECTOMY  10/05/04  HX GI  10/05/04  
 vertical banded gastroplasty/gastric bypass  HX KNEE REPLACEMENT    
 HX MOHS PROCEDURES    
 bilateral  
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   
     lung  Diabetes Mother  Other Brother   
     obese  Diabetes Brother  Diabetes Brother  Other Maternal Grandmother   
     obese  Diabetes Sister History reviewed, no pertinent family history. Social History Tobacco Use  Smoking status: Former Smoker Last attempt to quit: 4/3/1967 Years since quittin.6  Smokeless tobacco: Never Used Substance Use Topics  Alcohol use:  Yes  
 Alcohol/week: 4.0 standard drinks Types: 3 Glasses of wine, 1 Shots of liquor per week Comment: monthly Allergies Allergen Reactions  Morphine Other (comments) Hallucinate; \"ran all night long\" Current Facility-Administered Medications Medication Dose Route Frequency  sodium chloride (NS) flush 5-10 mL  5-10 mL IntraVENous PRN  Vancomycin-dosed by pharmacy  1 Each Other Rx Dosing/Monitoring  sodium chloride (NS) flush 5-40 mL  5-40 mL IntraVENous Q8H  
 sodium chloride (NS) flush 5-40 mL  5-40 mL IntraVENous PRN  
 acetaminophen (TYLENOL) tablet 650 mg  650 mg Oral Q6H PRN Or  
 acetaminophen (TYLENOL) suppository 650 mg  650 mg Rectal Q6H PRN  polyethylene glycol (MIRALAX) packet 17 g  17 g Oral DAILY PRN  promethazine (PHENERGAN) tablet 12.5 mg  12.5 mg Oral Q6H PRN Or  
 ondansetron (ZOFRAN) injection 4 mg  4 mg IntraVENous Q6H PRN  
 insulin lispro (HUMALOG) injection   SubCUTAneous Q6H  
 glucose chewable tablet 16 g  16 g Oral PRN  
 glucagon (GLUCAGEN) injection 1 mg  1 mg IntraMUSCular PRN  
 dextrose 10% infusion 125-250 mL  125-250 mL IntraVENous PRN  piperacillin-tazobactam (ZOSYN) 4.5 g in 0.9% sodium chloride (MBP/ADV) 100 mL MBP  4.5 g IntraVENous Q8H  
 ipratropium-albuterol (COMBIVENT RESPIMAT) 20 mcg-100 mcg inhalation spray  1 Puff Inhalation TID  enoxaparin (LOVENOX) injection 90 mg  1 mg/kg SubCUTAneous Q12H  
 doxycycline (VIBRAMYCIN) 100 mg in 0.9% sodium chloride (MBP/ADV) 100 mL MBP  100 mg IntraVENous Q12H  
 methylPREDNISolone (PF) (SOLU-MEDROL) injection 20 mg  20 mg IntraVENous Q8H  
 pantoprazole (PROTONIX) 40 mg in 0.9% sodium chloride 10 mL injection  40 mg IntraVENous DAILY  budesonide (PULMICORT) 500 mcg/2 ml nebulizer suspension  500 mcg Nebulization BID RT  
 [START ON 11/4/2020] furosemide (LASIX) injection 20 mg  20 mg IntraVENous DAILY  
 
 
 
 LAB AND IMAGING FINDINGS:  
 
Lab Results Component Value Date/Time WBC 20.0 (H) 11/03/2020 02:35 AM  
 HGB 9.3 (L) 11/03/2020 02:35 AM  
 PLATELET 784 (H) 28/63/5979 02:35 AM  
 
Lab Results Component Value Date/Time Sodium 136 11/03/2020 02:35 AM  
 Potassium 3.8 11/03/2020 02:35 AM  
 Chloride 101 11/03/2020 02:35 AM  
 CO2 29 11/03/2020 02:35 AM  
 BUN 13 11/03/2020 02:35 AM  
 Creatinine 0.99 11/03/2020 02:35 AM  
 Calcium 7.9 (L) 11/03/2020 02:35 AM  
 Magnesium 1.7 11/01/2020 04:45 AM  
 Phosphorus 2.8 09/21/2014 03:39 AM  
  
Lab Results Component Value Date/Time Alk. phosphatase 154 (H) 11/03/2020 02:35 AM  
 Protein, total 5.5 (L) 11/03/2020 02:35 AM  
 Albumin 1.7 (L) 11/03/2020 02:35 AM  
 Globulin 3.8 11/03/2020 02:35 AM  
 
Lab Results Component Value Date/Time INR 1.4 (H) 10/28/2020 12:28 AM  
 Prothrombin time 16.7 (H) 10/28/2020 12:28 AM  
 aPTT 32.2 10/28/2020 12:28 AM  
  
Lab Results Component Value Date/Time Iron 60 12/02/2015 10:39 AM  
 Ferritin 281 10/24/2020 05:25 AM  
  
No results found for: PH, PCO2, PO2 No components found for: [place] Lab Results Component Value Date/Time CK 69 11/03/2020 02:35 AM  
 CK - MB 1.7 11/03/2020 02:35 AM  
  
 
 
   
 
Total time: 30 minutes Counseling / coordination time, spent as noted above: 25 minutes  
> 50% counseling / coordination?: yes, patient, Family (attempted) Prolonged service was provided for  []30 min   []75 min in face to face time in the presence of the patient, spent as noted above. Time Start:  
Time End:  
Note: this can only be billed with 84978 (initial) or 97047 (follow up). If multiple start / stop times, list each separately.

## 2020-11-03 NOTE — ACP (ADVANCE CARE PLANNING)
Advanced Directive for Health Care is on file in EMR. Document names wife Georgia as sole healthcare agent. At this time patient is FULL code. Code status: FULL  
 
MPOA/NOK: Georgia (wife) [phone] (c)

## 2020-11-03 NOTE — ED NOTES
TRANSFER - OUT REPORT: 
 
Verbal report given to Chuy Morales  RN (name) on Sher Beckett.  being transferred to ICU (unit) for routine progression of care Report consisted of patients Situation, Background, Assessment and  
Recommendations(SBAR). Information from the following report(s) SBAR, Kardex, ED Summary, [facility] and Cardiac Rhythm Afib was reviewed with the receiving nurse. Lines:  
Peripheral IV 11/03/20 Right Antecubital (Active) Site Assessment Clean, dry, & intact 11/03/20 0233 Phlebitis Assessment 0 11/03/20 0233 Dressing Status Clean, dry, & intact 11/03/20 0233 Hub Color/Line Status Green 11/03/20 6543 Action Taken Blood drawn 11/03/20 0233 Peripheral IV 11/03/20 Left Arm (Active) Site Assessment Clean, dry, & intact 11/03/20 0234 Phlebitis Assessment 0 11/03/20 0234 Infiltration Assessment 0 11/03/20 0234 Dressing Status Clean, dry, & intact 11/03/20 0234 Hub Color/Line Status Green 11/03/20 0234 Action Taken Blood drawn 11/03/20 0234 Opportunity for questions and clarification was provided. Patient transported with: 
 O2 @ 15 liters, monitor, nurse

## 2020-11-03 NOTE — PROGRESS NOTES
Reason for Readmission:    SOB and AMS  
      
RUR Score/Risk Level:   high PCP: First and Last name:  Shirley Hyman Name of Practice:  
 Are you a current patient: Yes/No:  
 Approximate date of last visit:  
 Can you participate in a virtual visit with your PCP:  
 
Is a Care Conference indicated: yes Did you attend your follow up appointment (s): If not, why not:no Resources/supports as identified by patient/family: SNF Top Challenges facing patient (as identified by patient/family and CM): Finances/Medication cost?   Medicare and [employer] Transportation Support system or lack thereof?   family Living arrangements? Lives with wife Self-care/ADLs/Cognition?   complete Current Advanced Directive/Advance Care Plan: On chart Plan for utilizing home health:    
          
Transition of Care Plan:    Based on readmission, the patient's previous Plan of Care 
 has been evaluated and/or modified. The current Transition of Care Plan is:    Pt arrived from [address], at this time plan of care not finalized, cm left voice message on spouses phone, cm will cont to review and remain available for d/c planning. Care Management Interventions Palliative Care Criteria Met (RRAT>21 & CHF Dx)?: Yes 
Palliative Consult Recommended?: Yes Current Support Network: Lives with Spouse

## 2020-11-03 NOTE — PROGRESS NOTES
Hospitalist Progress Note Patient: Piero Madison MRN: [id number]  CSN: [id number] YOB: 1935  Age: 82 y.o. Sex: male DOA: 11/3/2020 LOS:  LOS: 0 days Chief Complaint: 
 
pneumonia Assessment/Plan 83 yo gentleman with recent surgery for right hip fracture. Came from home initially, hospitalized and seen by cardiology for afib prior to surgery and afterwards Treated for mild pulm edema and possible pneumonia with zosyn course during hospitalization Discharged 11/1 to SNF Returned last night with hypoxia, probable pneumonia, pulm edema, possible sepsis Hypoxic resp failure-on high flow, covid is negative Sepsis-wbc elevated, no fevers, lactate ok, not requiring pressors Chronic diastolic CHF, watch volume status Atrial fibrillation-if xarelto held, then ok for lovenox BID but switch to his own [facility] when ok to swallow Possible aspiration-swallow/speech therapy consult Nicolás Selby is oriented to self, pleasant, but debilitated and requires help with ADL's 
Weakness, brittney with recent hip fracture Anemia-stable, no current signs for blood loss Depression-continue home meds when able to safely swallow Hypoalbuminemia with mod prot sarahy malnutrition D/c isolation Change steroids to solumedrol low dose Continue doxy, zosyn, and vanco, pending cultures and clinical response Cardiology saw for afib this past hospital stay, echo done pre-op Daily labs, monitor 02 sats Currently full code Palliative care consult to discuss code status and goals of care 
 
prog poor overall Disposition : 
Patient Active Problem List  
Diagnosis Code  Hypertension I10  
 Diabetes mellitus ([facility] E11.9  Hyperlipemia E78.5  Depression F32.9  Varicose veins I83.90  Arthritis M19.90  Tinnitus H93.19  
 Paroxysmal atrial fibrillation (HCC) I48.0  Cardiac device in situ Z95.9  Pneumonia J18.9  Primary osteoarthritis of right knee M17.11  Type II or unspecified type diabetes mellitus with renal manifestations, uncontrolled(250.42) (HCC) E11.29, E11.65  Diastolic CHF, chronic ([facility]) I50.32  
 Migraine with vertigo G43. [address]  Fall W19. [facility]  Closed right hip fracture, initial encounter ([facility] S72.001A  Dementia without behavioral disturbance ([facility]) F03.90  Anticoagulant long-term use Z79.01  
 Hypoxia R09.02  
 Anticoagulated Z79.01  
 Debility R53.81  Sepsis ([facility] A41.9 Subjective: 
 
I am ok Denies chest pain, SOB, cough, abd pain \"Am I in same place? \" Review of systems: 
 
Constitutional: denies fevers Respiratory: denies SOB, cough Cardiovascular: denies chest pain, palpitations Gastrointestinal: denies nausea Vital signs/Intake and Output: 
Visit Vitals /65 Pulse 84 Temp 98 °F (36.7 °C) Resp 26 Ht 6' (1.829 m) Wt 87.9 kg (193 lb 11.2 oz) SpO2 100% BMI 26.27 kg/m² Current Shift:  No intake/output data recorded. Last three shifts:  11/01 1901 - 11/03 0700 In: 100 [I.V.:100] Out: - Exam: 
 
General: frail weak pale elderly WM, NAD, talking, ox1 
CVS:irreg rhythm, reg rate, no M/R/G, S1/S2 heard, no thrill Lungs:dec BS bases, no wheezes Abdomen: Soft, Nontender, No distention, Normal Bowel sounds, No hepatomegaly Extremities: right hip dressed, no bruising or swelling seen Neuro:grossly normal , follows commands Psych:appropriate Labs: Results:  
   
Chemistry Recent Labs 11/03/20 0235 11/01/20 
0445 * 109*  139  
K 3.8 3.3*  
 103 CO2 29 30 BUN 13 13 CREA 0.99 1.02  
CA 7.9* 7.9* AGAP 6 6 BUCR 13 13 *  --   
TP 5.5*  --   
ALB 1.7*  --   
GLOB 3.8  --   
AGRAT 0.4*  --   
  
CBC w/Diff Recent Labs 11/03/20 
0235 11/01/20 
0445 WBC 20.0* 10.9 RBC 3.26* 2.78* HGB 9.3* 7.7* HCT 28.9* 24.7*  
* 419 GRANS 83*  --   
LYMPH 8*  --   
EOS 1  --   
  
 Cardiac Enzymes No results for input(s): CPK, CKND1, ABBY in the last 72 hours. No lab exists for component: Bethel Beltran Coagulation No results for input(s): PTP, INR, APTT, INREXT in the last 72 hours. Lipid Panel Lab Results Component Value Date/Time Cholesterol, total 107 01/24/2015 09:15 AM  
 HDL Cholesterol 25 (L) 01/24/2015 09:15 AM  
 LDL, calculated 44 01/24/2015 09:15 AM  
 VLDL, calculated 38 01/24/2015 09:15 AM  
 Triglyceride 190 (H) 01/24/2015 09:15 AM  
 CHOL/HDL Ratio 4.3 01/24/2015 09:15 AM  
  
BNP No results for input(s): BNPP in the last 72 hours. Liver Enzymes Recent Labs 11/03/20 
0235 TP 5.5* ALB 1.7* * Thyroid Studies Lab Results Component Value Date/Time TSH 2.61 10/25/2020 02:20 AM  
    
Procedures/imaging: see electronic medical records for all procedures/Xrays and details which were not copied into this note but were reviewed prior to creation of Plan Mikki Hansen MD

## 2020-11-03 NOTE — ED TRIAGE NOTES
Patient arrives from [facility] with c/c of difficulty breathing and altered mental status. EMS reports the patients initial oxygen saturations were 66% on 2L NC, EMS provided patient with NRB at 15L and patients sats improved to %. Patient recently had right hip surgery here.

## 2020-11-03 NOTE — ED PROVIDER NOTES
EMERGENCY DEPARTMENT HISTORY AND PHYSICAL EXAM 
 
Date: 11/3/2020 Patient Name: Piero Bryant. History of Presenting Illness Chief Complaint Patient presents with  Altered mental status  Respiratory Distress History Provided By: Patient Piero Madison is a 82 y.o. male with PMHX of A. fib on Xarelto dementia AAO x1 and recent hip surgery who presents to the emergency department C/O hypoxia, fever, altered mental status. Patient and is in an extended care facility for rehabilitation after hip surgery staff found him altered and hypoxic this evening patient was found to have a pulse ox with a good waveform of 50 to 60% he was placed on O2 with minimal response EMS was called when they arrived he had a good waveform at 60% they placed him on a nonrebreather and his oxygen increased to 100%. Patient is only oriented to self at baseline per EMS he has gotten much more responsive while he has been on nonrebreather. When asked patient denies any symptoms. EMS notes he was also febrile to 101.9. Leo Madrigal PCP: Jaun Matos MD 
 
Current Facility-Administered Medications Medication Dose Route Frequency Provider Last Rate Last Dose  sodium chloride (NS) flush 5-10 mL  5-10 mL IntraVENous PRN Eliud Nance MD   10 mL at 11/03/20 6402  piperacillin-tazobactam (ZOSYN) 4.5 g in 0.9% sodium chloride (MBP/ADV) 100 mL MBP  4.5 g IntraVENous Q6H Dolores Anne MD   Stopped at 11/03/20 6113  sodium chloride 0.9 % bolus infusion 637 mL  637 mL IntraVENous ONCE Dolores Anne MD      
 vancomycin (VANCOCIN) 2000 mg in  ml infusion  2,000 mg IntraVENous ONCE Eliud Nance  mL/hr at 11/03/20 0349 2,000 mg at 11/03/20 3095  Vancomycin-dosed by pharmacy  1 Each Other Rx Dosing/Monitoring Eliud Nance MD      
 sodium chloride (NS) flush 5-40 mL  5-40 mL IntraVENous Q8H Tomasz Mast MD      
  sodium chloride (NS) flush 5-40 mL  5-40 mL IntraVENous PRN Sal Qureshi MD      
 acetaminophen (TYLENOL) tablet 650 mg  650 mg Oral Q6H PRN Sal Qureshi MD      
 Or  
 acetaminophen (TYLENOL) suppository 650 mg  650 mg Rectal Q6H PRN Sal Qureshi MD      
 polyethylene glycol (MIRALAX) packet 17 g  17 g Oral DAILY PRN Sal Qureshi MD      
 promethazine (PHENERGAN) tablet 12.5 mg  12.5 mg Oral Q6H PRN Sal Qureshi MD      
 Or  
 ondansetron [facility]) injection 4 mg  4 mg IntraVENous Q6H PRN Sal Qureshi MD      
 insulin lispro (HUMALOG) injection   SubCUTAneous Q6H Sal Qureshi MD      
 glucose chewable tablet 16 g  16 g Oral PRN Sal Qureshi MD      
 glucagon (GLUCAGEN) injection 1 mg  1 mg IntraMUSCular PRN Sal Qureshi MD      
 dextrose 10% infusion 125-250 mL  125-250 mL IntraVENous PRN Sal Qureshi MD      
 
Current Outpatient Medications Medication Sig Dispense Refill  tuberculin (AplisoL) 5 tub. unit /0.1 mL injection 5 Units by IntraDERMal route Once.  BISACODYL RE Insert 10 mg into rectum.  ferrous sulfate 325 mg (65 mg iron) tablet Take 1 Tab by mouth two (2) times daily (with meals). 10 Tab 0  
 senna-docusate (PERICOLACE) 8.6-50 mg per tablet Take 1 Tab by mouth two (2) times a day. 10 Tab 0  
 traMADoL (Ultram) 50 mg tablet Take 1 Tab by mouth every six (6) hours as needed for Pain for up to 3 days. Max Daily Amount: 200 mg. 12 Tab 0  ARIPiprazole (Abilify) 2 mg tablet Take 2 mg by mouth daily.  lidocaine (Lidoderm) 5 % Apply patch to the affected area for 12 hours a day and remove for 12 hours a day. 15 Each 0  
 buPROPion SR (WELLBUTRIN SR) 150 mg SR tablet Take 150 mg by mouth daily.  multivitamin, tx-iron-ca-min (THERA-M W/ IRON) 9 mg iron-400 mcg tab tablet Take 1 Tab by mouth daily. Formulary substitution for DAILY MULTIVITAMIN centrum silver  insulin detemir U-100 (LEVEMIR U-100 INSULIN) 100 unit/mL injection 15 Units by SubCUTAneous route nightly.  fluticasone (FLONASE) 50 mcg/actuation nasal spray 2 Sprays by Both Nostrils route daily as needed for Rhinitis.  cycloSPORINE (RESTASIS) 0.05 % dpet Administer 1 Drop to left eye as needed.  donepezil (ARICEPT) 5 mg tablet Take 5 mg by mouth nightly.  oxybutynin (DITROPAN) 5 mg tablet Take 5 mg by mouth nightly.  metoprolol tartrate 75 mg tab Take 75 mg by mouth every twelve (12) hours. 60 Tab 0  cholecalciferol (VITAMIN D3) 1,000 unit tablet Take 1,000 Units by mouth daily.  furosemide (LASIX) 20 mg tablet Take 20 mg by mouth daily. Indications: Edema  insulin lispro (HUMALOG) 100 unit/mL injection by SubCUTAneous route four (4) times daily. BS-with sliding scale 80-12-=2 units 121-160=4 
161-200=6 
201-250=8 
251- and over 10 units 
with each meal  Indications: type 2 diabetes mellitus, sliding scale  amoxicillin-clavulanate (Augmentin) 500-125 mg per tablet Take 1 Tab by mouth two (2) times a day. 10 Tab 0  
 dilTIAZem CD (CARDIZEM CD) 240 mg ER capsule Take 240 mg by mouth daily (after lunch).  rivaroxaban (XARELTO) 20 mg tab tablet Take 20 mg by mouth daily (with lunch).  VITAMIN D2 50,000 unit capsule TAKE 1 CAPSULE TWICE WEEKLY 52 Cap 0  
 Omeprazole delayed release (PRILOSEC D/R) 20 mg tablet Take 20 mg by mouth daily.  melatonin tab tablet Take 5 mg by mouth nightly. Indications: sleep Past History Past Medical History: 
Past Medical History:  
Diagnosis Date  Anticoagulant long-term use  Arthritis  Atrial fibrillation ([address]  Atrial fibrillation ([address]  Dementia ([address]  Depression  Depression  Diabetes ([address] 1980s  Fracture, femur ([address]  GERD (gastroesophageal reflux disease)  Heart failure ([address]  Hypercholesterolemia  Hyperlipemia  Hypertension 2000  Hypoxia  Ill-defined condition   
 has passed out-has been tested cannot determine cause  Osteoarthritis   
 of knees  Pneumonia  Primary osteoarthritis of right knee 2018  Ulcer   
 at the anastomotic bite of gastric bypass  Varicose veins  Weakness  Wide-complex tachycardia ([address] 2015 Past Surgical History: 
Past Surgical History:  
Procedure Laterality Date  ABDOMEN SURGERY PROC UNLISTED    
 umb hernia Rupture  BIOPSY LIVER  10/05/04  
 EGD  09  
 with biopsy  ENDOSCOPY, COLON, DIAGNOSTIC    
 GASTROSTOMY TUBE  10/05/04  HX CATARACT REMOVAL    
 bilateral  
 HX CHOLECYSTECTOMY  10/05/04  HX GI  10/05/04  
 vertical banded gastroplasty/gastric bypass  HX KNEE REPLACEMENT    
 HX MOHS PROCEDURES    
 bilateral  
 HX PACEMAKER   [facility]-Reveal Ling Cardiac Monitor Family History: 
Family History Problem Relation Age of Onset  Heart Attack Father  Diabetes Father  Hypertension Father  Cancer Mother   
     lung  Diabetes Mother  Other Brother   
     obese  Diabetes Brother  Diabetes Brother  Other Maternal Grandmother   
     obese  Diabetes Sister Social History: 
Social History Tobacco Use  Smoking status: Former Smoker Last attempt to quit: 4/3/1967 Years since quittin.6  Smokeless tobacco: Never Used Substance Use Topics  Alcohol use: Yes Alcohol/week: 4.0 standard drinks Types: 3 Glasses of wine, 1 Shots of liquor per week Comment: monthly  Drug use: No  
 
 
Allergies: Allergies Allergen Reactions  Morphine Other (comments) Hallucinate; \"ran all night long\" Review of Systems Review of Systems Unable to perform ROS: Mental status change Physical Exam  
 
Vitals:  
 20 0345 20 0415 20 0430 20 8973 BP: 120/64 123/60 123/60 123/60 Pulse: 97 94 92 92 Resp: (!) 34 27 29 28  
 Temp:    98 °F (36.7 °C) SpO2: 98% 100% 100% 100% Weight:      
Height:      
 
Physical Exam 
 
Nursing notes and vital signs reviewed Constitutional:  acute distress Head: Normocephalic, Atraumatic Eyes: Pupils are equal, round, and reactive to light, EOMI Neck: Supple Cardiovascular: irRegular rate and rhythm,tachycardia Chest: Increased of breathing and chest excursion bilaterally Lungs: Course to ausculation bilaterally Abdomen: Soft, non tender, non distended,  
Back: No evidence of trauma or deformity Extremities: Trickle site healing well clean dry and intact Skin: Warm and dry, normal cap refill Neuro: Altered oriented to self moving all 4 extremities Psychiatric: Normal mood and affect Diagnostic Study Results Labs - Recent Results (from the past 12 hour(s)) LACTIC ACID Collection Time: 11/03/20  2:35 AM  
Result Value Ref Range Lactic acid 1.4 0.4 - 2.0 MMOL/L  
METABOLIC PANEL, COMPREHENSIVE Collection Time: 11/03/20  2:35 AM  
Result Value Ref Range Sodium 136 136 - 145 mmol/L Potassium 3.8 3.5 - 5.5 mmol/L Chloride 101 100 - 111 mmol/L  
 CO2 29 21 - 32 mmol/L Anion gap 6 3.0 - 18 mmol/L Glucose 168 (H) 74 - 99 mg/dL BUN 13 7.0 - 18 MG/DL Creatinine 0.99 0.6 - 1.3 MG/DL  
 BUN/Creatinine ratio 13 12 - 20 GFR est AA >60 >60 ml/min/1.73m2 GFR est non-AA >60 >60 ml/min/1.73m2 Calcium 7.9 (L) 8.5 - 10.1 MG/DL Bilirubin, total 0.4 0.2 - 1.0 MG/DL  
 ALT (SGPT) 13 (L) 16 - 61 U/L  
 AST (SGOT) 18 10 - 38 U/L Alk. phosphatase 154 (H) 45 - 117 U/L Protein, total 5.5 (L) 6.4 - 8.2 g/dL Albumin 1.7 (L) 3.4 - 5.0 g/dL Globulin 3.8 2.0 - 4.0 g/dL A-G Ratio 0.4 (L) 0.8 - 1.7    
CBC WITH AUTOMATED DIFF Collection Time: 11/03/20  2:35 AM  
Result Value Ref Range WBC 20.0 (H) 4.6 - 13.2 K/uL  
 RBC 3.26 (L) 4.70 - 5.50 M/uL HGB 9.3 (L) 13.0 - 16.0 g/dL HCT 28.9 (L) 36.0 - 48.0 %  MCV 88.7 74.0 - 97.0 FL  
 MCH 28.5 24.0 - 34.0 PG  
 MCHC 32.2 31.0 - 37.0 g/dL  
 RDW 16.7 (H) 11.6 - 14.5 % PLATELET 107 (H) 266 - 420 K/uL MPV 8.7 (L) 9.2 - 11.8 FL  
 NEUTROPHILS 83 (H) 40 - 73 % LYMPHOCYTES 8 (L) 21 - 52 % MONOCYTES 8 3 - 10 % EOSINOPHILS 1 0 - 5 % BASOPHILS 0 0 - 2 %  
 ABS. NEUTROPHILS 16.7 (H) 1.8 - 8.0 K/UL  
 ABS. LYMPHOCYTES 1.5 0.9 - 3.6 K/UL  
 ABS. MONOCYTES 1.6 (H) 0.05 - 1.2 K/UL  
 ABS. EOSINOPHILS 0.2 0.0 - 0.4 K/UL  
 ABS. BASOPHILS 0.0 0.0 - 0.1 K/UL  
 DF AUTOMATED    
TROPONIN I Collection Time: 11/03/20  2:35 AM  
Result Value Ref Range Troponin-I, QT 0.10 (H) 0.0 - 0.045 NG/ML  
POC G3 Collection Time: 11/03/20  2:45 AM  
Result Value Ref Range Device: Non rebreather Flow rate (POC) 15 L/M  
 FIO2 (POC) 1.0 %  
 pH (POC) 7.51 (H) 7.35 - 7.45    
 pCO2 (POC) 35.2 35.0 - 45.0 MMHG  
 pO2 (POC) 97 80 - 100 MMHG  
 HCO3 (POC) 27.8 (H) 22 - 26 MMOL/L  
 sO2 (POC) 98 (H) 92 - 97 % Base excess (POC) 5 mmol/L Allens test (POC) N/A Total resp. rate 33 Site RIGHT RADIAL Patient temp. 101.3 Specimen type (POC) ARTERIAL Performed by Dori Ortez INFLUENZA A & B AG (RAPID TEST) Collection Time: 11/03/20  2:50 AM  
Result Value Ref Range Influenza A Antigen Negative NEG Influenza B Antigen Negative NEG    
URINALYSIS W/ RFLX MICROSCOPIC Collection Time: 11/03/20  3:28 AM  
Result Value Ref Range Color YELLOW Appearance CLOUDY Specific gravity 1.016 1.005 - 1.030    
 pH (UA) 8.0 5.0 - 8.0 Protein 100 (A) NEG mg/dL Glucose 250 (A) NEG mg/dL Ketone 15 (A) NEG mg/dL Bilirubin Negative NEG Blood Negative NEG Urobilinogen 1.0 0.2 - 1.0 EU/dL Nitrites Negative NEG Leukocyte Esterase Negative NEG    
URINE MICROSCOPIC ONLY Collection Time: 11/03/20  3:28 AM  
Result Value Ref Range WBC Negative 0 - 5 /hpf  
 RBC Negative 0 - 5 /hpf Epithelial cells Negative 0 - 5 /lpf Bacteria 1+ (A) NEG /hpf Amorphous Crystals FEW (A) NEG Radiologic Studies - 
XR CHEST PORT Final Result IMPRESSION:  
  
1. Interval worsening of bilateral lung consolidations suggesting pneumonia  
and/or pulmonary edema. Small bilateral pleural effusions. CT Results  (Last 48 hours) None CXR Results  (Last 48 hours) 11/03/20 0302  XR CHEST PORT Final result Impression:  IMPRESSION:  
   
1. Interval worsening of bilateral lung consolidations suggesting pneumonia  
and/or pulmonary edema. Small bilateral pleural effusions. Narrative:  EXAM: Portable frontal view of the chest.  
   
CLINICAL INDICATION/HISTORY: Shortness of breath, hypoxia COMPARISON: 10/30/2020  
   
_______________ FINDINGS:   
   
Interval worsening of severe patchy airspace filling process throughout both  
lungs with mild blunting costophrenic angles and hazy perihilar density. Stable  
borderline enlarged cardiac silhouette.  
   
_______________ Medications given in the ED- Medications  
sodium chloride (NS) flush 5-10 mL (10 mL IntraVENous Given 11/3/20 0351) piperacillin-tazobactam (ZOSYN) 4.5 g in 0.9% sodium chloride (MBP/ADV) 100 mL MBP (0 g IntraVENous IV Completed 11/3/20 0353)  
sodium chloride 0.9 % bolus infusion 1,000 mL (1,000 mL IntraVENous New Bag 11/3/20 0242) Followed by  
sodium chloride 0.9 % bolus infusion 1,000 mL (1,000 mL IntraVENous New Bag 11/3/20 0418) Followed by  
sodium chloride 0.9 % bolus infusion 637 mL (has no administration in time range)  
vancomycin (VANCOCIN) 2000 mg in  ml infusion (2,000 mg IntraVENous New Bag 11/3/20 0349) Vancomycin-dosed by pharmacy (has no administration in time range)  
sodium chloride (NS) flush 5-40 mL (has no administration in time range)  
sodium chloride (NS) flush 5-40 mL (has no administration in time range) acetaminophen (TYLENOL) tablet 650 mg (has no administration in time range) Or  
acetaminophen (TYLENOL) suppository 650 mg (has no administration in time range)  
polyethylene glycol (MIRALAX) packet 17 g (has no administration in time range)  
promethazine (PHENERGAN) tablet 12.5 mg (has no administration in time range) Or  
ondansetron (ZOFRAN) injection 4 mg (has no administration in time range)  
insulin lispro (HUMALOG) injection (has no administration in time range) glucose chewable tablet 16 g (has no administration in time range) glucagon (GLUCAGEN) injection 1 mg (has no administration in time range) dextrose 10% infusion 125-250 mL (has no administration in time range)  
acetaminophen (TYLENOL) suppository 650 mg (650 mg Rectal Given 11/3/20 0320) Medical Decision Making I am the first provider for this patient. I reviewed the vital signs, available nursing notes, past medical history, past surgical history, family history and social history. Vital Signs-Reviewed the patient's vital signs. Pulse Oximetry Analysis - 100% on 15L Cardiac Monitor: 
Rate: 101 bpm 
Rhythm: AFIB 
 
EKG interpretation: (Preliminary) EKG read by Dr. Saud hernandes with rapid ventricular response  no STEMI Records Reviewed: Nursing Notes and Old Medical Records Provider Notes (Medical Decision Making): Kike Magaña is a 82 y.o. male who presents today in acute respiratory distress. Patient was brought back and immediately evaluated IV access was obtained patient was continued on nonrebreather. His work of breathing steadily decreased and he was transitioned to a high flow nasal cannula which he tolerated much better. Patient had concern for sepsis. X-ray was obtained which showed pneumonia patient was started on Zosyn and vancomycin.   He received a 30 cc/kg bolus his lactate was within normal limits his tachycardia improved with fluid resuscitation. His troponin was mildly elevated which at this point is likely demand ischemia. Patient EKG showed A. fib with RVR without evidence of an ST segment elevation MI. White count was 20. Patient was febrile and treated with Tylenol. I spoke to Dr. Gabby Gilliland of internal medicine who will admit the patient to the ICU for further management and care. Procedures: 
Procedures Diagnosis and Disposition 4:51 AM 
I have spent 37 minutes of critical care time involved in lab review, consultations with specialist, family decision-making, and documentation. During this entire length of time I was immediately available to the patient. Critical Care: The reason for providing this level of medical care for this critically ill patient was due a critical illness that impaired one or more vital organ systems such that there was a high probability of imminent or life threatening deterioration in the patients condition. This care involved high complexity decision making to assess, manipulate, and support vital system functions, to treat this degreee vital organ system failure and to prevent further life threatening deterioration of the patients condition. Core Measures: 
For Hospitalized Patients: 
 
1. Hospitalization Decision Time: The decision to hospitalize the patient was made by Dr. Isiah Au on 11/3/2020 2. Aspirin: Aspirin was not given because the patient did not present with a stroke at the time of their Emergency Department evaluation 4:51 AM  Patient is being admitted to the hospital by Dr. Gabby Gilliland. The results of their tests and reasons for their admission have been discussed with them and/or available family. They convey agreement and understanding for the need to be admitted and for their admission diagnosis.    
 
CONDITIONS ON ADMISSION: 
Sepsis is present at the time of admissionUrinary Tract Infection is not present at the time of admission. Pneumonia is present at the time of admission. Wound infection is not present at the time of admission. CLINICAL IMPRESSION: 
 
1. Hypoxia 2. Pneumonia of both lungs due to infectious organism, unspecified part of lung 3. Altered mental status, unspecified altered mental status type   
 
_______________________ Please note that this dictation was completed with [employer], the computer voice recognition software. Quite often unanticipated grammatical, syntax, homophones, and other interpretive errors are inadvertently transcribed by the computer software. Please disregard these errors. Please excuse any errors that have escaped final proofreading.

## 2020-11-03 NOTE — DIABETES MGMT
GLYCEMIC CONTROL PROGRESS NOTE: 
 
- discussed in rounds, known h/o T2DM, HbA1C within recommended range for age + comorbids on basal/bolus regimen 
- recent d/c from [facility] to [facility] 
- previous admission pt required basal/bolus regimen while on steroid regimen - Humalog Normal Insulin Sensitivity Corrective Coverage orders in place, recommend continue  
- steroids to start this AM pt may benefit from conservative dose of basal insulin if BG trends up Recent Glucose Results:  
Lab Results Component Value Date/Time  (H) 11/03/2020 02:35 AM  
 GLUCPOC 125 (H) 11/03/2020 07:35 AM  
 
Luanne Cabrera MS, RN, CDE Glycemic Control Team 
[phone] Pager [phone] (M-TH 8:00-4:30P) *After Hours pager [phone]

## 2020-11-03 NOTE — PROGRESS NOTES
0720 Bedside shift change report received from Gavin Fuller RN. Report included the following information SBAR, Kardex, Intake/Output, MAR and Recent Results. 1025 Patient placed on NC @ 5L 
 
1530 Patient placed on hiflow at 40l 50%. Patient saturation dropped when sleeping.

## 2020-11-03 NOTE — PROGRESS NOTES
11/03/20 0305 Vitals O2 Sat (%) 99 % O2 Device Heated; Hi flow nasal cannula O2 Flow Rate (L/min) 40 l/min FIO2 (%) 55 % Patient arrived via EMS in respiratory distress on a NRB mask. BS clear and diminished, RR 33,  ABG obtained PH 7.51/ PCO2 35.2/ PAO2 98 on 100%/ 98%- Physician informed/ HFNC ordered. Patient placed on HFNC @ 40F/55% Patient tolerating well.

## 2020-11-03 NOTE — PROGRESS NOTES
46: TRANSFER - IN REPORT: 
 
Verbal report received from 45 Alfredo Clement RN(name) on Ellie Dumont.  being received from ED(unit) for routine progression of care Report consisted of patients Situation, Background, Assessment and  
Recommendations(SBAR). Information from the following report(s) SBAR, Kardex, ED Summary, Intake/Output, MAR, Recent Results, Med Rec Status, Cardiac Rhythm AFib and Alarm Parameters  was reviewed with the receiving nurse. Opportunity for questions and clarification was provided. 0530: Assessment completed upon patients arrival to unit and care assumed. Pt A&O x1, but states he feels much better than he did when he arrived. Clear, lower diminished breath sounds noted. Pt denies SOB or chest discomfort at this time. 0600: Dr. Yvette Sherman at bedside assessing pt.  
 
0700: Bedside and Verbal shift change report given to MARLYS Martinez RN (oncoming nurse) by Neo Warner RN (offgoing nurse). Report included the following information SBAR, Kardex, ED Summary, Intake/Output, MAR, Recent Results, Med Rec Status, Cardiac Rhythm NSR with PVC/Paroxysmal AFib and Alarm Parameters .

## 2020-11-04 NOTE — PROGRESS NOTES
Palliative Medicine Consult Patient Name: Piero Bryant. YOB: 1935 Date of Initial Consult: 11/3/2020; follow up: 11/4/2020 Reason for Consult: Goals of care discussions Requesting Provider: Theo Shea MD 
Primary Care Physician: Jaun Matos MD 
  
 SUMMARY:  
Piero Madison is a 82 y.o. with a past history of DM2, HTN, PAF, diastolic CHF, dementia, and closed right hip fracture, who was admitted on 11/3/2020 from SNF with a diagnosis of  Acute hypoxemic respiratory failure, recurrent pneumonia, and suspect aspiration. Current medical issues leading to Palliative Medicine involvement include: support and care decisions. 11/4/2020: Patient lying in bed on HFNC. Complains of ice pack leaking onto bed linen. RN notified. Patient denies any pain, nausea or shortness of breath. He was oriented to self, place and year but not situation. After brief time spent in his room, patient started falling asleep with questions. PALLIATIVE DIAGNOSES:  
1. Goals of care discussions 2. Acute hypoxic respiratory failure 3. Dementia 4. Debility PLAN:  
 
11/4/2020: This NP met with patient at bedside. Patient continues to require oxygen supplementation with HFNC. COVID testing negative. Will need swallow evaluation. Telephone call placed to wife at [phone] and received voicemail. Left message requesting call back. GOALS OF CARE: FULL CODE with FULL INTERVENTIONS 
ADDENDUM: Family faxed updated AMD to hospital and copy has been placed on chart. Patient named his wife, [place] as primary MPOA and his daughter, Gaye Damico, back up MPOA. Telephone call placed to patient's step-daughter, Santiago Gooden ([phone]), where patient's wife is staying and recuperating from a recent fall. [place] is hard of hearing so discussion took place between [place], [place] and myself via speakerphone.  Discussed benefits and risks of CPR in the event of cardiopulmonary arrest and intubation for respiratory distress. [place] stated that \"Art\" would not want CPR nor intubation. This news surprised Mami Monroy and she was teary but accepting. Mami Monroy pointed out that Arturo and his daughter, Nato Sánchez, have a tempestuous relationship and he gets upset and would not want her involved in his medical records. Instructed Crystal on visitation limitations and have set up zoom meeting for the family tomorrow (11/5) at 3:30. Introduced the topic of feeding tubes as something for them to consider if patient does not pass MBS. The family was upset that they were unaware of possible pneumonia diagnosis with his last admission. I explained it was treated with IV antibiotics but this is a recurrence possibly related to aspiration. Will proceed with obtaining POST form when MBS is completed. GOALS OF CARE: DNR/DNI, no intubation for respiratory distress. Continue current level of care. JIMBO Newton, Dr. Smita Jenkins and Dr. Johnny Shaw notified of change in code status. See previous notes shown below: 
 
11/3/2020 1. Goals of care discussions: In efforts to conserve PPE and limit disease exposure, assessed patient through glass window where he appeared to be sleeping on HFNC. NAD noted. Of note, he was weaned down from HFNC to 5 lpm via NC per chart review today. AMD on file naming patient's wife Jenny Alvarado as MPOA. Called patient's wife; unfortunately wife did not answer our call but I was able to leave a VM. Awaiting return phone call to discuss goals of care. At this time, continue full code with full interventions. 2.  Acute hypoxic respiratory failure: Recurrent pneumonia. Awaiting SLP consult for highly suspected aspiration. Was on HFNC during assessment but has since been weaned down to 5 lpm via NC. COVID 19 PUI but result now negative. May potentially need a discussion regarding the benefits and burdens of PEG tube versus comfort feeds. Awaiting SLP recs. 3.  Dementia: Oriented to self at baseline. Did not disturb patient as he was resting comfortably. 4.  Debility: Requires assist with all ADLs at baseline. 5.  Initial consult note routed to primary continuity provider 6. Communicated plan of care with: Palliative IDT 
 
 
 GOALS OF CARE / TREATMENT PREFERENCES:  
[====Goals of Care====] GOALS OF CARE: Full code with full interventions Patient/Health Care Proxy Stated Goals: Rehabilitation TREATMENT PREFERENCES:  
Code Status: Full Code Advance Care Planning: 
Advance Care Planning 10/24/2020 Patient's Healthcare Decision Maker is: -  
Primary Decision Maker Name -  
Primary Decision Maker Phone Number -  
Primary Decision Maker Relationship to Patient - Secondary Decision Maker Name - Secondary Decision Maker Phone Number - Secondary Decision Maker Relationship to Patient -  
Confirm Advance Directive Yes, on file Patient Would Like to Complete Advance Directive - Does the patient have other document types - Medical Interventions: Full interventions The palliative care team has discussed with patient / health care proxy about goals of care / treatment preferences for patient. 
[====Goals of Care====] HISTORY:  
 
History obtained from: patient's chart CHIEF COMPLAINT: n/a 
 
HPI/SUBJECTIVE: The patient is:  
[x] Verbal and participatory [] Non-participatory due to:  
See summary Clinical Pain Assessment (nonverbal scale for severity on nonverbal patients): Adult Nonverbal Pain Scale Face: No particular expression or smile Activity (Movement): Lying quietly, normal position Guarding: Lying quietly, no positioning of hands over areas of body Physiology (Vital Signs): Stable vital signs Respiratory: Baseline RR/SpO2 compliant with ventilator Total Score: 0 
 
 
 FUNCTIONAL ASSESSMENT:  
 
Palliative Performance Scale (PPS): PPS: 40  PSYCHOSOCIAL/SPIRITUAL SCREENING:  
 
 Advance Care Planning: 
Advance Care Planning 10/24/2020 Patient's Healthcare Decision Maker is: -  
Primary Decision Maker Name -  
Primary Decision Maker Phone Number -  
Primary Decision Maker Relationship to Patient - Secondary Decision Maker Name - Secondary Decision Maker Phone Number - Secondary Decision Maker Relationship to Patient -  
Confirm Advance Directive Yes, on file Patient Would Like to Complete Advance Directive - Does the patient have other document types - Any spiritual / Methodist concerns: unable to assess 
[] Yes /  [] No 
 
Caregiver Burnout: 
[] Yes /  [] No /  [x] No Caregiver Present Anticipatory grief assessment:  Unable to assess 
[] Normal  / [] Maladaptive REVIEW OF SYSTEMS:  
 
Positive and pertinent negative findings in ROS are noted above in HPI. The following systems were [x] reviewed / [] unable to be reviewed as noted in HPI Other findings are noted below. Systems: constitutional, ears/nose/mouth/throat, respiratory, gastrointestinal, genitourinary, musculoskeletal, integumentary, neurologic, psychiatric, endocrine. Positive findings noted below. Modified ESAS Completed by: provider Nausea: 0 Dyspnea: 0 Stool Occurrence(s): 1 PHYSICAL EXAM:  
 
From RN flowsheet: 
Wt Readings from Last 3 Encounters:  
11/03/20 84.7 kg (186 lb 11.7 oz) 11/01/20 87 kg (191 lb 12.8 oz) 09/11/20 88 kg (194 lb) Blood pressure 137/83, pulse (!) 101, temperature 98.4 °F (36.9 °C), resp. rate 30, height 6' (1.829 m), weight 84.7 kg (186 lb 11.7 oz), SpO2 100 %. Pain Scale 1: Numeric (0 - 10) Pain Intensity 1: 0 Constitutional: Elderly, pale gentleman lying in bed in NAD Eyes: pupils equal, anicteric Respiratory: breathing mildly labored, on HFNC Neurologic: oriented X 2 self and place Musculoskeletal: Moving all extremities; no deformities HISTORY:  
 
Principal Problem: Hypoxia (10/24/2020) Active Problems: Hypertension () Diabetes mellitus ([address] (10/6/2011) Paroxysmal atrial fibrillation ([address] (2/27/2015) Pneumonia (11/16/2016) Dementia without behavioral disturbance ([address] (10/24/2020) Anticoagulated (11/3/2020) Debility (11/3/2020) Sepsis ([address] (11/3/2020) Elevated troponin (11/3/2020) Acute hypoxemic respiratory failure ([address] (11/3/2020) Aspiration into airway (11/3/2020) Past Medical History:  
Diagnosis Date  Anticoagulant long-term use  Arthritis  Atrial fibrillation ([address]  Atrial fibrillation ([address]  Dementia ([address]  Depression  Depression  Diabetes ([address] 1980s  Fracture, femur ([address]  GERD (gastroesophageal reflux disease)  Heart failure ([address]  Hypercholesterolemia  Hyperlipemia  Hypertension 2000  Hypoxia  Ill-defined condition 2015  
 has passed out-has been tested cannot determine cause  Osteoarthritis   
 of knees  Pneumonia  Primary osteoarthritis of right knee 6/28/2018  Ulcer   
 at the anastomotic bite of gastric bypass  Varicose veins  Weakness  Wide-complex tachycardia ([address] 5/2/2015 Past Surgical History:  
Procedure Laterality Date  ABDOMEN SURGERY PROC UNLISTED  1998  
 umb hernia Rupture  BIOPSY LIVER  10/05/04  
 EGD  12/29/09  
 with biopsy  ENDOSCOPY, COLON, DIAGNOSTIC    
 GASTROSTOMY TUBE  10/05/04  HX CATARACT REMOVAL    
 bilateral  
 HX CHOLECYSTECTOMY  10/05/04  HX GI  10/05/04  
 vertical banded gastroplasty/gastric bypass  HX KNEE REPLACEMENT    
 HX MOHS PROCEDURES  1991/1995  
 bilateral  
 HX PACEMAKER  2015 [employer] Cardiac Monitor Family History Problem Relation Age of Onset  Heart Attack Father  Diabetes Father  Hypertension Father  Cancer Mother   
     lung  Diabetes Mother  Other Brother   
     obese  Diabetes Brother  Diabetes Brother  Other Maternal Grandmother   
     obese  Diabetes Sister History reviewed, no pertinent family history. Social History Tobacco Use  Smoking status: Former Smoker Last attempt to quit: 4/3/1967 Years since quittin.6  Smokeless tobacco: Never Used Substance Use Topics  Alcohol use: Yes Alcohol/week: 4.0 standard drinks Types: 3 Glasses of wine, 1 Shots of liquor per week Comment: monthly Allergies Allergen Reactions  Morphine Other (comments) Hallucinate; \"ran all night long\" Current Facility-Administered Medications Medication Dose Route Frequency  sodium chloride (NS) flush 5-10 mL  5-10 mL IntraVENous PRN  Vancomycin-dosed by pharmacy  1 Each Other Rx Dosing/Monitoring  sodium chloride (NS) flush 5-40 mL  5-40 mL IntraVENous Q8H  
 sodium chloride (NS) flush 5-40 mL  5-40 mL IntraVENous PRN  
 acetaminophen (TYLENOL) tablet 650 mg  650 mg Oral Q6H PRN Or  
 acetaminophen (TYLENOL) suppository 650 mg  650 mg Rectal Q6H PRN  polyethylene glycol (MIRALAX) packet 17 g  17 g Oral DAILY PRN  promethazine (PHENERGAN) tablet 12.5 mg  12.5 mg Oral Q6H PRN Or  
 ondansetron (ZOFRAN) injection 4 mg  4 mg IntraVENous Q6H PRN  
 insulin lispro (HUMALOG) injection   SubCUTAneous Q6H  
 glucose chewable tablet 16 g  16 g Oral PRN  
 glucagon (GLUCAGEN) injection 1 mg  1 mg IntraMUSCular PRN  
 dextrose 10% infusion 125-250 mL  125-250 mL IntraVENous PRN  piperacillin-tazobactam (ZOSYN) 4.5 g in 0.9% sodium chloride (MBP/ADV) 100 mL MBP  4.5 g IntraVENous Q8H  
 ipratropium-albuterol (COMBIVENT RESPIMAT) 20 mcg-100 mcg inhalation spray  1 Puff Inhalation TID  enoxaparin (LOVENOX) injection 90 mg  1 mg/kg SubCUTAneous Q12H  
 doxycycline (VIBRAMYCIN) 100 mg in 0.9% sodium chloride (MBP/ADV) 100 mL MBP  100 mg IntraVENous Q12H  
 methylPREDNISolone (PF) (SOLU-MEDROL) injection 20 mg  20 mg IntraVENous Q8H  
 pantoprazole (PROTONIX) 40 mg in 0.9% sodium chloride 10 mL injection  40 mg IntraVENous DAILY  furosemide (LASIX) injection 20 mg  20 mg IntraVENous DAILY  budesonide (PULMICORT) 500 mcg/2 ml nebulizer suspension  500 mcg Nebulization BID RT  
 vancomycin (VANCOCIN) 1,500 mg in 0.9% sodium chloride 500 mL (VIAL-MATE)_  1,500 mg IntraVENous Q24H  
 
 
 
 LAB AND IMAGING FINDINGS:  
 
Lab Results Component Value Date/Time WBC 12.4 11/04/2020 06:45 AM  
 HGB 8.1 (L) 11/04/2020 06:45 AM  
 PLATELET 158 26/39/2605 06:45 AM  
 
Lab Results Component Value Date/Time Sodium 140 11/04/2020 06:45 AM  
 Potassium 3.8 11/04/2020 06:45 AM  
 Chloride 105 11/04/2020 06:45 AM  
 CO2 26 11/04/2020 06:45 AM  
 BUN 17 11/04/2020 06:45 AM  
 Creatinine 1.10 11/04/2020 06:45 AM  
 Calcium 7.7 (L) 11/04/2020 06:45 AM  
 Magnesium 2.1 11/04/2020 06:45 AM  
 Phosphorus 2.8 09/21/2014 03:39 AM  
  
Lab Results Component Value Date/Time Alk. phosphatase 150 (H) 11/04/2020 06:45 AM  
 Protein, total 5.3 (L) 11/04/2020 06:45 AM  
 Albumin 1.8 (L) 11/04/2020 06:45 AM  
 Globulin 3.5 11/04/2020 06:45 AM  
 
Lab Results Component Value Date/Time INR 1.4 (H) 10/28/2020 12:28 AM  
 Prothrombin time 16.7 (H) 10/28/2020 12:28 AM  
 aPTT 32.2 10/28/2020 12:28 AM  
  
Lab Results Component Value Date/Time Iron 60 12/02/2015 10:39 AM  
 Ferritin 281 10/24/2020 05:25 AM  
  
No results found for: PH, PCO2, PO2 No components found for: [place] Lab Results Component Value Date/Time CK 58 11/03/2020 10:55 PM  
 CK - MB 2.0 11/03/2020 10:55 PM  
  
 
 
   
 
Total time: 35 minutes Counseling/coordination time, spent as noted above > 50% counseling / coordination: 30 minutes with patient and family Prolonged service was provided for  []30 min   []75 min in face to face time in the presence of the patient, spent as noted above. Time Start:  
Time End: Note: this can only be billed with 83064 (initial) or [id number] (follow up). If multiple start / stop times, list each separately.

## 2020-11-04 NOTE — PROGRESS NOTES
Called wife no answer went to voicemail left message wanted to clarify patient CODE STATUS patient has severe dementia unable to make decisions on his own 
 
family called back later. Apparently patient does have a CODE STATUS and left the paperwork with security with trying to look as a localized that KASEY Gomez MD

## 2020-11-04 NOTE — PROGRESS NOTES
Problem: Mobility Impaired (Adult and Pediatric) Goal: *Acute Goals and Plan of Care (Insert Text) Description: Physical Therapy Goals Initiated 11/4/2020 and to be accomplished within 7 day(s) 1. Patient will move from supine <> sit with min assist in prep for out of bed activity and change of position. 2.  Patient will transfer from bed <> chair with min assist via scooting NWB R LE for time up in chair for completion of ADL activity. 3.  Patient will demonstrate performance of HEP for strengthening B LE's for improved functional mobility at discharge. Outcome: Progressing Towards Goal 
PHYSICAL THERAPY EVALUATION Patient: Hannah Valderrama (82 y.o. male) Date: 11/4/2020 Primary Diagnosis: Hypoxia [R09.02] Pneumonia [J18.9] Precautions:   Aspiration, Fall, NWB(HFNC, NWB R LE, NPO) ASSESSMENT : 
Based on the objective data described below, the patient seen in ICU following admission from [facility] for diagnosis as above. Pt presents alert, talkative, confused and oriented to person only, with LE weakness, limitations in functional mobility with regard to bed mobility/transfers, and with decreased activity tolerance. Pt reports no pain at this time. Required total assist of mechanical bed to move up in bed. Pt left in bed, confused, with all needs in reach, and nurse notified. Recommend SNF again  for follow up physical therapy upon discharge to reach maximal level of independence/safety with functional mobility. Pt Education: Role of physical therapy in acute care setting, fall prevention and safety/technique during functional mobility tasks Patient will benefit from skilled intervention to address the above impairments. Patients rehabilitation potential is considered to be Fair Factors which may influence rehabilitation potential include:  
[]         None noted 
[]         Mental ability/status [x]         Medical condition []         Home/family situation and support systems 
[]         Safety awareness 
[]         Pain tolerance/management 
[]         Other: PLAN : 
Recommendations and Planned Interventions: 
[x]           Bed Mobility Training             []    Neuromuscular Re-Education 
[x]           Transfer Training                   []    Orthotic/Prosthetic Training 
[x]           Gait Training                          []    Modalities [x]           Therapeutic Exercises          []    Edema Management/Control 
[x]           Therapeutic Activities            [x]    Patient and Family Training/Education 
[]           Other (comment): Frequency/Duration: Patient will be followed by physical therapy 1-2 times per day to address goals. Discharge Recommendations: Carlos Rodriguez Further Equipment Recommendations for Discharge: N/A  
 
SUBJECTIVE:  
Patient stated I feel okay.  OBJECTIVE DATA SUMMARY:  
 
Past Medical History:  
Diagnosis Date Anticoagulant long-term use Arthritis Atrial fibrillation ([address] Atrial fibrillation ([address] Dementia ([address] Depression Depression Diabetes ([address] 1980s Fracture, femur ([address] GERD (gastroesophageal reflux disease) Heart failure ([address] Hypercholesterolemia Hyperlipemia Hypertension 2000 Hypoxia Ill-defined condition 2015  
 has passed out-has been tested cannot determine cause Osteoarthritis   
 of knees Pneumonia Primary osteoarthritis of right knee 6/28/2018 Ulcer   
 at the anastomotic bite of gastric bypass Varicose veins Weakness Wide-complex tachycardia ([address] 5/2/2015 Past Surgical History:  
Procedure Laterality Date ABDOMEN SURGERY PROC UNLISTED  1998  
 umb hernia Rupture BIOPSY LIVER  10/05/04 EGD  12/29/09  
 with biopsy ENDOSCOPY, COLON, DIAGNOSTIC    
 GASTROSTOMY TUBE  10/05/04 HX CATARACT REMOVAL    
 bilateral  
 HX CHOLECYSTECTOMY  10/05/04 HX GI  10/05/04  
 vertical banded gastroplasty/gastric bypass HX KNEE REPLACEMENT    
 HX MOHS PROCEDURES  1991/1995  
 bilateral  
 HX PACEMAKER  2015 [employer]-[facility] Cardiac Monitor Barriers to Learning/Limitations: yes;  sensory deficits-vision/hearing/speech, physical, and altered mental status (i.e.Sedation, Confusion) Compensate with: Visual Cues, Verbal Cues, and Tactile Cues Prior Level of Function/Home Situation: admitted from SNF following admission there after ORIF R femur Home Situation Home Environment: Private residence # Steps to Enter: 3 Rails to Enter: Yes Hand Rails : Bilateral(wide) One/Two Story Residence: One story Living Alone: No 
Support Systems: Spouse/Significant Other/Partner(wife at [facility] s/p fall w/ shldr injury) Patient Expects to be Discharged to[de-identified] Rehabilitation facility Current DME Used/Available at Home: Walker, rolling, Cane, straight, Glucometer, Shower chair Tub or Shower Type: Tub/Shower combination Critical Behavior: 
Neurologic State: Alert;Confused Orientation Level: Oriented to person;Disoriented to place; Disoriented to situation;Disoriented to time Cognition: Decreased attention/concentration;Decreased command following; Impaired decision making;Memory loss;Poor safety awareness Safety/Judgement: Decreased awareness of need for assistance;Decreased awareness of environment;Decreased awareness of need for safety;Decreased insight into deficits Psychosocial 
Patient Behaviors: Calm;Confused; Cooperative Needs Expressed: Educational;Emotional 
Purposeful Interaction: Yes Pt Identified Daily Priority: Clinical issues (comment) Caritas Process: Nurture loving kindness;Enable harish/hope;Establish trust;Nurture spiritual self;Teaching/learning; Attend basic human needs;Create healing environment Caring Interventions: Reassure Reassure: Therapeutic listening; Informing; Acceptance; Instilling harish and hope Therapeutic Modalities: Deep breathing;Humor; Intentional therapeutic touch Strength:   
Strength: Generally decreased, functional 
Tone & Sensation:  
Tone: Normal 
Sensation: Intact Range Of Motion: 
AROM: Generally decreased, functional 
PROM: Generally decreased, functional 
Functional Mobility: 
Bed Mobility: 
Scooting: Total assistance(use of [place] bed to move pt toward [facility]) Pain: 
Pain Scale 1: Numeric (0 - 10) Pain Intensity 1: 0 Activity Tolerance:  
Fair Please refer to the flowsheet for vital signs taken during this treatment. After treatment:  
[]         Patient left in no apparent distress sitting up in chair 
[x]         Patient left in no apparent distress in bed 
[x]         Call bell left within reach 
[]         Nursing notified 
[]         Caregiver present 
[]         Bed alarm activated COMMUNICATION/EDUCATION:  
[]         Fall prevention education was provided and the patient/caregiver indicated understanding. []         Patient/family have participated as able in goal setting and plan of care. []         Patient/family agree to work toward stated goals and plan of care. []         Patient understands intent and goals of therapy, but is neutral about his/her participation. [x]         Patient is unable to participate in goal setting and plan of care. Eval Complexity: History: HIGH Complexity :3+ comorbidities / personal factors will impact the outcome/ POC Exam:HIGH Complexity : 4+ Standardized tests and measures addressing body structure, function, activity limitation and / or participation in recreation  Presentation: HIGH Complexity : Unstable and unpredictable characteristics  Clinical Decision Making:High Complexity    Overall Complexity:HIGH Thank you for this referral. 
Sivakumar Fabian, PT Time Calculation: 11 mins

## 2020-11-04 NOTE — PROGRESS NOTES
0900 Assumed care of patient. .. patient in bed resting comfortably. .. will continue to follow 1200 Reassessment completed with Enrique Olguin. .. no addtinl concerns. .. Will continue to follow 1552 Patient daughter on the phone inquiring into update on patient care. .. Advised she is the oldest daughter. .. educated that [id number] Dayanara Russ is wife [place]. .. but patient is stable 
1600 200 Reassessment completed with Enrique Olguin. .. no addtinl concerns. .. Will continue to follow. Bedside and Verbal shift change report given to [address] [place] [address] (oncoming nurse) byNancie CHOI (offgoing nurse). Report included the following information SBAR and Kardex.

## 2020-11-04 NOTE — PROGRESS NOTES
Problem: Self Care Deficits Care Plan (Adult) Goal: *Acute Goals and Plan of Care (Insert Text) Description: Initial Occupational Therapy Goals (11/4/2020) Within 7 day(s): 1. Patient will perform grooming seated EOB with moderate assist for increased independence in ADLs. 2. Patient will perform UB dressing with moderate assist seated EOB for increased independence with ADLs. 3. Patient will perform LB dressing with moderate assist for increased independence with ADLs. 4. Patient will perform bed mobility to EOB w/ moderate assist for increased independence with ADLs. 5. Patient will perform BUE HEP for general strengthening with active participation for 100% of task for increased independence with ADLs. 6. Patient will perform simple facial/hand hygiene with setup assist (in supported sitting) for increased independence with ADLs. Outcome: Progressing Towards Goal 
  
OCCUPATIONAL THERAPY EVALUATION Patient: Tres Smith (82 y.o. male) Date: 11/4/2020 Primary Diagnosis: Hypoxia [R09.02] Pneumonia [J18.9] Precautions:  Aspiration, Fall, NWB(HFNC; NWB RLE; NPO) PLOF: Patient was mod I prior to initial fall 09/26/20 s/p distal femur nailing at [facility]; fell at [facility] 10/26 s/p ORIF Masha Kearney) NWB. Spouse fell a couple weeks before him and was at [facility] for Rehab. ASSESSMENT : 
Based on the objective data described below, the patient presents with significant decline. Pt reports seeing a cat on the wall. Pt restless and touching all tubes/wires. Pt easily redirected. Pt assisted this OT to scoot higher in bed in R sidelying position, but unable to motor plan. Pt tolerated gentle RLE ROM to assist w/ pain to encourage functional use of RLE during bed mob. Pt with limited ADL participation d/t perseveration. Pt reported being bored and RN assisted to place TV on [employer] channel for engagement. EOB deferred d/t increasing HR w/ bed mobility. Education: Patient instructed on home safety, body mechanics for optimal respiratory effort, Energy Conservation/Work Simplification Techniques, adaptive strategies and adaptive dressing techniques including clothing modifications with patient verbalizing understanding at this time. Patient will benefit from skilled intervention to address the above impairments. Patient's rehabilitation potential is considered to be Guarded Factors which may influence rehabilitation potential include:  
[]             None noted [x]             Mental ability/status [x]             Medical condition []             Home/family situation and support systems [x]             Safety awareness []             Pain tolerance/management 
[]             Other: PLAN : 
Recommendations and Planned Interventions:  
[x]               Self Care Training                  [x]      Therapeutic Activities [x]               Functional Mobility Training   [x]      Cognitive Retraining 
[x]               Therapeutic Exercises           [x]      Endurance Activities [x]               Balance Training                    [x]      Neuromuscular Re-Education [x]               Visual/Perceptual Training     [x]      Home Safety Training 
[x]               Patient Education                   [x]      Family Training/Education []               Other (comment): Frequency/Duration: Patient will be followed by occupational therapy 1-2 times per day/2-4 days per week to address goals. Discharge Recommendations: Rehab Further Equipment Recommendations for Discharge: To Be Determined (TBD) at next level of care SUBJECTIVE:  
Patient stated Do you see that cat? Isma Castillo OBJECTIVE DATA SUMMARY:  
 
Past Medical History:  
Diagnosis Date Anticoagulant long-term use Arthritis Atrial fibrillation ([facility] [address] Atrial fibrillation ([facility] [address] Dementia ([facility] [address] Depression Depression Diabetes ([facility] [address] 1980s Fracture, femur ([facility] [address] GERD (gastroesophageal reflux disease) Heart failure ([facility] [address] Hypercholesterolemia Hyperlipemia Hypertension 2000 Hypoxia Ill-defined condition 2015  
 has passed out-has been tested cannot determine cause Osteoarthritis   
 of knees Pneumonia Primary osteoarthritis of right knee 6/28/2018 Ulcer   
 at the anastomotic bite of gastric bypass Varicose veins Weakness Wide-complex tachycardia ([facility] [address] 5/2/2015 Past Surgical History:  
Procedure Laterality Date ABDOMEN SURGERY PROC UNLISTED  1998  
 umb hernia Rupture BIOPSY LIVER  10/05/04 EGD  12/29/09  
 with biopsy ENDOSCOPY, COLON, DIAGNOSTIC    
 GASTROSTOMY TUBE  10/05/04 HX CATARACT REMOVAL    
 bilateral  
 HX CHOLECYSTECTOMY  10/05/04 HX GI  10/05/04  
 vertical banded gastroplasty/gastric bypass HX KNEE REPLACEMENT    
 HX MOHS PROCEDURES  1991/1995  
 bilateral  
 HX PACEMAKER  2015 [employer] Card[facility] Monitor Barriers to Learning/Limitations: yes;  cognitive and altered mental status (i.e.Sedation, Confusion) Compensate with: visual, verbal, tactile, kinesthetic cues/model Home Situation/Prior level of Function: jumped [place] of airplanes in [place] Home Situation Home Environment: Private residence # Steps to Enter: 3 Rails to Enter: Yes Hand Rails : Bilateral(wide) One/Two Story Residence: One story Living Alone: No 
Support Systems: Spouse/Significant Other/Partner(wife at [facility] s/p fall w/ shldr injury) Patient Expects to be Discharged to[de-identified] Rehabilitation facility Current DME Used/Available at Home: Walker, rolling, Cane, straight, Glucometer, Shower chair Tub or Shower Type: Tub/Shower combination 
[x]  Right hand dominant   []  Left hand dominant Cognitive/Behavioral Status: 
Neurologic State: Alert;Confused Orientation Level: Disoriented to place; Disoriented to situation;Disoriented to time;Oriented to person Cognition: Decreased attention/concentration;Decreased command following; Impaired decision making;Memory loss;Poor safety awareness Safety/Judgement: Decreased awareness of need for assistance;Decreased awareness of environment;Decreased awareness of need for safety;Decreased insight into deficits Vision/Perceptual:   
   Pt hallucinating Coordination: BUE Coordination: Within functional limits Fine Motor Skills-Upper: Left Intact; Right Intact Gross Motor Skills-Upper: Left Intact; Right Intact Balance: 
 Impaired Strength: BUE Strength: Generally decreased, functional 
 
Tone & Sensation: BUE Tone: Normal 
Sensation: Intact Range of Motion: BUE 
AROM: Generally decreased, functional 
PROM: Generally decreased, functional 
 
Functional Mobility and Transfers for ADLs: 
Bed Mobility: 
Rolling: Minimum assistance;Contact guard assistance(bed rails) Supine to Sit: Maximum assistance(w/ HR elevating, so deferred) Scooting: Maximum assistance; Total assistance;Bed Modified ADL Assessment:  
Feeding: (NPO) Oral Facial Hygiene/Grooming: Maximum assistance Bathing: Maximum assistance Upper Body Dressing: Maximum assistance Lower Body Dressing: Total assistance Toileting: Total assistance ADL Intervention: 
Grooming Washing Face: Moderate assistance(cues to attn to task) Lower Body Dressing Assistance Socks: Total assistance (dependent) Toileting Bladder Hygiene: (catheter) Cognitive Retraining Orientation Retraining: Reorienting;Place;Situation Problem Solving: Inductive reason; Identifying the task; Identifying the problem;General alternative solution;Deductive reason Executive Functions: Executing cognitive plans;Managing time;Regulating behavior Organizing/Sequencing: Breaking task down;Prioritizing Attention to Task: Distractibility; Single task Safety/Judgement: Decreased awareness of need for assistance;Decreased awareness of environment;Decreased awareness of need for safety;Decreased insight into deficits Therapeutic Exercise: 
Gentle RLE ROM prior to bed mobility Pain: 
Pain level pre-treatment: 3/10 Pain level post-treatment: 3/10 Pain Intervention(s): Medication provided by Nursing (see MAR); Rest, Ice, Repositioning Response to intervention: Nurse notified, See doc flow sheet Please refer to the flowsheet for vital signs taken during this treatment. After treatment:  
[] Patient left in no apparent distress sitting up in chair 
[x] Patient left in no apparent distress in bed 
[x] Call bell left within reach [x] Nursing notified/Maddie 
[] Caregiver present 
[] Bed alarm activated COMMUNICATION/EDUCATION:  
[x] Role of Occupational Therapy in the acute care setting 
[x] Home safety education was provided and the patient/caregiver indicated understanding. [x] Patient/family have participated as able in goal setting and plan of care. [x] Patient/family agree to work toward stated goals and plan of care. [] Patient understands intent and goals of therapy, but is neutral about his/her participation. [] Patient is unable to participate in goal setting and plan of care. Thank you for this referral. 
Genie Cueva, OTR/L, CSRS, CDCS, CFPS Time Calculation: 23 mins Eval Complexity: History: HIGH Complexity : Extensive review of history including physical, cognitive and psychosocial history ; Examination: HIGH Complexity : 5 or more performance deficits relating to physical, cognitive , or psychosocial skils that result in activity limitations and / or participation restrictions; Decision Making:HIGH Complexity : Patient presents with comorbidities that affect occupational performance. Signifigant modification of tasks or assistance (eg, physical or verbal) with assessment (s) is necessary to enable patient to complete evaluation

## 2020-11-04 NOTE — PROGRESS NOTES
conducted a Follow up consultation and Spiritual Assessment for Merline Powers., who is a 82 y. o.,male. Lynette follow up visit with patient keeping him distracted while the nurses were working on lines. 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 He has a wife (second) a daughter Miguelina Bowers and son. Continued the relationship of care and support. Listened empathically. Offered assurance of continued prayer on patients behalf. Chart reviewed. Patient expressed gratitude for Spiritual Care visit. Chaplains will continue to follow and will provide pastoral care as needed or requested.  recommends bedside caregivers page the  on duty if patient shows signs of acute spiritual or emotional distress. [address], [place]. Board Certified [address] [place] [address] Road [phone] - Office

## 2020-11-04 NOTE — ACP (ADVANCE CARE PLANNING)
Family provided updated Advance Medical Directive that was executed in 2016. Document appoints wife [place] primary healthcare agent then daughter Kat Bradshaw secondary healthcare agent. Primary phone numbers to contact are [phone] or [phone] because wife [place] is staying at [address] [place] [address] Rd. Called and spoke with wife [place] if the help of step-daughter Raisa Peter due to wife being [facility]. Discussed medical condition and goals of care with them. Wife stated 's wishes are for DNR/DNI. Code status will be changed to reflect wishes. Will complete POST form closer to d/c. Code status: DNR/DNI 
 
MPOA: [place] (wife) [phone] 2nd MPOA: Kat Bradshaw (daughter) Number is unknown **Was informed by step-daughter Raisa Green that in past patient didn't want daughter Kat Bradshaw to have access to any medical records or information due discord in relationship**

## 2020-11-04 NOTE — PROGRESS NOTES
Pulmonary Specialists Pulmonary, Critical Care, and Sleep Medicine Name: Kayode Ji MRN: [id number] : 1935 Hospital: [facility] [place] Date: 2020  Room: 105/01 [facility] Note Consult requesting physician: Dr. Herminio Cheng Reason for Consult: Acute hypoxemic respiratory failure IMPRESSION:  
 
· Patient Active Problem List  
Diagnosis Code  Hypertension I10  
 Diabetes mellitus ([facility] E11.9  Hyperlipemia E78.5  Depression F32.9  Varicose veins I83.90  Arthritis M19.90  Tinnitus H93.19  
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  
 Migraine with vertigo G43. [address]  Fall W19. Usha Captain  Closed right hip fracture, initial encounter ([facility] S72.001A  Dementia without behavioral disturbance ([facility]) F03.90  Anticoagulant long-term use Z79.01  
 Hypoxia R09.02  
 Anticoagulated Z79.01  
 Debility R53.81  Sepsis ([facility] A41.9  Elevated troponin R77.8  Acute hypoxemic respiratory failure ([facility]) J96.01  
 Aspiration into airway T17.908A · COVID-19 negative · Code status: Full code RECOMMENDATIONS:  
Respiratory: Acute hypoxemic respiratory failure high flow 40 L. Wean to nasal cannula. Chest x-ray today. With test negative. Recurrent pneumonia Aspiration events. Currently on a high flow improving wean down to nasal cannula Ex-smoker add bronchodilators Add doxycycline for community-acquired pneumonia continue Vanco and Zosyn Follow-up chest x-ray Follow-up ABG COVID-19 negative Recently had similar hospitalization in similar event might require PEG tube? Keep SPO2 >=92%. HOB 30 degree elevation all the time. Aggressive pulmonary toileting. Aspiration precautions. Incentive spirometry. CVS: Atrial fibrillation consider low-dose of Coreg after echo performed. Follow troponin and EKG slightly elevated. Non-STEMI? ID: Suggestion of aspiration pneumonia versus community-acquired pneumonia by recent hospitalization so needs triple antibiotics follow procalcitonin white blood cells downgrade as soon as we see improvement lactic acid. Blood culture negative. White blood cell stable 
covid  19 - on 11/3/120 also was negative on 24th and 25th. Sepsis bundle and protocol followed. Follow serial lactic acid, frequent BMP check, fluid resuscitation. Follow cultures. Deescalate antibiotic when appropriate. Hematology/Oncology: White blood cells elevated, pneumonia, hemoglobin 9.3 stable chronic anemia related platelets. Was on Xarelto at home for atrial fibrillation switch to Lovenox possible procedures? Renal: Mild chronic renal insufficiency stable resume low-dose diuretics for congestive heart failure GI/: Current aspiration keep n.p.o. Place NG tube once oxygenation stable Endocrine: Monitor BS. SSI. Diabetic start insulin Neurology: Awake and alert x2 has dementia nonfocal 
Toxicology: None 
Pain/Sedation: Avoid due to above Skin/Wound: Valuate after hip surgery poor mobility Electrolytes: Replace electrolytes per ICU electrolyte replacement protocol. IVF: As needed Albumin's 
Nutrition: Duration events will place NG tube once oxygenation stable Prophylaxis: DVT Prophylaxis: SCD/ GI Prophylaxis: Protonix/S Restraints: none Lines/Tubes: PIV Aguilar: 11/3/2020 (Medically necessary for strict input/output monitoring in critically ill patient, will remove it when not needed. Aguilar bundle followed). Poor mobility right hip fracture recently surgery on 10/27/2020 Other: 
PT/OT eval and treat. OOB. Advance Directive/Palliative Care: consulted Will defer respective systems problem management to primary and other respective consultant and follow patient in ICU with primary and other medical team. 
Further recommendations will be based on the patient's response to recommended treatment and results of the investigation ordered. Quality Care: PPI, DVT prophylaxis, HOB elevated, Infection control all reviewed and addressed. Care of plan d/w hospitalist team, RN, RT, MDR. 
D/w patient (answered all questions to satisfaction). High complexity decision making was performed during the evaluation of this patient at high risk for decompensation with multiple organ involvement. Total critical care time spent rendering care exclusive of procedures/family discussion/coordination of care: 35 minutes. Subjective/History of Present Illness:  
 
Patient is a 82 y.o. male with PMHx significant for dementia, nursing home resident Delmi Sandersr, paroxysmal atrial fibrillation on Xarelto, diabetes, hypertension, poor mobility due to recent hip surgery right hip fracture surgery on 10/27. Patient was recently admitted with hip fracture and aspiration pneumonia. Discharge from nursing home recently twice COVID-19 negative. Comes back with shortness of breath acute hypoxemic respiratory failure. Bilateral pneumonia. Covid test repeated again today negative. Strongly suspect aspiration pneumonia versus community-acquired pneumonia versus recent hospitalization. Currently on a high flow. 11/4/2020: 
COVID-19 negative oxygenation gradually improving daytime at night had a period of desaturation. Weaning high flow to off. Aspiration precautions. White blood cells improving no fever. Chest x-ray today pending. Severe dementia Palliative care consultation I/O last 24 hrs: Intake/Output Summary (Last 24 hours) at 11/4/2020 1436 Last data filed at 11/4/2020 1200 Gross per 24 hour Intake  Output 965 ml Net -965 ml  
 
 
 
 The patient is critically ill and can not provide additional history due to acute hypoxemia and demntia Very limited hisotry , confused story taken in review of the system from the chart Allergies Allergen Reactions  Morphine Other (comments) Hallucinate; \"ran all night long\" Past Medical History:  
Diagnosis Date  Anticoagulant long-term use  Arthritis  Atrial fibrillation ([address]  Atrial fibrillation ([address]  Dementia ([address]  Depression  Depression  Diabetes ([address] 1980s  Fracture, femur ([address]  GERD (gastroesophageal reflux disease)  Heart failure ([address]  Hypercholesterolemia  Hyperlipemia  Hypertension   Hypoxia  Ill-defined condition   
 has passed out-has been tested cannot determine cause  Osteoarthritis   
 of knees  Pneumonia  Primary osteoarthritis of right knee 2018  Ulcer   
 at the anastomotic bite of gastric bypass  Varicose veins  Weakness  Wide-complex tachycardia ([address] 2015 Past Surgical History:  
Procedure Laterality Date  ABDOMEN SURGERY PROC UNLISTED    
 umb hernia Rupture  BIOPSY LIVER  10/05/04  
 EGD  09  
 with biopsy  ENDOSCOPY, COLON, DIAGNOSTIC    
 GASTROSTOMY TUBE  10/05/04  HX CATARACT REMOVAL    
 bilateral  
 HX CHOLECYSTECTOMY  10/05/04  HX GI  10/05/04  
 vertical banded gastroplasty/gastric bypass  HX KNEE REPLACEMENT    
 HX MOHS PROCEDURES    
 bilateral  
 HX PACEMAKER   [facility] Social History Tobacco Use  Smoking status: Former Smoker Last attempt to quit: 4/3/1967 Years since quittin.6  Smokeless tobacco: Never Used Substance Use Topics  Alcohol use: Yes Alcohol/week: 4.0 standard drinks Types: 3 Glasses of wine, 1 Shots of liquor per week Comment: monthly Family History Problem Relation Age of Onset  Heart Attack Father  Diabetes Father  Hypertension Father  Cancer Mother   
     lung  Diabetes Mother  Other Brother   
     obese  Diabetes Brother  Diabetes Brother  Other Maternal Grandmother   
     obese  Diabetes Sister Prior to Admission medications Medication Sig Start Date End Date Taking? Authorizing Provider  
tuberculin (AplisoL) 5 tub. unit /0.1 mL injection 5 Units by IntraDERMal route Once. Yes Radha, MD Lobo  
BISACODYL RE Insert 10 mg into rectum. Yes Other, MD Lobo  
ferrous sulfate 325 mg (65 mg iron) tablet Take 1 Tab by mouth two (2) times daily (with meals). 10/30/20  Yes Heather Barragan MD  
senna-docusate (PERICOLACE) 8.6-50 mg per tablet Take 1 Tab by mouth two (2) times a day. 10/30/20  Yes Heather Barragan MD  
ARIPiprazole (Abilify) 2 mg tablet Take 2 mg by mouth daily. Yes Provider, Historical  
lidocaine (Lidoderm) 5 % Apply patch to the affected area for 12 hours a day and remove for 12 hours a day. 6/23/20  Yes Steven Cartagena MD  
buPROPion SR [facility]) 150 mg SR tablet Take 150 mg by mouth daily. Yes Provider, Historical  
multivitamin, tx-iron-ca-min (THERA-M W/ IRON) 9 mg iron-400 mcg tab tablet Take 1 Tab by mouth daily. Formulary substitution for DAILY MULTIVITAMIN centrum silver   Yes Provider, Historical  
insulin detemir U-100 (LEVEMIR U-100 INSULIN) 100 unit/mL injection 15 Units by SubCUTAneous route nightly. Yes Provider, Historical  
fluticasone (FLONASE) 50 mcg/actuation nasal spray 2 Sprays by Both Nostrils route daily as needed for Rhinitis. Yes Provider, Historical  
cycloSPORINE (RESTASIS) 0.05 % dpet Administer 1 Drop to left eye as needed. Yes Provider, Historical  
donepezil (ARICEPT) 5 mg tablet Take 5 mg by mouth nightly. Yes Provider, Historical  
oxybutynin (DITROPAN) 5 mg tablet Take 5 mg by mouth nightly. Yes Other, MD Lobo  
metoprolol tartrate 75 mg tab Take 75 mg by mouth every twelve (12) hours. 12/29/16  Yes Caro Garcia MD  
cholecalciferol (VITAMIN D3) 1,000 unit tablet Take 1,000 Units by mouth daily. Yes Provider, Historical  
furosemide (LASIX) 20 mg tablet Take 20 mg by mouth daily. Indications: Edema   Yes Provider, Historical  
insulin lispro (HUMALOG) 100 unit/mL injection by SubCUTAneous route four (4) times daily. BS-with sliding scale 80-12-=2 units 121-160=4 
161-200=6 
201-250=8 
251- and over 10 units 
with each meal  Indications: type 2 diabetes mellitus, sliding scale   Yes Provider, Historical  
amoxicillin-clavulanate (Augmentin) 500-125 mg per tablet Take 1 Tab by mouth two (2) times a day. 10/30/20   Rossy Ponce MD  
dilTIAZem CD (CARDIZEM CD) 240 mg ER capsule Take 240 mg by mouth daily (after lunch). Provider, Historical  
rivaroxaban (XARELTO) 20 mg tab tablet Take 20 mg by mouth daily (with lunch). Provider, Historical  
VITAMIN D2 50,000 unit capsule TAKE 1 CAPSULE TWICE WEEKLY 11/20/17   Ronelle Mohs, MD  
Omeprazole delayed release (PRILOSEC D/R) 20 mg tablet Take 20 mg by mouth daily. Provider, Historical  
melatonin tab tablet Take 5 mg by mouth nightly. Indications: sleep    Provider, Historical  
 
Current Facility-Administered Medications Medication Dose Route Frequency  methylPREDNISolone (PF) (SOLU-MEDROL) injection 20 mg  20 mg IntraVENous Q12H  Vancomycin-dosed by pharmacy  1 Each Other Rx Dosing/Monitoring  sodium chloride (NS) flush 5-40 mL  5-40 mL IntraVENous Q8H  
 insulin lispro (HUMALOG) injection   SubCUTAneous Q6H  
 piperacillin-tazobactam (ZOSYN) 4.5 g in 0.9% sodium chloride (MBP/ADV) 100 mL MBP  4.5 g IntraVENous Q8H  
 ipratropium-albuterol (COMBIVENT RESPIMAT) 20 mcg-100 mcg inhalation spray  1 Puff Inhalation TID  enoxaparin (LOVENOX) injection 90 mg  1 mg/kg SubCUTAneous Q12H  
 doxycycline (VIBRAMYCIN) 100 mg in 0.9% sodium chloride (MBP/ADV) 100 mL MBP  100 mg IntraVENous Q12H  pantoprazole (PROTONIX) 40 mg in 0.9% sodium chloride 10 mL injection  40 mg IntraVENous DAILY  furosemide (LASIX) injection 20 mg  20 mg IntraVENous DAILY  budesonide (PULMICORT) 500 mcg/2 ml nebulizer suspension  500 mcg Nebulization BID RT  
 vancomycin (VANCOCIN) 1,500 mg in 0.9% sodium chloride 500 mL (VIAL-MATE)_  1,500 mg IntraVENous Q24H Objective:  
Vital Signs:   
Visit Vitals BP (!) 142/74 Pulse (!) 102 Temp 98.4 °F (36.9 °C) Resp 21 Ht 6' (1.829 m) Wt 84.7 kg (186 lb 11.7 oz) SpO2 96% BMI 25.33 kg/m² O2 Device: Hi flow nasal cannula O2 Flow Rate (L/min): 40 l/min Temp (24hrs), Av.7 °F (37.1 °C), Min:98 °F (36.7 °C), Max:99.4 °F (37.4 °C) Intake/Output:  
Last shift:      701 - 1900 In: -  
Out: 500 [Urine:500] Last 3 shifts: 1901 -  07 In: 100 [I.V.:100] Out: 815 [address]man Intake/Output Summary (Last 24 hours) at 2020 1436 Last data filed at 2020 1200 Gross per 24 hour Intake  Output 965 ml Net -965 ml Last 3 Recorded Weights in this Encounter 20 1331 20 1336 20 Weight: 87.5 kg (193 lb) 87.5 kg (193 lb) 84.7 kg (186 lb 11.7 oz) Recent Labs 20 
0245 PHI 7.51* PCO2I 35.2 PO2I 97 HCO3I 27.8*  
FIO2I 1.0 Physical Exam:  
 
Impresson general : Alert x1. Not in distress. Only knows his name does not know he is in the hospital nonfocal has dementia in moderate distress Head:   Normocephalic,atraumatic. ENT:   EOM , no scleral icterus, no pallor, no cyanosis. Nose:   No sinus tenderness Throat:  Oropharynx ,mucosa, and tongue normal. No oral thrush. Neck:   Supple, symmetric. Lymph nodes. Trachea is midline Lung:   Bilateral rhales  at the bases mild wheezing, poor airway entry, Heart:   irRegular  rate rate fibrillation & rhythm. Murmur 2 out of 3 at the apex no murmur. No JVD. Abdomen:  Soft. NT. ND. +BS. No masses. liver  and spleen Extremities:  No pedal edema. No cyanosis. No clubbing. Pulses: 2+ and symmetric in DP. Capillary refill: normal 
Lymphatic:  neck and supraclavicular Musculoskeletal: No joint swelling. No tenderness. Skin:   Lesion Color, texture, turgor normal. No rashes or lesions. Data:  
   
Recent Results (from the past 24 hour(s)) CARDIAC PANEL,(CK, CKMB & TROPONIN) Collection Time: 11/03/20  4:45 PM  
Result Value Ref Range CK - MB 2.2 <3.6 ng/ml CK-MB Index 3.2 0.0 - 4.0 % CK 68 39 - 308 U/L Troponin-I, QT 0.10 (H) 0.0 - 0.045 NG/ML  
CBC WITH AUTOMATED DIFF Collection Time: 11/03/20  4:45 PM  
Result Value Ref Range WBC 12.9 4.6 - 13.2 K/uL  
 RBC 3.06 (L) 4.70 - 5.50 M/uL HGB 8.6 (L) 13.0 - 16.0 g/dL HCT 27.6 (L) 36.0 - 48.0 % MCV 90.2 74.0 - 97.0 FL  
 MCH 28.1 24.0 - 34.0 PG  
 MCHC 31.2 31.0 - 37.0 g/dL  
 RDW 17.1 (H) 11.6 - 14.5 % PLATELET 571 367 - 008 K/uL MPV 8.9 (L) 9.2 - 11.8 FL  
 NEUTROPHILS 92 (H) 40 - 73 % LYMPHOCYTES 3 (L) 21 - 52 % MONOCYTES 4 3 - 10 % EOSINOPHILS 1 0 - 5 % BASOPHILS 0 0 - 2 %  
 ABS. NEUTROPHILS 11.8 (H) 1.8 - 8.0 K/UL  
 ABS. LYMPHOCYTES 0.4 (L) 0.9 - 3.6 K/UL  
 ABS. MONOCYTES 0.6 0.05 - 1.2 K/UL  
 ABS. EOSINOPHILS 0.1 0.0 - 0.4 K/UL  
 ABS. BASOPHILS 0.0 0.0 - 0.1 K/UL  
 DF AUTOMATED    
LACTIC ACID Collection Time: 11/03/20  4:45 PM  
Result Value Ref Range Lactic acid 1.2 0.4 - 2.0 MMOL/L  
GLUCOSE, POC Collection Time: 11/03/20  5:27 PM  
Result Value Ref Range Glucose (POC) 122 (H) 70 - 110 mg/dL CARDIAC PANEL,(CK, CKMB & TROPONIN) Collection Time: 11/03/20 10:55 PM  
Result Value Ref Range CK - MB 2.0 <3.6 ng/ml CK-MB Index 3.4 0.0 - 4.0 % CK 58 39 - 308 U/L Troponin-I, QT 0.07 (H) 0.0 - 0.045 NG/ML  
GLUCOSE, POC Collection Time: 11/03/20 11:19 PM  
Result Value Ref Range Glucose (POC) 190 (H) 70 - 110 mg/dL GLUCOSE, POC Collection Time: 11/03/20 11:35 PM  
Result Value Ref Range Glucose (POC) 189 (H) 70 - 110 mg/dL GLUCOSE, POC Collection Time: 11/04/20  1:57 AM  
Result Value Ref Range Glucose (POC) 201 (H) 70 - 110 mg/dL GLUCOSE, POC Collection Time: 11/04/20  5:19 AM  
Result Value Ref Range Glucose (POC) 201 (H) 70 - 110 mg/dL METABOLIC PANEL, COMPREHENSIVE Collection Time: 11/04/20  6:45 AM  
Result Value Ref Range Sodium 140 136 - 145 mmol/L Potassium 3.8 3.5 - 5.5 mmol/L Chloride 105 100 - 111 mmol/L  
 CO2 26 21 - 32 mmol/L Anion gap 9 3.0 - 18 mmol/L Glucose 196 (H) 74 - 99 mg/dL BUN 17 7.0 - 18 MG/DL Creatinine 1.10 0.6 - 1.3 MG/DL  
 BUN/Creatinine ratio 15 12 - 20 GFR est AA >60 >60 ml/min/1.73m2 GFR est non-AA >60 >60 ml/min/1.73m2 Calcium 7.7 (L) 8.5 - 10.1 MG/DL Bilirubin, total 0.4 0.2 - 1.0 MG/DL  
 ALT (SGPT) 13 (L) 16 - 61 U/L  
 AST (SGOT) 21 10 - 38 U/L Alk. phosphatase 150 (H) 45 - 117 U/L Protein, total 5.3 (L) 6.4 - 8.2 g/dL Albumin 1.8 (L) 3.4 - 5.0 g/dL Globulin 3.5 2.0 - 4.0 g/dL A-G Ratio 0.5 (L) 0.8 - 1.7    
CBC W/O DIFF Collection Time: 11/04/20  6:45 AM  
Result Value Ref Range WBC 12.4 4.6 - 13.2 K/uL  
 RBC 2.93 (L) 4.70 - 5.50 M/uL HGB 8.1 (L) 13.0 - 16.0 g/dL HCT 26.6 (L) 36.0 - 48.0 % MCV 90.8 74.0 - 97.0 FL  
 MCH 27.6 24.0 - 34.0 PG  
 MCHC 30.5 (L) 31.0 - 37.0 g/dL  
 RDW 17.3 (H) 11.6 - 14.5 % PLATELET 841 954 - 405 K/uL MPV 9.1 (L) 9.2 - 11.8 FL  
VANCOMYCIN, RANDOM Collection Time: 11/04/20  6:45 AM  
Result Value Ref Range Vancomycin, random 29.8 5.0 - 40.0 UG/ML  
MAGNESIUM Collection Time: 11/04/20  6:45 AM  
Result Value Ref Range Magnesium 2.1 1.6 - 2.6 mg/dL CALCIUM, IONIZED Collection Time: 11/04/20  9:58 AM  
Result Value Ref Range Ionized Calcium 1.07 (L) 1.12 - 1.32 MMOL/L  
GLUCOSE, POC Collection Time: 11/04/20 11:48 AM  
Result Value Ref Range Glucose (POC) 224 (H) 70 - 110 mg/dL Chemistry Recent Labs 11/04/20 
0645 11/03/20 
0235 * 168*  136  
K 3.8 3.8  101 CO2 26 29 BUN 17 13 CREA 1.10 0.99  
CA 7.7* 7.9*  
MG 2.1  --   
AGAP 9 6 BUCR 15 13 * 154* TP 5.3* 5.5* ALB 1.8* 1.7*  
GLOB 3.5 3.8 AGRAT 0.5* 0.4* Lactic Acid Lactic acid Date Value Ref Range Status 11/03/2020 1.2 0.4 - 2.0 MMOL/L Final  
 
Recent Labs 11/03/20 
1645 11/03/20 0235 LAC 1.2 1.4 Liver Enzymes Protein, total  
Date Value Ref Range Status 11/04/2020 5.3 (L) 6.4 - 8.2 g/dL Final  
 
Albumin Date Value Ref Range Status 11/04/2020 1.8 (L) 3.4 - 5.0 g/dL Final  
 
Globulin Date Value Ref Range Status 11/04/2020 3.5 2.0 - 4.0 g/dL Final  
 
A-G Ratio Date Value Ref Range Status 11/04/2020 0.5 (L) 0.8 - 1.7   Final  
 
Alk. phosphatase Date Value Ref Range Status 11/04/2020 150 (H) 45 - 117 U/L Final  
 
Recent Labs 11/04/20 
0645 11/03/20 
0235 TP 5.3* 5.5* ALB 1.8* 1.7*  
GLOB 3.5 3.8 AGRAT 0.5* 0.4* * 154* CBC w/Diff Recent Labs 11/04/20 
0645 11/03/20 
1645 11/03/20 
0235 WBC 12.4 12.9 20.0*  
RBC 2.93* 3.06* 3.26* HGB 8.1* 8.6* 9.3* HCT 26.6* 27.6* 28.9*  
 404 503* GRANS  --  92* 83* LYMPH  --  3* 8* EOS  --  1 1 Cardiac Enzymes Lab Results Component Value Date/Time CPK 58 11/03/2020 10:55 PM  
 CPK 68 11/03/2020 04:45 PM  
 CKMB 2.0 11/03/2020 10:55 PM  
 CKMB 2.2 11/03/2020 04:45 PM  
 CKND1 3.4 11/03/2020 10:55 PM  
 CKND1 3.2 11/03/2020 04:45 PM  
 TROIQ 0.07 (H) 11/03/2020 10:55 PM  
 TROIQ 0.10 (H) 11/03/2020 04:45 PM  
  
 
BNP No results found for: BNP, BNPP, XBNPT Coagulation No results for input(s): PTP, INR, APTT, INREXT, INREXT in the last 72 hours. Thyroid  Lab Results Component Value Date/Time TSH 2.61 10/25/2020 02:20 AM  
   
No results found for: T4  
 
Urinalysis Lab Results Component Value Date/Time Color YELLOW 11/03/2020 03:28 AM  
 Appearance CLOUDY 11/03/2020 03:28 AM  
 Specific gravity 1.016 11/03/2020 03:28 AM  
 pH (UA) 8.0 11/03/2020 03:28 AM  
 Protein 100 (A) 11/03/2020 03:28 AM  
 Glucose 250 (A) 11/03/2020 03:28 AM  
 Ketone 15 (A) 11/03/2020 03:28 AM  
 Bilirubin Negative 11/03/2020 03:28 AM  
 Urobilinogen 1.0 11/03/2020 03:28 AM  
 Nitrites Negative 11/03/2020 03:28 AM  
 Leukocyte Esterase Negative 11/03/2020 03:28 AM  
 Epithelial cells Negative 11/03/2020 03:28 AM  
 Bacteria 1+ (A) 11/03/2020 03:28 AM  
 WBC Negative 11/03/2020 03:28 AM  
 RBC Negative 11/03/2020 03:28 AM  
  
 
Micro  Recent Labs 11/03/20 0252 11/03/20 0235 CULT NO GROWTH AFTER 4 HOURS NO GROWTH AFTER 5 HOURS Recent Labs 11/03/20 0252 11/03/20 0235 CULT NO GROWTH AFTER 4 HOURS NO GROWTH AFTER 5 HOURS Culture data during this hospitalization. All Micro Results Procedure Component Value Units Date/Time CULTURE, RESPIRATORY/SPUTUM/BRONCH Lorel Farr STAIN [[id number]] Order Status:  Sent Specimen:  Sputum CULTURE, BLOOD [[id number]] Collected:  11/03/20 0252 Order Status:  Completed Specimen:  Blood Updated:  11/03/20 0757 Special Requests: NO SPECIAL REQUESTS Culture result: NO GROWTH AFTER 4 HOURS     
 CULTURE, BLOOD [[id number]] Collected:  11/03/20 0235 Order Status:  Completed Specimen:  Blood Updated:  11/03/20 0757 Special Requests: NO SPECIAL REQUESTS Culture result: NO GROWTH AFTER 5 HOURS INFLUENZA A & B AG (RAPID TEST) [[id number]] Collected:  11/03/20 0250 Order Status:  Completed Specimen:  Nasopharyngeal from Nasal washing Updated:  11/03/20 0330 Influenza A Antigen Negative Comment: A negative result does not exclude influenza virus infection, seasonal or H1N1 due to suboptimal sensitivity.  If influenza is circulating in your community, a diagnosis of influenza should be considered based on a patients clinical presentation and empiric antiviral treatment should be considered, if indicated. Influenza B Antigen Negative PFT Ultrasound LE Doppler ECHO Images report reviewed by me: 
CT (Most Recent) (CT chest reviewed by me) [place] [facility] [place] FEMUR RT WO CONT Narrative EXAM: CT FEMUR RT WO CONT INDICATION: right hip fracture, recent right knee repair COMPARISON: None. CONTRAST: None. TECHNIQUE:  
Multislice helical CT the right femur was performed without intravenous contrast 
administration. Coronal and sagittal reformations were generated. CT dose 
reduction was achieved through use of a standardized protocol tailored for this 
examination and automatic exposure control for dose modulation. Digital imaging 
and communications in Medicine (DICOM) format image data are available to 
nonaffiliated external healthcare facilities or entities on a secure, media 
free, reciprocally searchable basis with patient authorization for at least 12 
months after this study. FINDING: 
 
There is a comminuted displaced right intertrochanteric hip fracture. There is a 
comminuted mildly displaced fracture of the distal femur extending into the 
metaphyseal region. A knee prosthesis is noted in place. There is a mid to 
distal femoral jumana with multiple screws in place. The soft tissues are 
unremarkable. The visualized intrapelvic structures are unremarkable. Impression IMPRESSION: Displaced comminuted right intertrochanteric fracture and mildly 
displaced comminuted distal femoral fracture. CXR reviewed by me: 
XR (Most Recent). CXR  reviewed by me and compared with previous CXR [place] [facility] [place] XR CHEST PORT Narrative EXAM: Portable frontal view of the chest. 
 
CLINICAL INDICATION/HISTORY: Shortness of breath, hypoxia COMPARISON: 10/30/2020 
 
_______________ FINDINGS:  
 
Interval worsening of severe patchy airspace filling process throughout both 
lungs with mild blunting costophrenic angles and hazy perihilar density. Stable 
borderline enlarged cardiac silhouette. 
 
_______________ Impression IMPRESSION: 
 
1. Interval worsening of bilateral lung consolidations suggesting pneumonia 
and/or pulmonary edema. Small bilateral pleural effusions. Walter Jiang MD 
11/4/2020

## 2020-11-04 NOTE — PROGRESS NOTES
Speech Therapy Note: MBSS ordered for today, however this cannot be completed today as the patient is currently on HFNC. Will re-attempt MBSS when patient weaned from [address]. Servando Cutler M.S., CCC-SLP Speech Language Pathologist

## 2020-11-04 NOTE — DIABETES MGMT
GLYCEMIC CONTROL PROGRESS NOTE: 
 
- known h/o T2DM, HbA1C within recommended range for age + comorbids on basal/bolus regimen - Solumedrol 20 mg Q 8 hours, steroid associated hyperglycemia - BG out of target range this morning trending up 
- monitor BG trends, if BG remains > 200 mg/dL recommend conservative dose of basal insulin *Lantus 5 units daily Recent Glucose Results:  
Lab Results Component Value Date/Time  (H) 11/04/2020 06:45 AM  
 GLUCPOC 201 (H) 11/04/2020 05:19 AM  
 GLUCPOC 201 (H) 11/04/2020 01:57 AM  
 GLUCPOC 189 (H) 11/03/2020 11:35 PM  
 
Jennifer Lares MS, RN, CDE Glycemic Control Team 
[phone] Pager [phone] (M-TH 8:00-4:30P) *After Hours pager [phone]

## 2020-11-04 NOTE — PROGRESS NOTES
Physician Progress Note Cristian Carter 
CSN #:                  [id number] :                       1935 ADMIT DATE:       11/3/2020 2:21 AM 
[address] [place] DATE: 
RESPONDING 
PROVIDER #:        Jonny Weinstein MD 
 
 
 
 
QUERY TEXT: 
 
Dear Hospitalist, Pt admitted with hypoxia. Pt noted to have Sepsis. If possible, please document in progress notes and discharge summary if sepsis was present on admission (POA): The medical record reflects the following: 
 
Risk Factors: Debility, DM, pneumonia, Dementia without behavioral disturbance, recent surgery for right hip fracture Clinical Indicators: 
> 11/3- WBC 20.0, lactic acid wnl 
> 11/3 temp 101.3, 101.7, , RR 32, 34, /33, 133/59, 149/78 
> Noted on H&P 11/3- sepsis due to pneumonia 
> 11/3- UA Bacteria +1 
> 11/3 Chest x-ray Interval worsening of bilateral lung consolidations suggesting pneumonia and/or pulmonary edema. Small bilateral pleural effusions Treatment: Receiving Vancomycin, Zosyn, NS infusion, doxycycline Thank you, Flores French, RN, BSN, [address] [place] [address] 
[phone] Options provided: 
-- Yes, sepsis was present at the time of the order to admit to the hospital 
-- No, sepsis was not present on admission and developed during the inpatient stay 
-- Other - I will add my own diagnosis -- Disagree - Not applicable / Not valid -- Disagree - Clinically unable to determine / Unknown 
-- Refer to Clinical Documentation Reviewer PROVIDER RESPONSE TEXT: 
 
Yes, sepsis was present at the time of the order to admit to the hospital. 
 
Query created by: Archana Santoro on 11/3/2020 1:30 PM 
 
 
Electronically signed by:  Jonny Weinstein MD 11/3/2020 10:40 PM

## 2020-11-04 NOTE — PROGRESS NOTES
Hospitalist Progress Note Patient: Herber Rudolph. MRN: [id number]  CSN: [id number] YOB: 1935  Age: 82 y.o. Sex: male DOA: 11/3/2020 LOS:  LOS: 1 day Chief Complaint: 
 
sepsis Assessment/Plan 81 yo gentleman with recent surgery for right hip fracture. Came from home initially, hospitalized and seen by cardiology for afib prior to surgery and afterwards Treated for mild pulm edema and possible pneumonia with zosyn course during hospitalization Discharged 11/1 to SNF 
  
Returned  from snf with hypoxia, probable pneumonia, pulm edema, possible sepsis 
  
Hypoxic resp failure-on high flow, covid is negative, awake and alert this am 
Sepsis-wbc elevated, no fevers, lactate ok, not requiring pressors, blood cx negative Chronic diastolic CHF, watch volume status Atrial fibrillation-if xarelto held, then ok for lovenox BID but switch to his own [facility] when ok to swallow Possible aspiration-swallow/speech therapy consult but needs to be off HFNC per speech therapist 
Angelic Caicedo is oriented to self, pleasant, but debilitated and requires help with ADL's 
Weakness, brittney with recent hip fracture Diabetes type 2-SSI as tolerated, follow BG levels Anemia-stable, no current signs for blood loss Depression-continue home meds when able to safely swallow Hypoalbuminemia with mod prot sarahy malnutrition 
  
  
Changed steroids to solumedrol low dose, nebs rotuine 
  
Continue doxy, zosyn, and vanco, pending cultures and clinical response 
  
Cardiology saw for afib this past hospital stay, echo done pre-op Wean from high flow 02 as tolerated Continue IV abx Pain control DVt prophylaxis, AC-lovenox [facility] [place] Disposition : 
Patient Active Problem List  
Diagnosis Code  Hypertension I10  
 Diabetes mellitus ([facility] [address] E11.9  Hyperlipemia E78.5  Depression F32.9  Varicose veins I83.90  Arthritis M19.90  Tinnitus H93.19  Paroxysmal atrial fibrillation (HCC) I48.0  Cardiac device in situ Z95.9  Pneumonia J18.9  Primary osteoarthritis of right knee M17.11  Type II or unspecified type diabetes mellitus with renal manifestations, uncontrolled(250.42) (HCC) E11.29, E11.65  Diastolic CHF, chronic (HCC) I50.32  
 Migraine with vertigo G43. [address]  Fall W19. [place]  Closed right hip fracture, initial encounter ([facility] [address] S72.001A  Dementia without behavioral disturbance (HCC) F03.90  Anticoagulant long-term use Z79.01  
 Hypoxia R09.02  
 Anticoagulated Z79.01  
 Debility R53.81  Sepsis ([facility] [address] A41.9  Elevated troponin R77.8  Acute hypoxemic respiratory failure (HCC) J96.01  
 Aspiration into airway T17.908A Subjective: 
 
I am ok Denies pain, SOB, chest pain Pleasantly confused in NAD Review of systems: 
 
Constitutional: denies fevers Respiratory: denies SOB, cough Cardiovascular: denies chest pain Gastrointestinal: denies nausea, vomiting, diarrhea Vital signs/Intake and Output: 
Visit Vitals BP (!) 167/83 Pulse 97 Temp 98.4 °F (36.9 °C) Resp 28 Ht 6' (1.829 m) Wt 84.7 kg (186 lb 11.7 oz) SpO2 95% BMI 25.33 kg/m² Current Shift:  No intake/output data recorded. Last three shifts:  11/02 1901 - 11/04 0700 In: 100 [I.V.:100] Out: [address] Exam: 
 
General: elderly demented Wm,NAD, on high flow Head/Neck: NCAT, supple, No masses, No lymphadenopathy CVS:Regular rate and rhythm, no M/R/G, S1/S2 heard, no thrill Lungs:Coarse BS, dec BS bases, no wheezes Abdomen: Soft, Nontender, No distention, Normal Bowel sounds, No hepatomegaly Extremities: No C/C/E, pulses palpable 2+, small bruising inner right thigh and lower leg Neuro:grossly normal , follows commands Psych:appropriate Labs: Results:  
   
Chemistry Recent Labs 11/04/20 
0645 11/03/20 
0235 * 168*  136  
K 3.8 3.8  101 CO2 26 29 BUN 17 13 CREA 1.10 0.99  
CA 7.7* 7.9* AGAP 9 6 BUCR 15 13 * 154* TP 5.3* 5.5* ALB 1.8* 1.7*  
GLOB 3.5 3.8 AGRAT 0.5* 0.4* CBC w/Diff Recent Labs 11/04/20 
0645 11/03/20 
1645 11/03/20 
0235 WBC 12.4 12.9 20.0*  
RBC 2.93* 3.06* 3.26* HGB 8.1* 8.6* 9.3* HCT 26.6* 27.6* 28.9*  
 404 503* GRANS  --  92* 83* LYMPH  --  3* 8* EOS  --  1 1 Cardiac Enzymes Recent Labs 11/03/20 
2255 11/03/20 
1645 CPK 58 68 CKND1 3.4 3.2 Coagulation No results for input(s): PTP, INR, APTT, INREXT in the last 72 hours. Lipid Panel Lab Results Component Value Date/Time Cholesterol, total 107 01/24/2015 09:15 AM  
 HDL Cholesterol 25 (L) 01/24/2015 09:15 AM  
 LDL, calculated 44 01/24/2015 09:15 AM  
 VLDL, calculated 38 01/24/2015 09:15 AM  
 Triglyceride 190 (H) 01/24/2015 09:15 AM  
 CHOL/HDL Ratio 4.3 01/24/2015 09:15 AM  
  
BNP No results for input(s): BNPP in the last 72 hours. Liver Enzymes Recent Labs 11/04/20 
0645 TP 5.3* ALB 1.8* * Thyroid Studies Lab Results Component Value Date/Time TSH 2.61 10/25/2020 02:20 AM  
    
Procedures/imaging: see electronic medical records for all procedures/Xrays and details which were not copied into this note but were reviewed prior to creation of Plan No Hernandez MD

## 2020-11-05 NOTE — PROGRESS NOTES
11/04/20 2003 Oxygen Therapy O2 Sat (%) 100 % Pulse via Oximetry 110 beats per minute O2 Device Hi flow nasal cannula 
(AIRVO) O2 Flow Rate (L/min) 40 l/min O2 Temperature 93.2 °F (34 °C) FIO2 (%) 53 % On AIRVO HFNC, no distress, mouthpiece hhn given. Patient has tested negative for COVID 19.

## 2020-11-05 NOTE — PROGRESS NOTES
TRANSFER - IN REPORT: 
 
Verbal report received from Juanito Gillette RN(name) on [facility].  being received from ICU (unit) for routine progression of care Report consisted of patients Situation, Background, Assessment and  
Recommendations(SBAR). Information from the following report(s) SBAR, Kardex, Intake/Output, MAR and Recent Results was reviewed with the receiving nurse. Opportunity for questions and clarification was provided. Assessment completed upon patients arrival to unit and care assumed. 1612 Re-assessed patient. Pt confused and pulling at diaz catheter and IV tubing. Patient redirected and soft mitts applied without being tied to the bed (non-restraints). Pt agrees to the use of the mitts. 1730 Reassessment. Pt wife and stepdaughter present at bedside. Soft mitts removed. Bedside and Verbal shift change report given to [address] [place] [address] (oncoming nurse) by Giselle Masterson CD RN (offgoing nurse). Report included the following information SBAR, Kardex, Intake/Output, MAR and Recent Results.

## 2020-11-05 NOTE — PROGRESS NOTES
Problem: Pressure Injury - Risk of 
Goal: *Prevention of pressure injury Description: Document Patrick Scale and appropriate interventions in the flowsheet. Outcome: Progressing Towards Goal 
Note: Pressure Injury Interventions: 
Sensory Interventions: Assess changes in LOC, Keep linens dry and wrinkle-free, Maintain/enhance activity level, Minimize linen layers, Monitor skin under medical devices, Pad between skin to skin, Pressure redistribution bed/mattress (bed type) Moisture Interventions: Absorbent underpads, Apply protective barrier, creams and emollients, Maintain skin hydration (lotion/cream), Minimize layers, Offer toileting Q_hr Activity Interventions: Pressure redistribution bed/mattress(bed type), PT/OT evaluation Mobility Interventions: HOB 30 degrees or less, Pressure redistribution bed/mattress (bed type), PT/OT evaluation Nutrition Interventions: Document food/fluid/supplement intake, Discuss nutritional consult with provider, Offer support with meals,snacks and hydration Friction and Shear Interventions: Apply protective barrier, creams and emollients, HOB 30 degrees or less, Lift sheet, Minimize layers Problem: Falls - Risk of 
Goal: *Absence of Falls Description: Document Ivin Rutledge Fall Risk and appropriate interventions in the flowsheet. Outcome: Progressing Towards Goal 
Note: Fall Risk Interventions: 
Mobility Interventions: Assess mobility with egress test, Bed/chair exit alarm, Patient to call before getting OOB, PT Consult for mobility concerns, Utilize walker, cane, or other assistive device Mentation Interventions: Adequate sleep, hydration, pain control, Bed/chair exit alarm, Evaluate medications/consider consulting pharmacy, Door open when patient unattended, Update white board, Toileting rounds, Room close to nurse's station, More frequent rounding Medication Interventions: Bed/chair exit alarm, Evaluate medications/consider consulting pharmacy, Patient to call before getting OOB, Teach patient to arise slowly Elimination Interventions: Bed/chair exit alarm, Call light in reach, Patient to call for help with toileting needs, Stay With Me (per policy), Toileting schedule/hourly rounds History of Falls Interventions: Bed/chair exit alarm, Consult care management for discharge planning, Door open when patient unattended, Evaluate medications/consider consulting pharmacy, Investigate reason for fall, Room close to nurse's station

## 2020-11-05 NOTE — PROGRESS NOTES
Problem: Dysphagia (Adult) Goal: *Acute Goals and Plan of Care (Insert Text) Rec:  
1:1 supervision and assistance for all PO/meals/snacks Diet: Mechanical soft with chopped meats, add gravy/sauce for cohesive bolus Thin liquids via teaspoon only; NO straws or cup drinking Aspiration precautions HOB >45 during po intake, remain >30 for 30-45 minutes after po Small bites/sips; alternate liquid/solid, slow feeding rate Oral care TID Meds crushed in applesauce Description: Patient will: 1. Tolerate diet upgrade without overt s/sx of aspiration under SLP supervision. 2. Utilize compensatory swallow strategies of small bite/sip, alternate liquid/solid with min cues in 4/5 trials. 4. Perform oral-motor/laryngeal elevation exercises 10 reps/each to increase oropharyngeal swallow function with min cues. 5. Complete an objective swallow study (i.e., MBSS) to assess swallow integrity, r/o aspiration, and determine of safest LRD, min A (goal met 11/5/20). Outcome: Progressing Towards Goal 
SPEECH PATHOLOGY MODIFIED BARIUM SWALLOW STUDY & TREATMENT Patient: Buckner Leventhal. (82 y.o. male) Date: 11/5/2020 Primary Diagnosis: Hypoxia [R09.02] Pneumonia [J18.9] Precautions:  Aspiration, Fall, NWB(HFNC, NWB R LE, NPO) PLOF: Patient unable to state; suspect h/o dysphagia ASSESSMENT : 
Based on the objective data described below, the patient presents without oral dysphagia but moderate-severe pharyngeal dysphagia without penetration or aspiration. Deglutition c/b swallow delay with premature spillage to valleculae and pyriform sinus with moderate vallecular residue that clears with subsequent swallows and thin liquid wash via teaspoon. Patient is at risk for aspiration and related complications. Rec: 
Cautious Mechanical soft with chopped meats, add gravy/sauce for cohesive bolus Thin liquids via teaspoon only; NO straws or cup drinking Aspiration precautions HOB >45 during po intake, remain >30 for 30-45 minutes after po Small bites/sips; alternate liquid/solid, slow feeding rate Oral care TID Meds crushed in applesauce Treatment: 
Skilled therapy initiated: Educated pt on MBS results, aspiration precautions, and importance of compensatory swallow techniques to decrease aspiration risk (decrease rate of intake & sip/bite size, upright @HOB for all po intake and ~30 minutes after po); verbalized comprehension. SLP to follow as indicated. Patient will benefit from skilled intervention to address the above impairments. Patient's rehabilitation potential is considered to be Fair Factors which may influence rehabilitation potential include:  
[]              None noted [x]              Mental ability/status [x]              Medical condition [x]              Home/family situation and support systems []              Safety awareness []              Pain tolerance/management 
[]              Other: PLAN : 
Recommendations and Planned Interventions: 
1:1 supervision and assistance for all PO/meals/snacks Diet: Mechanical soft with chopped meats, add gravy/sauce for cohesive bolus Thin liquids via teaspoon only; NO straws or cup drinking Aspiration precautions HOB >45 during po intake, remain >30 for 30-45 minutes after po Small bites/sips; alternate liquid/solid, slow feeding rate Oral care TID Meds crushed in applesauce Frequency/Duration: Patient will be followed by speech-language pathology 1-2 times per day/4-7 days per week to address goals. Discharge Recommendations: Carlos Rodirguez SUBJECTIVE:  
Patient stated \"I'm ready for a hotdog and some crinkle potato chips.  OBJECTIVE:  
 
Past Medical History:  
Diagnosis Date Anticoagulant long-term use Arthritis Atrial fibrillation ([facility] [address] Atrial fibrillation ([facility] [address] Dementia ([facility] [address] Depression Depression Diabetes ([facility] [address] 1980s Fracture, femur ([facility] [address] GERD (gastroesophageal reflux disease) Heart failure ([facility] [address] Hypercholesterolemia Hyperlipemia Hypertension 2000 Hypoxia Ill-defined condition 2015  
 has passed out-has been tested cannot determine cause Osteoarthritis   
 of knees Pneumonia Primary osteoarthritis of right knee 6/28/2018 Ulcer   
 at the anastomotic bite of gastric bypass Varicose veins Weakness Wide-complex tachycardia ([facility] [address] 5/2/2015 Past Surgical History:  
Procedure Laterality Date ABDOMEN SURGERY PROC UNLISTED  1998  
 umb hernia Rupture BIOPSY LIVER  10/05/04 EGD  12/29/09  
 with biopsy ENDOSCOPY, COLON, DIAGNOSTIC    
 GASTROSTOMY TUBE  10/05/04 HX CATARACT REMOVAL    
 bilateral  
 HX CHOLECYSTECTOMY  10/05/04 HX GI  10/05/04  
 vertical banded gastroplasty/gastric bypass HX KNEE REPLACEMENT    
 HX MOHS PROCEDURES  1991/1995  
 bilateral  
 HX PACEMAKER  2015 [employer] Cardiac Monitor Home Situation:  
Home Situation Home Environment: Private residence # Steps to Enter: 3 Rails to Enter: Yes Hand Rails : Bilateral(wide) One/Two Story Residence: One story Living Alone: No 
Support Systems: Spouse/Significant Other/Partner(wife at [facility] s/p fall w/ shldr injury) Patient Expects to be Discharged to[de-identified] Rehabilitation facility Current DME Used/Available at Home: Walker, rolling, Cane, straight, Glucometer, Shower chair Tub or Shower Type: Tub/Shower combination Diet prior to admission: Unknown Current Diet:  NPO Film Views: Lateral 
Patient Position: (Upright in [facility] chair) Trial 1:  
Consistency Presented: Mixed consistency;Puree; Solid; Thin liquid How Presented: SLP-fed/presented;Spoon Bolus Acceptance: No impairment Bolus Formation/Control: No impairment:    
Propulsion: Delayed (# of seconds); Discoordination; Tongue pumping Oral Residue: None Initiation of Swallow: Triggered at vallecula Timing: Pooling 1-5 sec Penetration: None Aspiration/Timing: No evidence of aspiration Pharyngeal Clearance: Vallecular residue Attempted Modifications: Alternate liquids/solids; Spoon Effective Modifications: Alternate liquids/solids; Spoon;Small sips and bites Cues for Modifications: Moderate-maximal  
   
 
Decreased Tongue Base Retraction?: Yes Laryngeal Elevation: Reduced excursion with laryngeal vestibule gap Aspiration/Penetration Score: 1 (No penetration or aspiration-Contrast does not enter the airway) Pharyngeal Symmetry: Not assessed Pharyngeal-Esophageal Segment: No impairment Pharyngeal Dysfunction: Decreased tongue base retraction;Decreased strength;Decreased pharyngeal wall constriction Oral Phase Severity: No impairment Pharyngeal Phase Severity: Moderately severe 8-point Penetration-Aspiration Scale: Score 1 PAIN: 
Pain level pre-treatment: 0/10 Pain level post-treatment: 0/10 COMMUNICATION/EDUCATION:  
[x]  Patient educated regarding MBS results and diet recommendations. []  Patient/family have participated as able in goal setting and plan of care. []  Patient/family agree to work toward stated goals and plan of care. []  Patient understands intent and goals of therapy, but is neutral about his/her participation. []  Patient is unable to participate in goal setting and plan of care. Thank you for this referral. 
Lesli Beckett, JACKSON Time Calculation: 24 mins MBS Time: 12 minutes Treatment Time: 12 minutes

## 2020-11-05 NOTE — PROGRESS NOTES
[address] care from [facility] [address] Spoke with provider and recommended a sleep aid because patient appears restless and NPO. Orders received for 12.5 mg IV benadryl. 
 
 
0700 Bedside and Verbal shift change report given to oPlo Izquierdo RN (oncoming nurse) by Kel Darnell RN 
 (offgoing nurse). Report included the following information SBAR, Kardex, Intake/Output, MAR and Med Rec Status.

## 2020-11-05 NOTE — PROGRESS NOTES
Hospitalist Progress Note-critical care note Patient: Khadijah Silver MRN: [id number]  CSN: [id number] YOB: 1935  Age: 82 y.o. Sex: male DOA: 11/3/2020 LOS:  LOS: 2 days Chief complaint: acute respiratory failure with hypoxia sepsis Assessment/Plan Hospital Problems  Date Reviewed: 11/3/2020 Codes Class Noted POA Anticoagulated ICD-10-CM: Z79.01 
ICD-9-CM: V58.61  11/3/2020 Yes Debility ICD-10-CM: R53.81 ICD-9-CM: 799.3  11/3/2020 Yes Sepsis ([facility] [address] ICD-10-CM: A41.9 ICD-9-CM: 038.9, 995.91  11/3/2020 Yes Elevated troponin ICD-10-CM: R77.8 ICD-9-CM: 790.6  11/3/2020 Yes Acute hypoxemic respiratory failure ([facility] [address] ICD-10-CM: J96.01 
ICD-9-CM: 518.81  11/3/2020 Yes Aspiration into airway ICD-10-CM: T17.908A ICD-9-CM: 934.9  11/3/2020 Yes Dementia without behavioral disturbance (HCC) ICD-10-CM: F03.90 ICD-9-CM: 294.20  10/24/2020 Yes * (Principal) Hypoxia ICD-10-CM: R09.02 
ICD-9-CM: 799.02  10/24/2020 Yes Pneumonia ICD-10-CM: J18.9 ICD-9-CM: 776  11/16/2016 Yes Paroxysmal atrial fibrillation (HCC) ICD-10-CM: I48.0 ICD-9-CM: 427.31  2/27/2015 Yes Diabetes mellitus ([facility] [address] ICD-10-CM: E11.9 ICD-9-CM: 250.00  10/6/2011 Yes Hypertension ICD-10-CM: I10 
ICD-9-CM: 401.9  Unknown Yes 83 yo gentleman with recent surgery for right hip fracture. Came from home initially, hospitalized and seen by cardiology for afib prior to surgery and afterwards Treated for mild pulm edema and possible pneumonia with zosyn course during hospitalization Discharged 11/1 to SNF 
   
Hypoxic resp failure with hypoxia Continue to try wean off high  flow O2 this morning  
 covid is negative Continue aspiration precaution Continue breathing tx 
pulm on board Pna, aspirate into the airway Will continue doxy and zosyn  And vanc Speech on board Sepsis-resolving bcx no growth for two days Chronic diastolic CHF, watch volume status Atrial fibrillation-controlled per metoprolol and cardizem. On lovenox for ac  
 
Elevated trop No acs indicated Dementia 
baseline is oriented to self, pleasant Continue home medication Fall /aspiration precaution Now dnr/I now Weakness, brittney with recent hip fracture Will have pt/ot Diabetes type 2 
ssi and thin liquid diet Anemia Stable , continue monitoring Depression-continue home meds when able to safely swallow Hypoalbuminemia with mod prot sarahy malnutrition 
  
  
Subjective: feel fine  
rn : off high flow Disposition :tbd, Review of systems: 
 
General: No fevers or chills. Cardiovascular: No chest pain or pressure. No palpitations. Pulmonary: No shortness of breath. Gastrointestinal: No nausea, vomiting. Vital signs/Intake and Output: 
Visit Vitals BP (!) 143/91 Pulse (!) 102 Temp 98 °F (36.7 °C) Resp 30 Ht 6' (1.829 m) Wt 84.7 kg (186 lb 11.7 oz) SpO2 (!) 89% BMI 25.33 kg/m² Current Shift:  11/05 0701 - 11/05 1900 In: -  
Out: 1150 [[id number]] Last three shifts:  11/03 1901 - 11/05 0700 In: -  
Out: [address] [id number] Physical Exam: 
General: WD, WN. Alert, cooperative, no acute distress HEENT: NC, Atraumatic. PERRLA, anicteric sclerae. Lungs: Coarse bs Heart:  Regular  rhythm,  +murmur, No Rubs, No Gallops Abdomen: Soft, Non distended, Non tender. +Bowel sounds, Extremities: No c/c/e Psych:   Not anxious or agitated. Neurologic:  No acute neurological deficit. Labs: Results:  
   
Chemistry Recent Labs 11/05/20 
9013 11/04/20 
0645 11/03/20 
0235 * 196* 168*  140 136  
K 3.3* 3.8 3.8  105 101 CO2 28 26 29 BUN 23* 17 13 CREA 1.15 1.10 0.99 CA 8.1* 7.7* 7.9* AGAP 8 9 6 BUCR 20 15 13 * 150* 154* TP 5.4* 5.3* 5.5* ALB 1.8* 1.8* 1.7*  
GLOB 3.6 3.5 3.8 AGRAT 0.5* 0.5* 0.4*  
  
 CBC w/Diff Recent Labs 11/05/20 
7724 11/04/20 
0645 11/03/20 
1645 11/03/20 
0235 WBC 17.4* 12.4 12.9 20.0*  
RBC 3.04* 2.93* 3.06* 3.26* HGB 8.7* 8.1* 8.6* 9.3* HCT 27.7* 26.6* 27.6* 28.9*  
* 379 404 503* GRANS  --   --  92* 83* LYMPH  --   --  3* 8* EOS  --   --  1 1 Cardiac Enzymes Recent Labs 11/03/20 
2255 11/03/20 
1645 CPK 58 68 CKND1 3.4 3.2 Coagulation No results for input(s): PTP, INR, APTT, INREXT in the last 72 hours. Lipid Panel Lab Results Component Value Date/Time Cholesterol, total 107 01/24/2015 09:15 AM  
 HDL Cholesterol 25 (L) 01/24/2015 09:15 AM  
 LDL, calculated 44 01/24/2015 09:15 AM  
 VLDL, calculated 38 01/24/2015 09:15 AM  
 Triglyceride 190 (H) 01/24/2015 09:15 AM  
 CHOL/HDL Ratio 4.3 01/24/2015 09:15 AM  
  
BNP No results for input(s): BNPP in the last 72 hours. Liver Enzymes Recent Labs 11/05/20 
8579 TP 5.4* ALB 1.8* * Thyroid Studies Lab Results Component Value Date/Time TSH 2.61 10/25/2020 02:20 AM  
    
Procedures/imaging: see electronic medical records for all procedures/Xrays and details which were not copied into this note but were reviewed prior to creation of Plan Xr Swallow Func Video Result Date: 11/5/2020 Modified Barium Swallow History: Feeding difficulties, cough, Dysphagia Technique: The procedure was performed with the speech pathologist. Different consistencies of barium tinged products were given to swallow during real time fluoroscopic observation. Findings: With thin consistency via spoon, pt demonstrated [normal swallowing]. No significant residua in the vallecula and pyriform sinus. With pudding and solid consistency , pt demonstrated [swallow delay without penetration or aspiration]. There was significant residua in the vallecula sinus. Fluoroscopic time: 1.1 minutes Fluoroscopic dose (reference air kerma): 310 mGy Impression: Abnormal modified swallow study as described. Please see full speech pathologist report to follow for discussion of findings and detailed recommendations. Videotape is available for review. Ct Head Wo Cont Result Date: 10/24/2020 EXAM: CT head INDICATION: Fall. Pain. COMPARISON: None. TECHNIQUE: Axial CT imaging of the head was performed without intravenous contrast. Dose reduction techniques used: automated exposure control, adjustment of the mAs and/or kVp according to patient size, and iterative reconstruction techniques. Digital imaging and communications in Medicine (DICOM) format image data are available to nonaffiliated external healthcare facilities or entities on a secure, media free, reciprocally searchable basis with patient authorization for at least 12 months after this study. _______________ FINDINGS: BRAIN AND POSTERIOR FOSSA: There is cerebral volume loss with prominence of the lateral and the third ventricles. The cortical sulci are widened appropriately. The fourth ventricle and basal cisterns are normally outlined. There is mild bilateral periventricular and central white matter diminished attenuation. There is no acute territorial defect or hemorrhage or midline shift. EXTRA-AXIAL SPACES AND MENINGES: There are no abnormal extra-axial fluid collections. CALVARIUM: Intact. SINUSES: Clear. OTHER: None. _______________ IMPRESSION: Cerebral volume loss and mild bilateral periventricular and central white matter diminished attenuation which is nonspecific but likely to represent microvascular disease. No acute intracranial abnormality. Cta Chest W Or W Wo Cont Result Date: 10/24/2020 EXAM: CTA chest INDICATION: Shortness of breath. Abnormal chest x-ray. COMPARISON: June 23, 2020. TECHNIQUE: Axial CT imaging from the thoracic inlet through the diaphragm with intravenous contrast. Coronal and sagittal MIP reformats were generated.  Dose reduction techniques used: automated exposure control, adjustment of the mAs and/or kVp according to patient size, and iterative reconstruction techniques. Digital imaging and communications in Medicine (DICOM) format image data are available to nonaffiliated external healthcare facilities or entities on a secure, media free, reciprocally searchable basis with patient authorization for at least 12 months after this study. _______________ FINDINGS: EXAM QUALITY: Adequate. PULMONARY ARTERIES: No evidence of pulmonary embolism. MEDIASTINUM: Normal heart size. No evidence of right heart strain. Aorta is unremarkable. No pericardial effusion. LUNGS: There is diffuse bilateral groundglass infiltrates. PLEURA: There are small bilateral pleural effusions. AIRWAY: Normal. LYMPH NODES: No enlarged nodes. UPPER ABDOMEN: There is bilateral adrenal gland thickening. OTHER: No acute or aggressive osseous abnormalities identified. _______________ IMPRESSION: 1. No evidence of pulmonary embolism. 2.  Diffuse bilateral groundglass infiltrates. Suspect an atypical pneumonia. Consider Covid-19 disease. 3. Small bilateral pleural effusions. Ct Femur Rt Wo Cont Result Date: 10/24/2020 EXAM: CT FEMUR RT WO CONT INDICATION: right hip fracture, recent right knee repair COMPARISON: None. CONTRAST: None. TECHNIQUE: Multislice helical CT the right femur was performed without intravenous contrast administration. Coronal and sagittal reformations were generated. CT dose reduction was achieved through use of a standardized protocol tailored for this examination and automatic exposure control for dose modulation. Digital imaging and communications in Medicine (DICOM) format image data are available to nonaffiliated external healthcare facilities or entities on a secure, media free, reciprocally searchable basis with patient authorization for at least 12 months after this study.  FINDING: There is a comminuted displaced right intertrochanteric hip fracture. There is a comminuted mildly displaced fracture of the distal femur extending into the metaphyseal region. A knee prosthesis is noted in place. There is a mid to distal femoral jumana with multiple screws in place. The soft tissues are unremarkable. The visualized intrapelvic structures are unremarkable. IMPRESSION: Displaced comminuted right intertrochanteric fracture and mildly displaced comminuted distal femoral fracture. Xr Pelv 1 Or 2 V Result Date: 10/24/2020 
--------------------------------------------------------------------------- <<<<<<<<<           [facility]           >>>>>>>>> --------------------------------------------------------------------------- CLINICAL HISTORY:Pain. COMPARISON EXAMINATIONS: None. --- AP pelvic radiograph. The bone mineralization is normal. There is a displaced comminuted right intertrochanteric fracture. No soft tissue mass. ------------- Impression: ------------- Displaced comminuted right intertrochanteric hip fracture. Xr Chest Andrea Galley Result Date: 11/4/2020 EXAM: One-view chest CLINICAL HISTORY: hypoxia , COMPARISON: None FINDINGS: Frontal view of the chest demonstrate patchy bilateral airspace disease, perhaps slightly improved. Cardiac silhouette is stable in size and contour. No acute bony or soft tissue abnormality. IMPRESSION: Patchy bilateral nonspecific airspace disease appears slightly improved since prior examination. Probable trace pleural effusions. Xr Chest Andrea Galley Result Date: 11/3/2020 EXAM: Portable frontal view of the chest. CLINICAL INDICATION/HISTORY: Shortness of breath, hypoxia COMPARISON: 10/30/2020 _______________ FINDINGS: Interval worsening of severe patchy airspace filling process throughout both lungs with mild blunting costophrenic angles and hazy perihilar density. Stable borderline enlarged cardiac silhouette. _______________ IMPRESSION: 1. Interval worsening of bilateral lung consolidations suggesting pneumonia and/or pulmonary edema. Small bilateral pleural effusions. Xr Chest [facility] Result Date: 10/30/2020 EXAM: XR CHEST PORT CLINICAL INDICATION/HISTORY: pneumonia -Additional: None COMPARISON: Radiographs 10/23/2020 TECHNIQUE: Frontal view of the chest _______________ FINDINGS: HEART AND MEDIASTINUM: Normal cardiac size and mediastinal contours. LUNGS AND PLEURAL SPACES: Patchy interstitial and airspace disease is present with bilateral right greater than left distribution. No evidence of pneumothorax. Likely small bilateral pleural effusions. BONY THORAX AND SOFT TISSUES: No acute osseous abnormality. Degenerative changes across the shoulder girdles present bilaterally. _______________ IMPRESSION: Bilateral interstitial and airspace disease appearing similar to prior study with small bilateral pleural effusions. Xr Chest [facility] Result Date: 10/24/2020 
--------------------------------------------------------------------------- <<<<<<<<<           [facility]           >>>>>>>>> --------------------------------------------------------------------------- CLINICAL HISTORY:  Fall. Shortness of breath. COMPARISON EXAMINATIONS:  August 18, 2019. ---  SINGLE FRONTAL VIEW OF THE CHEST  --- The cardiomediastinal silhouette is stable. There are new bilateral patchy upper and lower lung field infiltrates. There is no evidence for significant pleural effusion. No significant osseous abnormalities are identified.  -------------- Impression: -------------- Bilateral upper and lower lung field infiltrates. Suspect pneumonia. Consider Covid 19 disease. Nc Xr Technologist Service Result Date: 11/3/2020 See impression. Impression:  Fluoroscopy was provided for this procedure under the supervision and/or direction of the attending provider. ?  For further information regarding this procedure, see patient medical record.   
 
 
Néstor Denney MD

## 2020-11-05 NOTE — PROGRESS NOTES
Transition of care: TBD anticipate SNF Chart reviewed and noted that pt has Palliative care consult to determine goals of care. Pt was recently at [facility] and referral placed for continuity of care anticipating he will need to return at time of dc. Covid results negative. CM will need to discuss with Spouse/family dc location as patient gets closer. Pt will need PT/OT eval for SNF if goals of care are to rehab.

## 2020-11-05 NOTE — PROGRESS NOTES
[facility] Pulmonary, Critical Care, and Sleep Medicine Name: Cricket Dobson MRN: [id number] : 1935 Hospital: [facility] [place] Date: 2020  Room: 105/01 [facility] Note Consult requesting physician: Dr. Laith Atkinson Reason for Consult: Acute hypoxemic respiratory failure, pneumonia IMPRESSION:  
 
· Patient Active Problem List  
Diagnosis Code  Hypertension I10  
 Diabetes mellitus ([facility] E11.9  Hyperlipemia E78.5  Depression F32.9  Varicose veins I83.90  Arthritis M19.90  Tinnitus H93.19  
 Paroxysmal atrial fibrillation ([facility]) I48.0  Cardiac device in situ Z95.9  Pneumonia J18.9  Primary osteoarthritis of right knee M17.11  Type II or unspecified type diabetes mellitus with renal manifestations, uncontrolled(250.42) ([facility]) E11.29, E11.65  Diastolic CHF, chronic ([facility]) I50.32  
 Migraine with vertigo G43. [address] [facility]  Fall W19. Srikanth Kasal  Closed right hip fracture, initial encounter ([facility] S72.001A  Dementia without behavioral disturbance ([facility]) F03.90  Anticoagulant long-term use Z79.01  
 Hypoxia R09.02  
 Anticoagulated Z79.01  
 Debility R53.81  Sepsis ([facility] A41.9  Elevated troponin R77.8  Acute hypoxemic respiratory failure ([facility]) J96.01  
 Aspiration into airway T17.908A · COVID-19 negative · Code status: Full code RECOMMENDATIONS:  
Respiratory: Acute hypoxemic respiratory failure high flow weaned to 6 0 of nasal cannula. At night has some periods of desaturation might have undiagnosed obstructive sleep apnea can use high flow at night. Due to severe dementia would not tolerate CPAP. Covid negative. Aspiration precautions. Steroids worsening his confusion Wean to nasal cannula. Chest x-ray today. With test negative. Recurrent pneumonia Aspiration events. Currently on a high flow improving wean down to nasal cannula Ex-smoker add bronchodilators Add doxycycline for community-acquired pneumonia continue Vanco and Zosyn Follow-up chest x-ray Follow-up ABG COVID-19 negative Recently had similar hospitalization in similar event might require PEG tube? Keep SPO2 >=92%. HOB 30 degree elevation all the time. Aggressive pulmonary toileting. Aspiration precautions. Incentive spirometry. CVS: Atrial fibrillation rate better controlled now on oral Cardizem On full dose Lovenox for anticoagulation Follow troponin and EKG slightly elevated. Non-STEMI? ID: COVID-19 pneumonia negative aspiration pneumonia. No MRSA stop vancomycin on day 7 of treatment Suggestion of aspiration pneumonia versus community-acquired pneumonia by recent hospitalization so needs triple antibiotics follow procalcitonin white blood cells downgrade as soon as we see improvement lactic acid. Blood culture negative. White blood cell stable 
covid  19 - on 11/3/120 also was negative on 24th and 25th. Sepsis bundle and protocol followed. Follow serial lactic acid, frequent BMP check, fluid resuscitation. Follow cultures. Deescalate antibiotic when appropriate. Hematology/Oncology: White blood cells elevated, pneumonia, hemoglobin 9.3 stable chronic anemia related platelets. Was on Xarelto at home for atrial fibrillation switch to Lovenox possible procedures? Renal: Mild chronic renal insufficiency stable resume low-dose diuretics for congestive heart failure GI/: Current aspiration keep n.p.o. Place NG tube once oxygenation stable Endocrine: Monitor BS. SSI. Diabetic start insulin Neurology: Awake and alert x2 has dementia nonfocal 
Toxicology: None 
Pain/Sedation: Avoid due to above Skin/Wound: Valuate after hip surgery poor mobility Electrolytes: Replace electrolytes per ICU electrolyte replacement protocol. IVF: As needed Albumin's 
Nutrition: Duration events will place NG tube once oxygenation stable Prophylaxis: DVT Prophylaxis: SCD/ GI Prophylaxis: Protonix/S Restraints: none Lines/Tubes: PIV Aguilar: 11/3/2020 (Medically necessary for strict input/output monitoring in critically ill patient, will remove it when not needed. Aguilar bundle followed). Poor mobility right hip fracture recently surgery on 10/27/2020 Other: 
PT/OT eval and treat. OOB. Advance Directive/Palliative Care: consulted Will defer respective systems problem management to primary and other respective consultant and follow patient in ICU with primary and other medical team. 
Further recommendations will be based on the patient's response to recommended treatment and results of the investigation ordered. Quality Care: PPI, DVT prophylaxis, HOB elevated, Infection control all reviewed and addressed. Care of plan d/w hospitalist team, RN, RT, MDR. 
D/w patient (answered all questions to satisfaction). High complexity decision making was performed during the evaluation of this patient at high risk for decompensation with multiple organ involvement. Total critical care time spent rendering care exclusive of procedures/family discussion/coordination of care: 35 minutes. Subjective/History of Present Illness:  
 
Patient is a 82 y.o. male with PMHx significant for dementia, nursing home resident Louise Boykin, paroxysmal atrial fibrillation on Xarelto, diabetes, hypertension, poor mobility due to recent hip surgery right hip fracture surgery on 10/27. Patient was recently admitted with hip fracture and aspiration pneumonia. Discharge from nursing home recently twice COVID-19 negative. Comes back with shortness of breath acute hypoxemic respiratory failure. Bilateral pneumonia. Covid test repeated again today negative. Strongly suspect aspiration pneumonia versus community-acquired pneumonia versus recent hospitalization. Currently on a high flow. 11/5/2020: 
 
Oxygenation improved now on 6 L of oxygen. Stop steroids. Continue antibiotics but finished vancomycin the day 7. Less likely MRSA. Heart letter better on Cardizem. Aspiration precautions speech evaluation today. DNR/DNI. Intake/Output Summary (Last 24 hours) at 11/5/2020 1236 Last data filed at 11/5/2020 0800 Gross per 24 hour Intake  Output 700 ml Net -700 ml The patient is critically ill and can not provide additional history due to acute hypoxemia and demntia Very limited hisotry , confused story taken in review of the system from the chart Allergies Allergen Reactions  Morphine Other (comments) Hallucinate; \"ran all night long\" Past Medical History:  
Diagnosis Date  Anticoagulant long-term use  Arthritis  Atrial fibrillation ([address]  Atrial fibrillation ([address]  Dementia ([address]  Depression  Depression  Diabetes ([address] 1980s  Fracture, femur ([address]  GERD (gastroesophageal reflux disease)  Heart failure ([address]  Hypercholesterolemia  Hyperlipemia  Hypertension 2000  Hypoxia  Ill-defined condition 2015  
 has passed out-has been tested cannot determine cause  Osteoarthritis   
 of knees  Pneumonia  Primary osteoarthritis of right knee 6/28/2018  Ulcer   
 at the anastomotic bite of gastric bypass  Varicose veins  Weakness  Wide-complex tachycardia ([address] 5/2/2015 Past Surgical History:  
Procedure Laterality Date  ABDOMEN SURGERY PROC UNLISTED  1998  
 umb hernia Rupture  BIOPSY LIVER  10/05/04  
 EGD  12/29/09  
 with biopsy  ENDOSCOPY, COLON, DIAGNOSTIC    
 GASTROSTOMY TUBE  10/05/04  HX CATARACT REMOVAL    
 bilateral  
 HX CHOLECYSTECTOMY  10/05/04  HX GI  10/05/04  
 vertical banded gastroplasty/gastric bypass  HX KNEE REPLACEMENT    
 HX MOHS PROCEDURES  1991/1995  
 bilateral  
 HX PACEMAKER  2015 [facility] Cardiac Monitor Social History Tobacco Use  
  Smoking status: Former Smoker Last attempt to quit: 4/3/1967 Years since quittin.6  Smokeless tobacco: Never Used Substance Use Topics  Alcohol use: Yes Alcohol/week: 4.0 standard drinks Types: 3 Glasses of wine, 1 Shots of liquor per week Comment: monthly Family History Problem Relation Age of Onset  Heart Attack Father  Diabetes Father  Hypertension Father  Cancer Mother   
     lung  Diabetes Mother  Other Brother   
     obese  Diabetes Brother  Diabetes Brother  Other Maternal Grandmother   
     obese  Diabetes Sister Prior to Admission medications Medication Sig Start Date End Date Taking? Authorizing Provider  
tuberculin (AplisoL) 5 tub. unit /0.1 mL injection 5 Units by IntraDERMal route Once. Yes Other, MD Lobo  
BISACODYL RE Insert 10 mg into rectum. Yes Other, MD Lobo  
ferrous sulfate 325 mg (65 mg iron) tablet Take 1 Tab by mouth two (2) times daily (with meals). 10/30/20  Yes Lorna Chapa MD  
senna-docusate (PERICOLACE) 8.6-50 mg per tablet Take 1 Tab by mouth two (2) times a day. 10/30/20  Yes Lorna Chapa MD  
ARIPiprazole (Abilify) 2 mg tablet Take 2 mg by mouth daily. Yes Provider, Historical  
lidocaine (Lidoderm) 5 % Apply patch to the affected area for 12 hours a day and remove for 12 hours a day. 20  Yes Vianey Cartagena MD  
buPROPion SR [facility] SR) 150 mg SR tablet Take 150 mg by mouth daily. Yes Provider, Historical  
multivitamin, tx-iron-ca-min (THERA-M W/ IRON) 9 mg iron-400 mcg tab tablet Take 1 Tab by mouth daily. Formulary substitution for DAILY MULTIVITAMIN centrum silver   Yes Provider, Historical  
insulin detemir U-100 (LEVEMIR U-100 INSULIN) 100 unit/mL injection 15 Units by SubCUTAneous route nightly. Yes Provider, Historical  
fluticasone (FLONASE) 50 mcg/actuation nasal spray 2 Sprays by Both Nostrils route daily as needed for Rhinitis.    Yes Provider, Historical  
 cycloSPORINE (RESTASIS) 0.05 % dpet Administer 1 Drop to left eye as needed. Yes Provider, Historical  
donepezil (ARICEPT) 5 mg tablet Take 5 mg by mouth nightly. Yes Provider, Historical  
oxybutynin (DITROPAN) 5 mg tablet Take 5 mg by mouth nightly. Yes Other, MD Lobo  
metoprolol tartrate 75 mg tab Take 75 mg by mouth every twelve (12) hours. 12/29/16  Yes Alex Bauer MD  
cholecalciferol (VITAMIN D3) 1,000 unit tablet Take 1,000 Units by mouth daily. Yes Provider, Historical  
furosemide (LASIX) 20 mg tablet Take 20 mg by mouth daily. Indications: Edema   Yes Provider, Historical  
insulin lispro (HUMALOG) 100 unit/mL injection by SubCUTAneous route four (4) times daily. BS-with sliding scale 80-12-=2 units 121-160=4 
161-200=6 
201-250=8 
251- and over 10 units 
with each meal  Indications: type 2 diabetes mellitus, sliding scale   Yes Provider, Historical  
amoxicillin-clavulanate (Augmentin) 500-125 mg per tablet Take 1 Tab by mouth two (2) times a day. 10/30/20   Bernice Love MD  
dilTIAZem CD (CARDIZEM CD) 240 mg ER capsule Take 240 mg by mouth daily (after lunch). Provider, Historical  
rivaroxaban (XARELTO) 20 mg tab tablet Take 20 mg by mouth daily (with lunch). Provider, Historical  
VITAMIN D2 50,000 unit capsule TAKE 1 CAPSULE TWICE WEEKLY 11/20/17   Vladimir Boyle MD  
Omeprazole delayed release (PRILOSEC D/R) 20 mg tablet Take 20 mg by mouth daily. Provider, Historical  
melatonin tab tablet Take 5 mg by mouth nightly. Indications: sleep    Provider, Historical  
 
Current Facility-Administered Medications Medication Dose Route Frequency  potassium chloride 10 mEq in 100 ml IVPB  10 mEq IntraVENous Q1H  
 calcium gluconate 2 g/100 mL sodium chloride (ISO-OSM)  2 g IntraVENous ONCE  
 [START ON 11/6/2020] Vancomycin trough due 0330 11/6/20  1 Each Other ONCE  
 ipratropium-albuterol (COMBIVENT RESPIMAT) 20 mcg-100 mcg inhalation spray  2 Puff Inhalation TID  ARIPiprazole (ABILIFY) tablet 2 mg  2 mg Oral DAILY  buPROPion SR (WELLBUTRIN SR) tablet 150 mg  150 mg Oral DAILY  dilTIAZem ER (CARDIZEM CD) capsule 240 mg  240 mg Oral PCL  
 donepeziL (ARICEPT) tablet 5 mg  5 mg Oral QHS  ferrous sulfate tablet 325 mg  325 mg Oral BID WITH MEALS  insulin glargine (LANTUS) injection 15 Units  15 Units SubCUTAneous QHS  metoprolol tartrate (LOPRESSOR) tablet 75 mg  75 mg Oral Q12H  
 oxybutynin (DITROPAN) tablet 5 mg  5 mg Oral QHS  Vancomycin-dosed by pharmacy  1 Each Other Rx Dosing/Monitoring  sodium chloride (NS) flush 5-40 mL  5-40 mL IntraVENous Q8H  
 insulin lispro (HUMALOG) injection   SubCUTAneous Q6H  
 piperacillin-tazobactam (ZOSYN) 4.5 g in 0.9% sodium chloride (MBP/ADV) 100 mL MBP  4.5 g IntraVENous Q8H  
 enoxaparin (LOVENOX) injection 90 mg  1 mg/kg SubCUTAneous Q12H  
 doxycycline (VIBRAMYCIN) 100 mg in 0.9% sodium chloride (MBP/ADV) 100 mL MBP  100 mg IntraVENous Q12H  pantoprazole (PROTONIX) 40 mg in 0.9% sodium chloride 10 mL injection  40 mg IntraVENous DAILY  budesonide (PULMICORT) 500 mcg/2 ml nebulizer suspension  500 mcg Nebulization BID RT  
 vancomycin (VANCOCIN) 1,500 mg in 0.9% sodium chloride 500 mL (VIAL-MATE)_  1,500 mg IntraVENous Q24H Objective:  
Vital Signs:   
Visit Vitals BP (!) 143/91 Pulse (!) 102 Temp 98 °F (36.7 °C) Resp 30 Ht 6' (1.829 m) Wt 84.7 kg (186 lb 11.7 oz) SpO2 (!) 89% BMI 25.33 kg/m² O2 Device: Nasal cannula O2 Flow Rate (L/min): 5 l/min Temp (24hrs), Av °F (36.7 °C), Min:97.7 °F (36.5 °C), Max:98.2 °F (36.8 °C) Intake/Output:  
Last shift:      701 -  190 In: -  
Out: 225 [Urine:225] Last 3 shifts:  1901 -  0700 In: -  
Out: [address] [[id number]] Intake/Output Summary (Last 24 hours) at 2020 1236 Last data filed at 2020 0800 Gross per 24 hour Intake  Output 700 ml Net -700 ml Last 3 Recorded Weights in this Encounter 11/03/20 1331 11/03/20 1336 11/03/20 2000 Weight: 87.5 kg (193 lb) 87.5 kg (193 lb) 84.7 kg (186 lb 11.7 oz) Recent Labs 11/05/20 
7687 11/03/20 
0245 PHI 7.46* 7.51* PCO2I 37.2 35.2 PO2I 62* 97 HCO3I 26.5* 27.8*  
FIO2I 40 1.0 Physical Exam:  
 
Impresson general : Alert x1. Not in distress. Neuro awake not in distress feeling better. No complaints today. Head:   Normocephalic,atraumatic. ENT:   EOM , no scleral icterus, no pallor, no cyanosis. Nose:   No sinus tenderness Throat:  Oropharynx ,mucosa, and tongue normal. No oral thrush. Neck:   Supple, symmetric. Lymph nodes. Trachea is midline Lung:   Bilateral rhales right more than left mild wheezing, poor airway entry, Heart:   irRegular  rate rate fibrillation & rhythm. Murmur 2 out of 3 at the apex no murmur. No JVD. Abdomen:  Soft. NT. ND. +BS. No masses. liver  and spleen Extremities:  No pedal edema. No cyanosis. No clubbing. Pulses: 2+ and symmetric in DP. Capillary refill: normal 
Lymphatic:  neck and supraclavicular Musculoskeletal: No joint swelling. No tenderness. Skin:   Lesion Color, texture, turgor normal. No rashes or lesions. Data:  
   
Recent Results (from the past 24 hour(s)) GLUCOSE, POC Collection Time: 11/04/20  5:43 PM  
Result Value Ref Range Glucose (POC) 242 (H) 70 - 110 mg/dL GLUCOSE, POC Collection Time: 11/04/20 11:56 PM  
Result Value Ref Range Glucose (POC) 218 (H) 70 - 110 mg/dL METABOLIC PANEL, COMPREHENSIVE Collection Time: 11/05/20  4:52 AM  
Result Value Ref Range Sodium 144 136 - 145 mmol/L Potassium 3.3 (L) 3.5 - 5.5 mmol/L Chloride 108 100 - 111 mmol/L  
 CO2 28 21 - 32 mmol/L Anion gap 8 3.0 - 18 mmol/L Glucose 121 (H) 74 - 99 mg/dL BUN 23 (H) 7.0 - 18 MG/DL Creatinine 1.15 0.6 - 1.3 MG/DL  
 BUN/Creatinine ratio 20 12 - 20 GFR est AA >60 >60 ml/min/1.73m2 GFR est non-AA >60 >60 ml/min/1.73m2 Calcium 8.1 (L) 8.5 - 10.1 MG/DL Bilirubin, total 0.4 0.2 - 1.0 MG/DL  
 ALT (SGPT) 14 (L) 16 - 61 U/L  
 AST (SGOT) 17 10 - 38 U/L Alk. phosphatase 162 (H) 45 - 117 U/L Protein, total 5.4 (L) 6.4 - 8.2 g/dL Albumin 1.8 (L) 3.4 - 5.0 g/dL Globulin 3.6 2.0 - 4.0 g/dL A-G Ratio 0.5 (L) 0.8 - 1.7    
CBC W/O DIFF Collection Time: 11/05/20  4:52 AM  
Result Value Ref Range WBC 17.4 (H) 4.6 - 13.2 K/uL  
 RBC 3.04 (L) 4.70 - 5.50 M/uL HGB 8.7 (L) 13.0 - 16.0 g/dL HCT 27.7 (L) 36.0 - 48.0 % MCV 91.1 74.0 - 97.0 FL  
 MCH 28.6 24.0 - 34.0 PG  
 MCHC 31.4 31.0 - 37.0 g/dL  
 RDW 17.8 (H) 11.6 - 14.5 % PLATELET 944 (H) 019 - 420 K/uL MPV 9.3 9.2 - 11.8 FL  
CALCIUM, IONIZED Collection Time: 11/05/20  4:52 AM  
Result Value Ref Range Ionized Calcium 1.11 (L) 1.12 - 1.32 MMOL/L  
PHOSPHORUS Collection Time: 11/05/20  4:52 AM  
Result Value Ref Range Phosphorus 2.1 (L) 2.5 - 4.9 MG/DL MAGNESIUM Collection Time: 11/05/20  4:52 AM  
Result Value Ref Range Magnesium 2.0 1.6 - 2.6 mg/dL GLUCOSE, POC Collection Time: 11/05/20  6:13 AM  
Result Value Ref Range Glucose (POC) 123 (H) 70 - 110 mg/dL POC G3 Collection Time: 11/05/20  9:29 AM  
Result Value Ref Range Device: NASAL CANNULA Flow rate (POC) 5.0 L/M  
 FIO2 (POC) 40 % pH (POC) 7.46 (H) 7.35 - 7.45    
 pCO2 (POC) 37.2 35.0 - 45.0 MMHG  
 pO2 (POC) 62 (L) 80 - 100 MMHG  
 HCO3 (POC) 26.5 (H) 22 - 26 MMOL/L  
 sO2 (POC) 93 92 - 97 % Base excess (POC) 3 mmol/L Allens test (POC) YES Total resp. rate 20 Site LEFT RADIAL Patient temp. 98.6 Specimen type (POC) ARTERIAL Performed by Mena Ocampo GLUCOSE, POC Collection Time: 11/05/20 12:07 PM  
Result Value Ref Range Glucose (POC) 120 (H) 70 - 110 mg/dL Chemistry Recent Labs 11/05/20 
7833 11/04/20 
0645 11/03/20 
0235 * 196* 168*  140 136 K 3.3* 3.8 3.8  105 101 CO2 28 26 29 BUN 23* 17 13 CREA 1.15 1.10 0.99 CA 8.1* 7.7* 7.9*  
MG 2.0 2.1  --   
PHOS 2.1*  --   --   
AGAP 8 9 6 BUCR 20 15 13 * 150* 154* TP 5.4* 5.3* 5.5* ALB 1.8* 1.8* 1.7*  
GLOB 3.6 3.5 3.8 AGRAT 0.5* 0.5* 0.4* Lactic Acid Lactic acid Date Value Ref Range Status 11/03/2020 1.2 0.4 - 2.0 MMOL/L Final  
 
Recent Labs 11/03/20 
1645 11/03/20 
0235 LAC 1.2 1.4 Liver Enzymes Protein, total  
Date Value Ref Range Status 11/05/2020 5.4 (L) 6.4 - 8.2 g/dL Final  
 
Albumin Date Value Ref Range Status 11/05/2020 1.8 (L) 3.4 - 5.0 g/dL Final  
 
Globulin Date Value Ref Range Status 11/05/2020 3.6 2.0 - 4.0 g/dL Final  
 
A-G Ratio Date Value Ref Range Status 11/05/2020 0.5 (L) 0.8 - 1.7   Final  
 
Alk. phosphatase Date Value Ref Range Status 11/05/2020 162 (H) 45 - 117 U/L Final  
 
Recent Labs 11/05/20 
3407 11/04/20 
0645 11/03/20 
0235 TP 5.4* 5.3* 5.5* ALB 1.8* 1.8* 1.7*  
GLOB 3.6 3.5 3.8 AGRAT 0.5* 0.5* 0.4* * 150* 154* CBC w/Diff Recent Labs 11/05/20 
3620 11/04/20 
0645 11/03/20 
1645 11/03/20 
0235 WBC 17.4* 12.4 12.9 20.0*  
RBC 3.04* 2.93* 3.06* 3.26* HGB 8.7* 8.1* 8.6* 9.3* HCT 27.7* 26.6* 27.6* 28.9*  
* 379 404 503* GRANS  --   --  92* 83* LYMPH  --   --  3* 8* EOS  --   --  1 1 Cardiac Enzymes No results found for: CPK, CK, CKMMB, CKMB, RCK3, CKMBT, CKNDX, CKND1, ABBY, TROPT, TROIQ, KESHA, TROPT, TNIPOC, BNP, BNPP  
 
BNP No results found for: BNP, BNPP, XBNPT Coagulation No results for input(s): PTP, INR, APTT, INREXT, INREXT in the last 72 hours. Thyroid  Lab Results Component Value Date/Time TSH 2.61 10/25/2020 02:20 AM  
   
No results found for: T4  
 
Urinalysis Lab Results Component Value Date/Time  Color YELLOW 11/03/2020 03:28 AM  
 Appearance CLOUDY 11/03/2020 03:28 AM  
 Specific gravity 1.016 11/03/2020 03:28 AM  
 pH (UA) 8.0 11/03/2020 03:28 AM  
 Protein 100 (A) 11/03/2020 03:28 AM  
 Glucose 250 (A) 11/03/2020 03:28 AM  
 Ketone 15 (A) 11/03/2020 03:28 AM  
 Bilirubin Negative 11/03/2020 03:28 AM  
 Urobilinogen 1.0 11/03/2020 03:28 AM  
 Nitrites Negative 11/03/2020 03:28 AM  
 Leukocyte Esterase Negative 11/03/2020 03:28 AM  
 Epithelial cells Negative 11/03/2020 03:28 AM  
 Bacteria 1+ (A) 11/03/2020 03:28 AM  
 WBC Negative 11/03/2020 03:28 AM  
 RBC Negative 11/03/2020 03:28 AM  
  
 
Micro  Recent Labs 11/03/20 0252 11/03/20 0235 CULT NO GROWTH 2 DAYS NO GROWTH 2 DAYS Recent Labs 11/03/20 0252 11/03/20 0235 CULT NO GROWTH 2 DAYS NO GROWTH 2 DAYS Culture data during this hospitalization. All Micro Results Procedure Component Value Units Date/Time CULTURE, BLOOD [[id number]] Collected:  11/03/20 0252 Order Status:  Completed Specimen:  Blood Updated:  11/05/20 0810 Special Requests: NO SPECIAL REQUESTS Culture result: NO GROWTH 2 DAYS     
 CULTURE, BLOOD [[id number]] Collected:  11/03/20 0235 Order Status:  Completed Specimen:  Blood Updated:  11/05/20 0810 Special Requests: NO SPECIAL REQUESTS Culture result: NO GROWTH 2 DAYS     
 CULTURE, RESPIRATORY/SPUTUM/BRONCH Elgie Esteban STAIN [[id number]] Order Status:  Sent Specimen:  Sputum INFLUENZA A & B AG (RAPID TEST) [[id number]] Collected:  11/03/20 0250 Order Status:  Completed Specimen:  Nasopharyngeal from Nasal washing Updated:  11/03/20 0330 Influenza A Antigen Negative Comment: A negative result does not exclude influenza virus infection, seasonal or H1N1 due to suboptimal sensitivity. If influenza is circulating in your community, a diagnosis of influenza should be considered based on a patients clinical presentation and empiric antiviral treatment should be considered, if indicated. Influenza B Antigen Negative PFT Ultrasound LE Doppler ECHO    
 
Images report reviewed by me: 
CT (Most Recent) (CT chest reviewed by me) Results from [facility] [place] encounter on 10/23/20 CT FEMUR RT WO CONT Narrative EXAM: CT FEMUR RT WO CONT INDICATION: right hip fracture, recent right knee repair COMPARISON: None. CONTRAST: None. TECHNIQUE:  
Multislice helical CT the right femur was performed without intravenous contrast 
administration. Coronal and sagittal reformations were generated. CT dose 
reduction was achieved through use of a standardized protocol tailored for this 
examination and automatic exposure control for dose modulation. Digital imaging 
and communications in Medicine (DICOM) format image data are available to 
nonaffiliated external healthcare facilities or entities on a secure, media 
free, reciprocally searchable basis with patient authorization for at least 12 
months after this study. FINDING: 
 
There is a comminuted displaced right intertrochanteric hip fracture. There is a 
comminuted mildly displaced fracture of the distal femur extending into the 
metaphyseal region. A knee prosthesis is noted in place. There is a mid to 
distal femoral jumana with multiple screws in place. The soft tissues are 
unremarkable. The visualized intrapelvic structures are unremarkable. Impression IMPRESSION: Displaced comminuted right intertrochanteric fracture and mildly 
displaced comminuted distal femoral fracture. CXR reviewed by me: 
XR (Most Recent). CXR  reviewed by me and compared with previous CXR Results from [facility] [place] encounter on 11/03/20 XR CHEST PORT Narrative EXAM: One-view chest 
 
CLINICAL HISTORY: hypoxia , 
 
COMPARISON: None FINDINGS: 
 
Frontal view of the chest demonstrate patchy bilateral airspace disease, perhaps 
slightly improved. Cardiac silhouette is stable in size and contour. No acute 
bony or soft tissue abnormality.  
  
 Impression IMPRESSION: 
 
 Patchy bilateral nonspecific airspace disease appears slightly improved since 
prior examination. Probable trace pleural effusions. Mao Gomez MD 
11/5/2020

## 2020-11-05 NOTE — PROGRESS NOTES
TRANSFER - OUT REPORT: 
 
Verbal report given to Janette CHOI (name) on Whitney Beltran.  being transferred to Telemetry(unit) for routine progression of care Report consisted of patients Situation, Background, Assessment and  
Recommendations(SBAR). Information from the following report(s) SBAR and Kardex was reviewed with the receiving nurse. Lines:  
Peripheral IV 11/04/20 Anterior; Left Wrist (Active) Site Assessment Clean, dry, & intact 11/05/20 0043 Phlebitis Assessment 0 11/05/20 0043 Infiltration Assessment 0 11/05/20 0043 Dressing Status Clean, dry, & intact 11/05/20 0043 Dressing Type Tape;Transparent 11/05/20 0043 Hub Color/Line Status Pink; Infusing;Flushed 11/05/20 0043 Alcohol Cap Used Yes 11/04/20 2016 Peripheral IV 11/03/20 Anterior;Distal;Right Forearm (Active) Site Assessment Clean, dry, & intact 11/05/20 0043 Phlebitis Assessment 0 11/05/20 0043 Infiltration Assessment 0 11/05/20 0043 Dressing Status Clean, dry, & intact 11/05/20 0043 Dressing Type Tape;Transparent 11/05/20 0043 Hub Color/Line Status Pink;Flushed; Infusing 11/05/20 0043 Alcohol Cap Used Yes 11/05/20 0043 Opportunity for questions and clarification was provided. Patient transported with: 
 O2 @ 5 liters

## 2020-11-05 NOTE — PROGRESS NOTES
Palliative Medicine Consult Patient Name: Artem Sommer. YOB: 1935 Date of Initial Consult: 11/3/2020; follow up: 11/5/2020 Reason for Consult: Goals of care discussions Requesting Provider: Jonathan Hayes MD 
Primary Care Physician: Praveena Simon MD 
  
 SUMMARY:  
Artem Sommer. is a 82 y.o. with a past history of DM2, HTN, PAF, diastolic CHF, dementia, and closed right hip fracture, who was admitted on 11/3/2020 from SNF with a diagnosis of  Acute hypoxemic respiratory failure, recurrent pneumonia, and suspect aspiration. Current medical issues leading to Palliative Medicine involvement include: support and care decisions. 11/4/2020: Patient lying in bed on HFNC. Complains of ice pack leaking onto bed linen. RN notified. Patient denies any pain, nausea or shortness of breath. He was oriented to self, place and year but not situation. After brief time spent in his room, patient started falling asleep with questions. 11/5/2020: This NP met with patient at bedside in the ICU this morning. He denied pain, nausea and shortness of breath. Complained of being cold. Discussed zoom meeting that is to be held later this afternoon. PALLIATIVE DIAGNOSES:  
1. Goals of care discussions 2. Acute hypoxic respiratory failure 3. Dementia 4. Debility PLAN:  
 
11/5/2020:  Multiple telephone calls today from patient's wife and stepdaughter. Zoom meeting information was not sending properly. Patient moved this afternoon to a medical unit bed. Notified family of the change and instructed they may have one visitor per day. Patient's wife is very [address] and recently fell herself and is recuperating after rehab. Requested special permission for Raul Godfrey, to accompany and supervise her mother while visiting today. They are aware of only one visitor per day and will be having patient's sister visit tomorrow.  MBS has not been completed as of this entry. Will still need POST form completed. GOALS OF CARE: DNR/DNI, no intubation for respiratory distress. See previous notes shown below: 
 
11/4/2020: This NP met with patient at bedside. Patient continues to require oxygen supplementation with HFNC. COVID testing negative. Will need swallow evaluation. Telephone call placed to wife at [phone] and received voicemail. Left message requesting call back. ADDENDUM: Family faxed updated AMD to hospital and copy has been placed on chart. Patient named his wife, [place] as primary MPOA and his daughter, Kristine Marrufo, back up MPOA. Telephone call placed to patient's step-daughter, Bailey Stevenson ([phone]), where patient's wife is staying and recuperating from a recent fall. [place] is hard of hearing so discussion took place between [place], Bob aguilar and myself via speakerphone. Discussed benefits and risks of CPR in the event of cardiopulmonary arrest and intubation for respiratory distress. [place] stated that \"Art\" would not want CPR nor intubation. This news surprised Bob aguilar and she was teary but accepting. Bob aguilar pointed out that Arturo and his daughter, Dean Aldridge, have a tempestuous relationship and he gets upset and would not want her involved in his medical records. Instructed Crystal on visitation limitations and have set up zoom meeting for the family tomorrow (11/5) at 3:30. Introduced the topic of feeding tubes as something for them to consider if patient does not pass MBS. The family was upset that they were unaware of possible pneumonia diagnosis with his last admission. I explained it was treated with IV antibiotics but this is a recurrence possibly related to aspiration. Will proceed with obtaining POST form when MBS is completed. GOALS OF CARE: DNR/DNI, no intubation for respiratory distress. Continue current level of care. JIMBO Cadena, Dr. Cassandra Mojica and Dr. Linette Nam notified of change in code status. 11/3/2020 1. Goals of care discussions: In efforts to conserve PPE and limit disease exposure, assessed patient through glass window where he appeared to be sleeping on HFNC. NAD noted. Of note, he was weaned down from HFNC to 5 lpm via NC per chart review today. AMD on file naming patient's wife Ly Neff as MPOA. Called patient's wife; unfortunately wife did not answer our call but I was able to leave a VM. Awaiting return phone call to discuss goals of care. At this time, continue full code with full interventions. 2.  Acute hypoxic respiratory failure: Recurrent pneumonia. Awaiting SLP consult for highly suspected aspiration. Was on HFNC during assessment but has since been weaned down to 5 lpm via NC. COVID 19 PUI but result now negative. May potentially need a discussion regarding the benefits and burdens of PEG tube versus comfort feeds. Awaiting SLP recs. 3.  Dementia: Oriented to self at baseline. Did not disturb patient as he was resting comfortably. 4.  Debility: Requires assist with all ADLs at baseline. 5.  Initial consult note routed to primary continuity provider 6. Communicated plan of care with: Palliative IDT 
 
 
 GOALS OF CARE / TREATMENT PREFERENCES:  
[====Goals of Care====] GOALS OF CARE: Full code with full interventions Patient/Health Care Proxy Stated Goals: Prolong life TREATMENT PREFERENCES:  
Code Status: DNR/DNI Advance Care Planning: 
Advance Care Planning 10/24/2020 Patient's Healthcare Decision Maker is: -  
Primary Decision Maker Name -  
Primary Decision Maker Phone Number -  
Primary Decision Maker Relationship to Patient - Secondary Decision Maker Name - Secondary Decision Maker Phone Number - Secondary Decision Maker Relationship to Patient -  
Confirm Advance Directive Yes, on file Patient Would Like to Complete Advance Directive - Does the patient have other document types - Medical Interventions: Limited additional interventions The palliative care team has discussed with patient / health care proxy about goals of care / treatment preferences for patient. 
[====Goals of Care====] HISTORY:  
 
History obtained from: patient's chart CHIEF COMPLAINT: cold hands HPI/SUBJECTIVE: The patient is:  
[x] Verbal and participatory [] Non-participatory due to:  
See summary Clinical Pain Assessment (nonverbal scale for severity on nonverbal patients): Adult Nonverbal Pain Scale Face: No particular expression or smile Activity (Movement): Lying quietly, normal position Guarding: Lying quietly, no positioning of hands over areas of body Physiology (Vital Signs): Stable vital signs Respiratory: Baseline RR/SpO2 compliant with ventilator Total Score: 0 
 
 
 FUNCTIONAL ASSESSMENT:  
 
Palliative Performance Scale (PPS): PPS: 40 PSYCHOSOCIAL/SPIRITUAL SCREENING:  
 
Advance Care Planning: 
Advance Care Planning 10/24/2020 Patient's Healthcare Decision Maker is: -  
Primary Decision Maker Name -  
Primary Decision Maker Phone Number -  
Primary Decision Maker Relationship to Patient - Secondary Decision Maker Name - Secondary Decision Maker Phone Number - Secondary Decision Maker Relationship to Patient -  
Confirm Advance Directive Yes, on file Patient Would Like to Complete Advance Directive - Does the patient have other document types - Any spiritual / Denominational concerns: unable to assess 
[] Yes /  [] No 
 
Caregiver Burnout: 
[] Yes /  [] No /  [x] No Caregiver Present Anticipatory grief assessment:  Unable to assess 
[] Normal  / [] Maladaptive REVIEW OF SYSTEMS:  
 
Positive and pertinent negative findings in ROS are noted above in HPI. The following systems were [x] reviewed / [] unable to be reviewed as noted in HPI Other findings are noted below.  
Systems: constitutional, ears/nose/mouth/throat, respiratory, gastrointestinal, genitourinary, musculoskeletal, integumentary, neurologic, psychiatric, endocrine. Positive findings noted below. Modified ESAS Completed by: provider Nausea: 0 Dyspnea: 0 Stool Occurrence(s): 1 PHYSICAL EXAM:  
 
From RN flowsheet: 
Wt Readings from Last 3 Encounters:  
11/03/20 84.7 kg (186 lb 11.7 oz) 11/01/20 87 kg (191 lb 12.8 oz) 09/11/20 88 kg (194 lb) Blood pressure (!) 145/76, pulse (!) 103, temperature 97.6 °F (36.4 °C), resp. rate 20, height 6' (1.829 m), weight 84.7 kg (186 lb 11.7 oz), SpO2 96 %. Pain Scale 1: Numeric (0 - 10) Pain Intensity 1: 0 Constitutional: Elderly, pale gentleman lying in bed in NAD Eyes: pupils equal, anicteric Respiratory: breathing unlabored, on oxygen per nasal cannula. Neurologic: oriented X 2 self and place Musculoskeletal: Moving all extremities; no deformities HISTORY:  
 
Principal Problem: Hypoxia (10/24/2020) Active Problems: Hypertension () Diabetes mellitus ([address] (10/6/2011) Paroxysmal atrial fibrillation ([address] (2/27/2015) Pneumonia (11/16/2016) Dementia without behavioral disturbance ([address] (10/24/2020) Anticoagulated (11/3/2020) Debility (11/3/2020) Sepsis ([address] (11/3/2020) Elevated troponin (11/3/2020) Acute hypoxemic respiratory failure ([address] (11/3/2020) Aspiration into airway (11/3/2020) Past Medical History:  
Diagnosis Date  Anticoagulant long-term use  Arthritis  Atrial fibrillation ([address]  Atrial fibrillation ([address]  Dementia ([address]  Depression  Depression  Diabetes ([address] 1980s  Fracture, femur ([address]  GERD (gastroesophageal reflux disease)  Heart failure ([address]  Hypercholesterolemia  Hyperlipemia  Hypertension 2000  Hypoxia  Ill-defined condition 2015  
 has passed out-has been tested cannot determine cause  Osteoarthritis   
 of knees  Pneumonia  Primary osteoarthritis of right knee 6/28/2018  Ulcer   
 at the anastomotic bite of gastric bypass  Varicose veins  Weakness  Wide-complex tachycardia ([address] 2015 Past Surgical History:  
Procedure Laterality Date  ABDOMEN SURGERY PROC UNLISTED    
 umb hernia Rupture  BIOPSY LIVER  10/05/04  
 EGD  09  
 with biopsy  ENDOSCOPY, COLON, DIAGNOSTIC    
 GASTROSTOMY TUBE  10/05/04  HX CATARACT REMOVAL    
 bilateral  
 HX CHOLECYSTECTOMY  10/05/04  HX GI  10/05/04  
 vertical banded gastroplasty/gastric bypass  HX KNEE REPLACEMENT    
 HX MOHS PROCEDURES    
 bilateral  
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   
     lung  Diabetes Mother  Other Brother   
     obese  Diabetes Brother  Diabetes Brother  Other Maternal Grandmother   
     obese  Diabetes Sister History reviewed, no pertinent family history. Social History Tobacco Use  Smoking status: Former Smoker Last attempt to quit: 4/3/1967 Years since quittin.6  Smokeless tobacco: Never Used Substance Use Topics  Alcohol use: Yes Alcohol/week: 4.0 standard drinks Types: 3 Glasses of wine, 1 Shots of liquor per week Comment: monthly Allergies Allergen Reactions  Morphine Other (comments) Hallucinate; \"ran all night long\" Current Facility-Administered Medications Medication Dose Route Frequency  ELECTROLYTE REPLACEMENT PROTOCOL - Potassium Standard Dosing   1 Each Other PRN  
 calcium gluconate 2 g/100 mL sodium chloride (ISO-OSM)  2 g IntraVENous ONCE  
 [START ON 2020] Vancomycin trough due 0330 20  1 Each Other ONCE  
 ipratropium-albuterol (COMBIVENT RESPIMAT) 20 mcg-100 mcg inhalation spray  2 Puff Inhalation TID  buPROPion [facility] FOR [facility]) tablet 75 mg  75 mg Oral BID  
  dilTIAZem IR (CARDIZEM) tablet 60 mg  60 mg Oral QID  ARIPiprazole (ABILIFY) tablet 2 mg  2 mg Oral DAILY  cycloSPORINE (RESTASIS) 0.05 % ophthalmic emulsion 1 Drop  1 Drop Left Eye PRN  
 donepeziL (ARICEPT) tablet 5 mg  5 mg Oral QHS  ferrous sulfate tablet 325 mg  325 mg Oral BID WITH MEALS  insulin glargine (LANTUS) injection 15 Units  15 Units SubCUTAneous QHS  metoprolol tartrate (LOPRESSOR) tablet 75 mg  75 mg Oral Q12H  
 oxybutynin (DITROPAN) tablet 5 mg  5 mg Oral QHS  sodium chloride (NS) flush 5-10 mL  5-10 mL IntraVENous PRN  Vancomycin-dosed by pharmacy  1 Each Other Rx Dosing/Monitoring  sodium chloride (NS) flush 5-40 mL  5-40 mL IntraVENous Q8H  
 sodium chloride (NS) flush 5-40 mL  5-40 mL IntraVENous PRN  
 acetaminophen (TYLENOL) tablet 650 mg  650 mg Oral Q6H PRN Or  
 acetaminophen (TYLENOL) suppository 650 mg  650 mg Rectal Q6H PRN  polyethylene glycol (MIRALAX) packet 17 g  17 g Oral DAILY PRN  promethazine (PHENERGAN) tablet 12.5 mg  12.5 mg Oral Q6H PRN Or  
 ondansetron (ZOFRAN) injection 4 mg  4 mg IntraVENous Q6H PRN  
 insulin lispro (HUMALOG) injection   SubCUTAneous Q6H  
 glucose chewable tablet 16 g  16 g Oral PRN  
 glucagon (GLUCAGEN) injection 1 mg  1 mg IntraMUSCular PRN  
 dextrose 10% infusion 125-250 mL  125-250 mL IntraVENous PRN  piperacillin-tazobactam (ZOSYN) 4.5 g in 0.9% sodium chloride (MBP/ADV) 100 mL MBP  4.5 g IntraVENous Q8H  
 enoxaparin (LOVENOX) injection 90 mg  1 mg/kg SubCUTAneous Q12H  
 doxycycline (VIBRAMYCIN) 100 mg in 0.9% sodium chloride (MBP/ADV) 100 mL MBP  100 mg IntraVENous Q12H  pantoprazole (PROTONIX) 40 mg in 0.9% sodium chloride 10 mL injection  40 mg IntraVENous DAILY  budesonide (PULMICORT) 500 mcg/2 ml nebulizer suspension  500 mcg Nebulization BID RT  
 vancomycin (VANCOCIN) 1,500 mg in 0.9% sodium chloride 500 mL (VIAL-MATE)_  1,500 mg IntraVENous Q24H LAB AND IMAGING FINDINGS:  
 
Lab Results Component Value Date/Time WBC 17.4 (H) 11/05/2020 04:52 AM  
 HGB 8.7 (L) 11/05/2020 04:52 AM  
 PLATELET 866 (H) 94/27/7567 04:52 AM  
 
Lab Results Component Value Date/Time Sodium 144 11/05/2020 04:52 AM  
 Potassium 3.3 (L) 11/05/2020 04:52 AM  
 Chloride 108 11/05/2020 04:52 AM  
 CO2 28 11/05/2020 04:52 AM  
 BUN 23 (H) 11/05/2020 04:52 AM  
 Creatinine 1.15 11/05/2020 04:52 AM  
 Calcium 8.1 (L) 11/05/2020 04:52 AM  
 Magnesium 2.0 11/05/2020 04:52 AM  
 Phosphorus 2.1 (L) 11/05/2020 04:52 AM  
  
Lab Results Component Value Date/Time Alk. phosphatase 162 (H) 11/05/2020 04:52 AM  
 Protein, total 5.4 (L) 11/05/2020 04:52 AM  
 Albumin 1.8 (L) 11/05/2020 04:52 AM  
 Globulin 3.6 11/05/2020 04:52 AM  
 
Lab Results Component Value Date/Time INR 1.4 (H) 10/28/2020 12:28 AM  
 Prothrombin time 16.7 (H) 10/28/2020 12:28 AM  
 aPTT 32.2 10/28/2020 12:28 AM  
  
Lab Results Component Value Date/Time Iron 60 12/02/2015 10:39 AM  
 Ferritin 281 10/24/2020 05:25 AM  
  
No results found for: PH, PCO2, PO2 No components found for: [place] Lab Results Component Value Date/Time CK 58 11/03/2020 10:55 PM  
 CK - MB 2.0 11/03/2020 10:55 PM  
  
 
 
   
 
Total time: 25 minutes Counseling/coordination time, spent as noted above > 50% counseling / coordination: 20 minutes with patient and family Prolonged service was provided for  []30 min   []75 min in face to face time in the presence of the patient, spent as noted above. Time Start:  
Time End:  
Note: this can only be billed with 21005 (initial) or 02596 (follow up). If multiple start / stop times, list each separately.

## 2020-11-05 NOTE — PROGRESS NOTES
Comprehensive Nutrition Assessment Type and Reason for Visit: Initial, NPO/clear liquid Nutrition Recommendations/Plan: Other: continue w/POC Nutrition Assessment:  PMH: dementia, arthritis, a fib, depression, DM, GERD, high cholseterol, HTN, ulcer at anastomotic bite of gastric bypass. Pt admitted w/ anticoagulation, debility, spesis, elevated troponin, acute hypoxemic repiratory failure, aspiration into airway, Dementia, 
 
Estimated Daily Nutrient Needs: 
Energy (kcal): 2465; Weight Used for Energy Requirements: Current Protein (g): 93g(1.1g); Weight Used for Protein Requirements: Current Fluid (ml/day): 2465; Weight Used for Fluid Requirements: Current Nutrition Related Findings:  Lab: K 3.3, glucose 121, Ca 8.1, phos 2.1. Med: cardizen CD, ferrous sulfate, lasix, lantus, humalog, lopressor, protonix, KCl, miralax. Pt was disoriented, was unable to get clear diet history. Wounds:   
None Current Nutrition Therapies: DIET NPO With Heather and Veronica Anthropometric Measures: 
· Height:  6' (182.9 cm) · Current Body Wt:  84.7 kg (186 lb 11.7 oz) · Admission Body Wt:  193 lb 12.6 oz   
· Usual Body Wt:  86.2 kg (190 lb) · Ideal Body Wt:  178 lbs:  104.9 % · Adjusted Body Weight:   ; Weight Adjustment for: No adjustment · Adjusted BMI:      
· BMI Category: Overweight (BMI 25.0-29. 9) Nutrition Diagnosis:  
· Inadequate oral intake related to impaired respiratory function as evidenced by intake 0-25% Nutrition Interventions:  
Food and/or Nutrient Delivery: Continue current diet Nutrition Education and Counseling: No recommendations at this time Coordination of Nutrition Care: Continue to monitor while inpatient, Interdisciplinary rounds Goals: 
Encourage PO intake >50% within the next 1-4 days Nutrition Monitoring and Evaluation:  
Behavioral-Environmental Outcomes:   
Food/Nutrient Intake Outcomes: Food and nutrient intake Physical Signs/Symptoms Outcomes: Biochemical data, Nutrition focused physical findings, Skin, Weight, GI status, Nausea/vomiting Discharge Planning: Too soon to determine Electronically signed by Cris De Leon on 11/5/2020 at 8:46 AM

## 2020-11-06 NOTE — PROGRESS NOTES
Problem: Diabetes Self-Management Goal: *Disease process and treatment process Description: Define diabetes and identify own type of diabetes; list 3 options for treating diabetes. Outcome: Progressing Towards Goal 
Goal: *Incorporating nutritional management into lifestyle Description: Describe effect of type, amount and timing of food on blood glucose; list 3 methods for planning meals. Outcome: Progressing Towards Goal 
Goal: *Incorporating physical activity into lifestyle Description: State effect of exercise on blood glucose levels. Outcome: Progressing Towards Goal 
Goal: *Developing strategies to promote health/change behavior Description: Define the ABC's of diabetes; identify appropriate screenings, schedule and personal plan for screenings. Outcome: Progressing Towards Goal 
Goal: *Using medications safely Description: State effect of diabetes medications on diabetes; name diabetes medication taking, action and side effects. Outcome: Progressing Towards Goal 
Goal: *Monitoring blood glucose, interpreting and using results Description: Identify recommended blood glucose targets  and personal targets. Outcome: Progressing Towards Goal 
Goal: *Prevention, detection, treatment of acute complications Description: List symptoms of hyper- and hypoglycemia; describe how to treat low blood sugar and actions for lowering  high blood glucose level. Outcome: Progressing Towards Goal 
Goal: *Prevention, detection and treatment of chronic complications Description: Define the natural course of diabetes and describe the relationship of blood glucose levels to long term complications of diabetes. Outcome: Progressing Towards Goal 
Goal: *Developing strategies to address psychosocial issues Description: Describe feelings about living with diabetes; identify support needed and support network Outcome: Progressing Towards Goal 
Goal: *Insulin pump training Outcome: Progressing Towards Goal 
 Goal: *Sick day guidelines Outcome: Progressing Towards Goal 
Goal: *Patient Specific Goal (EDIT GOAL, INSERT TEXT) Outcome: Progressing Towards Goal 
  
Problem: Patient Education: Go to Patient Education Activity Goal: Patient/Family Education Outcome: Progressing Towards Goal 
  
Problem: Pressure Injury - Risk of 
Goal: *Prevention of pressure injury Description: Document Patrick Scale and appropriate interventions in the flowsheet. Outcome: Progressing Towards Goal 
Note: Pressure Injury Interventions: 
Sensory Interventions: Assess changes in LOC, Avoid rigorous massage over bony prominences, Check visual cues for pain, Keep linens dry and wrinkle-free, Maintain/enhance activity level, Pressure redistribution bed/mattress (bed type), Turn and reposition approx. every two hours (pillows and wedges if needed) Moisture Interventions: Internal/External urinary devices, Check for incontinence Q2 hours and as needed, Maintain skin hydration (lotion/cream), Absorbent underpads Activity Interventions: PT/OT evaluation, Increase time out of bed, Pressure redistribution bed/mattress(bed type) Mobility Interventions: Pressure redistribution bed/mattress (bed type), HOB 30 degrees or less, PT/OT evaluation, Turn and reposition approx. every two hours(pillow and wedges) Nutrition Interventions: Document food/fluid/supplement intake, Offer support with meals,snacks and hydration Friction and Shear Interventions: Lift sheet, Transferring/repositioning devices, HOB 30 degrees or less Problem: Patient Education: Go to Patient Education Activity Goal: Patient/Family Education Outcome: Progressing Towards Goal 
  
Problem: Patient Education: Go to Patient Education Activity Goal: Patient/Family Education Outcome: Progressing Towards Goal 
  
Problem: Falls - Risk of 
Goal: *Absence of Falls Description: Document Rochele Erica Fall Risk and appropriate interventions in the flowsheet. Outcome: Progressing Towards Goal 
Note: Fall Risk Interventions: 
Mobility Interventions: Assess mobility with egress test, Bed/chair exit alarm, Communicate number of staff needed for ambulation/transfer, OT consult for ADLs, Patient to call before getting OOB, PT Consult for mobility concerns, PT Consult for assist device competence, Strengthening exercises (ROM-active/passive), Utilize walker, cane, or other assistive device Mentation Interventions: Adequate sleep, hydration, pain control, Bed/chair exit alarm, Door open when patient unattended, Evaluate medications/consider consulting pharmacy, Increase mobility, More frequent rounding, Reorient patient, Toileting rounds, Update white board Medication Interventions: Teach patient to arise slowly, Patient to call before getting OOB, Evaluate medications/consider consulting pharmacy, Bed/chair exit alarm Elimination Interventions: Bed/chair exit alarm, Call light in reach, Patient to call for help with toileting needs, Toilet paper/wipes in reach, Toileting schedule/hourly rounds, Urinal in reach History of Falls Interventions: Bed/chair exit alarm, Consult care management for discharge planning, Door open when patient unattended, Evaluate medications/consider consulting pharmacy, Investigate reason for fall, Room close to nurse's station, Utilize gait belt for transfer/ambulation, Assess for delayed presentation/identification of injury for 48 hrs (comment for end date), Vital signs minimum Q4HRs X 24 hrs (comment for end date) Problem: Patient Education: Go to Patient Education Activity Goal: Patient/Family Education Outcome: Progressing Towards Goal 
  
Problem: Patient Education: Go to Patient Education Activity Goal: Patient/Family Education Outcome: Progressing Towards Goal 
  
Problem: Patient Education: Go to Patient Education Activity Goal: Patient/Family Education Outcome: Progressing Towards Goal

## 2020-11-06 NOTE — PROGRESS NOTES
Problem: Mobility Impaired (Adult and Pediatric) Goal: *Acute Goals and Plan of Care (Insert Text) Description: Physical Therapy Goals Initiated 11/4/2020 and to be accomplished within 7 day(s) 1. Patient will move from supine <> sit with min assist in prep for out of bed activity and change of position. 2.  Patient will transfer from bed <> chair with min assist via scooting NWB R LE for time up in chair for completion of ADL activity. 3.  Patient will demonstrate performance of HEP for strengthening B LE's for improved functional mobility at discharge. Outcome: Progressing Towards Goal 
PT note: pt now on telemetry unit. Pt wife and daughter present when pt attempted previously. Pt nurse Janette now with pt. Pt confused and somewhat agitated. Pt repositioned in bed with RN and PT  total assist. Pt too confused for attempt to transition to sit EOB. Total A to  move up in bed and left with bed alarm engaged. Will continue as tolerated.  
Cleo Wade, PT

## 2020-11-06 NOTE — PROGRESS NOTES
Family members called the unit back around 6 am this morning. They were updated that the patient passed at 0230. RN will reach out to them regarding  home arrangements.

## 2020-11-06 NOTE — PROGRESS NOTES
1945:  Assumed care for patient, received bedside report from Sterling Castillo RN. Patient is alert to self and has pulled off all clothes and attempting to pull diaz catheter out. Patient pulled leads off from cardiac monitor. Diaz readjusted with relief, leads for cardiac monitor replaced, and patient cleaned up and new gown put on. Patient has no complaints of pain or discomfort at the time. Diaz draining cloudy, yellow urine. Dressing to right hip pulled off by patient. Incision clean and dry with 21 staples, no redness, swelling, or discharge noted. Whiteboard updated, bed at the lowest position with call bell within reach. Bed alarm engaged. 2034:  Entered patient room and patient was pulling at diaz catheter saying \" Only if I can just pull this out. \"  This nurse stopped patient and informed patient of harm he could cause himself, patient expressed understanding. Patient moaning in pain from catheter, diaz repositioned and patient given PRN tylenol. Brief placed on patient. 0045:  Patient in room with clothes off and one leg hanging off the bed. Patient redressed and placed comfortably back in bed. This nurse explained the importance of not to getting out of the bed and to use the call bell, patient expressed understanding. 7453: Informed by monitor tech that HR is in 30's, upon assessment patient lying in bed with eyes and mouth open unresponsive. Sternal rub performed with no repsonse, code blue activated. 0225:  Code blue activated. Patinet DNR and code blue canceled. Dr. Adriana Mays at bedside. 2315:   on unit, attempted to call family with no answer. 9035:  [place] [facility]

## 2020-11-06 NOTE — DISCHARGE SUMMARY
Discharge Summary Patient: Sher Grewal MRN: [id number]  CSN: [id number] YOB: 1935  Age: 82 y.o. Sex: male DOA: 11/3/2020 LOS:  LOS: 3 days   Discharge Date:   
 
Primary Care Provider:  Navid Yeager MD 
 
Admission Diagnoses: Hypoxia [R09.02] Pneumonia [J18.9] Discharge Diagnoses:   
Problem List as of 11/6/2020 Date Reviewed: 11/3/2020 Codes Class Noted - Resolved Anticoagulated ICD-10-CM: Z79.01 
ICD-9-CM: V58.61  11/3/2020 - Present Debility ICD-10-CM: R53.81 ICD-9-CM: 799.3  11/3/2020 - Present Sepsis ([facility] ICD-10-CM: A41.9 ICD-9-CM: 038.9, 995.91  11/3/2020 - Present Elevated troponin ICD-10-CM: R77.8 ICD-9-CM: 790.6  11/3/2020 - Present Acute hypoxemic respiratory failure ([facility] ICD-10-CM: J96.01 
ICD-9-CM: 518.81  11/3/2020 - Present Aspiration into airway ICD-10-CM: T17.908A ICD-9-CM: 934.9  11/3/2020 - Present Fall ICD-10-CM: W19. Nuvia Nic ICD-9-CM: E888.9  10/24/2020 - Present Closed right hip fracture, initial encounter ([facility] ICD-10-CM: S72.001A ICD-9-CM: 820.8  10/24/2020 - Present Dementia without behavioral disturbance (HCC) ICD-10-CM: F03.90 ICD-9-CM: 294.20  10/24/2020 - Present Anticoagulant long-term use ICD-10-CM: Z79.01 
ICD-9-CM: V58.61  10/24/2020 - Present * (Principal) Hypoxia ICD-10-CM: R09.02 
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 
ICD-9-CM: 428.32, 428.0  6/30/2018 - Present Primary osteoarthritis of right knee (Chronic) ICD-10-CM: M17.11 ICD-9-CM: 715.16  6/28/2018 - Present Pneumonia ICD-10-CM: J18.9 ICD-9-CM: 080  2016 - Present Cardiac device in situ ICD-10-CM: Z95.9 ICD-9-CM: V45.00  7/10/2015 - Present Overview Signed 7/10/2015  9:06 AM by Ligia Jimenez MD  
  EP study 7/10/15 without inducible, sustained arrhythmias. S/p Linq subcutaneous loop recorder. Tinnitus ICD-10-CM: H93.19 ICD-9-CM: 388.30  2015 - Present Paroxysmal atrial fibrillation (HCC) ICD-10-CM: I48.0 ICD-9-CM: 427.31  2015 - Present Hyperlipemia ICD-10-CM: E78.5 ICD-9-CM: 272.4  Unknown - Present Depression ICD-10-CM: F32.9 ICD-9-CM: 311  Unknown - Present Varicose veins ICD-10-CM: I83.90 ICD-9-CM: 454.9  Unknown - Present Arthritis ICD-10-CM: M19.90 ICD-9-CM: 716.90  Unknown - Present Diabetes mellitus ([facility] ICD-10-CM: E11.9 ICD-9-CM: 250.00  10/6/2011 - Present Hypertension ICD-10-CM: I10 
ICD-9-CM: 401.9  Unknown - Present RESOLVED: Blurred vision ICD-10-CM: H53.8 ICD-9-CM: 368.8  2017 - 2017 RESOLVED: SOB (shortness of breath) ICD-10-CM: R06.02 
ICD-9-CM: 786.05  2016 - 2018 RESOLVED: Acute exacerbation of CHF (congestive heart failure) (HCC) ICD-10-CM: I50.9 ICD-9-CM: 428.0  2016 - 2018 RESOLVED: Acute renal insufficiency ICD-10-CM: N28.9 ICD-9-CM: 593.9  2016 - 2018 RESOLVED: Blurred vision ICD-10-CM: H53.8 ICD-9-CM: 368.8  2016 - 2016 Discharge Medications:    
Current Discharge Medication List  
  
 
 
Discharge Condition:  Consults: Pulmonary/Critical Care, palliative care, speech pathology PHYSICAL EXAM  
Visit Vitals BP (!) 97/49 (BP 1 Location: Left arm, BP Patient Position: At rest) Pulse 64 Temp 97.3 °F (36.3 °C) Resp 20 Ht 6' (1.829 m) Wt 84.7 kg (186 lb 11.7 oz) SpO2 92% BMI 25.33 kg/m² Death exam performed. Absence of cardiac sounds x1 minute. Absent pupillary and corneal reflexes. Admission HPI : Ellie Montes is a 82 y.o. male who was recently discharged from the hospital with a hip fracture and pneumonia. He was [facility] to [facility] and is alert and oriented x1 at baseline. Per EMS, he was found in his bed with an O2 sat in the 60s. He was transported on a nonrebreather mask and placed on high flow oxygen in the ED. He returned to his baseline on high flow oxygen. History is unobtainable from the patient due to dementia. Hospital Course : He was admitted to the ICU for high flow oxygen and treatment of pneumonia and sepsis. He was determined to be a high aspiration risk by speech pathology, who recommended cautious advancement to a mechanical soft diet after weaning down to nasal cannula oxygen. Palliative care met with his family on several occasions and he was made DNR/DNI. On , he became bradycardic and subsequently  at 0230. Disposition:  Minutes spent on discharge: 20 minutes Labs: Results:  
   
Chemistry Recent Labs 20 
1845 20 
0645 * 196*  140  
K 3.3* 3.8  105 CO2 28 26 BUN 23* 17  
CREA 1.15 1.10 CA 8.1* 7.7* AGAP 8 9 BUCR 20 15 * 150* TP 5.4* 5.3* ALB 1.8* 1.8*  
GLOB 3.6 3.5 AGRAT 0.5* 0.5* CBC w/Diff Recent Labs 20 
8875 20 
0645 20 
1645 WBC 17.4* 12.4 12.9 RBC 3.04* 2.93* 3.06* HGB 8.7* 8.1* 8.6* HCT 27.7* 26.6* 27.6* * 379 404 GRANS  --   --  92* LYMPH  --   --  3* EOS  --   --  1 Cardiac Enzymes Recent Labs 20 
2255 20 
1645 CPK 58 68 CKND1 3.4 3.2 Coagulation No results for input(s): PTP, INR, APTT, INREXT in the last 72 hours. Lipid Panel Lab Results Component Value Date/Time  Cholesterol, total 107 2015 09:15 AM  
 HDL Cholesterol 25 (L) 2015 09:15 AM  
 LDL, calculated 44 2015 09:15 AM  
 VLDL, calculated 38 01/24/2015 09:15 AM  
 Triglyceride 190 (H) 01/24/2015 09:15 AM  
 CHOL/HDL Ratio 4.3 01/24/2015 09:15 AM  
  
BNP No results for input(s): BNPP in the last 72 hours. Liver Enzymes Recent Labs 11/05/20 
0397 TP 5.4* ALB 1.8* * Thyroid Studies Lab Results Component Value Date/Time TSH 2.61 10/25/2020 02:20 AM  
    
 
 
Significant Diagnostic Studies: Xr Swallow Func Video Result Date: 11/5/2020 Modified Barium Swallow History: Feeding difficulties, cough, Dysphagia Technique: The procedure was performed with the speech pathologist. Different consistencies of barium tinged products were given to swallow during real time fluoroscopic observation. Findings: With thin consistency via spoon, pt demonstrated [normal swallowing]. No significant residua in the vallecula and pyriform sinus. With pudding and solid consistency , pt demonstrated [swallow delay without penetration or aspiration]. There was significant residua in the vallecula sinus. Fluoroscopic time: 1.1 minutes Fluoroscopic dose (reference air kerma): 310 mGy Impression: Abnormal modified swallow study as described. Please see full speech pathologist report to follow for discussion of findings and detailed recommendations. Videotape is available for review. Ct Head Wo Cont Result Date: 10/24/2020 EXAM: CT head INDICATION: Fall. Pain. COMPARISON: None. TECHNIQUE: Axial CT imaging of the head was performed without intravenous contrast. Dose reduction techniques used: automated exposure control, adjustment of the mAs and/or kVp according to patient size, and iterative reconstruction techniques. Digital imaging and communications in Medicine (DICOM) format image data are available to nonaffiliated external healthcare facilities or entities on a secure, media free, reciprocally searchable basis with patient authorization for at least 12 months after this study. _______________ FINDINGS: BRAIN AND POSTERIOR FOSSA: There is cerebral volume loss with prominence of the lateral and the third ventricles. The cortical sulci are widened appropriately. The fourth ventricle and basal cisterns are normally outlined. There is mild bilateral periventricular and central white matter diminished attenuation. There is no acute territorial defect or hemorrhage or midline shift. EXTRA-AXIAL SPACES AND MENINGES: There are no abnormal extra-axial fluid collections. CALVARIUM: Intact. SINUSES: Clear. OTHER: None. _______________ IMPRESSION: Cerebral volume loss and mild bilateral periventricular and central white matter diminished attenuation which is nonspecific but likely to represent microvascular disease. No acute intracranial abnormality. Cta Chest W Or W Wo Cont Result Date: 10/24/2020 EXAM: CTA chest INDICATION: Shortness of breath. Abnormal chest x-ray. COMPARISON: June 23, 2020. TECHNIQUE: Axial CT imaging from the thoracic inlet through the diaphragm with intravenous contrast. Coronal and sagittal MIP reformats were generated. Dose reduction techniques used: automated exposure control, adjustment of the mAs and/or kVp according to patient size, and iterative reconstruction techniques. Digital imaging and communications in Medicine (DICOM) format image data are available to nonaffiliated external healthcare facilities or entities on a secure, media free, reciprocally searchable basis with patient authorization for at least 12 months after this study. _______________ FINDINGS: EXAM QUALITY: Adequate. PULMONARY ARTERIES: No evidence of pulmonary embolism. MEDIASTINUM: Normal heart size. No evidence of right heart strain. Aorta is unremarkable. No pericardial effusion. LUNGS: There is diffuse bilateral groundglass infiltrates. PLEURA: There are small bilateral pleural effusions. AIRWAY: Normal. LYMPH NODES: No enlarged nodes.  UPPER ABDOMEN: There is bilateral adrenal gland thickening. OTHER: No acute or aggressive osseous abnormalities identified. _______________ IMPRESSION: 1. No evidence of pulmonary embolism. 2.  Diffuse bilateral groundglass infiltrates. Suspect an atypical pneumonia. Consider Covid-19 disease. 3. Small bilateral pleural effusions. Ct Femur Rt Wo Cont Result Date: 10/24/2020 EXAM: CT FEMUR RT WO CONT INDICATION: right hip fracture, recent right knee repair COMPARISON: None. CONTRAST: None. TECHNIQUE: Multislice helical CT the right femur was performed without intravenous contrast administration. Coronal and sagittal reformations were generated. CT dose reduction was achieved through use of a standardized protocol tailored for this examination and automatic exposure control for dose modulation. Digital imaging and communications in Medicine (DICOM) format image data are available to nonaffiliated external healthcare facilities or entities on a secure, media free, reciprocally searchable basis with patient authorization for at least 12 months after this study. FINDING: There is a comminuted displaced right intertrochanteric hip fracture. There is a comminuted mildly displaced fracture of the distal femur extending into the metaphyseal region. A knee prosthesis is noted in place. There is a mid to distal femoral jumana with multiple screws in place. The soft tissues are unremarkable. The visualized intrapelvic structures are unremarkable. IMPRESSION: Displaced comminuted right intertrochanteric fracture and mildly displaced comminuted distal femoral fracture. Xr Pelv 1 Or 2 V Result Date: 10/24/2020 
--------------------------------------------------------------------------- <<<<<<<<<           [facility]  [facility]           >>>>>>>>> --------------------------------------------------------------------------- CLINICAL HISTORY:Pain. COMPARISON EXAMINATIONS: None.  --- AP pelvic radiograph. The bone mineralization is normal. There is a displaced comminuted right intertrochanteric fracture. No soft tissue mass. ------------- Impression: ------------- Displaced comminuted right intertrochanteric hip fracture. Xr Chest [facility] Result Date: 11/4/2020 EXAM: One-view chest CLINICAL HISTORY: hypoxia , COMPARISON: None FINDINGS: Frontal view of the chest demonstrate patchy bilateral airspace disease, perhaps slightly improved. Cardiac silhouette is stable in size and contour. No acute bony or soft tissue abnormality. IMPRESSION: Patchy bilateral nonspecific airspace disease appears slightly improved since prior examination. Probable trace pleural effusions. Xr Chest [facility] Result Date: 11/3/2020 EXAM: Portable frontal view of the chest. CLINICAL INDICATION/HISTORY: Shortness of breath, hypoxia COMPARISON: 10/30/2020 _______________ FINDINGS: Interval worsening of severe patchy airspace filling process throughout both lungs with mild blunting costophrenic angles and hazy perihilar density. Stable borderline enlarged cardiac silhouette. _______________ IMPRESSION: 1. Interval worsening of bilateral lung consolidations suggesting pneumonia and/or pulmonary edema. Small bilateral pleural effusions. Xr Chest [facility] Result Date: 10/30/2020 EXAM: XR CHEST PORT CLINICAL INDICATION/HISTORY: pneumonia -Additional: None COMPARISON: Radiographs 10/23/2020 TECHNIQUE: Frontal view of the chest _______________ FINDINGS: HEART AND MEDIASTINUM: Normal cardiac size and mediastinal contours. LUNGS AND PLEURAL SPACES: Patchy interstitial and airspace disease is present with bilateral right greater than left distribution. No evidence of pneumothorax. Likely small bilateral pleural effusions. BONY THORAX AND SOFT TISSUES: No acute osseous abnormality. Degenerative changes across the shoulder girdles present bilaterally. _______________ IMPRESSION: Bilateral interstitial and airspace disease appearing similar to prior study with small bilateral pleural effusions. Xr Chest [facility] Result Date: 10/24/2020 
--------------------------------------------------------------------------- <<<<<<<<<           [facility]  [facility]           >>>>>>>>> --------------------------------------------------------------------------- CLINICAL HISTORY:  Fall. Shortness of breath. COMPARISON EXAMINATIONS:  August 18, 2019. ---  SINGLE FRONTAL VIEW OF THE CHEST  --- The cardiomediastinal silhouette is stable. There are new bilateral patchy upper and lower lung field infiltrates. There is no evidence for significant pleural effusion. No significant osseous abnormalities are identified.  -------------- Impression: -------------- Bilateral upper and lower lung field infiltrates. Suspect pneumonia. Consider Covid 19 disease. [place] Xr Technologist Service Result Date: 11/3/2020 See impression. Impression:  Fluoroscopy was provided for this procedure under the supervision and/or direction of the attending provider. ? For further information regarding this procedure, see patient medical record. No results found for this or any previous visit.  
 
 
 
CC: Fran Sawant MD

## 2020-11-06 NOTE — PROGRESS NOTES
Code blue paged overhead but patient was DNR. He went bradycardic into the 30s then was unresponsive. Death exam was performed with absent cardiac sounds for one minute, absent pupillary reflexes, and absent corneal reflexes. Time of death 0230. Attempted to call family members but they were unreachable by telephone. Voicemails left to call [facility].

## 2020-11-06 NOTE — PROGRESS NOTES
Bereavement Note: 
 
 responded to the death of Leobardo Birmingham, who is a 82 y.o., male, the patient's family, see flow sheets for interventions. Date of Death: 2020 Extended Emergency Contact Information Primary Emergency Contact: McgeeDori shipley Address: [address] [facility], [address] [place] [address] [place] Home Phone: [phone] Mobile Phone: [phone] Relation: Spouse Secondary Emergency Contact: Jamee Jeffs Home Phone: [phone] Mobile Phone: [phone] Relation: Daughter YES      NO  UNKNOWN Life Net   []        [x]    [] [employer]   [] [x] [] Medical Examiner  []        [x]  [] Going to [place]  []        [x] [] Autopsy   []        [x]         [] Sympathy Card  [x]        [] Bereavement Materials  [x]        [] Business Card Provided  [x]        []  Home: None at this time Chaplains will continue to follow family and will provide spiritual care as needed. [address] [facility]  [phone] - Office

## 2020-11-09 LAB
BACTERIA SPEC CULT: NORMAL
BACTERIA SPEC CULT: NORMAL
SERVICE CMNT-IMP: NORMAL
SERVICE CMNT-IMP: NORMAL

## 2020-11-11 NOTE — PROGRESS NOTES
Physician Progress Note Madina Merlos 
CSN #:                  [id number] :                       1935 ADMIT DATE:       11/3/2020 2:21 AM 
DISCH DATE:        2020 2:30 AM 
RESPONDING 
PROVIDER #:        Makayla Garland MD 
 
 
 
 
QUERY TEXT: 
 
Pt admitted with pneumonia. Pt noted to have dementia and possible aspiration. If possible, please document in the progress notes and discharge summary if you are evaluating and/or treating any of the following: The medical record reflects the following: 
Risk Factors: 81yo male with dementia, recent R hip surgery Clinical Indicators: presented w/ difficulty breathing and AMS. Initial O2 sats 66% on 2L CXR: Interval worsening of bilateral lung consolidations suggesting pneumonia and/or pulmonary edema. Small bilateral pleural effusions. H&P: Will treat with zosyn and vancomycin due to recent hospitalization. I suspect he may be chronically aspirating so I placed a speech consult for him for swallow evaluation PN 11/3: Possible aspiration-swallow/speech therapy consult 11/3 SLP evaluation: the patient presents with mod-severe pharyngeal dysphagia,pt was coughing with thin liquids. Pt with delayed weak cough following thin liquid, drop in SpO2 from 95% to 92% with nectar and honey thick liquid. . Delayed swallow initiation noted, decreased laryngeal elevation to palpation. Pt is at risk for aspiration d/t incoordination of respiratory-swallow sequence. Recommend continue NPO 
D/C summary:  He was admitted to the ICU for high flow oxygen and treatment of pneumonia and sepsis. He was determined to be a high aspiration risk by speech pathology, who recommended cautious advancement to a mechanical soft diet after weaning down to nasal cannula oxygen Treatment: SLP evaluation, IV Vanco, IV doxycycline, IV  Zosyn Thank you, Asad Erickson RN, BSN, [address] 
[phone] Options provided: 
-- Aspiration pneumonia -- Gram negative pneumonia 
-- Gram positive pneumonia 
-- MRSA pneumonia 
-- MSSA pneumonia -- Bacterial pneumonia 
-- Hypostatic pneumonia 
-- Other - I will add my own diagnosis -- Disagree - Not applicable / Not valid -- Disagree - Clinically unable to determine / Unknown 
-- Refer to Clinical Documentation Reviewer PROVIDER RESPONSE TEXT: 
 
This patient has aspiration pneumonia.  
 
Query created by: Tamera Dwyer on 11/10/2020 2:42 PM 
 
 
Electronically signed by:  Ck Dumont MD 11/11/2020 12:56 AM

## (undated) DEVICE — SOLUTION IRRIG 3000ML 0.9% SOD CHL FLX CONT 0797208] ICU MEDICAL INC]

## (undated) DEVICE — BLADE SAW 1.27X90 MM FOR LG BNE [EZ2513PM62STE] [KOMET MEDICAL]

## (undated) DEVICE — 2.5MM THREADED GUIDE WIRE SPADE POINT 230MM

## (undated) DEVICE — REM POLYHESIVE ADULT PATIENT RETURN ELECTRODE: Brand: VALLEYLAB

## (undated) DEVICE — ADHESIVE SKIN CLOSURE 4X22 CM PREMIERPRO EXOFINFUSION DISP

## (undated) DEVICE — SOL IRRIGATION INJ NACL 0.9% 500ML BTL

## (undated) DEVICE — SOLUTION SURG PREP 26 CC PURPREP

## (undated) DEVICE — STERILE POLYISOPRENE POWDER-FREE SURGICAL GLOVES WITH EMOLLIENT COATING: Brand: PROTEXIS

## (undated) DEVICE — 3M™ TEGADERM™ I.V. SECUREMENT DRESSING, 9525HP, 2-1/2 IN X 2-3/4 IN (6.5 CM X 7 CM), 100/CT 4CT/CASE: Brand: 3M™ TEGADERM™

## (undated) DEVICE — DISPOSABLE TOURNIQUET CUFF SINGLE BLADDER, SINGLE PORT AND QUICK CONNECT CONNECTOR: Brand: COLOR CUFF

## (undated) DEVICE — SHEET,DRAPE,70X85,STERILE: Brand: MEDLINE

## (undated) DEVICE — 3 BONE CEMENT MIXER: Brand: MIXEVAC

## (undated) DEVICE — KENDALL SCD EXPRESS SLEEVES, KNEE LENGTH, MEDIUM: Brand: KENDALL SCD

## (undated) DEVICE — SHEET,DRAPE,40X58,STERILE: Brand: MEDLINE

## (undated) DEVICE — SPONGE GZ W4XL4IN COT 12 PLY TYP VII WVN C FLD DSGN

## (undated) DEVICE — DRESSING FOAM SELF ADH 20X10 CM ABSORBENT MEPILEX BORDER

## (undated) DEVICE — DEVON™ KNEE AND BODY STRAP 60" X 3" (1.5 M X 7.6 CM): Brand: DEVON

## (undated) DEVICE — BANDAGE COBAN 6 IN WND 6INX5YD FOAM

## (undated) DEVICE — GOWN,SIRUS,NONRNF,SETINSLV,2XL,18/CS: Brand: MEDLINE

## (undated) DEVICE — STERILE POLYISOPRENE POWDER-FREE SURGICAL GLOVES: Brand: PROTEXIS

## (undated) DEVICE — MICROSURGICAL INSTRUMENT HYDRODISSECTION CANNULA 27GA, 1.57MM BEND (AKAHOSHI STYLE): Brand: ALCON

## (undated) DEVICE — GARMENT,MEDLINE,DVT,INT,CALF,MED, GEN2: Brand: MEDLINE

## (undated) DEVICE — HANDPIECE SET WITH FAN SPRAY TIP: Brand: INTERPULSE

## (undated) DEVICE — SUTURE MCRYL SZ 2-0 L27IN ABSRB VLT CT-1 L36MM 1/2 CIR TAPR Y339H

## (undated) DEVICE — SUTURE MCRYL SZ 3-0 L27IN ABSRB UD L19MM PS-2 3/8 CIR PRIM Y427H

## (undated) DEVICE — STERILE TETRA-FLEX CF LF, 6IN X 11 YD: Brand: TETRA-FLEX™ CF

## (undated) DEVICE — PACK PROCEDURE SURG ANTR HIP

## (undated) DEVICE — Device

## (undated) DEVICE — NDL SPNE QNCKE 18GX3.5IN LF --

## (undated) DEVICE — (D)PREP SKN CHLRAPRP APPL 26ML -- CONVERT TO ITEM 371833

## (undated) DEVICE — DRSG GZ OIL EMUL CURAD 3X8 --

## (undated) DEVICE — SYR LR LCK 1ML GRAD NSAF 30ML --

## (undated) DEVICE — CANN VISCOFLOW FRM 27G 22MM --

## (undated) DEVICE — 3M™ IOBAN™ 2 ANTIMICROBIAL INCISE DRAPE 6650EZ: Brand: IOBAN™ 2

## (undated) DEVICE — SUTURE MCRYL SZ 2-0 L36IN ABSRB UD L36MM CT-1 1/2 CIR Y945H

## (undated) DEVICE — PACK PROCEDURE SURG TOT KNEE CUST

## (undated) DEVICE — SUT VCRL + 1 36IN CT1 VIO --

## (undated) DEVICE — SUTURE VCRL + SZ 2-0 L18IN ABSRB VLT CT-2 1/2 CIR TAPERCUT VCP726D

## (undated) DEVICE — PAD,ABDOMINAL,5"X9",ST,LF,25/BX: Brand: MEDLINE INDUSTRIES, INC.

## (undated) DEVICE — NEEDLE HYPO 18GA L1.5IN PNK POLYPR HUB S STL THN WALL FILL

## (undated) DEVICE — BIT DRL L145MM DIA3.2MM QUIK CPL W/O STP REUSE

## (undated) DEVICE — SUTURE VCRL + SZ 1 L18IN ABSRB UD L36MM CT-1 1/2 CIR VCP841D

## (undated) DEVICE — CONCISE CEMENT SCULPS KIT: Brand: CONCISE

## (undated) DEVICE — SOL IRR STRL H2O 1500ML BTL --

## (undated) DEVICE — DRESSING,STRATASORB,COMP,ISLAND,4"X4": Brand: MEDLINE

## (undated) DEVICE — GOWN,SIRUS,NONRNF,SETINSLV,XL,20/CS: Brand: MEDLINE

## (undated) DEVICE — BLADE SAW W13XL90MM 1.19MM PARA